# Patient Record
Sex: MALE | Race: WHITE | Employment: OTHER | URBAN - METROPOLITAN AREA
[De-identification: names, ages, dates, MRNs, and addresses within clinical notes are randomized per-mention and may not be internally consistent; named-entity substitution may affect disease eponyms.]

---

## 2017-04-04 PROBLEM — M15.9 PRIMARY OSTEOARTHRITIS INVOLVING MULTIPLE JOINTS: Status: ACTIVE | Noted: 2017-04-04

## 2019-11-26 ENCOUNTER — HOSPITAL ENCOUNTER (OUTPATIENT)
Dept: SURGERY | Age: 79
Discharge: HOME | End: 2019-11-26
Payer: MEDICARE

## 2019-11-26 ENCOUNTER — HOSPITAL ENCOUNTER (OUTPATIENT)
Dept: PHYSICAL THERAPY | Age: 79
Discharge: HOME | End: 2019-11-26
Payer: MEDICARE

## 2019-11-26 ENCOUNTER — HOME HEALTH ADMISSION (OUTPATIENT)
Dept: HOME HEALTH SERVICES | Facility: HOME HEALTH | Age: 79
End: 2019-11-26
Payer: MEDICARE

## 2019-11-26 LAB
ANION GAP SERPL CALC-SCNC: 4 MMOL/L (ref 7–16)
APTT PPP: 30.4 SEC (ref 24.7–39.8)
ATRIAL RATE: 63 BPM
BACTERIA SPEC CULT: NORMAL
BASOPHILS # BLD: 0.1 K/UL (ref 0–0.2)
BASOPHILS NFR BLD: 1 % (ref 0–2)
BUN SERPL-MCNC: 18 MG/DL (ref 8–23)
CALCIUM SERPL-MCNC: 10 MG/DL (ref 8.3–10.4)
CALCULATED P AXIS, ECG09: 45 DEGREES
CALCULATED T AXIS, ECG11: 36 DEGREES
CHLORIDE SERPL-SCNC: 107 MMOL/L (ref 98–107)
CO2 SERPL-SCNC: 29 MMOL/L (ref 21–32)
CREAT SERPL-MCNC: 0.96 MG/DL (ref 0.8–1.5)
DIAGNOSIS, 93000: NORMAL
DIFFERENTIAL METHOD BLD: NORMAL
EOSINOPHIL # BLD: 0.1 K/UL (ref 0–0.8)
EOSINOPHIL NFR BLD: 1 % (ref 0.5–7.8)
ERYTHROCYTE [DISTWIDTH] IN BLOOD BY AUTOMATED COUNT: 14.2 % (ref 11.9–14.6)
EST. AVERAGE GLUCOSE BLD GHB EST-MCNC: 100 MG/DL
GLUCOSE SERPL-MCNC: 85 MG/DL (ref 65–100)
HBA1C MFR BLD: 5.1 %
HCT VFR BLD AUTO: 45.3 % (ref 41.1–50.3)
HGB BLD-MCNC: 14.9 G/DL (ref 13.6–17.2)
IMM GRANULOCYTES # BLD AUTO: 0 K/UL (ref 0–0.5)
IMM GRANULOCYTES NFR BLD AUTO: 0 % (ref 0–5)
INR PPP: 1
LYMPHOCYTES # BLD: 1.4 K/UL (ref 0.5–4.6)
LYMPHOCYTES NFR BLD: 17 % (ref 13–44)
MCH RBC QN AUTO: 29.9 PG (ref 26.1–32.9)
MCHC RBC AUTO-ENTMCNC: 32.9 G/DL (ref 31.4–35)
MCV RBC AUTO: 90.8 FL (ref 79.6–97.8)
MONOCYTES # BLD: 0.8 K/UL (ref 0.1–1.3)
MONOCYTES NFR BLD: 9 % (ref 4–12)
NEUTS SEG # BLD: 6 K/UL (ref 1.7–8.2)
NEUTS SEG NFR BLD: 72 % (ref 43–78)
NRBC # BLD: 0 K/UL (ref 0–0.2)
P-R INTERVAL, ECG05: 192 MS
PLATELET # BLD AUTO: 216 K/UL (ref 150–450)
PMV BLD AUTO: 10.9 FL (ref 9.4–12.3)
POTASSIUM SERPL-SCNC: 4.4 MMOL/L (ref 3.5–5.1)
PROTHROMBIN TIME: 13.4 SEC (ref 11.7–14.5)
Q-T INTERVAL, ECG07: 418 MS
QRS DURATION, ECG06: 134 MS
QTC CALCULATION (BEZET), ECG08: 427 MS
RBC # BLD AUTO: 4.99 M/UL (ref 4.23–5.6)
SERVICE CMNT-IMP: NORMAL
SODIUM SERPL-SCNC: 140 MMOL/L (ref 136–145)
VENTRICULAR RATE, ECG03: 63 BPM
WBC # BLD AUTO: 8.3 K/UL (ref 4.3–11.1)

## 2019-11-26 PROCEDURE — 77030027138 HC INCENT SPIROMETER -A

## 2019-11-26 PROCEDURE — 85730 THROMBOPLASTIN TIME PARTIAL: CPT

## 2019-11-26 PROCEDURE — 80048 BASIC METABOLIC PNL TOTAL CA: CPT

## 2019-11-26 PROCEDURE — 93005 ELECTROCARDIOGRAM TRACING: CPT | Performed by: ANESTHESIOLOGY

## 2019-11-26 PROCEDURE — 87641 MR-STAPH DNA AMP PROBE: CPT

## 2019-11-26 PROCEDURE — 85610 PROTHROMBIN TIME: CPT

## 2019-11-26 PROCEDURE — 36415 COLL VENOUS BLD VENIPUNCTURE: CPT

## 2019-11-26 PROCEDURE — 83036 HEMOGLOBIN GLYCOSYLATED A1C: CPT

## 2019-11-26 PROCEDURE — 97161 PT EVAL LOW COMPLEX 20 MIN: CPT

## 2019-11-26 PROCEDURE — 85025 COMPLETE CBC W/AUTO DIFF WBC: CPT

## 2019-11-26 NOTE — PROGRESS NOTES
SW met with pt in Prehab to discuss Left TKA scheduled for 12/12/19. Pt plans to return home with spouse and HHPT. Pt resides in [facility] and is agreeable to [facility]. [facility] referral completed. Pt states he has a RW, cane and higher toilets. No additional needs or questions identified at this time.    Sander Snowden

## 2019-11-26 NOTE — PERIOP NOTES
Your patient recently had labs drawn during a hospital appointment due to an upcoming surgery. The results are attached. If you have any questions or concerns please reach out to your patient for a follow-up in your office. Please do not respond to this message as my mailbox is not monitored. You may call [phone] with questions or concerns. Recent Results (from the past 12 hour(s))   CBC WITH AUTOMATED DIFF    Collection Time: 11/26/19 11:20 AM   Result Value Ref Range    WBC 8.3 4.3 - 11.1 K/uL    RBC 4.99 4.23 - 5.6 M/uL    HGB 14.9 13.6 - 17.2 g/dL    HCT 45.3 41.1 - 50.3 %    MCV 90.8 79.6 - 97.8 FL    MCH 29.9 26.1 - 32.9 PG    MCHC 32.9 31.4 - 35.0 g/dL    RDW 14.2 11.9 - 14.6 %    PLATELET 866 084 - 885 K/uL    MPV 10.9 9.4 - 12.3 FL    ABSOLUTE NRBC 0.00 0.0 - 0.2 K/uL    DF AUTOMATED      NEUTROPHILS 72 43 - 78 %    LYMPHOCYTES 17 13 - 44 %    MONOCYTES 9 4.0 - 12.0 %    EOSINOPHILS 1 0.5 - 7.8 %    BASOPHILS 1 0.0 - 2.0 %    IMMATURE GRANULOCYTES 0 0.0 - 5.0 %    ABS. NEUTROPHILS 6.0 1.7 - 8.2 K/UL    ABS. LYMPHOCYTES 1.4 0.5 - 4.6 K/UL    ABS. MONOCYTES 0.8 0.1 - 1.3 K/UL    ABS. EOSINOPHILS 0.1 0.0 - 0.8 K/UL    ABS. BASOPHILS 0.1 0.0 - 0.2 K/UL    ABS. IMM.  GRANS. 0.0 0.0 - 0.5 K/UL   HEMOGLOBIN A1C WITH EAG    Collection Time: 11/26/19 11:20 AM   Result Value Ref Range    Hemoglobin A1c 5.1 %    Est. average glucose 498 mg/dL   METABOLIC PANEL, BASIC    Collection Time: 11/26/19 11:20 AM   Result Value Ref Range    Sodium 140 136 - 145 mmol/L    Potassium 4.4 3.5 - 5.1 mmol/L    Chloride 107 98 - 107 mmol/L    CO2 29 21 - 32 mmol/L    Anion gap 4 (L) 7 - 16 mmol/L    Glucose 85 65 - 100 mg/dL    BUN 18 8 - 23 MG/DL    Creatinine 0.96 0.8 - 1.5 MG/DL    GFR est AA >60 >60 ml/min/1.73m2    GFR est non-AA >60 >60 ml/min/1.73m2    Calcium 10.0 8.3 - 10.4 MG/DL   PROTHROMBIN TIME + INR    Collection Time: 11/26/19 11:20 AM   Result Value Ref Range    Prothrombin time 13.4 11.7 - 14.5 sec    INR 1. 0     PTT    Collection Time: 11/26/19 11:20 AM   Result Value Ref Range    aPTT 30.4 24.7 - 39.8 SEC   EKG, 12 LEAD, INITIAL    Collection Time: 11/26/19 12:25 PM   Result Value Ref Range    Ventricular Rate 63 BPM    Atrial Rate 63 BPM    P-R Interval 192 ms    QRS Duration 134 ms    Q-T Interval 418 ms    QTC Calculation (Bezet) 427 ms    Calculated P Axis 45 degrees    Calculated T Axis 36 degrees    Diagnosis       Normal sinus rhythm  Right bundle branch block  Abnormal ECG  No previous ECGs available

## 2019-11-26 NOTE — PERIOP NOTES
PLEASE CONTINUE TAKING ALL PRESCRIPTION MEDICATIONS UP TO THE DAY OF SURGERY. DISCONTINUE all vitamins and supplements 3 weeks prior to surgery. Home Medications to take  the day of surgery      Tylenol if needed           Home Medications   to Hold     Meloxicam - hold for 5 days prior to surgery           Comments                Please do not bring home medications with you on the day of surgery unless otherwise directed by your nurse. If you are instructed to bring home medications, please give them to your nurse as they will be administered by the nursing staff. If you have any questions, please call Bernardo Escobar [phone].

## 2019-11-26 NOTE — PERIOP NOTES
Patient verified name and . Order for consent is found in EHR and matches case posting; patient verified. Type 3 surgery, Joint camp assessment complete. Labs per surgeon: cbc,bmp,pt,ptt,mra swab, HgbA1c ; results (within [facility] protocols)  Labs per anesthesia protocol: included in surgeon's orders. EKG:done today - is within [facility] protocols. MRSA/MSSA swab collected; pharmacy to review and dose antibiotic as appropriate. Hospital approved surgical skin cleanser and instructions to return bottle on DOS given per hospital policy. Patient provided with handouts including Guide to Surgery, Pain Management, Hand Hygiene, Blood Transfusion Education, and [facility] Brochure. Patient answered medical/surgical history questions at their best of ability. All prior to admission medications documented in [facility]. Original medication prescription bottle not visualized during patient appointment. Patient instructed to hold all vitamins 7 days prior to surgery and NSAIDS 5 days prior to surgery. Prescription medications to hold: vitamins and meloxicam    Patient instructed to continue previous medications as prescribed prior to surgery and to take the following medications the day of surgery according to anesthesia guidelines with a small sip of water: Tylenol prn. Patient teach back successful and patient demonstrates knowledge of instruction.

## 2019-11-26 NOTE — PROGRESS NOTES
Owen Campoverde  : 3/0/1953(4 y.o.) [address] at [place] [address].  [phone]       Physical Therapy Prehab Plan of Treatment and Evaluation Summary:2019    ICD-10: Treatment Diagnosis:   · Pain in Left Knee (M25.562)  · Stiffness of Left Knee, Not elsewhere classified (N87.901)  Precautions/Allergies:   Patient has no known allergies. MEDICAL/REFERRING DIAGNOSIS:  Unilateral primary osteoarthritis, left knee [M17.12]  REFERRING PHYSICIAN: Anirudh De La Cruz MD  DATE OF SURGERY: 19    Assessment:   Comments:  H eis here with his spouse. He plans on going home after surgery with her support. He is motivated and  prepared for surgery. He needs to have the R knee replaced next     PROBLEM LIST (Impacting functional limitations):  Mr. Madie Duncan presents with the following left lower extremity(s) problems:  1. Strength  2. Range of Motion  3. Home Exercise Program  4. Pain   INTERVENTIONS PLANNED:  1. Home Exercise Program  2. Educational Discussion      TREATMENT PLAN: Effective Dates: 2019 TO 2019. Frequency/Duration: Patient to continue to perform home exercise program at least twice per day up until his surgery. GOALS: (Goals have been discussed and agreed upon with patient.)  Discharge Goals: Time Frame: 1 Day  1. Patient will demonstrate independence with a home exercise program designed to increase strength, range of motion and pain control to minimize functional deficits and optimize patient for total joint replacement. Rehabilitation Potential For Stated Goals: Good  Regarding Merclopez Cortezjoe Zapata's therapy, I certify that the treatment plan above will be carried out by a therapist or under their direction.   Thank you for this referral,  Donna Calderón, PT               HISTORY:   Present Symptoms:  Pain Intensity 1: 6(at its worst)  Pain Location 1: Knee  Pain Orientation 1: Anterior, Lateral, Left, Medial History of Present Injury/Illness (Reason for Referral):  Medical/Referring Diagnosis: Unilateral primary osteoarthritis, left knee [M17.12]   Past Medical History/Comorbidities:   Mr. Errol Meeks  has a past medical history of HLD (hyperlipidemia) (12/7/2012) and Macular degeneration. Mr. Errol Meeks  has a past surgical history that includes hx colonoscopy (2002) and hx colonoscopy (11-1-13).   Social History/Living Environment:   Home Environment: Private residence  # Steps to Enter: 1  Hand Rails : Left  One/Two Story Residence: One story(2 steps into den with rail)  Living Alone: No  Support Systems: Spouse/Significant Other/Partner  Patient Expects to be Discharged to[de-identified] Private residence  Current DME Used/Available at Home: dominick Collins, [address], [place], straight, Crutches, Commode, bedside  Tub or Shower Type: Tub/Shower combination  Work/Activity:  retired  Dominant Side:  RIGHT  Current Medications:  See Pre-assessment nursing note   Number of Personal Factors/Comorbidities that affect the Plan of Care: 1-2: MODERATE COMPLEXITY   EXAMINATION:   ADLs (Current Functional Status):   Ambulation:  [x] Independent  [] Walk Indoors Only  [] Walk Outdoors  [x] Use Assistive Device  [] Use Wheelchair Only Dressing:  [x] [address] from Someone for:  [] Sock/Shoes  [] Pants  [] Everything   Bathing/Showering:   [x] Independent  [] Requires Assistance from Someone  [] [address]:  [] Routine house and yard work  [] Light Housework Only  [x] None   Observation/Orthostatic Postural Assessment:    Genu varus left, Genu varus right  ROM/Flexibility:   AROM: Within functional limits(R LE)                LLE AROM  L Knee Flexion: 116  L Knee Extension: 10          Strength:   Strength: Generally decreased, functional(R LE 4/5)       LLE Strength  L Hip Flexion: 4  L Knee Extension: 4  L Ankle Dorsiflexion: 4          Functional Mobility:    Sensation: Intact(R LE)    Stand to Sit: Independent  Sit to Stand: Independent  Stand Pivot Transfers: Independent  Distance (ft): 1000 Feet (ft)  Ambulation - Level of Assistance: Modified independent  Assistive Device: Cane, straight  Speed/Marilyn: Pace decreased (<100 feet/min)  Step Length: Right shortened  Stance: Left decreased  Gait Abnormalities: Antalgic          Balance:    Sitting: Intact  Standing: Intact   Body Structures Involved:  1. Bones  2. Joints  3. Muscles Body Functions Affected:  1. Neuromusculoskeletal  2. Movement Related Activities and Participation Affected:  1. General Tasks and Demands  2. Mobility   Number of elements that affect the Plan of Care: 4+: HIGH COMPLEXITY   CLINICAL PRESENTATION:   Presentation: Stable and uncomplicated: LOW COMPLEXITY   CLINICAL DECISION MAKING:   Outcome Measure: Tool Used: Lower Extremity Functional Scale (LEFS)  Score:  Initial: 27/80 Most Recent: X/80 (Date: -- )   Interpretation of Score: 20 questions each scored on a 5 point scale with 0 representing \"extreme difficulty or unable to perform\" and 4 representing \"no difficulty\". The lower the score, the greater the functional disability. 80/80 represents no disability. Minimal detectable change is 9 points. Medical Necessity:   · Mr. Danny Veras is expected to optimize his lower extremity strength and ROM in preparation for joint replacement surgery. Reason for Services/Other Comments:  · Achieve baseline assesment of musculoskeletal system, functional mobility and home environment. , educate in PT HEP in preparation for surgery, educate in hospital plan of care. Use of outcome tool(s) and clinical judgement create a POC that gives a: Clear prediction of patient's progress: LOW COMPLEXITY   TREATMENT:   Treatment/Session Assessment:  Patient was instructed in PT- HEP to increase strength and ROM in LEs. Answered all questions. · Post session pain:  2  · Compliance with Program/Exercises: compliant most of the time.   Total Treatment Duration:  PT Patient Time In/Time Out  Time In: 1030  Time Out: [address], [place]

## 2019-11-27 VITALS
WEIGHT: 213 LBS | DIASTOLIC BLOOD PRESSURE: 82 MMHG | TEMPERATURE: 95.5 F | OXYGEN SATURATION: 98 % | HEIGHT: 70 IN | HEART RATE: 73 BPM | BODY MASS INDEX: 30.49 KG/M2 | SYSTOLIC BLOOD PRESSURE: 129 MMHG

## 2019-11-27 NOTE — PROGRESS NOTES
19 1030   Oxygen Therapy   O2 Sat (%) 97 %   Pulse via Oximetry 81 beats per minute   O2 Device Room air   Pre-Treatment   Breath Sounds Bilateral Clear   Pre FEV1 (liters) 2.5 liters   % Predicted 89   Incentive Spirometry Treatment   Actual Volume (ml) 2500 ml     Initial respiratory Assessment completed with pt. Pt was interviewed and evaluated in Joint camp prior to surgery. Patient ID:  Terri Celaya  [id number]  70 y.o.  1940  Surgeon: Dr. Alphonse Barajas  Date of Surgery: 2019  Procedure: Total Left Knee Arthroplasty  Primary Care Physician: Chata Elmore -[phone]  Specialists:                                  Pt instructed in the use of Incentive Spirometry. Pt instructed to bring Incentive Spirometer back on date of surgery & to start using Is upon return to pt room. Pt taught proper cough technique    History of smokin PPD FOR 15 YEARS                                                      Quit date:       1976    Secondhand smoke:PARENTS      Past procedures with Oxygen desaturation:DENIES    Past Medical History:   Diagnosis Date    HLD (hyperlipidemia) 2012    Macular degeneration     Osteoarthritis of left knee                                                                                                                                                      Respiratory history:DENIES SOB                                                                   Respiratory meds:  DENIES                                       FAMILY PRESENT:            SPOUSE,                                                                                           PAST SLEEP STUDY:                        DENIES  HX OF KENDRA:                                      DENIES                                     KENDRA assessment:                                               SLEEPS ON SIDE                                                        PHYSICAL EXAM   Body mass index is 30.56 kg/m². Visit Vitals  /82   Pulse 73   Temp 95.5 °F (35.3 °C)   Ht 5' 10\" (1.778 m)   Wt 96.6 kg (213 lb)   SpO2 98%   BMI 30.56 kg/m²     Neck circumference:  41    cm    Loud snoring:        YES                                 Witnessed apnea or wakening gasping or choking:,                                                                                                            APNEA    Awakens with headaches:                                                  DENIES    Morning or daytime tiredness/ sleepiness:                                                                                                          TIRED   Dry mouth or sore throat in morning:                                                                                       DENIES    Laws stage:  4    SACS score:14      Stop Bang   STOP-BANG  Does the patient snore loudly (louder than talking or loud enough to be heard through closed doors)?: Yes  Does the patient often feel tired, fatigued, or sleepy during the daytime, even after a \"good\" night's sleep?: Yes  Has anyone ever observed the patient stop breathing during their sleep? : Yes  Does the patient have or are they being treated for high blood pressure?: No  Is the patient's BMI greater than 35?: No  Is your neck circumference greater than 17 inches (Male) or 16 inches (Female)?: No  Is the patient older than 48?: Yes  Is the patient male?: Yes  KENDRA Score: 5  Has the patient been referred to Sleep Medicine?: No  Has the patient previously been diagnosed with Obstructive Sleep Apnea?: No                            CPAP:                       NONE                                                CONT SAT HS            Referrals:  [facility]  Pt.  Phone Number:

## 2019-12-09 NOTE — H&P
H&P    Patient ID:  Rebecca Flores  [id number]  38 y.o.  1940  Surgeon:  Donna Parada MD  Date of Surgery: * No surgery date entered *  Procedure: Left Knee Total Arthroplasty  Primary Care Physician: Elizabeth Cortes MD        Subjective:  Rebecca Flores is a [de-identified] y.o. WHITE OR  male who presents with Left Knee pain. He has history of Left Knee pain for several months. Symptoms worse with walking long distances and relieved with rest. Conservative treatment consisting of  activity modification and injections have not helped. The patient lives with their family. The patients goal after surgery is improved pain and function. Past Medical History:   Diagnosis Date    HLD (hyperlipidemia) 2012    Macular degeneration     Osteoarthritis of left knee       Past Surgical History:   Procedure Laterality Date    HX COLONOSCOPY      HX COLONOSCOPY  13    diverticulosis, internal hemorrhoids. no further screening recommended     No family history on file. Social History     Tobacco Use    Smoking status: Former Smoker     Types: Cigarettes     Last attempt to quit: 1976     Years since quittin.9    Smokeless tobacco: Never Used   Substance Use Topics    Alcohol use: Yes     Alcohol/week: 0.0 standard drinks     Comment: 1weekly       Prior to Admission medications    Medication Sig Start Date End Date Taking? Authorizing Provider   acetaminophen (TYLENOL PO) Take  by mouth as needed. Take / use AM day of surgery  per anesthesia protocols PRN    Provider, Historical   glucos sul 2KCl/msm/chond/C/Mn (GLUCOSAMINE CHONDROITIN PO) Take  by mouth. Provider, Historical   meloxicam (MOBIC) 7.5 mg tablet Take 1-2 Tabs by mouth daily. 19   Elizaebth Cortes MD   vit A/vit C/vit E/zinc/copper (PRESERVISION AREDS PO) Take  by mouth. Provider, Historical   multivitamin (ONE A DAY) tablet Take 1 Tab by mouth daily.     Provider, Historical   MELATONIN PO Take  by mouth. Provider, Historical   aspirin 81 mg chewable tablet Take 81 mg by mouth every evening. Provider, Historical   DOCOSAHEXANOIC ACID/EPA (FISH OIL PO) Take  by mouth. Provider, Historical     No Known Allergies     REVIEW OF SYSTEMS:  CONSTITUTIONAL: Denies fever, decreased appetite, weight loss/gain, night sweats or fatigue. HEENT: Denies vision or hearing changes. denies glasses. denies hearing aids. CARDIAC: Denies CP, palpitations, rheumatic fever, murmur, peripheral edema, carotid artery disease or syncopal episodes. RESPIRATORY: Denies dyspnea on exertion, asthma, COPD or orthopnea. GI: Denies GERD, history of GI bleed or melena, PUD, hepatitis or cirrhosis. : Denies dysuria, hematuria. denies BPH symptoms. HEMATOLOGIC: Denies anemia or blood disorders. ENDOCRINE: Denies thyroid disease. MUSCULOSKELETAL: See HPI. NEUROLOGIC: Denies seizure, peripheral neuropathy or memory loss. PSYCH: Denies depression, anxiety or insomnia. SKIN: Denies rash or open sores. Objective:    PHYSICAL EXAM  GENERAL: No data found. EYES: PERRL. EOM intact. MOUTH:Teeth and Gums normal. NECK: Full ROM. Trachea midline. No thyromegaly or JVD. CARDIOVASCULAR: Regular rate and rhythm. No murmur or gallops. No carotid bruits. Peripheral pulses: radial 2 +, PT 2+, DP 2+ bilaterally. LUNGS: CTA bilaterally. No wheezes, rhonchi or rales. GI: positive BS. Abdomen nontender. NEUROLOGIC: Alert and oriented x 3. Bilateral equal strong had grasp and bilateral equal strong plantar flexion and dorsiflexion. GAIT: abnormal MUSCULOSKELETAL: ROM: full with pain. Tenderness: over the medial and lateral joint lines. Crepitus: present. SKIN: No rash, bruising, swelling, redness or warmth. Labs:  No results found for this or any previous visit (from the past 24 hour(s)). Xray Left Knee: joint space narrowing    Assessment:  Advanced Left Knee Osteoarthritis. Total Left Knee Arthroplasty Indicated.   Patient Active Problem List   Diagnosis Code    HLD (hyperlipidemia) E78.5    Primary osteoarthritis involving multiple joints M15.0       Plan:  I have advised the patient of the risks and consequences, including possible complications of performing total joint replacement, as well as not doing this operation. The patient had the opportunity to ask questions and have them answered to their satisfaction.      Signed:  TRUDY Bullard 12/9/2019

## 2019-12-10 NOTE — ADVANCED PRACTICE NURSE
Total Joint Surgery Preoperative Chart Review      Patient ID:  Malathi Pabon  [id number]  38 y.o.  1940  Surgeon: Dr. Jenny Briones  Date of Surgery: 2019  Procedure: Total Left Knee Arthroplasty  Primary Care Physician: Cecilia Gomez -[phone]  Specialty Physician(s):      Subjective:   Malathi Pabon is a [de-identified] y.o. WHITE OR  male who presents for preoperative evaluation for Total Left Knee arthroplasty. This is a preoperative chart review note based on data collected by the nurse at the surgical Pre-Assessment visit. Past Medical History:   Diagnosis Date    HLD (hyperlipidemia) 2012    Macular degeneration     Osteoarthritis of left knee       Past Surgical History:   Procedure Laterality Date    HX COLONOSCOPY      HX COLONOSCOPY  13    diverticulosis, internal hemorrhoids. no further screening recommended     History reviewed. No pertinent family history. Social History     Tobacco Use    Smoking status: Former Smoker     Types: Cigarettes     Last attempt to quit: 1976     Years since quittin.9    Smokeless tobacco: Never Used   Substance Use Topics    Alcohol use: Yes     Alcohol/week: 0.0 standard drinks     Comment: 1weekly       Prior to Admission medications    Medication Sig Start Date End Date Taking? Authorizing Provider   acetaminophen (TYLENOL PO) Take  by mouth as needed. Take / use AM day of surgery  per anesthesia protocols PRN   Yes Provider, Historical   glucos sul 2KCl/msm/chond/C/Mn (GLUCOSAMINE CHONDROITIN PO) Take  by mouth. Yes Provider, Historical   meloxicam (MOBIC) 7.5 mg tablet Take 1-2 Tabs by mouth daily. 19  Yes Cecilia Gomez MD   vit A/vit C/vit E/zinc/copper (PRESERVISION AREDS PO) Take  by mouth. Yes Provider, Historical   multivitamin (ONE A DAY) tablet Take 1 Tab by mouth daily. Yes Provider, Historical   MELATONIN PO Take  by mouth.    Yes Provider, Historical   aspirin 81 mg chewable tablet Take 81 mg by mouth every evening. Yes Provider, Historical   DOCOSAHEXANOIC ACID/EPA (FISH OIL PO) Take  by mouth. Yes Provider, Historical     No Known Allergies       Objective:     Physical Exam:   No data found. ECG:    EKG Results     Procedure 720 Value Units Date/Time    EKG, 12 LEAD, INITIAL [[id number]] Collected:  11/26/19 1225    Order Status:  Completed Updated:  11/26/19 1342     Ventricular Rate 63 BPM      Atrial Rate 63 BPM      P-R Interval 192 ms      QRS Duration 134 ms      Q-T Interval 418 ms      QTC Calculation (Bezet) 427 ms      Calculated P Axis 45 degrees      Calculated T Axis 36 degrees      Diagnosis --     Normal sinus rhythm  Right bundle branch block  Abnormal ECG  No previous ECGs available  Confirmed by [facility]  MD (), Jelly San ([id number]) on 11/26/2019 1:42:17 PM            Data Review:   Labs:   Results for Lilly Harkins (MRN [id number]) as of 12/10/2019 14:23   Ref. Range 11/26/2019 11:20   Sodium Latest Ref Range: 136 - 145 mmol/L 140   Potassium Latest Ref Range: 3.5 - 5.1 mmol/L 4.4   Chloride Latest Ref Range: 98 - 107 mmol/L 107   CO2 Latest Ref Range: 21 - 32 mmol/L 29   Anion gap Latest Ref Range: 7 - 16 mmol/L 4 (L)   Glucose Latest Ref Range: 65 - 100 mg/dL 85   BUN Latest Ref Range: 8 - 23 MG/DL 18   Creatinine Latest Ref Range: 0.8 - 1.5 MG/DL 0.96   Calcium Latest Ref Range: 8.3 - 10.4 MG/DL 10.0   GFR est non-AA Latest Ref Range: >60 ml/min/1.73m2 >60   GFR est AA Latest Ref Range: >60 ml/min/1.73m2 >60   Hemoglobin A1c, (calculated) Latest Units: % 5.1   Est. average glucose Latest Units: mg/dL 100         Problem List:  )  Patient Active Problem List   Diagnosis Code    HLD (hyperlipidemia) E78.5    Primary osteoarthritis involving multiple joints M15.0       Total Joint Surgery Pre-Assessment Recommendations:           Recommend continuous saturation monitoring hours of sleep, during hospitalization.       Signed By: JAN Paez    December 10, 2019

## 2019-12-11 ENCOUNTER — ANESTHESIA EVENT (OUTPATIENT)
Dept: SURGERY | Age: 79
End: 2019-12-11
Payer: MEDICARE

## 2019-12-12 ENCOUNTER — HOSPITAL ENCOUNTER (OUTPATIENT)
Age: 79
Discharge: HOME HEALTH | End: 2019-12-13
Attending: ORTHOPAEDIC SURGERY | Admitting: ORTHOPAEDIC SURGERY
Payer: MEDICARE

## 2019-12-12 ENCOUNTER — ANESTHESIA (OUTPATIENT)
Dept: SURGERY | Age: 79
End: 2019-12-12
Payer: MEDICARE

## 2019-12-12 DIAGNOSIS — Z96.652 TOTAL KNEE REPLACEMENT STATUS, LEFT: Primary | ICD-10-CM

## 2019-12-12 PROBLEM — M17.12 ARTHRITIS OF KNEE, LEFT: Status: ACTIVE | Noted: 2019-12-12

## 2019-12-12 LAB
GLUCOSE BLD STRIP.AUTO-MCNC: 91 MG/DL (ref 65–100)
HGB BLD-MCNC: 13.1 G/DL (ref 13.6–17.2)

## 2019-12-12 PROCEDURE — 76010000171 HC OR TIME 2 TO 2.5 HR INTENSV-TIER 1: Performed by: ORTHOPAEDIC SURGERY

## 2019-12-12 PROCEDURE — 74011250636 HC RX REV CODE- 250/636: Performed by: ANESTHESIOLOGY

## 2019-12-12 PROCEDURE — 77030013708 HC HNDPC SUC IRR PULS STRY –B: Performed by: ORTHOPAEDIC SURGERY

## 2019-12-12 PROCEDURE — 36415 COLL VENOUS BLD VENIPUNCTURE: CPT

## 2019-12-12 PROCEDURE — 74011000250 HC RX REV CODE- 250: Performed by: NURSE ANESTHETIST, CERTIFIED REGISTERED

## 2019-12-12 PROCEDURE — 85018 HEMOGLOBIN: CPT

## 2019-12-12 PROCEDURE — 74011250637 HC RX REV CODE- 250/637: Performed by: ORTHOPAEDIC SURGERY

## 2019-12-12 PROCEDURE — 77030006835 HC BLD SAW SAG STRY -B: Performed by: ORTHOPAEDIC SURGERY

## 2019-12-12 PROCEDURE — 74011000258 HC RX REV CODE- 258: Performed by: ORTHOPAEDIC SURGERY

## 2019-12-12 PROCEDURE — 74011250636 HC RX REV CODE- 250/636: Performed by: NURSE ANESTHETIST, CERTIFIED REGISTERED

## 2019-12-12 PROCEDURE — 74011250636 HC RX REV CODE- 250/636: Performed by: ORTHOPAEDIC SURGERY

## 2019-12-12 PROCEDURE — 94762 N-INVAS EAR/PLS OXIMTRY CONT: CPT

## 2019-12-12 PROCEDURE — 77030012935 HC DRSG AQUACEL BMS -B: Performed by: ORTHOPAEDIC SURGERY

## 2019-12-12 PROCEDURE — 77030007880 HC KT SPN EPDRL BBMI -B: Performed by: ANESTHESIOLOGY

## 2019-12-12 PROCEDURE — 77030020256 HC SOL INJ NACL 0.9%  500ML: Performed by: ORTHOPAEDIC SURGERY

## 2019-12-12 PROCEDURE — 65270000029 HC RM PRIVATE

## 2019-12-12 PROCEDURE — 77030003665 HC NDL SPN BBMI -A: Performed by: ANESTHESIOLOGY

## 2019-12-12 PROCEDURE — 97535 SELF CARE MNGMENT TRAINING: CPT

## 2019-12-12 PROCEDURE — 77030040922 HC BLNKT HYPOTHRM STRY -A: Performed by: ANESTHESIOLOGY

## 2019-12-12 PROCEDURE — 74011000250 HC RX REV CODE- 250: Performed by: ORTHOPAEDIC SURGERY

## 2019-12-12 PROCEDURE — 97110 THERAPEUTIC EXERCISES: CPT

## 2019-12-12 PROCEDURE — 77030013819 HC MX SYS CEM ZIMM -B: Performed by: ORTHOPAEDIC SURGERY

## 2019-12-12 PROCEDURE — C1776 JOINT DEVICE (IMPLANTABLE): HCPCS | Performed by: ORTHOPAEDIC SURGERY

## 2019-12-12 PROCEDURE — 97165 OT EVAL LOW COMPLEX 30 MIN: CPT

## 2019-12-12 PROCEDURE — 77030006720 HC BLD PAT RMR ZIMM -B: Performed by: ORTHOPAEDIC SURGERY

## 2019-12-12 PROCEDURE — 94760 N-INVAS EAR/PLS OXIMETRY 1: CPT

## 2019-12-12 PROCEDURE — 77030037714 HC CLOSR DEV INCIS ZIP STRY -C: Performed by: ORTHOPAEDIC SURGERY

## 2019-12-12 PROCEDURE — 76060000035 HC ANESTHESIA 2 TO 2.5 HR: Performed by: ORTHOPAEDIC SURGERY

## 2019-12-12 PROCEDURE — 76942 ECHO GUIDE FOR BIOPSY: CPT | Performed by: ORTHOPAEDIC SURGERY

## 2019-12-12 PROCEDURE — 77030003602 HC NDL NRV BLK BBMI -B: Performed by: ANESTHESIOLOGY

## 2019-12-12 PROCEDURE — 82962 GLUCOSE BLOOD TEST: CPT

## 2019-12-12 PROCEDURE — 76210000006 HC OR PH I REC 0.5 TO 1 HR: Performed by: ORTHOPAEDIC SURGERY

## 2019-12-12 PROCEDURE — 77030033067 HC SUT PDO STRATFX SPIR J&J -B: Performed by: ORTHOPAEDIC SURGERY

## 2019-12-12 PROCEDURE — 76010010054 HC POST OP PAIN BLOCK: Performed by: ORTHOPAEDIC SURGERY

## 2019-12-12 PROCEDURE — 64450 NJX AA&/STRD OTHER PN/BRANCH: CPT

## 2019-12-12 PROCEDURE — 74011250637 HC RX REV CODE- 250/637: Performed by: ANESTHESIOLOGY

## 2019-12-12 PROCEDURE — 97161 PT EVAL LOW COMPLEX 20 MIN: CPT

## 2019-12-12 PROCEDURE — 77030037715 HC DRSG ZIP STRY -B: Performed by: ORTHOPAEDIC SURGERY

## 2019-12-12 PROCEDURE — 77030019557 HC ELECTRD VES SEAL MEDT -F: Performed by: ORTHOPAEDIC SURGERY

## 2019-12-12 PROCEDURE — 77030031139 HC SUT VCRL2 J&J -A: Performed by: ORTHOPAEDIC SURGERY

## 2019-12-12 PROCEDURE — 77030018836 HC SOL IRR NACL ICUM -A: Performed by: ORTHOPAEDIC SURGERY

## 2019-12-12 PROCEDURE — C1713 ANCHOR/SCREW BN/BN,TIS/BN: HCPCS | Performed by: ORTHOPAEDIC SURGERY

## 2019-12-12 DEVICE — ATTUNE PATELLA MEDIALIZED ANATOMIC 38MM CEMENTED AOX
Type: IMPLANTABLE DEVICE | Site: KNEE | Status: FUNCTIONAL
Brand: ATTUNE

## 2019-12-12 DEVICE — COMPONENT TIB SZ 6 CEM BASE ROT PLATFRM KNEE SYS ATTUNE: Type: IMPLANTABLE DEVICE | Site: KNEE | Status: FUNCTIONAL

## 2019-12-12 DEVICE — CEMENT BNE SIMPLEX W/O GENT -- PK/10 ONLY: Type: IMPLANTABLE DEVICE | Site: KNEE | Status: FUNCTIONAL

## 2019-12-12 DEVICE — ATTUNE KNEE SYSTEM TIBIAL INSERT ROTATING PLATFORM POSTERIOR STABILIZED 6 7MM AOX
Type: IMPLANTABLE DEVICE | Site: KNEE | Status: FUNCTIONAL
Brand: ATTUNE

## 2019-12-12 DEVICE — ATTUNE KNEE SYSTEM FEMORAL POSTERIOR STABILIZED SIZE 6 LEFT CEMENTED
Type: IMPLANTABLE DEVICE | Site: KNEE | Status: FUNCTIONAL
Brand: ATTUNE

## 2019-12-12 RX ORDER — PROPOFOL 10 MG/ML
INJECTION, EMULSION INTRAVENOUS
Status: DISCONTINUED | OUTPATIENT
Start: 2019-12-12 | End: 2019-12-12 | Stop reason: HOSPADM

## 2019-12-12 RX ORDER — PROMETHAZINE HYDROCHLORIDE 25 MG/1
25 TABLET ORAL
Status: DISCONTINUED | OUTPATIENT
Start: 2019-12-12 | End: 2019-12-13 | Stop reason: HOSPADM

## 2019-12-12 RX ORDER — ASPIRIN 81 MG/1
81 TABLET ORAL EVERY 12 HOURS
Status: DISCONTINUED | OUTPATIENT
Start: 2019-12-12 | End: 2019-12-13 | Stop reason: HOSPADM

## 2019-12-12 RX ORDER — NALOXONE HYDROCHLORIDE 0.4 MG/ML
.2-.4 INJECTION, SOLUTION INTRAMUSCULAR; INTRAVENOUS; SUBCUTANEOUS
Status: DISCONTINUED | OUTPATIENT
Start: 2019-12-12 | End: 2019-12-13 | Stop reason: HOSPADM

## 2019-12-12 RX ORDER — ACETAMINOPHEN 500 MG
1000 TABLET ORAL EVERY 6 HOURS
Status: DISCONTINUED | OUTPATIENT
Start: 2019-12-13 | End: 2019-12-13 | Stop reason: HOSPADM

## 2019-12-12 RX ORDER — ONDANSETRON 2 MG/ML
INJECTION INTRAMUSCULAR; INTRAVENOUS AS NEEDED
Status: DISCONTINUED | OUTPATIENT
Start: 2019-12-12 | End: 2019-12-12 | Stop reason: HOSPADM

## 2019-12-12 RX ORDER — ONDANSETRON 4 MG/1
8 TABLET, ORALLY DISINTEGRATING ORAL
Status: DISCONTINUED | OUTPATIENT
Start: 2019-12-12 | End: 2019-12-13 | Stop reason: HOSPADM

## 2019-12-12 RX ORDER — CELECOXIB 200 MG/1
200 CAPSULE ORAL ONCE
Status: COMPLETED | OUTPATIENT
Start: 2019-12-12 | End: 2019-12-12

## 2019-12-12 RX ORDER — LIDOCAINE HYDROCHLORIDE 10 MG/ML
0.1 INJECTION INFILTRATION; PERINEURAL AS NEEDED
Status: DISCONTINUED | OUTPATIENT
Start: 2019-12-12 | End: 2019-12-12 | Stop reason: HOSPADM

## 2019-12-12 RX ORDER — DIPHENHYDRAMINE HCL 25 MG
25 CAPSULE ORAL
Status: DISCONTINUED | OUTPATIENT
Start: 2019-12-12 | End: 2019-12-13 | Stop reason: HOSPADM

## 2019-12-12 RX ORDER — FENTANYL CITRATE 50 UG/ML
100 INJECTION, SOLUTION INTRAMUSCULAR; INTRAVENOUS ONCE
Status: DISCONTINUED | OUTPATIENT
Start: 2019-12-12 | End: 2019-12-12 | Stop reason: HOSPADM

## 2019-12-12 RX ORDER — DEXAMETHASONE SODIUM PHOSPHATE 4 MG/ML
INJECTION, SOLUTION INTRA-ARTICULAR; INTRALESIONAL; INTRAMUSCULAR; INTRAVENOUS; SOFT TISSUE
Status: COMPLETED | OUTPATIENT
Start: 2019-12-12 | End: 2019-12-12

## 2019-12-12 RX ORDER — OXYCODONE HYDROCHLORIDE 5 MG/1
5 TABLET ORAL
Status: DISCONTINUED | OUTPATIENT
Start: 2019-12-12 | End: 2019-12-12 | Stop reason: HOSPADM

## 2019-12-12 RX ORDER — SODIUM CHLORIDE 0.9 % (FLUSH) 0.9 %
5-40 SYRINGE (ML) INJECTION EVERY 8 HOURS
Status: DISCONTINUED | OUTPATIENT
Start: 2019-12-12 | End: 2019-12-13 | Stop reason: HOSPADM

## 2019-12-12 RX ORDER — SODIUM CHLORIDE, SODIUM LACTATE, POTASSIUM CHLORIDE, CALCIUM CHLORIDE 600; 310; 30; 20 MG/100ML; MG/100ML; MG/100ML; MG/100ML
100 INJECTION, SOLUTION INTRAVENOUS CONTINUOUS
Status: DISCONTINUED | OUTPATIENT
Start: 2019-12-12 | End: 2019-12-12 | Stop reason: HOSPADM

## 2019-12-12 RX ORDER — BACITRACIN 50000 [IU]/1
INJECTION, POWDER, FOR SOLUTION INTRAMUSCULAR AS NEEDED
Status: DISCONTINUED | OUTPATIENT
Start: 2019-12-12 | End: 2019-12-12 | Stop reason: HOSPADM

## 2019-12-12 RX ORDER — MIDAZOLAM HYDROCHLORIDE 1 MG/ML
2 INJECTION, SOLUTION INTRAMUSCULAR; INTRAVENOUS ONCE
Status: DISCONTINUED | OUTPATIENT
Start: 2019-12-12 | End: 2019-12-12 | Stop reason: HOSPADM

## 2019-12-12 RX ORDER — HYDROMORPHONE HYDROCHLORIDE 1 MG/ML
1 INJECTION, SOLUTION INTRAMUSCULAR; INTRAVENOUS; SUBCUTANEOUS
Status: DISCONTINUED | OUTPATIENT
Start: 2019-12-12 | End: 2019-12-13 | Stop reason: HOSPADM

## 2019-12-12 RX ORDER — DIPHENHYDRAMINE HYDROCHLORIDE 50 MG/ML
12.5 INJECTION, SOLUTION INTRAMUSCULAR; INTRAVENOUS
Status: DISCONTINUED | OUTPATIENT
Start: 2019-12-12 | End: 2019-12-12 | Stop reason: HOSPADM

## 2019-12-12 RX ORDER — TRANEXAMIC ACID 100 MG/ML
INJECTION, SOLUTION INTRAVENOUS AS NEEDED
Status: DISCONTINUED | OUTPATIENT
Start: 2019-12-12 | End: 2019-12-12 | Stop reason: HOSPADM

## 2019-12-12 RX ORDER — ALBUTEROL SULFATE 0.83 MG/ML
2.5 SOLUTION RESPIRATORY (INHALATION) AS NEEDED
Status: DISCONTINUED | OUTPATIENT
Start: 2019-12-12 | End: 2019-12-12 | Stop reason: HOSPADM

## 2019-12-12 RX ORDER — AMOXICILLIN 250 MG
2 CAPSULE ORAL DAILY
Status: DISCONTINUED | OUTPATIENT
Start: 2019-12-13 | End: 2019-12-13 | Stop reason: HOSPADM

## 2019-12-12 RX ORDER — SODIUM CHLORIDE 0.9 % (FLUSH) 0.9 %
5-40 SYRINGE (ML) INJECTION AS NEEDED
Status: DISCONTINUED | OUTPATIENT
Start: 2019-12-12 | End: 2019-12-13 | Stop reason: HOSPADM

## 2019-12-12 RX ORDER — FENTANYL CITRATE 50 UG/ML
INJECTION, SOLUTION INTRAMUSCULAR; INTRAVENOUS AS NEEDED
Status: DISCONTINUED | OUTPATIENT
Start: 2019-12-12 | End: 2019-12-12 | Stop reason: HOSPADM

## 2019-12-12 RX ORDER — DEXAMETHASONE SODIUM PHOSPHATE 100 MG/10ML
10 INJECTION INTRAMUSCULAR; INTRAVENOUS ONCE
Status: DISCONTINUED | OUTPATIENT
Start: 2019-12-13 | End: 2019-12-13 | Stop reason: HOSPADM

## 2019-12-12 RX ORDER — ONDANSETRON 2 MG/ML
4 INJECTION INTRAMUSCULAR; INTRAVENOUS ONCE
Status: DISCONTINUED | OUTPATIENT
Start: 2019-12-12 | End: 2019-12-12 | Stop reason: HOSPADM

## 2019-12-12 RX ORDER — HYDROMORPHONE HYDROCHLORIDE 2 MG/ML
0.5 INJECTION, SOLUTION INTRAMUSCULAR; INTRAVENOUS; SUBCUTANEOUS
Status: DISCONTINUED | OUTPATIENT
Start: 2019-12-12 | End: 2019-12-12 | Stop reason: HOSPADM

## 2019-12-12 RX ORDER — CEFAZOLIN SODIUM/WATER 2 G/20 ML
2 SYRINGE (ML) INTRAVENOUS ONCE
Status: COMPLETED | OUTPATIENT
Start: 2019-12-12 | End: 2019-12-12

## 2019-12-12 RX ORDER — DEXAMETHASONE SODIUM PHOSPHATE 4 MG/ML
INJECTION, SOLUTION INTRA-ARTICULAR; INTRALESIONAL; INTRAMUSCULAR; INTRAVENOUS; SOFT TISSUE AS NEEDED
Status: DISCONTINUED | OUTPATIENT
Start: 2019-12-12 | End: 2019-12-12 | Stop reason: HOSPADM

## 2019-12-12 RX ORDER — CELECOXIB 200 MG/1
200 CAPSULE ORAL EVERY 12 HOURS
Status: DISCONTINUED | OUTPATIENT
Start: 2019-12-12 | End: 2019-12-13 | Stop reason: HOSPADM

## 2019-12-12 RX ORDER — ROPIVACAINE HYDROCHLORIDE 2 MG/ML
INJECTION, SOLUTION EPIDURAL; INFILTRATION; PERINEURAL AS NEEDED
Status: DISCONTINUED | OUTPATIENT
Start: 2019-12-12 | End: 2019-12-12 | Stop reason: HOSPADM

## 2019-12-12 RX ORDER — SODIUM CHLORIDE 9 MG/ML
100 INJECTION, SOLUTION INTRAVENOUS CONTINUOUS
Status: DISCONTINUED | OUTPATIENT
Start: 2019-12-12 | End: 2019-12-13 | Stop reason: HOSPADM

## 2019-12-12 RX ORDER — MIDAZOLAM HYDROCHLORIDE 1 MG/ML
2 INJECTION, SOLUTION INTRAMUSCULAR; INTRAVENOUS
Status: DISCONTINUED | OUTPATIENT
Start: 2019-12-12 | End: 2019-12-12 | Stop reason: HOSPADM

## 2019-12-12 RX ORDER — OXYCODONE HYDROCHLORIDE 5 MG/1
5-10 TABLET ORAL
Status: DISCONTINUED | OUTPATIENT
Start: 2019-12-12 | End: 2019-12-13 | Stop reason: HOSPADM

## 2019-12-12 RX ORDER — ROPIVACAINE HYDROCHLORIDE 2 MG/ML
INJECTION, SOLUTION EPIDURAL; INFILTRATION; PERINEURAL
Status: COMPLETED | OUTPATIENT
Start: 2019-12-12 | End: 2019-12-12

## 2019-12-12 RX ORDER — ACETAMINOPHEN 500 MG
1000 TABLET ORAL ONCE
Status: COMPLETED | OUTPATIENT
Start: 2019-12-12 | End: 2019-12-12

## 2019-12-12 RX ORDER — NALOXONE HYDROCHLORIDE 0.4 MG/ML
0.1 INJECTION, SOLUTION INTRAMUSCULAR; INTRAVENOUS; SUBCUTANEOUS AS NEEDED
Status: DISCONTINUED | OUTPATIENT
Start: 2019-12-12 | End: 2019-12-12 | Stop reason: HOSPADM

## 2019-12-12 RX ORDER — KETOROLAC TROMETHAMINE 30 MG/ML
INJECTION, SOLUTION INTRAMUSCULAR; INTRAVENOUS AS NEEDED
Status: DISCONTINUED | OUTPATIENT
Start: 2019-12-12 | End: 2019-12-12 | Stop reason: HOSPADM

## 2019-12-12 RX ORDER — OXYCODONE HYDROCHLORIDE 5 MG/1
10 TABLET ORAL
Status: DISCONTINUED | OUTPATIENT
Start: 2019-12-12 | End: 2019-12-12 | Stop reason: HOSPADM

## 2019-12-12 RX ORDER — CEFAZOLIN SODIUM/WATER 2 G/20 ML
2 SYRINGE (ML) INTRAVENOUS EVERY 8 HOURS
Status: COMPLETED | OUTPATIENT
Start: 2019-12-12 | End: 2019-12-13

## 2019-12-12 RX ORDER — ACETAMINOPHEN 10 MG/ML
1000 INJECTION, SOLUTION INTRAVENOUS ONCE
Status: COMPLETED | OUTPATIENT
Start: 2019-12-12 | End: 2019-12-12

## 2019-12-12 RX ADMIN — Medication 5 ML: at 19:31

## 2019-12-12 RX ADMIN — CELECOXIB 200 MG: 200 CAPSULE ORAL at 09:24

## 2019-12-12 RX ADMIN — ACETAMINOPHEN 1000 MG: 10 INJECTION, SOLUTION INTRAVENOUS at 17:25

## 2019-12-12 RX ADMIN — FENTANYL CITRATE 50 MCG: 50 INJECTION INTRAMUSCULAR; INTRAVENOUS at 10:00

## 2019-12-12 RX ADMIN — ACETAMINOPHEN 1000 MG: 500 TABLET, FILM COATED ORAL at 23:35

## 2019-12-12 RX ADMIN — DEXAMETHASONE SODIUM PHOSPHATE 10 MG: 4 INJECTION, SOLUTION INTRAMUSCULAR; INTRAVENOUS at 11:43

## 2019-12-12 RX ADMIN — Medication 2 G: at 11:34

## 2019-12-12 RX ADMIN — FENTANYL CITRATE 25 MCG: 50 INJECTION INTRAMUSCULAR; INTRAVENOUS at 12:47

## 2019-12-12 RX ADMIN — FENTANYL CITRATE 25 MCG: 50 INJECTION INTRAMUSCULAR; INTRAVENOUS at 12:30

## 2019-12-12 RX ADMIN — OXYCODONE HYDROCHLORIDE 10 MG: 5 TABLET ORAL at 23:36

## 2019-12-12 RX ADMIN — MEPIVACAINE HYDROCHLORIDE 3 ML: 15 INJECTION, SOLUTION EPIDURAL; INFILTRATION at 11:41

## 2019-12-12 RX ADMIN — FENTANYL CITRATE 25 MCG: 50 INJECTION INTRAMUSCULAR; INTRAVENOUS at 12:07

## 2019-12-12 RX ADMIN — Medication 2 G: at 19:30

## 2019-12-12 RX ADMIN — DEXAMETHASONE SODIUM PHOSPHATE 5 MG: 4 INJECTION, SOLUTION INTRAMUSCULAR; INTRAVENOUS at 10:59

## 2019-12-12 RX ADMIN — FENTANYL CITRATE 25 MCG: 50 INJECTION INTRAMUSCULAR; INTRAVENOUS at 13:14

## 2019-12-12 RX ADMIN — CELECOXIB 200 MG: 200 CAPSULE ORAL at 20:38

## 2019-12-12 RX ADMIN — ASPIRIN 81 MG: 81 TABLET ORAL at 20:38

## 2019-12-12 RX ADMIN — PROPOFOL 25 MCG/KG/MIN: 10 INJECTION, EMULSION INTRAVENOUS at 11:43

## 2019-12-12 RX ADMIN — OXYCODONE HYDROCHLORIDE 10 MG: 5 TABLET ORAL at 15:00

## 2019-12-12 RX ADMIN — OXYCODONE HYDROCHLORIDE 10 MG: 5 TABLET ORAL at 19:30

## 2019-12-12 RX ADMIN — Medication 3 AMPULE: at 09:23

## 2019-12-12 RX ADMIN — MIDAZOLAM 1 MG: 1 INJECTION INTRAMUSCULAR; INTRAVENOUS at 10:56

## 2019-12-12 RX ADMIN — SODIUM CHLORIDE, SODIUM LACTATE, POTASSIUM CHLORIDE, AND CALCIUM CHLORIDE 100 ML/HR: 600; 310; 30; 20 INJECTION, SOLUTION INTRAVENOUS at 09:24

## 2019-12-12 RX ADMIN — ACETAMINOPHEN 1000 MG: 500 TABLET, FILM COATED ORAL at 09:23

## 2019-12-12 RX ADMIN — TRANEXAMIC ACID 1000 MG: 100 INJECTION, SOLUTION INTRAVENOUS at 11:43

## 2019-12-12 RX ADMIN — ONDANSETRON 4 MG: 2 INJECTION INTRAMUSCULAR; INTRAVENOUS at 11:43

## 2019-12-12 RX ADMIN — ROPIVACAINE HYDROCHLORIDE 20 MG: 2 INJECTION, SOLUTION EPIDURAL; INFILTRATION at 10:59

## 2019-12-12 RX ADMIN — Medication 1 AMPULE: at 20:38

## 2019-12-12 RX ADMIN — SODIUM CHLORIDE, SODIUM LACTATE, POTASSIUM CHLORIDE, AND CALCIUM CHLORIDE: 600; 310; 30; 20 INJECTION, SOLUTION INTRAVENOUS at 11:34

## 2019-12-12 NOTE — ANESTHESIA PROCEDURE NOTES
Peripheral Block    Start time: 12/12/2019 10:56 AM  End time: 12/12/2019 10:59 AM  Performed by: Josue Sanchez MD  Authorized by: Josue Sanchez MD       Pre-procedure: Indications: at surgeon's request and post-op pain management    Preanesthetic Checklist: patient identified, risks and benefits discussed, site marked, timeout performed, anesthesia consent given and patient being monitored    Timeout Time: 10:56          Block Type:   Block Type:   Adductor canal  Laterality:  Left  Monitoring:  Standard ASA monitoring, responsive to questions, continuous pulse ox, oxygen, frequent vital sign checks and heart rate  Injection Technique:  Single shot  Procedures: ultrasound guided    Patient Position: supine  Prep: chlorhexidine    Location:  Mid thigh  Needle Type:  Stimuplex  Needle Gauge:  22 G  Needle Localization:  Ultrasound guidance    Assessment:  Number of attempts:  1  Injection Assessment:  Incremental injection every 5 mL, negative aspiration for CSF, ultrasound image on chart, no paresthesia, local visualized surrounding nerve on ultrasound, negative aspiration for blood and no intravascular symptoms  Patient tolerance:  Patient tolerated the procedure well with no immediate complications

## 2019-12-12 NOTE — PROGRESS NOTES
Problem: Mobility Impaired (Adult and Pediatric)  Goal: *Acute Goals and Plan of Care (Insert Text)  Description  GOALS (1-4 days):  (1.)Mr. Reena Solis will move from supine to sit and sit to supine  in bed with STAND BY ASSIST.  (2.)Mr. Reena Solis will transfer from bed to chair and chair to bed with STAND BY ASSIST using the least restrictive device. (3.)Mr. Zapata will ambulate with STAND BY ASSIST for 150 feet with the least restrictive device. (4.)Mr. Zapata will ambulate up/down 2 steps with right railing with MINIMAL ASSIST with device as needed. (5.)Mr. Reena Solis will increase left knee ROM to 5°-80°.  ________________________________________________________________________________________________     Outcome: Progressing Towards Goal     PHYSICAL THERAPY JOINT CAMP TKA: Initial Assessment and PM 12/12/2019  INPATIENT: Hospital Day: 1  [place] / [place] / Product Type: Medicare /      NAME/AGE/GENDER: Khang Purcell is a [de-identified] y.o. male   PRIMARY DIAGNOSIS:  Unilateral primary osteoarthritis, left knee [M17.12]   Procedure(s) and Anesthesia Type:     * KNEE ARTHROPLASTY TOTAL/ LEFT - Spinal (Left)  ICD-10: Treatment Diagnosis:    · Pain in Left Knee (M25.562)  · Stiffness of Left Knee, Not elsewhere classified (M25.662)  · Difficulty in walking, Not elsewhere classified (R26.2)      ASSESSMENT:     Mr. Reena Solis presents with decreased rom and strength of left LE as well as decreased functional mobility and gait s/p left tka. He plans to go home with HHPT. This section established at most recent assessment   PROBLEM LIST (Impairments causing functional limitations):  1. Decreased Strength  2. Decreased ADL/Functional Activities  3. Decreased Transfer Abilities  4. Decreased Ambulation Ability/Technique  5. Decreased Balance  6. Increased Pain  7. Decreased Activity Tolerance  8. Decreased Flexibility/Joint Mobility  9.  Decreased [place] with Home Exercise Program INTERVENTIONS PLANNED: (Benefits and precautions of physical therapy have been discussed with the patient.)  1. bed mobility  2. gait training  3. home exercise program (HEP)  4. Range of Motion: active/assisted/passive  5. Therapeutic Activities  6. therapeutic exercise/strengthening  7. transfer training  8. Group Therapy     TREATMENT PLAN: Frequency/Duration: Follow patient BID for duration of hospital stay to address above goals. Rehabilitation Potential For Stated Goals: Good     RECOMMENDED REHABILITATION/EQUIPMENT: (at time of discharge pending progress): Continue Skilled Therapy and Home Health: Physical Therapy. HISTORY:   History of Present Injury/Illness (Reason for Referral):  S/p left tka  Past Medical History/Comorbidities:   Mr. Don Quiroga  has a past medical history of HLD (hyperlipidemia) (12/7/2012), Macular degeneration, and Osteoarthritis of left knee. Mr. Don Quiroga  has a past surgical history that includes hx colonoscopy (2002) and hx colonoscopy (11-1-13). Social History/Living Environment:   Home Environment: Private residence  One/Two Story Residence: One story  Living Alone: No  Support Systems: Family member(s), Friends \ neighbors  Patient Expects to be Discharged to[de-identified] Unknown  Current DME Used/Available at Home: None  Prior Level of Function/Work/Activity:  Independent, cane with gait   Number of Personal Factors/Comorbidities that affect the Plan of Care: 1-2: MODERATE COMPLEXITY   EXAMINATION:   Most Recent Physical Functioning:      Gross Assessment  AROM: Within functional limits(right LE)  Strength: Generally decreased, functional(right LE)          LLE AROM  L Knee Flexion: 60  L Knee Extension: 10          Bed Mobility  Supine to Sit: Minimum assistance    Transfers  Sit to Stand: Minimum assistance  Stand to Sit: Minimum assistance  Bed to Chair: Minimum assistance    Balance  Sitting: Intact  Standing: Pull to stand; With support              Weight Bearing Status  Left Side Weight Bearing: As tolerated  Distance (ft): 25 Feet (ft)  Ambulation - Level of Assistance: Contact guard assistance  Assistive Device: Walker, rolling  Speed/Marilyn: Delayed  Step Length: Left shortened;Right shortened  Stance: Left decreased  Gait Abnormalities: Antalgic  Interventions: Safety awareness training;Verbal cues     Braces/Orthotics:     Left Knee Cold  Type: Cryocuff      Body Structures Involved:  1. Bones  2. Joints  3. Muscles  4. Ligaments Body Functions Affected:  1. Movement Related Activities and Participation Affected:  1. Mobility   Number of elements that affect the Plan of Care: 3: MODERATE COMPLEXITY   CLINICAL PRESENTATION:   Presentation: Stable and uncomplicated: LOW COMPLEXITY   CLINICAL DECISION MAKIN[address] [place] [id number] AM-PAC 6 Clicks   Basic Mobility Inpatient Short Form  How much difficulty does the patient currently have. .. Unable A Lot A Little None   1. Turning over in bed (including adjusting bedclothes, sheets and blankets)? [] 1   [] 2   [x] 3   [] 4   2. Sitting down on and standing up from a chair with arms ( e.g., wheelchair, bedside commode, etc.)   [] 1   [] 2   [x] 3   [] 4   3. Moving from lying on back to sitting on the side of the bed? [] 1   [] 2   [x] 3   [] 4   How much help from another person does the patient currently need. .. Total A Lot A Little None   4. Moving to and from a bed to a chair (including a wheelchair)? [] 1   [] 2   [x] 3   [] 4   5. Need to walk in hospital room? [] 1   [] 2   [x] 3   [] 4   6. Climbing 3-5 steps with a railing? [] 1   [] 2   [x] 3   [] 4   © , Trustees of [address] [place] [address] [address] [address], under license to [employer]. All rights reserved     Score:  Initial: 18 Most Recent: X (Date: -- )    Interpretation of Tool:  Represents activities that are increasingly more difficult (i.e. Bed mobility, Transfers, Gait).     Medical Necessity:     · Patient is expected to demonstrate progress in   · strength, range of motion, and balance  ·  to   · decrease assistance required with exercises and functional mobility   · . Reason for Services/Other Comments:  · Patient   · continues to require present interventions due to patient's inability to perform exercises and functional mobility independently   · . Use of outcome tool(s) and clinical judgement create a POC that gives a: Clear prediction of patient's progress: LOW COMPLEXITY            TREATMENT:   (In addition to Assessment/Re-Assessment sessions the following treatments were rendered)     Pre-treatment Symptoms/Complaints:    Pain Initial: knee pain     Post Session:  no complaints     Therapeutic Exercise: (10 Minutes):  Exercises per grid below to improve mobility and strength. Required minimal visual, verbal, and manual cues to promote proper body alignment, promote proper body posture, and promote proper body mechanics. Progressed range and repetitions as indicated. Date:  12/12 Date:   Date:     ACTIVITY/EXERCISE AM PM AM PM AM PM   GROUP THERAPY  []  []  []  []  []  []   Ankle Pumps  10a       Quad Sets  10a       Gluteal Sets  10a       Hip ABd/ADduction  10a       Straight Leg Raises         Knee Slides  10a       Short Arc Quads         Long Arc Quads         Chair Slides                  B = bilateral; AA = active assistive; A = active; P = passive      Treatment/Session Assessment:     Response to Treatment:  Pt. Did well. Education:  [x] Home Exercises  [x] Fall Precautions  []  [] D/C Instruction Review  [x] Knee Prosthesis Review  [x] Walker Management/Safety [] Adaptive Equipment as Needed       Interdisciplinary Collaboration:   o Occupational Therapist  o Registered Nurse    After treatment position/precautions:   o Up in chair  o Bed/Chair-wheels locked  o Bed in low position  o Caregiver at bedside  o Call light within reach  o Family at bedside    Compliance with Program/Exercises: Will assess as treatment progresses.   No qeustions. Recommendations/Intent for next treatment session:  Treatment next visit will focus on increasing Mr. Soni Serra independence with bed mobility, transfers, gait training, strength/ROM exercises, modalities for pain, and patient education.       Total Treatment Duration:  PT Patient Time In/Time Out  Time In: [facility]  Time Out: [address], PT

## 2019-12-12 NOTE — ANESTHESIA POSTPROCEDURE EVALUATION
Procedure(s):  KNEE ARTHROPLASTY TOTAL/ LEFT. spinal    Anesthesia Post Evaluation      Multimodal analgesia: multimodal analgesia used between 6 hours prior to anesthesia start to PACU discharge  Patient location during evaluation: bedside  Patient participation: complete - patient participated  Level of consciousness: awake and alert  Pain score: 2  Pain management: adequate  Airway patency: patent  Anesthetic complications: no  Cardiovascular status: acceptable  Respiratory status: acceptable  Hydration status: acceptable  Comments: Patient doing well. Continue care on floor.    Post anesthesia nausea and vomiting:  none      Vitals Value Taken Time   /73 12/12/2019  1:55 PM   Temp     Pulse 69 12/12/2019  1:55 PM   Resp 16 12/12/2019  1:55 PM   SpO2 97 % 12/12/2019  1:55 PM

## 2019-12-12 NOTE — PROGRESS NOTES
12/12/19 1440   Oxygen Therapy   O2 Sat (%) 97 %   Pulse via Oximetry 63 beats per minute   O2 Device Room air   O2 Flow Rate (L/min) 0 l/min   Incentive Spirometry Treatment   Actual Volume (ml) 2500 ml   Number of Attempts 1   . No shortness of breath or distress noted. BS are clear b/l.    [facility] notes reviewed- continuous sat # 16 ordered HS

## 2019-12-12 NOTE — ANESTHESIA PROCEDURE NOTES
Spinal Block    Start time: 12/12/2019 11:38 AM  End time: 12/12/2019 11:41 AM  Performed by: Yamila Jean MD  Authorized by: Yamila Jean MD     Pre-procedure:   Indications: at surgeon's request and primary anesthetic  Preanesthetic Checklist: patient identified, risks and benefits discussed, anesthesia consent, site marked, patient being monitored and timeout performed    Timeout Time: 11:38          Spinal Block:   Patient Position:  Seated  Prep Region:  Lumbar  Prep: chlorhexidine and patient draped      Location:  L3-4  Technique:  Single shot    Local Dose (mL):  3    Needle:   Needle Type:  Pencan  Needle Gauge:  25 G  Attempts:  1      Events: CSF confirmed, no blood with aspiration and no paresthesia        Assessment:  Insertion:  Uncomplicated  Patient tolerance:  Patient tolerated the procedure well with no immediate complications

## 2019-12-12 NOTE — OP NOTES
[facility]  Cemented Total Knee Arthroplasty  Patient:Curly Zapata   : 1940  Medical Record CZYWEZ:[id number]  Pre-operative Diagnosis:  Unilateral primary osteoarthritis, left knee [M17.12]  Post-operative Diagnosis: Unilateral primary osteoarthritis, left knee [M17.12]    Surgeon: Mariah Rivera MD  Assistant: Nadir Lo PA-C    Anesthesia: Spinal    Procedure: Cemented Total Knee Arthroplasty   The complexity of the total joint surgery requires the use of a first assistant for positioning, retraction and assistance in closure. The patient's Body mass index is 30.56 kg/m²., BMI's greater then 40 make surgical exposure and retraction extremely difficult and increase operative time. Tourniquet Time: none  EBL: 150cc  Additional Findings: Severe DJD  Releases none    Toby Henriquez was brought to the operating room and positioned on the operating table. He was anethestized  IV antibiotics were administered per CMS protocol. Prior to the incision being made a timeout was called identifying the patient, procedure ,operative side and surgeon. The left leg was prepped and draped in the usual sterile manner  An anterior longitudinal incision was accomplished just medial to the tibial tubercle and extending approximal 6 centimeters proximal to the superior pole of the patella. A medial parapatellar capsular incision was performed. The medial capsular flap was elevated around to the insertion of the semimembranous tendon. The patella was everted and the knee flexed and externally rotated. The medial and external menisci were excised. The lateral half of the fat pad excised and the patella femoral ligament was released. The anterior cruciate ligament was resected and the posterior cruciate ligament was substituted. Using extramedullary instrumentation, the tibial cut was accomplished with appropriate posterior slope.   Approxiamately 2 mm of bone was removed from the low side of the tibia. The distal femur was next addressed. A drill hole was made above the intracondylar notch. Using appropriate intramedullary instrumentation,a 5 degree valgus distal cut was accomplished. A femur was sized. The anterior and posterior cuts were then made about the distal femur. The osteophytes were removed from the tibial and femoral surfaces. The flexion and extension gaps were assessed with the appropriate spacer blocks. Additional surgical procedures included none. The flexion and extension gaps were deemed appropriately balanced. The appropriate cutting blocks were then utilized to perform the anterior chamfer, posterior chamfer and notch cuts, with appropriate lateral tranlation accomplished for the patellofemoral groove. The tibia was sized. The tibial base plate was pinned into place with the appropriate external rotation and stem site prepared. A preliminary range of motion was accomplished with the above size trial components. A polyethylene insert allowed the patient to obtain full extension as well as appropriate flexion. The patient's ligaments were stable in flexion and extension to medial and lateral stressing and the alignment was through the appropriate mechanical axis. The patella was then everted. It was prepared for a pegged patellar implant per routine. All trial components were removed and the implants were cemented into position. Bone and cement debris were removed. The betadine lavage protocol was performed. Trish Alvarado knee was placed through range of motion and noted to be stable as mentioned above with the trial components. The wound was dry, therefore no drain was used. The operative knee was injected with 60cc of Naropin, 10 cc's of morphine and 1 cc of 30mg of Toradol. The capsular layer was closed using a #1 vicryl suture, while subcutaneous layers were closed using 2-0 Vicryl interrupted sutures and a #1 Stratofix.   Finally the skin was closed using 3-0 Vicryl and a Zipline closure. A sterile sterile bandage was applied. An Iceman cryo pad was applied on the operative leg. Sponge count and needle counts were correct. Owen Campoverde left the operating room     Implants:   Implant Name Type Inv.  Item Serial No.  Lot No. LRB No. Used   CEMENT BNE SIMPLEX W/O GENT -- PK/10 ONLY - [id number]  CEMENT BNE SIMPLEX W/O GENT -- PK/10 ONLY [id number] [employer] HOW [id number] Left 1   CEMENT BNE SIMPLEX W/O GENT -- PK/10 ONLY - [id number]  CEMENT BNE SIMPLEX W/O GENT -- PK/10 ONLY [id number] [employer] HOW [id number] Left 1   BASE TIB RP SZ6 PABLO -- ATTUNE - [id number]  BASE TIB RP SZ6 PABLO -- ATTUNE [id number] [facility] [employer] [id number] Left 1   FEM PS SZ 6 LT PABLO -- ATTUNE - [id number]  FEM PS SZ 6 LT PABLO -- ATTUNE [id number] [facility] [employer] [id number] Left 1   PAT PABLO MEDIAL SIOBHAN 38MM -- ATTUNE - [id number]  PAT PABLO MEDIAL SIOBHAN 38MM -- ATTUNE [id number] [facility] [employer] [id number] Left 1   INSERT TIB PS RP SZ 6 7MM -- ATTUNE - [id number]  INSERT TIB PS RP SZ 6 7MM -- ATTUNE [id number] [facility] [employer] [id number] Left 1     Signed By: Yung Aggarwal MD

## 2019-12-12 NOTE — PROGRESS NOTES
TRANSFER - IN REPORT:    Verbal report received from [facility]) on Megan Pradip  being received from [employer]) for routine post - op      Report consisted of patients Situation, Background, Assessment and   Recommendations(SBAR). Information from the following report(s) SBAR, Kardex, Procedure Summary, Intake/Output, MAR and Recent Results was reviewed with the receiving nurse. Opportunity for questions and clarification was provided. Assessment completed upon patients arrival to unit and care assumed.

## 2019-12-12 NOTE — PERIOP NOTES
Betadine lavage: 17.5cc of betadine lot # [id number], exp. Date 04/21,  in 500cc of . 9NS Lot # [id number], exp.  Date : [id number]

## 2019-12-12 NOTE — INTERVAL H&P NOTE
H&P Update: 
Max Espino was seen and examined. History and physical has been reviewed. The patient has been examined.  There have been no significant clinical changes since the completion of the originally dated History and Physical.

## 2019-12-12 NOTE — ANESTHESIA PREPROCEDURE EVALUATION
Relevant Problems   No relevant active problems       Anesthetic History   No history of anesthetic complications            Review of Systems / Medical History  Patient summary reviewed and pertinent labs reviewed    Pulmonary  Within defined limits                 Neuro/Psych   Within defined limits           Cardiovascular              Hyperlipidemia    Exercise tolerance: >4 METS  Comments: Denies CP, SOB or changes in functional status   GI/Hepatic/Renal  Within defined limits              Endo/Other        Obesity and arthritis     Other Findings              Physical Exam    Airway  Mallampati: II  TM Distance: 4 - 6 cm  Neck ROM: normal range of motion   Mouth opening: Normal     Cardiovascular    Rhythm: regular  Rate: normal         Dental    Dentition: Caps/crowns     Pulmonary  Breath sounds clear to auscultation               Abdominal  GI exam deferred       Other Findings            Anesthetic Plan    ASA: 2  Anesthesia type: spinal      Post-op pain plan if not by surgeon: peripheral nerve block single    Induction: Intravenous  Anesthetic plan and risks discussed with: Patient

## 2019-12-12 NOTE — PROGRESS NOTES
Problem: Self Care Deficits Care Plan (Adult)  Goal: *Acute Goals and Plan of Care (Insert Text)  Description  GOALS:   DISCHARGE GOALS (in preparation for going home/rehab):  3 days  1. Mr. Reena Solis will perform one lower body dressing activity with minimal assistance required to demonstrate improved functional mobility and safety. 2.  Mr. Reena Solis will perform one lower body bathing activity with minimal assistance required to demonstrate improved functional mobility and safety. 3.  Mr. Reena Solis will perform toileting/toilet transfer with contact guard assistance to demonstrate improved functional mobility and safety. 4.  Mr. Reena Solis will perform shower transfer with contact guard assistance to demonstrate improved functional mobility and safety. JOINT CAMP OCCUPATIONAL THERAPY TKA: Initial Assessment and Daily Note 12/12/2019  INPATIENT: Hospital Day: 1  [place] / Product Type: Medicare /      NAME/AGE/GENDER: Khang Purcell is a [de-identified] y.o. male   PRIMARY DIAGNOSIS:  Unilateral primary osteoarthritis, left knee [M17.12]   Procedure(s) and Anesthesia Type:     * KNEE ARTHROPLASTY TOTAL/ LEFT - Spinal (Left)  ICD-10: Treatment Diagnosis:    Pain in Left Knee (M25.562)  Stiffness of Left Knee, Not elsewhere classified (K34.125)      ASSESSMENT:     Mr. Reena Solis is s/p Left TKA and presents with decreased weight bearing on L LE and decreased independence with functional mobility and activities of daily living as compared to baseline level of function and safety. Patient would benefit from skilled Occupational Therapy to maximize independence and safety with self-care task and functional mobility. Pt would also benefit from education on adaptive equipment and safety precautions in preparation for going home with family. Patient able to don underwear and shorts at edge of bed. mobilized from bed to recliner using a rolling walker.  Should progress well with ADL's tomorrow. This section established at most recent assessment   PROBLEM LIST (Impairments causing functional limitations):  Decreased Strength  Decreased ADL/Functional Activities  Decreased Transfer Abilities  Increased Pain  Increased Fatigue  Decreased Flexibility/Joint Mobility  Decreased Knowledge of Precautions   INTERVENTIONS PLANNED: (Benefits and precautions of occupational therapy have been discussed with the patient.)  Activities of daily living training  Adaptive equipment training  Balance training  Clothing management  Donning&doffing training  Theraputic activity     TREATMENT PLAN: Frequency/Duration: Follow patient 1-2tx to address above goals. Rehabilitation Potential For Stated Goals: Excellent     RECOMMENDED REHABILITATION/EQUIPMENT: (at time of discharge pending progress): Continue Skilled Therapy. OCCUPATIONAL PROFILE AND HISTORY:   History of Present Injury/Illness (Reason for Referral): Pt presents this date s/p (right) TKA. Past Medical History/Comorbidities:   Mr. Kendal Hutson  has a past medical history of HLD (hyperlipidemia) (12/7/2012), Macular degeneration, and Osteoarthritis of left knee. Mr. Kendal Hutson  has a past surgical history that includes hx colonoscopy (2002) and hx colonoscopy (11-1-13). Social History/Living Environment:   Home Environment: Private residence  One/Two Story Residence: One story  Living Alone: No  Support Systems: Spouse/Significant Other/Partner  Patient Expects to be Discharged to[de-identified] Private residence  Current DME Used/Available at Home: None  Prior Level of Function/Work/Activity:  Independent prior. Number of Personal Factors/Comorbidities that affect the Plan of Care: Brief history (0):  LOW COMPLEXITY   ASSESSMENT OF OCCUPATIONAL PERFORMANCE[de-identified]   Most Recent Physical Functioning:   Balance  Sitting: Intact  Standing: Pull to stand; With support       Gross Assessment  AROM: Within functional limits(right LE)  Strength: Generally decreased, functional(right LE)          LLE AROM  L Knee Flexion: 60  L Knee Extension: 10 Coordination  Fine Motor Skills-Upper: Left Intact; Right Intact  Gross Motor Skills-Upper: Left Intact; Right Intact         Mental Status  Neurologic State: Alert  Orientation Level: Oriented X4  Cognition: Appropriate decision making  Perception: Appears intact                Basic ADLs (From Assessment) Complex ADLs (From Assessment)   Basic ADL  Feeding: Independent  Oral Facial Hygiene/Grooming: Setup  Bathing: Minimum assistance  Upper Body Dressing: Setup  Lower Body Dressing: Moderate assistance  Toileting: Moderate assistance     Grooming/Bathing/Dressing Activities of Daily Living                       Functional Transfers  Bathroom Mobility: Minimum assistance  Shower Transfer: Moderate assistance     Bed/Mat Mobility  Supine to Sit: Minimum assistance  Sit to Stand: Minimum assistance  Stand to Sit: Minimum assistance  Bed to Chair: Minimum assistance         Physical Skills Involved:  Range of Motion  Balance  Strength Cognitive Skills Affected (resulting in the inability to perform in a timely and safe manner):  [facility]  Psychosocial Skills Affected:  WFL    Number of elements that affect the Plan of Care: 1-3:  LOW COMPLEXITY   CLINICAL DECISION MAKING:   [employer] AM-PAC 6 Clicks   Daily Activity Inpatient Short Form  How much help from another person does the patient currently need. .. Total A Lot A Little None   1. Putting on and taking off regular lower body clothing? [] 1   [x] 2   [] 3   [] 4   2. Bathing (including washing, rinsing, drying)? [] 1   [x] 2   [] 3   [] 4   3. Toileting, which includes using toilet, bedpan or urinal?   [] 1   [x] 2   [] 3   [] 4   4. Putting on and taking off regular upper body clothing? [] 1   [] 2   [] 3   [x] 4   5. Taking care of personal grooming such as brushing teeth? [] 1   [] 2   [] 3   [x] 4   6. Eating meals?    [] 1   [] 2   [] 3   [x] 4   © 2007, Trustees of [address] [place] [address] [address] [address], under license to [employer]. All rights reserved     Score:  Initial: 18 Most Recent: X (Date: -- )    Interpretation of Tool:  Represents activities that are increasingly more difficult (i.e. Bed mobility, Transfers, Gait). Medical Necessity:     Skilled intervention continues to be required due to Deficits noted abvoe. Reason for Services/Other Comments:  Patient continues to require skilled intervention due to   New TKA   . Use of outcome tool(s) and clinical judgement create a POC that gives a: MODERATE COMPLEXITY            TREATMENT:   (In addition to Assessment/Re-Assessment sessions the following treatments were rendered)     Pre-treatment Symptoms/Complaints:    Pain: Initial:   Pain Intensity 1: 5  Post Session:  5     Self Care: (10): Procedure(s) (per grid) utilized to improve and/or restore self-care/home management as related to dressing and toileting. Required minimal verbal and tactile cueing to facilitate activities of daily living skills. Initial evaluation 5 minutes. Treatment/Session Assessment:     Response to Treatment:  Good, sitting up in recliner. Education:  [] Home Exercises  [x] Fall Precautions  [] Hip Precautions [] Going Home Video  [x] Knee/Hip Prosthesis Review  [x] Walker Management/Safety [x] Adaptive Equipment as Needed       Interdisciplinary Collaboration:   Physical Therapist  Occupational Therapist  Registered Nurse    After treatment position/precautions:   Up in chair  Bed/Chair-wheels locked  Caregiver at bedside  Call light within reach  RN notified     Compliance with Program/Exercises: Compliant all of the time, Will assess as treatment progresses. Recommendations/Intent for next treatment session:  Treatment next visit will focus on increasing Mr. Linda Frost independence with bed mobility, transfers, self care, functional mobility, modalities for pain, and patient education.       Total Treatment Duration:  OT Patient Time In/Time Out  Time In: 2615  Time Out: 1150 Aiden Jara Ne, OT

## 2019-12-13 VITALS
BODY MASS INDEX: 30.49 KG/M2 | TEMPERATURE: 98 F | SYSTOLIC BLOOD PRESSURE: 149 MMHG | HEIGHT: 70 IN | WEIGHT: 213 LBS | HEART RATE: 85 BPM | RESPIRATION RATE: 18 BRPM | OXYGEN SATURATION: 95 % | DIASTOLIC BLOOD PRESSURE: 80 MMHG

## 2019-12-13 PROBLEM — M17.12 UNILATERAL PRIMARY OSTEOARTHRITIS, LEFT KNEE: Status: ACTIVE | Noted: 2019-12-13

## 2019-12-13 PROBLEM — Z96.652 TOTAL KNEE REPLACEMENT STATUS, LEFT: Status: ACTIVE | Noted: 2019-12-13

## 2019-12-13 PROCEDURE — 74011250636 HC RX REV CODE- 250/636: Performed by: ORTHOPAEDIC SURGERY

## 2019-12-13 PROCEDURE — 97150 GROUP THERAPEUTIC PROCEDURES: CPT

## 2019-12-13 PROCEDURE — 97116 GAIT TRAINING THERAPY: CPT

## 2019-12-13 PROCEDURE — 97535 SELF CARE MNGMENT TRAINING: CPT

## 2019-12-13 PROCEDURE — 97110 THERAPEUTIC EXERCISES: CPT

## 2019-12-13 PROCEDURE — 74011250637 HC RX REV CODE- 250/637: Performed by: ORTHOPAEDIC SURGERY

## 2019-12-13 PROCEDURE — 94760 N-INVAS EAR/PLS OXIMETRY 1: CPT

## 2019-12-13 RX ORDER — ASPIRIN 81 MG/1
81 TABLET ORAL EVERY 12 HOURS
Qty: 60 TAB | Refills: 0 | Status: SHIPPED | OUTPATIENT
Start: 2019-12-13 | End: 2020-01-12

## 2019-12-13 RX ORDER — OXYCODONE HYDROCHLORIDE 5 MG/1
5-10 TABLET ORAL
Qty: 60 TAB | Refills: 0 | Status: SHIPPED | OUTPATIENT
Start: 2019-12-13 | End: 2019-12-20

## 2019-12-13 RX ADMIN — Medication 5 ML: at 03:29

## 2019-12-13 RX ADMIN — SENNOSIDES AND DOCUSATE SODIUM 2 TABLET: 8.6; 5 TABLET ORAL at 07:30

## 2019-12-13 RX ADMIN — Medication 1 AMPULE: at 07:30

## 2019-12-13 RX ADMIN — OXYCODONE HYDROCHLORIDE 10 MG: 5 TABLET ORAL at 03:29

## 2019-12-13 RX ADMIN — OXYCODONE HYDROCHLORIDE 10 MG: 5 TABLET ORAL at 14:49

## 2019-12-13 RX ADMIN — OXYCODONE HYDROCHLORIDE 10 MG: 5 TABLET ORAL at 11:20

## 2019-12-13 RX ADMIN — CELECOXIB 200 MG: 200 CAPSULE ORAL at 07:31

## 2019-12-13 RX ADMIN — ACETAMINOPHEN 1000 MG: 500 TABLET, FILM COATED ORAL at 05:52

## 2019-12-13 RX ADMIN — ASPIRIN 81 MG: 81 TABLET ORAL at 07:31

## 2019-12-13 RX ADMIN — Medication 2 G: at 03:29

## 2019-12-13 RX ADMIN — OXYCODONE HYDROCHLORIDE 10 MG: 5 TABLET ORAL at 07:31

## 2019-12-13 RX ADMIN — ACETAMINOPHEN 1000 MG: 500 TABLET, FILM COATED ORAL at 11:19

## 2019-12-13 NOTE — PROGRESS NOTES
[address]  Face to Face Encounter    Patients Name: Veronika Gonzalez    YOB: 1940    Ordering Physician: Kaylan Carroll    Primary Diagnosis: Unilateral primary osteoarthritis, left knee [M17.12]  Arthritis of knee, left [M17.12]  Unilateral primary osteoarthritis, left knee [M17.12]  S/p left TKA    Date of Face to Face:   12/13/19                                 Face to Face Encounter findings are related to primary reason for home care:   yes. 1. I certify that the patient needs intermittent care as follows: physical therapy: gait/stair training    2. I certify that this patient is homebound, that is: 1) patient requires the use of a walker device, special transportation, or assistance of another to leave the home; or 2) patient's condition makes leaving the home medically contraindicated; and 3) patient has a normal inability to leave the home and leaving the home requires considerable and taxing effort. Patient may leave the home for infrequent and short duration for medical reasons, and occasional absences for non-medical reasons. Homebound status is due to the following functional limitations: Patient's ambulation limited secondary to severe pain and requires the use of an assistive device and the assistance of a caregiver for safe completion. Patient with strength and ROM deficits limiting ambulation endurance requiring the use of an assistive device and the assistance of a caregiver. Patient deemed temporarily homebound secondary to increased risk for infection when leaving home and going out into the community. 3. I certify that this patient is under my care and that I, or a nurse practitioner or [address] [place] [address], or clinical nurse specialist, or certified nurse midwife, working with me, had a Face-to-Face Encounter that meets the physician Face-to-Face Encounter requirements.   The following are the clinical findings from the [address] [place] [address] encounter that support the need for skilled services and is a summary of the encounter:         Santiago Tyrone Daysi Clementine  12/13/2019      THE FOLLOWING TO BE COMPLETED BY THE COMMUNITY PHYSICIAN:    I concur with the findings described above from the F2F encounter that this patient is homebound and in need of a skilled service.     Certifying Physician: _____________________________________      Printed Certifying Physician Name: _____________________________________    Date: _________________

## 2019-12-13 NOTE — DISCHARGE SUMMARY
[address] [place] [address]  Total Joint Discharge Summary      Patient ID:  Leroy Chen  [id number]  5 y.o.  1940    Admit date: 12/12/2019  Discharge date and time: 12-13-19  Admitting Physician: Eliza Arias MD  Surgeon: Same  Admission Diagnoses: Unilateral primary osteoarthritis, left knee [M17.12]  Arthritis of knee, left [M17.12]  Discharge Diagnoses: Principal Problem: Total knee replacement status, left (12/13/2019)    Active Problems:    Arthritis of knee, left (12/12/2019)                                Perioperative Antibiotics: Ancef 1 to 2 mg was given depending on patient's weight. If allergic to Ancef or due to other indications, patient was given Vancomycin. Hospital Medications given:   [unfilled]  [unfilled]  [unfilled]    Discharge Medications given:  Current Discharge Medication List      START taking these medications    Details   aspirin delayed-release 81 mg tablet Take 1 Tab by mouth every twelve (12) hours every twelve (12) hours for 30 days. Qty: 60 Tab, Refills: 0      oxyCODONE IR (ROXICODONE) 5 mg immediate release tablet Take 1-2 Tabs by mouth every four (4) hours as needed for Pain for up to 7 days. Max Daily Amount: 60 mg.  Qty: 60 Tab, Refills: 0    Associated Diagnoses: Total knee replacement status, left         CONTINUE these medications which have NOT CHANGED    Details   acetaminophen (TYLENOL PO) Take  by mouth as needed. Take / use AM day of surgery  per anesthesia protocols PRN      glucos sul 2KCl/msm/chond/C/Mn (GLUCOSAMINE CHONDROITIN PO) Take  by mouth. MELATONIN PO Take  by mouth.          STOP taking these medications       meloxicam (MOBIC) 7.5 mg tablet Comments:   Reason for Stopping:         vit A/vit C/vit E/zinc/copper (PRESERVISION AREDS PO) Comments:   Reason for Stopping:         multivitamin (ONE A DAY) tablet Comments:   Reason for Stopping:         aspirin 81 mg chewable tablet Comments:   Reason for Stopping: DOCOSAHEXANOIC ACID/EPA (FISH OIL PO) Comments:   Reason for Stopping:                Additional DVT Prophylaxis:  KHUSHI Hose,Plexi-Pulse    Postoperative transfusions:   none  Post Op complications: none    Hemoglobin at discharge:   Lab Results   Component Value Date/Time    HGB 13.1 (L) 12/12/2019 07:49 PM       Wound appears to be healing without any evidence of infection. Physical Therapy started on the day following surgery and progressed to independent ambulation with the aid of a walker. At the time of discharge, able to go up and down stairs and had understanding of precautions needed following surgery.       PT/OT:            Assistive Device: Walker (comment)        LLE AROM  L Knee Flexion: 60  L Knee Extension: 10       Discharged to: home    Discharge instructions:  -Rx pain medication given  - Anticoagulate with: Ecotrin 81 mg PO BID x 4 weeks  -Resume pre hospital diet             -Resume home medications per medical continuation form     -Ambulate with walker, appropriate total joint protocol  -Follow up in office as scheduled       Signed:  Harvel Boxer, PA  12/13/2019  6:55 AM

## 2019-12-13 NOTE — PROGRESS NOTES
Pt alert and oriented. Sitting up in bed, family member at bedside. IV, dressing are clean, dry and intact. Iceman and SCDs on. Oxycodone was given for pain at 1930. At reassessment, pt reported that pain initially increased but has since calmed back down. He reports pain level about the same as prior to the pain med. Urinal at bedside. Call light, IS at bedside within reach. Will continue to monitor.

## 2019-12-13 NOTE — PROGRESS NOTES
2019         Post Op day: 1 Day Post-Op     Admit Date: 2019  Admit Diagnosis: Unilateral primary osteoarthritis, left knee [M17.12]  Arthritis of knee, left [M17.12]        Subjective: Doing well, No complaints, No SOB, No Chest Pain, No Nausea or Vomiting     Objective:   Vital Signs are Stable, No Acute Distress, Alert and Oriented, Dressing is Dry,  Neurovascular exam is normal.     Assessment / Plan :  Patient Active Problem List   Diagnosis Code    HLD (hyperlipidemia) E78.5    Primary osteoarthritis involving multiple joints M15.0    Arthritis of knee, left M17.12    Total knee replacement status, left D27.198      Patient Vitals for the past 8 hrs:   BP Temp Pulse Resp SpO2   19 0325 120/73 97.3 °F (36.3 °C) 77 16 97 %   19 2331 106/68 97.6 °F (36.4 °C) 84 16 95 %    Temp (24hrs), Av.6 °F (36.4 °C), Min:97 °F (36.1 °C), Max:98.5 °F (36.9 °C)    Body mass index is 30.56 kg/m².     Lab Results   Component Value Date/Time    HGB 13.1 (L) 2019 07:49 PM      Pt seen by and discussed with [employer] [facility] likely today       Signed By: TRUDY Wing

## 2019-12-13 NOTE — PROGRESS NOTES
Problem: Self Care Deficits Care Plan (Adult)  Goal: *Acute Goals and Plan of Care (Insert Text)  Description  GOALS:   DISCHARGE GOALS (in preparation for going home/rehab):  3 days  1. Mr. David Solares will perform one lower body dressing activity with minimal assistance required to demonstrate improved functional mobility and safety. -GOAL MET 12/13/2019   2. Mr. David Solares will perform one lower body bathing activity with minimal assistance required to demonstrate improved functional mobility and safety. -GOAL MET 12/13/2019   3. Mr. David Solares will perform toileting/toilet transfer with contact guard assistance to demonstrate improved functional mobility and safety. -GOAL MET 12/13/2019   4. Mr. David Solares will perform shower transfer with contact guard assistance to demonstrate improved functional mobility and safety. -GOAL MET 12/13/2019        JOINT CAMP OCCUPATIONAL THERAPY TKA: Daily Note and Discharge 12/13/2019  INPATIENT: Hospital Day: 2  [place] / Product Type: Medicare /      NAME/AGE/GENDER: Quynh Morton is a [de-identified] y.o. male   PRIMARY DIAGNOSIS:  Unilateral primary osteoarthritis, left knee [M17.12]   Procedure(s) and Anesthesia Type:     * KNEE ARTHROPLASTY TOTAL/ LEFT - Spinal (Left)  ICD-10: Treatment Diagnosis:    · Pain in Left Knee (M25.562)  · Stiffness of Left Knee, Not elsewhere classified (J21.014)      ASSESSMENT:      Mr. Daivd Solares is s/p Left TKA and presents with decreased weight bearing on L LE and decreased independence with functional mobility and activities of daily living. Patient completed shower and dressing as charter below in ADL grid and is ambulating with rolling walker and stand by assist.  Patient has met 4/4 goals and plans to return home with good family support. Family able to provide patient with appropriate level of assistance at this time.    Will do well at home for self cares and transfers during ADL's.  D/C OT for acute deficits. This section established at most recent assessment   PROBLEM LIST (Impairments causing functional limitations):  1. Decreased Strength  2. Decreased ADL/Functional Activities  3. Decreased Transfer Abilities  4. Increased Pain  5. Increased Fatigue  6. Decreased Flexibility/Joint Mobility  7. Decreased Knowledge of Precautions   INTERVENTIONS PLANNED: (Benefits and precautions of occupational therapy have been discussed with the patient.)  1. Activities of daily living training  2. Adaptive equipment training  3. Balance training  4. Clothing management  5. Donning&doffing training  6. Theraputic activity     TREATMENT PLAN: Frequency/Duration: Follow patient 1-2tx to address above goals. Rehabilitation Potential For Stated Goals: Excellent     RECOMMENDED REHABILITATION/EQUIPMENT: (at time of discharge pending progress): Continue Skilled Therapy. OCCUPATIONAL PROFILE AND HISTORY:   History of Present Injury/Illness (Reason for Referral): Pt presents this date s/p (right) TKA. Past Medical History/Comorbidities:   Mr. Joseph Stein  has a past medical history of HLD (hyperlipidemia) (12/7/2012), Macular degeneration, and Osteoarthritis of left knee. Mr. Joseph Stein  has a past surgical history that includes hx colonoscopy (2002) and hx colonoscopy (11-1-13). Social History/Living Environment:   Home Environment: Private residence  One/Two Story Residence: One story  Living Alone: No  Support Systems: Spouse/Significant Other/Partner  Patient Expects to be Discharged to[de-identified] Private residence  Current DME Used/Available at Home: None  Prior Level of Function/Work/Activity:  Independent prior.       Number of Personal Factors/Comorbidities that affect the Plan of Care: Brief history (0):  LOW COMPLEXITY   ASSESSMENT OF OCCUPATIONAL PERFORMANCE[de-identified]   Most Recent Physical Functioning:   Balance  Sitting: Intact  Standing: With support                    Coordination  Fine Motor Skills-Upper: Left Intact; Right Intact  Gross Motor Skills-Upper: Left Intact; Right Intact         Mental Status  Neurologic State: Alert  Orientation Level: Oriented X4  Cognition: Appropriate decision making  Perception: Appears intact                Basic ADLs (From Assessment) Complex ADLs (From Assessment)   Basic ADL  Feeding: Independent  Oral Facial Hygiene/Grooming: Setup  Bathing: Stand-by assistance  Upper Body Dressing: Setup  Lower Body Dressing: Stand-by assistance  Toileting: Stand by assistance     Grooming/Bathing/Dressing Activities of Daily Living   Grooming  Grooming Assistance: Independent     Upper Body Bathing  Bathing Assistance: Independent     Lower Body Bathing  Bathing Assistance: Stand-by assistance     Upper Body Dressing Assistance  Dressing Assistance: Set-up Functional Transfers  Shower Transfer: Stand-by assistance   Lower Body Dressing Assistance  Dressing Assistance: Stand-by assistance  Underpants: Stand-by assistance  Socks: Stand-by assistance Bed/Mat Mobility  Sit to Stand: Stand-by assistance  Stand to Sit: Stand-by assistance  Bed to Chair: Stand-by assistance         Physical Skills Involved:  1. Range of Motion  2. Balance  3. Strength Cognitive Skills Affected (resulting in the inability to perform in a timely and safe manner):  1. [facility]  Psychosocial Skills Affected:  1. WFL    Number of elements that affect the Plan of Care: 1-3:  LOW COMPLEXITY   CLINICAL DECISION MAKIN [facility] [address] [address] AM-PAC 6 Clicks   Daily Activity Inpatient Short Form  How much help from another person does the patient currently need. .. Total A Lot A Little None   1. Putting on and taking off regular lower body clothing? [] 1   [] 2   [x] 3   [] 4   2. Bathing (including washing, rinsing, drying)? [] 1   [] 2   [x] 3   [] 4   3. Toileting, which includes using toilet, bedpan or urinal?   [] 1   [] 2   [x] 3   [] 4   4. Putting on and taking off regular upper body clothing?    [] 1   [] 2   [] 3 [x] 4   5. Taking care of personal grooming such as brushing teeth? [] 1   [] 2   [] 3   [x] 4   6. Eating meals? [] 1   [] 2   [] 3   [x] 4   © 2007, Trustees of [address] [place] [address] [address] [address], under license to [employer]. All rights reserved     Score:  Initial: 18 Most Recent: 21 (Date: 12/13/2019 )    Interpretation of Tool:  Represents activities that are increasingly more difficult (i.e. Bed mobility, Transfers, Gait). Use of outcome tool(s) and clinical judgement create a POC that gives a: MODERATE COMPLEXITY            TREATMENT:   (In addition to Assessment/Re-Assessment sessions the following treatments were rendered)     Pre-treatment Symptoms/Complaints:    Pain: Initial:   Pain Intensity 1: 3  Post Session:  5     Self Care: (40): Procedure(s) (per grid) utilized to improve and/or restore self-care/home management as related to dressing, bathing, toileting and grooming. Required minimal verbal cueing to facilitate activities of daily living skills. Treatment/Session Assessment:     Response to Treatment:  Good, sitting up in recliner. Education:  [] Home Exercises  [x] Fall Precautions  [] Hip Precautions [] Going Home Video  [x] Knee/Hip Prosthesis Review  [x] Walker Management/Safety [x] Adaptive Equipment as Needed       Interdisciplinary Collaboration:   o Physical Therapist  o Occupational Therapist  o Registered Nurse    After treatment position/precautions:   o Up in chair  o Bed/Chair-wheels locked  o Caregiver at bedside  o Call light within reach  o RN notified     Compliance with Program/Exercises: Compliant all of the time, Will assess as treatment progresses.     Recommendations/Intent for next treatment session:  D/C OT for acute deficits     Total Treatment Duration:  OT Patient Time In/Time Out  Time In: 0920  Time Out: [address] [facility]

## 2019-12-13 NOTE — PROGRESS NOTES
Was called to pt's room as pt reported oximeter was alarming even though he was awake, alert and \"breathing hard\". Oxygen at 2L via nasal cannula initiated. Also advised that sometimes the alarm can sound due to movement of his hand as the sensor on his finger is sometimes sensitive. Pt voiced understanding. No further needs voiced at this time.

## 2019-12-13 NOTE — PROGRESS NOTES
Problem: Mobility Impaired (Adult and Pediatric)  Goal: *Acute Goals and Plan of Care (Insert Text)  Description  GOALS (1-4 days):  (1.)Mr. Fernanda Lowe will move from supine to sit and sit to supine  in bed with STAND BY ASSIST.  (2.)Mr. Fernanda Lowe will transfer from bed to chair and chair to bed with STAND BY ASSIST using the least restrictive device. (3.)Mr. Zapata will ambulate with STAND BY ASSIST for 150 feet with the least restrictive device. (4.)Mr. Zapata will ambulate up/down 2 steps with right railing with MINIMAL ASSIST with device as needed. (5.)Mr. Fernanda Lowe will increase left knee ROM to 5°-80°.  ________________________________________________________________________________________________     Outcome: Progressing Towards Goal     PHYSICAL THERAPY JOINT CAMP TKA: Daily Note and AM 12/13/2019  INPATIENT: Hospital Day: 2  [place] / Product Type: Medicare /      NAME/AGE/GENDER: Toby Henriquez is a [de-identified] y.o. male   PRIMARY DIAGNOSIS:  Unilateral primary osteoarthritis, left knee [M17.12]   Procedure(s) and Anesthesia Type:     * KNEE ARTHROPLASTY TOTAL/ LEFT - Spinal (Left)  ICD-10: Treatment Diagnosis:    · Pain in Left Knee (M25.562)  · Stiffness of Left Knee, Not elsewhere classified (M25.662)  · Difficulty in walking, Not elsewhere classified (R26.2)      ASSESSMENT:     Mr. Fernanda Lowe presents with decreased rom and strength of left LE as well as decreased functional mobility and gait s/p left tka. He plans to go home with HHPT. Pt. Doing well this am with no complaints. He ambulated with walker SBA into the faustin. He did tka exercises with cues. Progressing well. Plans to go home today. This section established at most recent assessment   PROBLEM LIST (Impairments causing functional limitations):  1. Decreased Strength  2. Decreased ADL/Functional Activities  3. Decreased Transfer Abilities  4. Decreased Ambulation Ability/Technique  5.  Decreased Balance  6. Increased Pain  7. Decreased Activity Tolerance  8. Decreased Flexibility/Joint Mobility  9. Decreased [place] with Home Exercise Program   INTERVENTIONS PLANNED: (Benefits and precautions of physical therapy have been discussed with the patient.)  1. bed mobility  2. gait training  3. home exercise program (HEP)  4. Range of Motion: active/assisted/passive  5. Therapeutic Activities  6. therapeutic exercise/strengthening  7. transfer training  8. Group Therapy     TREATMENT PLAN: Frequency/Duration: Follow patient BID for duration of hospital stay to address above goals. Rehabilitation Potential For Stated Goals: Good     RECOMMENDED REHABILITATION/EQUIPMENT: (at time of discharge pending progress): Continue Skilled Therapy and Home Health: Physical Therapy. HISTORY:   History of Present Injury/Illness (Reason for Referral):  S/p left tka  Past Medical History/Comorbidities:   Mr. Miles Fontana  has a past medical history of HLD (hyperlipidemia) (12/7/2012), Macular degeneration, and Osteoarthritis of left knee. Mr. Miles Fontana  has a past surgical history that includes hx colonoscopy (2002) and hx colonoscopy (11-1-13).   Social History/Living Environment:   Home Environment: Private residence  One/Two Story Residence: One story  Living Alone: No  Support Systems: Spouse/Significant Other/Partner  Patient Expects to be Discharged to[de-identified] Private residence  Current DME Used/Available at Home: None  Prior Level of Function/Work/Activity:  Independent, cane with gait   Number of Personal Factors/Comorbidities that affect the Plan of Care: 1-2: MODERATE COMPLEXITY   EXAMINATION:   Most Recent Physical Functioning:                            Bed Mobility  Supine to Sit: Contact guard assistance    Transfers  Sit to Stand: Stand-by assistance  Stand to Sit: Stand-by assistance  Bed to Chair: Stand-by assistance    Balance  Sitting: Intact  Standing: With support              Weight Bearing Status  Left Side Weight Bearing: As tolerated  Distance (ft): 100 Feet (ft)  Ambulation - Level of Assistance: Stand-by assistance  Assistive Device: Walker, rolling  Speed/Marilyn: Delayed  Step Length: Right shortened;Left shortened  Stance: Left decreased  Gait Abnormalities: Antalgic  Interventions: Safety awareness training;Verbal cues     Braces/Orthotics:     Left Knee Cold  Type: Cryocuff      Body Structures Involved:  1. Bones  2. Joints  3. Muscles  4. Ligaments Body Functions Affected:  1. Movement Related Activities and Participation Affected:  1. Mobility   Number of elements that affect the Plan of Care: 3: MODERATE COMPLEXITY   CLINICAL PRESENTATION:   Presentation: Stable and uncomplicated: LOW COMPLEXITY   CLINICAL DECISION MAKIN[address] [place] [address] AM-PAC 6 Clicks   Basic Mobility Inpatient Short Form  How much difficulty does the patient currently have. .. Unable A Lot A Little None   1. Turning over in bed (including adjusting bedclothes, sheets and blankets)? [] 1   [] 2   [x] 3   [] 4   2. Sitting down on and standing up from a chair with arms ( e.g., wheelchair, bedside commode, etc.)   [] 1   [] 2   [x] 3   [] 4   3. Moving from lying on back to sitting on the side of the bed? [] 1   [] 2   [x] 3   [] 4   How much help from another person does the patient currently need. .. Total A Lot A Little None   4. Moving to and from a bed to a chair (including a wheelchair)? [] 1   [] 2   [x] 3   [] 4   5. Need to walk in hospital room? [] 1   [] 2   [x] 3   [] 4   6. Climbing 3-5 steps with a railing? [] 1   [] 2   [x] 3   [] 4   © , Trustees of [address] [place] [address] [address] [address], under license to [employer]. All rights reserved     Score:  Initial: 18 Most Recent: X (Date: -- )    Interpretation of Tool:  Represents activities that are increasingly more difficult (i.e. Bed mobility, Transfers, Gait).     Medical Necessity:     · Patient is expected to demonstrate progress in   · strength, range of motion, and balance  ·  to   · decrease assistance required with exercises and functional mobility   · . Reason for Services/Other Comments:  · Patient   · continues to require present interventions due to patient's inability to perform exercises and functional mobility independently   · . Use of outcome tool(s) and clinical judgement create a POC that gives a: Clear prediction of patient's progress: LOW COMPLEXITY            TREATMENT:   (In addition to Assessment/Re-Assessment sessions the following treatments were rendered)     Pre-treatment Symptoms/Complaints:    Pain Initial: knee pain     Post Session:  2     Therapeutic Exercise: (15 Minutes):  Exercises per grid below to improve mobility and strength. Required minimal visual, verbal, and manual cues to promote proper body alignment, promote proper body posture, and promote proper body mechanics. Progressed range and repetitions as indicated. Gait Training (10 Minutes):  Gait training to improve and/or restore physical functioning as related to mobility, strength and balance. Ambulated 100 Feet (ft) with Stand-by assistance using a Walker, rolling and minimal Safety awareness training;Verbal cues related to their stance phase to promote proper body alignment, promote proper body posture and promote proper body mechanics.          Date:  12/12 Date:  12/13 Date:     ACTIVITY/EXERCISE AM PM AM PM AM PM   GROUP THERAPY  []  []  []  []  []  []   Ankle Pumps  10a 10a      Quad Sets  10a 10a      Gluteal Sets  10a 10a      Hip ABd/ADduction  10a 10a      Straight Leg Raises   10a      Knee Slides  10a 10a      Short Arc Quads         Long Arc Quads         Chair Slides                  B = bilateral; AA = active assistive; A = active; P = passive      Treatment/Session Assessment:     Response to Treatment:  Pt. Did fine    Education:  [x] Home Exercises  [x] Fall Precautions  []  [] D/C Instruction Review  [x] Knee Prosthesis Review  [x] Stefano Rivera Management/Safety [] Adaptive Equipment as Needed       Interdisciplinary Collaboration:   o Registered Nurse    After treatment position/precautions:   o Up in chair  o Bed/Chair-wheels locked  o Bed in low position  o Caregiver at bedside  o Call light within reach  o Family at bedside    Compliance with Program/Exercises: Will assess as treatment progresses. No qeustions. Recommendations/Intent for next treatment session:  Treatment next visit will focus on increasing Mr. Judah Mccray independence with bed mobility, transfers, gait training, strength/ROM exercises, modalities for pain, and patient education.       Total Treatment Duration:  PT Patient Time In/Time Out  Time In: 0820  Time Out: Yara 95, PT

## 2019-12-13 NOTE — PROGRESS NOTES
Problem: Mobility Impaired (Adult and Pediatric)  Goal: *Acute Goals and Plan of Care (Insert Text)  Description  GOALS (1-4 days):  (1.)Mr. Radha Pringle will move from supine to sit and sit to supine  in bed with STAND BY ASSIST.  (2.)Mr. Radha Pringle will transfer from bed to chair and chair to bed with STAND BY ASSIST using the least restrictive device. goalm et  (3.)Mr. Zapata will ambulate with STAND BY ASSIST for 150 feet with the least restrictive device. goal met  (4.)Mr. Zapata will ambulate up/down 2 steps with right railing with MINIMAL ASSIST with device as needed. goalm et  (5.)Mr. Radha Pringle will increase left knee ROM to 5°-80°.  ________________________________________________________________________________________________     Outcome: Progressing Towards Goal     PHYSICAL THERAPY JOINT CAMP TKA: Daily Note and PM 12/13/2019  OUTPATIENT: Hospital Day: 2  [place] / Product Type: Medicare /      NAME/AGE/GENDER: Max Espino is a [de-identified] y.o. male   PRIMARY DIAGNOSIS:  Unilateral primary osteoarthritis, left knee [M17.12]   Procedure(s) and Anesthesia Type:     * KNEE ARTHROPLASTY TOTAL/ LEFT - Spinal (Left)  ICD-10: Treatment Diagnosis:    · Pain in Left Knee (M25.562)  · Stiffness of Left Knee, Not elsewhere classified (M25.662)  · Difficulty in walking, Not elsewhere classified (R26.2)      ASSESSMENT:     Mr. Radha Pringle presents with decreased rom and strength of left LE as well as decreased functional mobility and gait s/p left tka. He plans to go home with HHPT. Pt. Continues to report that he is doing well with no complaints. We discussed safety, importance of rom and swelling control and that the block will wear off and he will have more pain and swelling at home. He ambulated an increased distance with walker and did stairs without a problem. He did tka exercises with cues only. Discussed importance of extension rom.   He had no questions or concerns about going home today. This section established at most recent assessment   PROBLEM LIST (Impairments causing functional limitations):  1. Decreased Strength  2. Decreased ADL/Functional Activities  3. Decreased Transfer Abilities  4. Decreased Ambulation Ability/Technique  5. Decreased Balance  6. Increased Pain  7. Decreased Activity Tolerance  8. Decreased Flexibility/Joint Mobility  9. Decreased [place] with Home Exercise Program   INTERVENTIONS PLANNED: (Benefits and precautions of physical therapy have been discussed with the patient.)  1. bed mobility  2. gait training  3. home exercise program (HEP)  4. Range of Motion: active/assisted/passive  5. Therapeutic Activities  6. therapeutic exercise/strengthening  7. transfer training  8. Group Therapy     TREATMENT PLAN: Frequency/Duration: Follow patient BID for duration of hospital stay to address above goals. Rehabilitation Potential For Stated Goals: Good     RECOMMENDED REHABILITATION/EQUIPMENT: (at time of discharge pending progress): Continue Skilled Therapy and Home Health: Physical Therapy. HISTORY:   History of Present Injury/Illness (Reason for Referral):  S/p left tka  Past Medical History/Comorbidities:   Mr. Radha Pringle  has a past medical history of HLD (hyperlipidemia) (12/7/2012), Macular degeneration, and Osteoarthritis of left knee. Mr. Radha Pringle  has a past surgical history that includes hx colonoscopy (2002) and hx colonoscopy (11-1-13).   Social History/Living Environment:   Home Environment: Private residence  One/Two Story Residence: One story  Living Alone: No  Support Systems: Spouse/Significant Other/Partner  Patient Expects to be Discharged to[de-identified] Private residence  Current DME Used/Available at Home: None  Prior Level of Function/Work/Activity:  Independent, cane with gait   Number of Personal Factors/Comorbidities that affect the Plan of Care: 1-2: MODERATE COMPLEXITY   EXAMINATION:   Most Recent Physical Functioning: LLE AROM  L Knee Flexion: 112  L Knee Extension: 9          Bed Mobility  Supine to Sit: Contact guard assistance    Transfers  Sit to Stand: Stand-by assistance  Stand to Sit: Stand-by assistance  Bed to Chair: Stand-by assistance    Balance  Sitting: Intact  Standing: With support              Weight Bearing Status  Left Side Weight Bearing: As tolerated  Distance (ft): 270 Feet (ft)(x 2)  Ambulation - Level of Assistance: Stand-by assistance  Assistive Device: Walker, rolling  Speed/Marilyn: Delayed  Step Length: Left shortened;Right shortened  Stance: Left decreased  Gait Abnormalities: Antalgic  Number of Stairs Trained: 3  Stairs - Level of Assistance: Contact guard assistance  Rail Use: Left   Interventions: Safety awareness training;Verbal cues     Braces/Orthotics:     Left Knee Cold  Type: Cryocuff      Body Structures Involved:  1. Bones  2. Joints  3. Muscles  4. Ligaments Body Functions Affected:  1. Movement Related Activities and Participation Affected:  1. Mobility   Number of elements that affect the Plan of Care: 3: MODERATE COMPLEXITY   CLINICAL PRESENTATION:   Presentation: Stable and uncomplicated: LOW COMPLEXITY   CLINICAL DECISION MAKIN[address] [place] [address] AM-PAC 6 Clicks   Basic Mobility Inpatient Short Form  How much difficulty does the patient currently have. .. Unable A Lot A Little None   1. Turning over in bed (including adjusting bedclothes, sheets and blankets)? [] 1   [] 2   [x] 3   [] 4   2. Sitting down on and standing up from a chair with arms ( e.g., wheelchair, bedside commode, etc.)   [] 1   [] 2   [x] 3   [] 4   3. Moving from lying on back to sitting on the side of the bed? [] 1   [] 2   [x] 3   [] 4   How much help from another person does the patient currently need. .. Total A Lot A Little None   4. Moving to and from a bed to a chair (including a wheelchair)? [] 1   [] 2   [x] 3   [] 4   5. Need to walk in hospital room?    [] 1   [] 2   [x] 3   [] 4   6.  Climbing 3-5 steps with a railing? [] 1   [] 2   [x] 3   [] 4   © 2007, Trustees of [address] [place] [address] [address] [address], under license to [employer]. All rights reserved     Score:  Initial: 18 Most Recent: X (Date: -- )    Interpretation of Tool:  Represents activities that are increasingly more difficult (i.e. Bed mobility, Transfers, Gait). Medical Necessity:     · Patient is expected to demonstrate progress in   · strength, range of motion, and balance  ·  to   · decrease assistance required with exercises and functional mobility   · . Reason for Services/Other Comments:  · Patient   · continues to require present interventions due to patient's inability to perform exercises and functional mobility independently   · . Use of outcome tool(s) and clinical judgement create a POC that gives a: Clear prediction of patient's progress: LOW COMPLEXITY            TREATMENT:   (In addition to Assessment/Re-Assessment sessions the following treatments were rendered)     Pre-treatment Symptoms/Complaints:    Pain Initial: knee pain     Post Session:  3     Therapeutic Exercise: (45 Minutes):  Exercises per grid below to improve mobility and strength. Required minimal visual, verbal, and manual cues to promote proper body alignment, promote proper body posture, and promote proper body mechanics. Progressed range and repetitions as indicated. Gait Training (15 Minutes):  Gait training to improve and/or restore physical functioning as related to mobility, strength and balance. Ambulated 270 Feet (ft)(x 2) with Stand-by assistance using a Walker, rolling and minimal Safety awareness training;Verbal cues related to their stance phase to promote proper body alignment, promote proper body posture and promote proper body mechanics.          Date:  12/12 Date:  12/13 Date:     ACTIVITY/EXERCISE AM PM AM PM AM PM   GROUP THERAPY  []  []  []  [x]  []  []   Ankle Pumps  10a 10a 15a     Quad Sets  10a 10a 15a     Gluteal Sets  10a 10a 15a     Hip ABd/ADduction  10a 10a 15a     Straight Leg Raises   10a 15a     Knee Slides  10a 10a 15a     Short Arc Quads    15a     Long Arc Quads         Chair Slides    15a              B = bilateral; AA = active assistive; A = active; P = passive      Treatment/Session Assessment:     Response to Treatment:  Pt. Has made great progress    Education:  [x] Home Exercises  [x] Fall Precautions  []  [x] D/C Instruction Review  [x] Knee Prosthesis Review  [x] Walker Management/Safety [] Adaptive Equipment as Needed       Interdisciplinary Collaboration:   o Physical Therapist  o Physical Therapy Assistant  o Rehabilitation Attendant    After treatment position/precautions:   o Up in chair  o Bed/Chair-wheels locked  o Bed in low position  o Caregiver at bedside  o Call light within reach  o Family at bedside    Compliance with Program/Exercises: Compliant most of the time. No qeustions. Recommendations/Intent for next treatment session:  Treatment next visit will focus on increasing Mr. Soni Serra independence with bed mobility, transfers, gait training, strength/ROM exercises, modalities for pain, and patient education.       Total Treatment Duration:  PT Patient Time In/Time Out  Time In: 1300  Time Out: 2202 Alisha Barroso, PT

## 2019-12-13 NOTE — PROGRESS NOTES
12/12/19 5611   Oxygen Therapy   O2 Sat (%) 93 %   O2 Device Room air   sat monitor number 10 placed on patient.   Trends cleared, patients alarms set per protocol

## 2019-12-13 NOTE — PROGRESS NOTES
I have reviewed discharge instructions with the patient and spouse. Instructed on last pain medication given and due at 7 pm when patient is allowed for next dose, ONLY TO BE TAKEN IF NEEDED. The patient and spouse verbalized understanding. Signed discharge paper in paper chart. Pain medication prescription given to patient. Will discharge home with family.

## 2019-12-13 NOTE — PROGRESS NOTES
Patient in bed. Dressing dry and intact. Pain med given-see MAR. Neurovascular status WDL. Palpable pulses. Positive dorsiflexion and plantar flexion. Instructed not to get up by themselves and call for assistance or any needs. Patient verbalized understanding. Call bell within reach. Side rails up x3. Bed low and locked. No distress noted. Family member at bedside.

## 2019-12-13 NOTE — PROGRESS NOTES
Care Management Interventions  PCP Verified by CM: Yes  Mode of Transport at Discharge: Self  Transition of Care Consult (CM Consult): [address]: Yes  Physical Therapy Consult: Yes  Occupational Therapy Consult: Yes  Current Support Network: Lives with Spouse  Confirm Follow Up Transport: Family  Plan discussed with Pt/Family/Caregiver: Yes  Freedom of Choice Offered: Yes  Discharge Location  Discharge Placement: Home with home health    Patient is a [de-identified]y.o. year old male admitted for Left TKA . Patient plans to return home on discharge. Order received to arrange home health. Patient without preference towards agency. Referral sent to [facility]. Patient denies any equipment needs as patient has a walker. The Plan for Transition of Care is related to the following treatment goals: return to independent care  The Patient  was provided with a choice of provider and agrees   with the discharge plan. [x] Yes [] No    Freedom of choice list was provided with basic dialogue that supports the patient's individualized plan of care/goals, treatment preferences and shares the quality data associated with the providers.  [x] Yes [] No

## 2019-12-14 ENCOUNTER — HOME CARE VISIT (OUTPATIENT)
Dept: SCHEDULING | Facility: HOME HEALTH | Age: 79
End: 2019-12-14
Payer: MEDICARE

## 2019-12-14 VITALS
OXYGEN SATURATION: 95 % | SYSTOLIC BLOOD PRESSURE: 112 MMHG | HEART RATE: 87 BPM | TEMPERATURE: 99.7 F | RESPIRATION RATE: 18 BRPM | DIASTOLIC BLOOD PRESSURE: 66 MMHG

## 2019-12-14 PROCEDURE — 3331090002 HH PPS REVENUE DEBIT

## 2019-12-14 PROCEDURE — 400013 HH SOC

## 2019-12-14 PROCEDURE — G0151 HHCP-SERV OF PT,EA 15 MIN: HCPCS

## 2019-12-14 PROCEDURE — 3331090001 HH PPS REVENUE CREDIT

## 2019-12-15 PROCEDURE — 3331090001 HH PPS REVENUE CREDIT

## 2019-12-15 PROCEDURE — 3331090002 HH PPS REVENUE DEBIT

## 2019-12-16 ENCOUNTER — HOME CARE VISIT (OUTPATIENT)
Dept: SCHEDULING | Facility: HOME HEALTH | Age: 79
End: 2019-12-16
Payer: MEDICARE

## 2019-12-16 VITALS
RESPIRATION RATE: 16 BRPM | HEART RATE: 78 BPM | DIASTOLIC BLOOD PRESSURE: 72 MMHG | TEMPERATURE: 98.6 F | SYSTOLIC BLOOD PRESSURE: 142 MMHG

## 2019-12-16 PROCEDURE — G0157 HHC PT ASSISTANT EA 15: HCPCS

## 2019-12-16 PROCEDURE — 3331090002 HH PPS REVENUE DEBIT

## 2019-12-16 PROCEDURE — 3331090001 HH PPS REVENUE CREDIT

## 2019-12-17 PROCEDURE — 3331090001 HH PPS REVENUE CREDIT

## 2019-12-17 PROCEDURE — 3331090002 HH PPS REVENUE DEBIT

## 2019-12-18 ENCOUNTER — HOME CARE VISIT (OUTPATIENT)
Dept: SCHEDULING | Facility: HOME HEALTH | Age: 79
End: 2019-12-18
Payer: MEDICARE

## 2019-12-18 VITALS
TEMPERATURE: 98 F | HEART RATE: 86 BPM | RESPIRATION RATE: 16 BRPM | DIASTOLIC BLOOD PRESSURE: 74 MMHG | SYSTOLIC BLOOD PRESSURE: 122 MMHG

## 2019-12-18 PROCEDURE — 3331090002 HH PPS REVENUE DEBIT

## 2019-12-18 PROCEDURE — 3331090001 HH PPS REVENUE CREDIT

## 2019-12-18 PROCEDURE — G0157 HHC PT ASSISTANT EA 15: HCPCS

## 2019-12-19 PROCEDURE — 3331090001 HH PPS REVENUE CREDIT

## 2019-12-19 PROCEDURE — 3331090002 HH PPS REVENUE DEBIT

## 2019-12-20 ENCOUNTER — HOME CARE VISIT (OUTPATIENT)
Dept: SCHEDULING | Facility: HOME HEALTH | Age: 79
End: 2019-12-20
Payer: MEDICARE

## 2019-12-20 VITALS
TEMPERATURE: 98.6 F | HEART RATE: 81 BPM | DIASTOLIC BLOOD PRESSURE: 72 MMHG | RESPIRATION RATE: 16 BRPM | SYSTOLIC BLOOD PRESSURE: 130 MMHG

## 2019-12-20 PROCEDURE — 3331090002 HH PPS REVENUE DEBIT

## 2019-12-20 PROCEDURE — 3331090001 HH PPS REVENUE CREDIT

## 2019-12-20 PROCEDURE — G0157 HHC PT ASSISTANT EA 15: HCPCS

## 2019-12-21 PROCEDURE — 3331090001 HH PPS REVENUE CREDIT

## 2019-12-21 PROCEDURE — 3331090002 HH PPS REVENUE DEBIT

## 2019-12-22 PROCEDURE — 3331090002 HH PPS REVENUE DEBIT

## 2019-12-22 PROCEDURE — 3331090001 HH PPS REVENUE CREDIT

## 2019-12-23 PROCEDURE — 3331090001 HH PPS REVENUE CREDIT

## 2019-12-23 PROCEDURE — 3331090002 HH PPS REVENUE DEBIT

## 2019-12-24 ENCOUNTER — HOME CARE VISIT (OUTPATIENT)
Dept: SCHEDULING | Facility: HOME HEALTH | Age: 79
End: 2019-12-24
Payer: MEDICARE

## 2019-12-24 ENCOUNTER — HOME CARE VISIT (OUTPATIENT)
Dept: HOME HEALTH SERVICES | Facility: HOME HEALTH | Age: 79
End: 2019-12-24
Payer: MEDICARE

## 2019-12-24 VITALS
HEART RATE: 85 BPM | RESPIRATION RATE: 16 BRPM | TEMPERATURE: 97.4 F | SYSTOLIC BLOOD PRESSURE: 138 MMHG | DIASTOLIC BLOOD PRESSURE: 84 MMHG

## 2019-12-24 PROCEDURE — G0157 HHC PT ASSISTANT EA 15: HCPCS

## 2019-12-24 PROCEDURE — 3331090002 HH PPS REVENUE DEBIT

## 2019-12-24 PROCEDURE — 3331090001 HH PPS REVENUE CREDIT

## 2019-12-25 PROCEDURE — 3331090001 HH PPS REVENUE CREDIT

## 2019-12-25 PROCEDURE — 3331090002 HH PPS REVENUE DEBIT

## 2019-12-26 ENCOUNTER — HOME CARE VISIT (OUTPATIENT)
Dept: SCHEDULING | Facility: HOME HEALTH | Age: 79
End: 2019-12-26
Payer: MEDICARE

## 2019-12-26 VITALS
HEART RATE: 72 BPM | RESPIRATION RATE: 16 BRPM | TEMPERATURE: 98 F | SYSTOLIC BLOOD PRESSURE: 138 MMHG | DIASTOLIC BLOOD PRESSURE: 72 MMHG

## 2019-12-26 PROCEDURE — A4649 SURGICAL SUPPLIES: HCPCS

## 2019-12-26 PROCEDURE — 3331090001 HH PPS REVENUE CREDIT

## 2019-12-26 PROCEDURE — 3331090002 HH PPS REVENUE DEBIT

## 2019-12-26 PROCEDURE — G0157 HHC PT ASSISTANT EA 15: HCPCS

## 2019-12-27 PROCEDURE — 3331090002 HH PPS REVENUE DEBIT

## 2019-12-27 PROCEDURE — 3331090001 HH PPS REVENUE CREDIT

## 2019-12-28 PROCEDURE — 3331090002 HH PPS REVENUE DEBIT

## 2019-12-28 PROCEDURE — 3331090001 HH PPS REVENUE CREDIT

## 2019-12-29 PROCEDURE — 3331090002 HH PPS REVENUE DEBIT

## 2019-12-29 PROCEDURE — 3331090001 HH PPS REVENUE CREDIT

## 2019-12-30 ENCOUNTER — HOME CARE VISIT (OUTPATIENT)
Dept: SCHEDULING | Facility: HOME HEALTH | Age: 79
End: 2019-12-30
Payer: MEDICARE

## 2019-12-30 VITALS
SYSTOLIC BLOOD PRESSURE: 130 MMHG | RESPIRATION RATE: 16 BRPM | HEART RATE: 98 BPM | DIASTOLIC BLOOD PRESSURE: 86 MMHG | TEMPERATURE: 98.2 F

## 2019-12-30 PROCEDURE — 3331090001 HH PPS REVENUE CREDIT

## 2019-12-30 PROCEDURE — 3331090002 HH PPS REVENUE DEBIT

## 2019-12-30 PROCEDURE — G0157 HHC PT ASSISTANT EA 15: HCPCS

## 2019-12-31 PROCEDURE — 3331090002 HH PPS REVENUE DEBIT

## 2019-12-31 PROCEDURE — 3331090001 HH PPS REVENUE CREDIT

## 2020-01-01 ENCOUNTER — HOME CARE VISIT (OUTPATIENT)
Dept: SCHEDULING | Facility: HOME HEALTH | Age: 80
End: 2020-01-01
Payer: MEDICARE

## 2020-01-01 VITALS
TEMPERATURE: 98 F | HEART RATE: 102 BPM | DIASTOLIC BLOOD PRESSURE: 80 MMHG | RESPIRATION RATE: 18 BRPM | SYSTOLIC BLOOD PRESSURE: 142 MMHG

## 2020-01-01 PROCEDURE — 3331090001 HH PPS REVENUE CREDIT

## 2020-01-01 PROCEDURE — 3331090003 HH PPS REVENUE ADJ

## 2020-01-01 PROCEDURE — G0151 HHCP-SERV OF PT,EA 15 MIN: HCPCS

## 2020-01-01 PROCEDURE — 3331090002 HH PPS REVENUE DEBIT

## 2020-01-03 ENCOUNTER — HOSPITAL ENCOUNTER (OUTPATIENT)
Dept: PHYSICAL THERAPY | Age: 80
Discharge: HOME | End: 2020-01-03
Payer: MEDICARE

## 2020-01-03 PROCEDURE — 97161 PT EVAL LOW COMPLEX 20 MIN: CPT

## 2020-01-03 PROCEDURE — 97110 THERAPEUTIC EXERCISES: CPT

## 2020-01-03 PROCEDURE — 97140 MANUAL THERAPY 1/> REGIONS: CPT

## 2020-01-03 NOTE — PROGRESS NOTES
Capri Rodríguez  : 1940  [place] / [place] / Product Type: Medicare /  [address]  at [address], [address]  [phone]   [phone]      OUTPATIENT PHYSICAL THERAPY: Daily Treatment Note 1/3/2020  Visit Count:  1    ICD-10: Treatment Diagnosis: difficulty walking R 26.2, left knee pain M25.562, Left knee stiffness M25.662    Pre-treatment Symptoms/Complaints:  Pt reporting increased knee pain and stiffness. Pain: Initial: Pain Intensity 1: 7  Post Session:  7/10   Medications Last Reviewed:  1/3/2020  Updated Objective Findings:  See evaluation note from today   TREATMENT:   THERAPEUTIC EXERCISE: (20 minutes):  Exercises per grid below to improve mobility and strength. Required minimal verbal cues to promote proper body mechanics. Progressed resistance, range and repetitions as indicated. Nu-step level 1 x 7 min seat 10/11  LAQ x 30  Hamstring stretching 3x hold 20 sec  Slant board 3x hold 20 sec  Marching x 30  HS curls 20# x 30  6 inch step ups x 20  Manual Therapy (  15 min    ): STM to the knee to work on surrounding muscle tissues and hamstrings to help improve his knee flexion and extension. Overpressure provided into flexion and extension    Therapeutic Modalities (  na   ):na    Evaluation (x)    HEP: As above; handouts given to patient for all exercises. Treatment/Session Summary:    · Response to Treatment:  Pt seen for eval today. Pt presenting with more extension loss than flexion loss. He is ambulating with cane and walker at times depending upon how he feels. He is progressing well for three weeks post op. Pt will work on knee extension at home through use of stretching and discontinue use of pillow directly under his knee while sleeping. .  · Communication/Consultation:  None today  · Equipment provided today:  None today  · Recommendations/Intent for next treatment session: Next visit will focus on ROM, strengthening and gait/balance training.   Treatment Plan of Care Effective Dates:  1/3/2020 to 4/2/2020  Total Treatment Billable Duration:  eval plus 35 min  PT Patient Time In/Time Out  Time In: 1035  Time Out: [address], PT    Future Appointments   Date Time Provider Liane Valencia   1/6/2020 12:00 PM Andrew Rosita, PT [facility]   1/9/2020 12:30 PM Andrew Rosita, PT [facility]   1/13/2020 12:00 PM Andrew Rosita, PT [facility]   1/16/2020 11:45 AM Andrew Rosita, PT [facility]   1/21/2020 11:45 AM Andrew Rosita, PT [facility]   1/24/2020 12:45 PM Andrew Rosita, PT [facility]   1/28/2020 11:45 AM Andrew Rosita, PT [facility]   1/30/2020 11:45 AM Andrew Rosita, PT [facility]

## 2020-01-03 NOTE — THERAPY EVALUATION
Gabriel Bautista  : 1940  [place] / Product Type: Medicare /  [address]  at [facility] Therapy  [address], [address]  [phone]   [phone]         OUTPATIENT PHYSICAL THERAPY:Initial Assessment 1/3/2020   ICD-10: Treatment Diagnosis: difficulty walking R 26.2, left knee pain M25.562, Left knee stiffness M25.662  Precautions/Allergies:   Patient has no known allergies. TREATMENT PLAN:  Effective Dates: 1/3/2020 TO 2020 (90 days). Frequency/Duration: 2 times a week for 90 Day(s) and upon reassessment, will adjust frequency and duration as progress indicates. MEDICAL/REFERRING DIAGNOSIS:  Presence of left artificial knee joint [Z96.652]   DATE OF ONSET: surgery 19  REFERRING PHYSICIAN: Jamil Chopra MD MD Orders: Luiz Shingles and treat  Return MD Appointment: 20     INITIAL ASSESSMENT:  Mr. Coby Leija presents three weeks status post LTKA. He reported spending one night in hospital and was discharged home with home health therapy. Pt reports increased pain, stiffness and difficulty with walking. He is using a cane and walker to ambulate. He reports having pain in the knee for at least one year prior to surgery if not longer and that he has ambulated with a cane over the past 6 months. He presented today with lack of full knee extension and knee flexion to 115 °. Pt will benefit from skilled therapy to work on improving his endurance, mobility and knee ROM. PROBLEM LIST (Impacting functional limitations):  1. Decreased Strength  2. Decreased ADL/Functional Activities  3. Decreased Ambulation Ability/Technique  4. Decreased Balance  5. Increased Pain  6. Decreased Activity Tolerance  7. Decreased Flexibility/Joint Mobility  8. Edema/Girth INTERVENTIONS PLANNED: (Treatment may consist of any combination of the following)  1. Balance Exercise  2. Gait Training  3.  Home Exercise Program (HEP)  4. Manual Therapy  5. Range of Motion (ROM)  6. Therapeutic Exercise/Strengthening     GOALS: (Goals have been discussed and agreed upon with patient.)  Short-Term Functional Goals: Time Frame: 6 weeks  1. Establish independent HEP with no cuing. 2. Pt will be able to gain 5 degrees of knee extension for improved gait mechanics. 3. Pt will be able to return to driving. 4. Pt will be independent with all dressing. Discharge Goals: Time Frame: 12 weeks (90 days)  1. Pt will be able to improve score on LEFS by at least 9 points for advanced ADL's.  2. Pt will be able to restore knee ROM to at least 3-0-120 degrees for improved gait mechanics and ease of sitting. 3. Pt will be able to increase strength to 4/5 to 4+/5 in order to ambulate with LRAD for 30 minutes. 4. Pt will be able to report ambulating on uneven surfaces with use of LRAD and without limitations or loss of balance. OUTCOME MEASURE:   Tool Used: Lower Extremity Functional Scale (LEFS)  Score:  Initial: 28/80 Most Recent: X/80 (Date: -- )   Interpretation of Score: 20 questions each scored on a 5 point scale with 0 representing \"extreme difficulty or unable to perform\" and 4 representing \"no difficulty\". The lower the score, the greater the functional disability. 80/80 represents no disability. Minimal detectable change is 9 points. MEDICAL NECESSITY:   · Patient is expected to demonstrate progress in strength, range of motion and balance to improve safety during ambulation and all transfers. REASON FOR SERVICES/OTHER COMMENTS:  · Patient continues to require skilled intervention due to lack of full knee motion, strrength  and normal balance for walking. Total Duration:  PT Patient Time In/Time Out  Time In: 1035  Time Out: 1130    Rehabilitation Potential For Stated Goals: Good  Regarding Cheryle Grazyna Zapata's therapy, I certify that the treatment plan above will be carried out by a therapist or under their direction.   Thank you for this referral,  Laura Washington PT     Referring Physician Signature: Samaria Kay MD _______________________________ Date _____________                        PAIN/SUBJECTIVE:   Initial: Pain Intensity 1: 7  Post Session:  7/10   HISTORY:   History of Injury/Illness (Reason for Referral):Mr. Zapata presents three weeks status post LTKA. He reported spending one night in hospital and was discharged home with home health therapy. Pt reports increased pain, stiffness and difficulty with walking. He is using a cane and walker to ambulate. He reports having pain in the knee for at least one year prior to surgery if not longer and that he has ambulated with a cane over the past 6 months. He presented today with lack of full knee extension and knee flexion to 115 °. Past Medical History/Comorbidities:   Mr. Justyna Mccauley  has a past medical history of HLD (hyperlipidemia) (12/7/2012), Macular degeneration, and Osteoarthritis of left knee. Mr. Justyna Mccauley  has a past surgical history that includes hx colonoscopy (2002) and hx colonoscopy (11-1-13). Social History/Living Environment:     pt lives with spouse on one level home-roughly 2 steps to enter home  Prior Level of Function/Work/Activity:  Pt is a retired industrial salesmen     Ambulatory/Rehab [address] [place] [address] Factors:       (1)  Gender [Male] Ability to [facility] from [address] [facility]:       (1)  Pushes up, successful in one attempt   [address]:       No modifications necessary   Total: (5 or greater = High Risk): 2   ©2010 [address] States Patent #[id number]. Federal Law prohibits the replication, distribution or use without written permission from [facility] [employer]   Current Medications:       Current Outpatient Medications:     docusate sodium (COLACE) 100 mg capsule, Take 100 mg by mouth three (3) times daily as needed for Constipation. , Disp: , Rfl:     aspirin delayed-release 81 mg tablet, Take 1 Tab by mouth every twelve (12) hours every twelve (12) hours for 30 days. , Disp: 60 Tab, Rfl: 0    acetaminophen (TYLENOL PO), Take 1,000 mg by mouth every six (6) hours as needed for Pain., Disp: , Rfl:    Date Last Reviewed:  1/3/2020     Number of Personal Factors/Comorbidities that affect the Plan of Care: 1-2: MODERATE COMPLEXITY        EXAMINATION:   Palpation:          Mild edema and bruising noted  ROM:        R knee 5-116 °  L knee 15 -115 °                             Strength:     R LE roughly 4/5         L LE grossly 4-/5            Special Tests: na  Neurological Screen: na  Balance:  fair-pt using straight cane for ambulation      Body Structures Involved:  1. Bones  2. Joints  3. Muscles  4. Ligaments Body Functions Affected:  1. Sensory/Pain  2. Neuromusculoskeletal  3. Movement Related Activities and Participation Affected:  1. General Tasks and Demands  2. Mobility  3. Self Care  4. Domestic Life  5. Interpersonal Interactions and Relationships  6.  Community, Social and [place]   Number of elements (examined above) that affect the Plan of Care: 4+: HIGH COMPLEXITY   CLINICAL PRESENTATION:   Presentation: Stable and uncomplicated: LOW COMPLEXITY   CLINICAL DECISION MAKING:   Use of outcome tool(s) and clinical judgement create a POC that gives a: Clear prediction of patient's progress: LOW COMPLEXITY

## 2020-01-09 ENCOUNTER — HOSPITAL ENCOUNTER (OUTPATIENT)
Dept: PHYSICAL THERAPY | Age: 80
Discharge: HOME | End: 2020-01-09
Payer: MEDICARE

## 2020-01-09 PROCEDURE — 97140 MANUAL THERAPY 1/> REGIONS: CPT

## 2020-01-09 PROCEDURE — 97110 THERAPEUTIC EXERCISES: CPT

## 2020-01-09 NOTE — PROGRESS NOTES
Jacklyn Littlejohn  : 1940  [place] / Product Type: Medicare /  [address]  at [facility] Therapy  [address] [place] [address], [address]  [phone]   [phone]      OUTPATIENT PHYSICAL THERAPY: Daily Treatment Note 2020  Visit Count:  2    ICD-10: Treatment Diagnosis: difficulty walking R 26.2, left knee pain M25.562, Left knee stiffness M25.662    Pre-treatment Symptoms/Complaints:  Pt reporting he thinks the knee is improving. He did see MD and x-rays were normal and pt is progressing as scheduled. Pain: Initial: Pain Intensity 1: 7  Post Session:  6/10   Medications Last Reviewed:  2020  Updated Objective Findings:  See evaluation note from today   TREATMENT:   THERAPEUTIC EXERCISE: (29 minutes):  Exercises per grid below to improve mobility and strength. Required minimal verbal cues to promote proper body mechanics. Progressed resistance, range and repetitions as indicated. Nu-step level 1 x 10 min seat 10/11  LAQ x 30  Hamstring stretching 3x hold 20 sec  Slant board 3x hold 20 sec  Marching x 30-held  HS curls 25# x 30  6 inch step ups and overs x 20  Standing bilateral hip abduction 25# x 20  Standing bilateral hip extension 37.5# x 20  Manual Therapy (  15 min    ): STM to the knee to work on surrounding muscle tissues and hamstrings to help improve his knee flexion and extension. Overpressure provided into flexion and extension    Therapeutic Modalities (  na   ):na    HEP: continue as directed    Treatment/Session Summary:    · Response to Treatment:  Pt reporting no increased pain. He is ambulating with straight cane due to right knee OA and instability. He will be undergoing R TKA iin April. He progressed well with exercises today and reached 130 degrees of knee flexion. Extension is still lacking on table and with ambulation.   He has stopped placing pillow under the knee and is stretching at home to work on this. .  · Communication/Consultation:  None today  · Equipment provided today:  None today  · Recommendations/Intent for next treatment session: Next visit will focus on ROM, strengthening and gait/balance training.   Treatment Plan of Care Effective Dates:  1/3/2020 to 4/2/2020  Total Treatment Billable Duration:  44 min  PT Patient Time In/Time Out  Time In: 1230  Time Out: [address], PT    Future Appointments   Date Time Provider Liane Valencia   1/13/2020 12:00 PM Lucinda Clipper, PT [facility]   1/16/2020 11:45 AM Lucinda Clipper, PT [facility]   1/21/2020 11:45 AM Lucinda Clipper, PT [facility]   1/24/2020 12:45 PM Lucinda Clipper, PT [facility]   1/28/2020 11:45 AM Lucinda Clipper, PT [facility]   1/30/2020 11:45 AM Lucinda Clipper, PT [facility]

## 2020-01-13 ENCOUNTER — HOSPITAL ENCOUNTER (OUTPATIENT)
Dept: PHYSICAL THERAPY | Age: 80
Discharge: HOME | End: 2020-01-13
Payer: MEDICARE

## 2020-01-13 PROCEDURE — 97110 THERAPEUTIC EXERCISES: CPT

## 2020-01-13 PROCEDURE — 97140 MANUAL THERAPY 1/> REGIONS: CPT

## 2020-01-13 NOTE — PROGRESS NOTES
Mohit Jesi  : 1940  [place] / Product Type: Medicare /  [address]  at [facility] Therapy  [address], [address]  [phone]   [phone]      OUTPATIENT PHYSICAL THERAPY: Daily Treatment Note 2020  Visit Count:  3    ICD-10: Treatment Diagnosis: difficulty walking R 26.2, left knee pain M25.562, Left knee stiffness M25.662    Pre-treatment Symptoms/Complaints:  Pt reporting the knee is improving. He has been able to walk in the home without the cane at times. Pain: Initial: Pain Intensity 1: 6  Post Session:  5/10   Medications Last Reviewed:  2020  Updated Objective Findings:  knee extension improved to 5- pt has now gained 10 degrees total   TREATMENT:   THERAPEUTIC EXERCISE: (40 minutes):  Exercises per grid below to improve mobility and strength. Required minimal verbal cues to promote proper body mechanics. Progressed resistance, range and repetitions as indicated. Nu-step level 1 x 10 min seat 10/11  LAQ x 30  Hamstring stretching 3x hold 20 sec  Slant board 3x hold 20 sec  Marching x 30-held  HS curls 30# x 30  6 inch step ups and overs x 20  Standing bilateral hip abduction 25# x 20  Standing bilateral hip extension 37.5# x 20  SAQ x 25  Manual Therapy (  15 min    ): STM to the knee to work on surrounding muscle tissues and hamstrings to help improve his knee flexion and extension. Overpressure provided into flexion and extension    Therapeutic Modalities (  na   ):na    HEP: continue as directed    Treatment/Session Summary:    · Response to Treatment:  Pt reporting no increased pain with treatment. He was able to ambulate without assistive device for 150 feet. He also has gained 10 degrees of knee extension and is ambulating wiht more ease and confidence. Slow radha still noted, but overall greatly improved. .  · Communication/Consultation:  None today  · Equipment provided today:  None today  · Recommendations/Intent for next treatment session: Next visit will focus on ROM, strengthening and gait/balance training.   Treatment Plan of Care Effective Dates:  1/3/2020 to 4/2/2020  Total Treatment Billable Duration:  55 min  PT Patient Time In/Time Out  Time In: 5144  Time Out: [address], PT    Future Appointments   Date Time Provider Liane Valencia   1/16/2020 11:45 AM [place] Golden, PT [facility]   1/21/2020 11:45 AM [place] Golden, PT [facility]   1/24/2020 12:45 PM [place] Golden, PT [facility]   1/28/2020 11:45 AM Glo Golden, PT [facility]   1/30/2020 11:45 AM Glo Golden, PT [facility]

## 2020-01-16 ENCOUNTER — HOSPITAL ENCOUNTER (OUTPATIENT)
Dept: PHYSICAL THERAPY | Age: 80
Discharge: HOME | End: 2020-01-16
Payer: MEDICARE

## 2020-01-16 PROCEDURE — 97110 THERAPEUTIC EXERCISES: CPT

## 2020-01-16 PROCEDURE — 97140 MANUAL THERAPY 1/> REGIONS: CPT

## 2020-01-16 NOTE — PROGRESS NOTES
Hannah Ramachandran  : 1940  [place] / Product Type: Medicare /  [address]  at [facility] Therapy  [address], [address]  [phone]   [phone]      OUTPATIENT PHYSICAL THERAPY: Daily Treatment Note 2020  Visit Count:  4    ICD-10: Treatment Diagnosis: difficulty walking R 26.2, left knee pain M25.562, Left knee stiffness M25.662    Pre-treatment Symptoms/Complaints:  Pt reporting the knee is gradually improving. Pain: Initial: Pain Intensity 1: 5  Post Session:  5/10   Medications Last Reviewed:  2020  Updated Objective Findings:  None Today   TREATMENT:   THERAPEUTIC EXERCISE: (30 minutes):  Exercises per grid below to improve mobility and strength. Required minimal verbal cues to promote proper body mechanics. Progressed resistance, range and repetitions as indicated. Nu-step level 1 x 10 min seat 10/11  LAQ x 30 5#  Hamstring stretching 3x hold 20 sec  Slant board 3x hold 20 sec  Marching x 30-held  HS curls 30# x 30  6 inch step ups and overs x 20  Standing bilateral hip abduction 25# x 20  Standing bilateral hip extension 37.5# x 20  SAQ x 25  BOSU marching x 30  Manual Therapy (  20 min    ): STM to the knee to work on surrounding muscle tissues and hamstrings to help improve his knee flexion and extension. Overpressure provided into flexion and extension    Therapeutic Modalities (  na   ):na    HEP: continue as directed    Treatment/Session Summary:    · Response to Treatment:  Pt reporting he feels like the knee is improving. He can ambulate without the cane, but the right knee is painful and he takes a shorter stride. He did lengthen with cuing and also with use of the cane. He is progressing well. Still continues to need work on achieving more knee extension.   · Communication/Consultation:  None today  · Equipment provided today:  None today  · Recommendations/Intent for next treatment session: Next visit will focus on ROM, strengthening and gait/balance training.   Treatment Plan of Care Effective Dates:  1/3/2020 to 4/2/2020  Total Treatment Billable Duration:  50 min  PT Patient Time In/Time Out  Time In: 4028  Time Out: [address], PT    Future Appointments   Date Time Provider Liane Valencia   1/21/2020 11:45 AM TANNER [facility]   1/24/2020 12:45 PM TANNER [facility]   1/28/2020 11:00 AM [facility]   1/30/2020 11:45 AM Faheem Weldon PT [facility] Send to hospital, if necessary, based on MOLST orders

## 2020-01-21 ENCOUNTER — HOSPITAL ENCOUNTER (OUTPATIENT)
Dept: PHYSICAL THERAPY | Age: 80
Discharge: HOME | End: 2020-01-21
Payer: MEDICARE

## 2020-01-21 PROCEDURE — 97110 THERAPEUTIC EXERCISES: CPT

## 2020-01-21 PROCEDURE — 97140 MANUAL THERAPY 1/> REGIONS: CPT

## 2020-01-21 NOTE — PROGRESS NOTES
Jay Preciado  : 1940  [place] / Product Type: Medicare /  [address] [employer]  at [address]  [address], [address]  [phone]   [phone]      OUTPATIENT PHYSICAL THERAPY: Daily Treatment Note 2020  Visit Count:  5    ICD-10: Treatment Diagnosis: difficulty walking R 26.2, left knee pain M25.562, Left knee stiffness M25.662    Pre-treatment Symptoms/Complaints:  Pt reporting the knee is feeling better on a daily basis. Pain: Initial: Pain Intensity 1: 4  Post Session:  3/10   Medications Last Reviewed:  2020  Updated Objective Findings:  None Today   TREATMENT:   THERAPEUTIC EXERCISE: (24 minutes):  Exercises per grid below to improve mobility and strength. Required minimal verbal cues to promote proper body mechanics. Progressed resistance, range and repetitions as indicated. Nu-step level 1 x 10 min seat 10/11  LAQ x 30 7.5#  Hamstring stretching 3x hold 20 sec  Slant board 3x hold 20 sec  Marching x 30-held  HS curls 30# x 30  6 inch step ups and overs x 20  Standing bilateral hip abduction 25# x 20  Standing bilateral hip extension 37.5# x 20  SAQ x 25-held  BOSU marching x 30-held  Manual Therapy (  20 min    ): STM to the knee to work on surrounding muscle tissues and hamstrings to help improve his knee flexion and extension. Overpressure provided into flexion and extension. Pt also in prone to work on hamstrings    Therapeutic Modalities (  na   ):na    HEP: continue as directed    Treatment/Session Summary:    · Response to Treatment:  Pt reporting the knee is improving. Pt placed in prone today to work on hamstrings and this seemed to help with his overall knee extension. He has been able to run short community errands and still requires the use of the cane for the community due to increased pain in right knee.   .  · Communication/Consultation:  None today  · Equipment provided today:  None today  · Recommendations/Intent for next treatment session: Next visit will focus on ROM, strengthening and gait/balance training.   Treatment Plan of Care Effective Dates:  1/3/2020 to 4/2/2020  Total Treatment Billable Duration:  44 min  PT Patient Time In/Time Out  Time In: 4070  Time Out: [address], PT    Future Appointments   Date Time Provider Liane Valencia   1/24/2020 12:45 PM Pepe Hendricks PT [facility]   1/28/2020 11:00 AM Kieran [facility]   1/30/2020 11:45 AM Pepe Hendricks, PT [facility]

## 2020-01-24 ENCOUNTER — HOSPITAL ENCOUNTER (OUTPATIENT)
Dept: PHYSICAL THERAPY | Age: 80
Discharge: HOME | End: 2020-01-24
Payer: MEDICARE

## 2020-01-24 PROCEDURE — 97110 THERAPEUTIC EXERCISES: CPT

## 2020-01-24 PROCEDURE — 97140 MANUAL THERAPY 1/> REGIONS: CPT

## 2020-01-24 NOTE — PROGRESS NOTES
Candice Ward  : 1940  [place] / Product Type: Medicare /  [address]  at [place] [address] Therapy  [address], [address]  [phone]   [phone]      OUTPATIENT PHYSICAL THERAPY: Daily Treatment Note 2020  Visit Count:  6    ICD-10: Treatment Diagnosis: difficulty walking R 26.2, left knee pain M25.562, Left knee stiffness M25.662    Pre-treatment Symptoms/Complaints:  Pt reporting the left knee continues to feel better. Right knee is more painful. Pain: Initial: Pain Intensity 1: 3  Post Session:  3/10   Medications Last Reviewed:  2020  Updated Objective Findings:  None Today   TREATMENT:   THERAPEUTIC EXERCISE: (28 minutes):  Exercises per grid below to improve mobility and strength. Required minimal verbal cues to promote proper body mechanics. Progressed resistance, range and repetitions as indicated. Nu-step level 1 x 10 min seat 10/11  LAQ x 30 7.5#  Hamstring stretching 3x hold 20 sec  Slant board 3x hold 20 sec  Marching x 30-held  HS curls 30# x 30  6 inch step ups and overs x 20  Standing bilateral hip abduction 25# x 20  Standing bilateral hip extension 37.5# x 20  SAQ x 25 3#  BOSU marching x 30-held  SLS on airex 3x hold 25 sec  Manual Therapy (  20 min    ): STM to the knee to work on surrounding muscle tissues and hamstrings to help improve his knee flexion and extension. Overpressure provided into flexion and extension. Pt also in prone to work on hamstrings    Therapeutic Modalities (  na   ):na    HEP: continue as directed    Treatment/Session Summary:    · Response to Treatment:  Pt is progressing well with knee ROM and strength. The knee feels  more stable to him with ambulation. Right knee is more of a hindrance now. ROM for knee flexion is 130 and knee extension is lacking only 2-4 degrees from being fully straingt.   He is intermittently using cane in the home and is using it out in community. Exepct 2 more weeks max of therapy until transition to HEP. · Communication/Consultation:  None today  · Equipment provided today:  None today  · Recommendations/Intent for next treatment session: Next visit will focus on ROM, strengthening and gait/balance training.   Treatment Plan of Care Effective Dates:  1/3/2020 to 4/2/2020  Total Treatment Billable Duration:  48 min  PT Patient Time In/Time Out  Time In: 1240  Time Out: [address] PT    Future Appointments   Date Time Provider Liane Valencia   1/28/2020 11:00 AM Misa [facility]   1/30/2020 11:45 AM Yanet Sr, PT [facility]

## 2020-01-27 NOTE — PROGRESS NOTES
Therapy Center at [facility] Therapy   [address], [facility] [address]  [phone]   [phone]    OUTPATIENT DAILY NOTE    NAME/AGE/GENDER: Yanira Dailey is a [de-identified] y.o. male. DATE: 1/27/2020    Patient called and stated he hurt his knee. He did call MD and asked if he could continue with therapy. He will return tomorrow for therapy.      Lior Javier, PT

## 2020-01-28 ENCOUNTER — HOSPITAL ENCOUNTER (OUTPATIENT)
Dept: PHYSICAL THERAPY | Age: 80
Discharge: HOME | End: 2020-01-28
Payer: MEDICARE

## 2020-01-28 PROCEDURE — 97140 MANUAL THERAPY 1/> REGIONS: CPT

## 2020-01-28 NOTE — PROGRESS NOTES
Ralph Alvarez  : 1940  [place] / Product Type: Medicare /  [address]  at [facility] Therapy  [address] [place] [address], [address]  [phone]   [phone]      OUTPATIENT PHYSICAL THERAPY: Daily Treatment Note 2020  Visit Count:  7    ICD-10: Treatment Diagnosis: difficulty walking R 26.2, left knee pain M25.562, Left knee stiffness M25.662    Pre-treatment Symptoms/Complaints:  Patient reports knee is very still very pain especially when he first stands up and while walking. Pain: Initial: Pain Intensity 1: 8  Post Session:  7/10   Medications Last Reviewed:  2020  Updated Objective Findings:  None Today   TREATMENT:   THERAPEUTIC EXERCISE: :min  Exercises per grid below to improve mobility and strength. Required minimal verbal cues to promote proper body mechanics. Progressed resistance, range and repetitions as indicated. Nu-step level 1 x 10 min seat 10/  LAQ x 30 7.5#  Hamstring stretching 3x hold 20 sec  Slant board 3x hold 20 sec  Marching x 30-held  HS curls 30# x 30  6 inch step ups and overs x 20  Standing bilateral hip abduction 25# x 20  Standing bilateral hip extension 37.5# x 20  SAQ x 25 3#  BOSU marching x 30-held  SLS on airex 3x hold 25 sec  Held all exercises expect ambulation with rolling walker  Manual Therapy (  55 min    ): STM to the knee to work on surrounding muscle tissues and hamstrings to help improve his knee flexion and extension. Overpressure provided into flexion and extension. Pt also in prone to work on hamstrings    Therapeutic Modalities (  na   ):na    HEP: continue as directed    Treatment/Session Summary:    · Response to Treatment:  Patient tolerated treatment with moderate discomfort today Patient continued to complain of discomfort, but after manual therapy and tapping he indicated slight improvement. Patient also stated that he was told to return to therapy. Instructed patient to continue home exercises especially the quad sets and leg lifts. · Communication/Consultation:  None today  · Equipment provided today:  None today  · Recommendations/Intent for next treatment session: Next visit will focus on ROM, strengthening and gait/balance training.   Treatment Plan of Care Effective Dates:  1/3/2020 to 4/2/2020  Total Treatment Billable Duration:  55 min  PT Patient Time In/Time Out  Time In: 1100  Time Out: [address], [place]    Future Appointments   Date Time Provider Liane Valencia   1/30/2020 11:45 AM Marielos Chandler, PT [facility]

## 2020-01-30 ENCOUNTER — HOSPITAL ENCOUNTER (OUTPATIENT)
Dept: PHYSICAL THERAPY | Age: 80
Discharge: HOME | End: 2020-01-30
Payer: MEDICARE

## 2020-01-30 NOTE — PROGRESS NOTES
[facility] at [facility] Therapy   [address], [facility] [address]  [phone]   [phone]    OUTPATIENT DAILY NOTE    NAME/AGE/GENDER: Kisha Akhtar is a [de-identified] y.o. male. DATE: 1/30/2020    Patient called to cancel appointment today due to MD orders. His knee is still hurting and he will be getting a MRI. Will plan to follow up on next scheduled visit.     Claudia Butcher, PT

## 2020-01-31 ENCOUNTER — HOSPITAL ENCOUNTER (OUTPATIENT)
Dept: MRI IMAGING | Age: 80
Discharge: HOME | End: 2020-01-31
Attending: ORTHOPAEDIC SURGERY
Payer: MEDICARE

## 2020-01-31 DIAGNOSIS — Z96.652 PRESENCE OF LEFT ARTIFICIAL KNEE JOINT: ICD-10-CM

## 2020-01-31 PROCEDURE — 73721 MRI JNT OF LWR EXTRE W/O DYE: CPT

## 2020-02-04 ENCOUNTER — HOSPITAL ENCOUNTER (OUTPATIENT)
Dept: PHYSICAL THERAPY | Age: 80
Discharge: HOME | End: 2020-02-04
Payer: MEDICARE

## 2020-02-04 PROCEDURE — 97110 THERAPEUTIC EXERCISES: CPT

## 2020-02-04 PROCEDURE — 97140 MANUAL THERAPY 1/> REGIONS: CPT

## 2020-02-04 NOTE — PROGRESS NOTES
Martin Culp  : 1940  [place] / Product Type: Medicare /  [address]  at [facility] Therapy  [address], [facility] [address]  [phone]   [phone]      OUTPATIENT PHYSICAL THERAPY: Daily Treatment Note 2020  Visit Count:  8    ICD-10: Treatment Diagnosis: difficulty walking R 26.2, left knee pain M25.562, Left knee stiffness M25.662    Pre-treatment Symptoms/Complaints:  Patient reports the knee is still very sore. He saw MD yesterday and he had MRI which showed ruptured patellar tendon. Pain: Initial: Pain Intensity 1: 8  Post Session:  7/10   Medications Last Reviewed:  2020  Updated Objective Findings:  None Today   TREATMENT:   THERAPEUTIC EXERCISE: :(35 min)  Exercises per grid below to improve mobility and strength. Required minimal verbal cues to promote proper body mechanics. Progressed resistance, range and repetitions as indicated. SLR 2 x 10  Hip abduction 2 x 10 on mat table  Hip adduction 2 x 10 on mat table  Quad sets x 20  Weight shifting with walker  Gait training x 35 feet  Manual Therapy (  15 min    ): STM to the knee to work on surrounding muscle tissues and hamstrings to help improve his knee flexion and extension. Slight Overpressure provided into  extension. Therapeutic Modalities (  na   ):na    HEP: continue as directed    Treatment/Session Summary:    · Response to Treatment:  Patient reporting feeling increased pain in the knee. He arrived in therapy in wheelchair today and use of walker to get inside the clinic. He has has limited ambulation since injuring the knee. He received manual therapy and light mat table exercises. Pt did report feeling better with standing after exercises. Will continue to progress pt per his tolerance.     · Communication/Consultation:  None today  · Equipment provided today:  None today  · Recommendations/Intent for next treatment session: Next visit will focus on ROM, strengthening and gait/balance training.   Treatment Plan of Care Effective Dates:  1/3/2020 to 4/2/2020  Total Treatment Billable Duration:  50 min  PT Patient Time In/Time Out  Time In: 0910  Time Out: 640 Jordan Barroso, PT    Future Appointments   Date Time Provider Liane Valencia   2/6/2020  9:00 AM Dayla Postal, PT [facility]   2/10/2020  9:00 AM Dayla Postal, PT [facility]   2/12/2020 11:30 AM Dayla Postal, PT [facility]   2/18/2020 11:30 AM Dayla Postal, PT [facility]   2/20/2020 10:30 AM Dayla Postal, PT [facility]   2/24/2020 11:15 AM Dayla Postal, PT [facility]   2/27/2020 10:30 AM Dayla Postal, PT [facility]   3/2/2020 10:30 AM Dayla Postal, PT [facility]   3/5/2020 10:30 AM Dayla Postal, PT [facility]   3/9/2020 10:30 AM Dayla Postal, PT [facility]   3/12/2020 10:30 AM Dayla Postal, PT [facility]

## 2020-02-06 ENCOUNTER — HOSPITAL ENCOUNTER (OUTPATIENT)
Dept: PHYSICAL THERAPY | Age: 80
Discharge: HOME | End: 2020-02-06
Payer: MEDICARE

## 2020-02-06 PROCEDURE — 97140 MANUAL THERAPY 1/> REGIONS: CPT

## 2020-02-06 PROCEDURE — 97110 THERAPEUTIC EXERCISES: CPT

## 2020-02-06 NOTE — PROGRESS NOTES
Tisha Height  : 1940  [place] / Product Type: Medicare /  [address]  at [facility] Therapy  [address], [address]  [phone]   [phone]      OUTPATIENT PHYSICAL THERAPY: Daily Treatment Note 2020  Visit Count:  9    ICD-10: Treatment Diagnosis: difficulty walking R 26.2, left knee pain M25.562, Left knee stiffness M25.662    Pre-treatment Symptoms/Complaints:  Patient reports his  Knee is feeling better. The right knee is giving him more pain currently due to effects of OA. Pain: Initial: Pain Intensity 1: 7  Post Session:  6/10   Medications Last Reviewed:  2020  Updated Objective Findings:  None Today   TREATMENT:   THERAPEUTIC EXERCISE: :(34 min)  Exercises per grid below to improve mobility and strength. Required minimal verbal cues to promote proper body mechanics. Progressed resistance, range and repetitions as indicated. SLR 2 x 10  Hip abduction 2 x 10 on mat table  Hip adduction 2 x 10 on mat table  Quad sets x 20  SAQ x 20  Weight shifting with walker onto 4 inch step x 15  4 inch step up with walker x 10  Gait training x 50 feet  Nu-step level 2 x 10 min  Manual Therapy (  25 min    ): STM to the knee to work on surrounding muscle tissues and hamstrings to help improve his knee flexion and extension. Slight Overpressure provided into  extension. Therapeutic Modalities (  na   ):na    HEP: continue as directed    Treatment/Session Summary:    · Response to Treatment:  Patient's right knee is hurting more now that he is relying on it for weight-bearing. He did progress well with exercises today to work on improving his overall gait and weight-bearing abilities. He continues to lack full knee extension and required verbal cues to work on this with gait.   He will continue to benefit from strengthening of bilateral LE's so he can ambulate with more ease and prepare for upcoming r TKA.  · Communication/Consultation:  None today  · Equipment provided today:  None today  · Recommendations/Intent for next treatment session: Next visit will focus on ROM, strengthening and gait/balance training.   Treatment Plan of Care Effective Dates:  1/3/2020 to 4/2/2020  Total Treatment Billable Duration:  59 min  PT Patient Time In/Time Out  Time In: 0900  Time Out: 640 Jordan Barroso, PT    Future Appointments   Date Time Provider Liane Valencia   2/10/2020  9:00 AM Anika Clonts, PT [facility]   2/12/2020 11:30 AM Anika Clonts, PT [facility]   2/18/2020 11:30 AM Anika Clonts, PT [facility]   2/20/2020 10:30 AM Anika Clonts, PT [facility]   2/24/2020 11:15 AM Anika Clonts, PT [facility]   2/27/2020 10:30 AM Anika Clonts, PT [facility]   3/2/2020 10:30 AM Anika Clonts, PT [facility]   3/5/2020 10:30 AM Anika Clonts, PT [facility]   3/9/2020 10:30 AM Anika Clonts, PT [facility]   3/12/2020 10:30 AM Anika Clonts, PT [facility]

## 2020-02-10 ENCOUNTER — HOSPITAL ENCOUNTER (OUTPATIENT)
Dept: PHYSICAL THERAPY | Age: 80
Discharge: HOME | End: 2020-02-10
Payer: MEDICARE

## 2020-02-10 PROCEDURE — 97140 MANUAL THERAPY 1/> REGIONS: CPT

## 2020-02-10 PROCEDURE — 97110 THERAPEUTIC EXERCISES: CPT

## 2020-02-10 NOTE — PROGRESS NOTES
Andres Moore  : 1940  [place] / Product Type: Medicare /  [address] [place]  at [place] [address] Therapy  [address], [address]  [phone]   [phone]         OUTPATIENT PHYSICAL THERAPY:Progress Report 2/10/2020   ICD-10: Treatment Diagnosis: difficulty walking R 26.2, left knee pain M25.562, Left knee stiffness M25.662  Precautions/Allergies:   Patient has no known allergies. TREATMENT PLAN:  Effective Dates: 1/3/2020 TO 2020 (90 days). Frequency/Duration: 2 times a week for 90 Day(s) and upon reassessment, will adjust frequency and duration as progress indicates. MEDICAL/REFERRING DIAGNOSIS:  Presence of left artificial knee joint [Z96.652]   DATE OF ONSET: surgery 19  REFERRING PHYSICIAN: Zenobia Becker MD MD Orders: Magdiel Emery and treat  Return MD Appointment: 20     INITIAL ASSESSMENT:  Mr. Gabriella Mcfadden presents three weeks status post LTKA. He reported spending one night in hospital and was discharged home with home health therapy. Pt reports increased pain, stiffness and difficulty with walking. He is using a cane and walker to ambulate. He reports having pain in the knee for at least one year prior to surgery if not longer and that he has ambulated with a cane over the past 6 months. He presented today with lack of full knee extension and knee flexion to 115 °. Pt will benefit from skilled therapy to work on improving his endurance, mobility and knee ROM. Assessment as of 2/10/2020:  Pt has attended ten therapy sessions thus far. He was progressing well until about 2-3 weeks ago, but at this time, pt ruptured left patellar tendon trying to get into the car. He reported his right knee buckled and all the weight was shifted to his left leg. The injury has caused a set back in his progress. He is now ambulating with walker and sits most of the day to avoid hurting the knee.   We are working on decreasing pain and increasing the strength and stability of the left knee after this injury. He still reports pain with weightbearing and knee motion is 3-120 °. He will benefit from continued therapy to address the left knee pain, weakness and loss of extension as well as to work on increasing right LE strength so he will be more supported with ambulation and in standing. PROBLEM LIST (Impacting functional limitations):  1. Decreased Strength  2. Decreased ADL/Functional Activities  3. Decreased Ambulation Ability/Technique  4. Decreased Balance  5. Increased Pain  6. Decreased Activity Tolerance  7. Decreased Flexibility/Joint Mobility  8. Edema/Girth INTERVENTIONS PLANNED: (Treatment may consist of any combination of the following)  1. Balance Exercise  2. Gait Training  3. Home Exercise Program (HEP)  4. Manual Therapy  5. Range of Motion (ROM)  6. Therapeutic Exercise/Strengthening     GOALS: (Goals have been discussed and agreed upon with patient.)  Short-Term Functional Goals: Time Frame: 6 weeks  1. Establish independent HEP with no cuing.-met  2. Pt will be able to gain 5 degrees of knee extension for improved gait mechanics. -met  3. Pt will be able to return to driving.-met  4. Pt will be independent with all dressing.-met  Discharge Goals: Time Frame: 12 weeks (90 days)  1. Pt will be able to improve score on LEFS by at least 9 points for advanced ADL's.-not met  2. Pt will be able to restore knee ROM to at least 3-0-120 degrees for improved gait mechanics and ease of sitting.-currently meeting  3. Pt will be able to increase strength to 4/5 to 4+/5 in order to ambulate with LRAD for 30 minutes.-in progress  4.  Pt will be able to report ambulating on uneven surfaces with use of LRAD and without limitations or loss of balance.-in progress    OUTCOME MEASURE:   Tool Used: Lower Extremity Functional Scale (LEFS)  Score:  Initial: 28/80 Most Recent: 10/80 (Date: 2/10/2020 )   Interpretation of Score: 20 questions each scored on a 5 point scale with 0 representing \"extreme difficulty or unable to perform\" and 4 representing \"no difficulty\". The lower the score, the greater the functional disability. 80/80 represents no disability. Minimal detectable change is 9 points. MEDICAL NECESSITY:   · Patient is expected to demonstrate progress in strength, range of motion and balance to improve safety during ambulation and all transfers. REASON FOR SERVICES/OTHER COMMENTS:  · Patient continues to require skilled intervention due to lack of full knee motion, strrength  and normal balance for walking. Total Duration:  PT Patient Time In/Time Out  Time In: 0900  Time Out: 1000    Rehabilitation Potential For Stated Goals: Good  Regarding Papito Zapata's therapy, I certify that the treatment plan above will be carried out by a therapist or under their direction.   Thank you for this referral,  Ernesto Garcia, PT

## 2020-02-10 NOTE — PROGRESS NOTES
Briana Brothers  : 1940  [place] / Product Type: Medicare /  [address] [place]  at [facility] Therapy  [address], [address]  [phone]   [phone]      OUTPATIENT PHYSICAL THERAPY: Daily Treatment Note 2/10/2020  Visit Count:  10    ICD-10: Treatment Diagnosis: difficulty walking R 26.2, left knee pain M25.562, Left knee stiffness M25.662    Pre-treatment Symptoms/Complaints:  Patient reports the knee hurts with weight-bearing. He has no pain with sitting. Pain: Initial: Pain Intensity 1: 7  Post Session:  6/10   Medications Last Reviewed:  2/10/2020  Updated Objective Findings:  3-120 degrees   TREATMENT:   THERAPEUTIC EXERCISE: :(39 min)  Exercises per grid below to improve mobility and strength. Required minimal verbal cues to promote proper body mechanics. Progressed resistance, range and repetitions as indicated. SLR 2 x 10  Right SLR 2# x 20  Hip abduction 2 x 10 on mat table-held  Hip adduction 2 x 10 on mat table-held  Quad sets x 20  SAQ x 25  4 inch step up with walker 2 x 10  Gait training 2 x 50 feet  Nu-step level 2 x 10 min -held  Seated HS curls right leg green TB x 20  Left TKE green TB x 25  Standing left hip abduction 17.5# 2 x 10  Manual Therapy (  20 min    ): STM to the knee to work on surrounding muscle tissues and hamstrings to help improve his knee flexion and extension. Slight Overpressure provided into  extension. Therapeutic Modalities (  na   ):na    HEP: continue as directed    Treatment/Session Summary:    · Response to Treatment:  Patient's left knee continues to hurt with weight bearing. He has not been up much at home and pt was asked to begin ambulating slightly more with use of walker to avoid atrophy and sedentary positions. We did add right LE exercises to work on improving strength and stability of this leg as well. He feels like both legs are weak and unstable. · Communication/Consultation:  None today  · Equipment provided today:  None today  · Recommendations/Intent for next treatment session: Next visit will focus on ROM, strengthening and gait/balance training.   Treatment Plan of Care Effective Dates:  1/3/2020 to 4/2/2020  Total Treatment Billable Duration:  59 min  PT Patient Time In/Time Out  Time In: 0900  Time Out: 640 Jordan Barroso, PT    Future Appointments   Date Time Provider Liane Valencia   2/12/2020 11:30 AM Dayla Postal, PT [facility]   2/18/2020 11:30 AM Dayla Postal, PT [facility]   2/20/2020 10:30 AM Dayla Postal, PT [facility]   2/24/2020 11:15 AM Dayla Postal, PT [facility]   2/27/2020 10:30 AM Dayla Postal, PT [facility]   3/2/2020 10:30 AM Dayla Postal, PT [facility]   3/5/2020 10:30 AM Dayla Postal, PT [facility]   3/9/2020 10:30 AM Dayla Postal, PT [facility]   3/12/2020 10:30 AM Dayla Postal, PT [facility]

## 2020-02-12 ENCOUNTER — HOSPITAL ENCOUNTER (OUTPATIENT)
Dept: PHYSICAL THERAPY | Age: 80
Discharge: HOME | End: 2020-02-12
Payer: MEDICARE

## 2020-02-12 PROCEDURE — 97140 MANUAL THERAPY 1/> REGIONS: CPT

## 2020-02-12 PROCEDURE — 97110 THERAPEUTIC EXERCISES: CPT

## 2020-02-12 NOTE — PROGRESS NOTES
Chiquita Webb  : 1940  [place] / Product Type: Medicare /  [address] [place]  at [facility] Therapy  [address], [address]  [phone]   [phone]      OUTPATIENT PHYSICAL THERAPY: Daily Treatment Note 2020  Visit Count:  11    ICD-10: Treatment Diagnosis: difficulty walking R 26.2, left knee pain M25.562, Left knee stiffness M25.662    Pre-treatment Symptoms/Complaints:  Patient reports the knee hurts more today. He was fatigued from entering clinic. Pain: Initial: Pain Intensity 1: 8  Post Session:  7/10   Medications Last Reviewed:  2020  Updated Objective Findings:  None Today   TREATMENT:   THERAPEUTIC EXERCISE: :( 28 min)  Exercises per grid below to improve mobility and strength. Required minimal verbal cues to promote proper body mechanics. Progressed resistance, range and repetitions as indicated. SLR 2 x 10-held  Right SLR 2# x 20-held  Hip abduction 2 x 10 on mat table-held  Hip adduction 2 x 10 on mat table-held  Quad sets x 20  SAQ x 25-held today  4 inch step up with walker 2 x 15  Gait training 2 x 50 feet  Nu-step level 2 x 10 min   Seated HS curls right and left leg green TB x 20  Left TKE green TB x 25  Standing left hip abduction 17.5# 2 x 10  LAQ 5 # bilaterally  Manual Therapy (  20 min    ): STM to the knee to work on surrounding muscle tissues and hamstrings to help improve his knee flexion and extension. Slight Overpressure provided into  extension. Therapeutic Modalities (  na   ):na    HEP: continue as directed    Treatment/Session Summary:    · Response to Treatment:  Patient's left and right knees hurt more today. He has severe OA in right knee which is making it very difficulty for pt to ambulate. The left knee injury is also making ambulation very difficult. He does not get up much at home due to the pain. Today, he had increased difficulty with SAQ.   Extension at rest is still lacking. Pt does requires slight assistance to get in and out of clinic properly and safely. · Communication/Consultation:  None today  · Equipment provided today:  None today  · Recommendations/Intent for next treatment session: Next visit will focus on ROM, strengthening and gait/balance training.   Treatment Plan of Care Effective Dates:  1/3/2020 to 4/2/2020  Total Treatment Billable Duration:  48 min  PT Patient Time In/Time Out  Time In: 1135  Time Out: 4901 Bradley Barroso, PT    Future Appointments   Date Time Provider Liane Valencia   2/18/2020 11:30 AM Mariposa Fuse, PT [facility]   2/20/2020 10:30 AM Mariposa Fuse, PT [facility]   2/24/2020 11:15 AM Mariposa Fuse, PT [facility]   2/27/2020 10:30 AM Otoniel Fuse, PT [facility]   3/2/2020 10:30 AM Mariposa Fuse, PT [facility]   3/5/2020 10:30 AM Otoniel Fuse, PT [facility]   3/9/2020 10:30 AM Otoniel Fuse, PT [facility]   3/12/2020 10:30 AM Mariposa Fuse, PT [facility]

## 2020-02-18 ENCOUNTER — HOSPITAL ENCOUNTER (OUTPATIENT)
Dept: PHYSICAL THERAPY | Age: 80
Discharge: HOME | End: 2020-02-18
Payer: MEDICARE

## 2020-02-18 NOTE — PROGRESS NOTES
[facility]rapy [facility] at [facility] Therapy   [address], [facility] [address]  [phone]   [phone]    OUTPATIENT DAILY NOTE    NAME/AGE/GENDER: Joanne June is a [de-identified] y.o. male. DATE: 2/18/2020    Patient called to cancel appointment today. Will plan to follow up on next scheduled visit.     Ernesto Garcia, PT

## 2020-02-20 ENCOUNTER — APPOINTMENT (OUTPATIENT)
Dept: PHYSICAL THERAPY | Age: 80
End: 2020-02-20
Payer: MEDICARE

## 2020-02-24 ENCOUNTER — APPOINTMENT (OUTPATIENT)
Dept: PHYSICAL THERAPY | Age: 80
End: 2020-02-24
Payer: MEDICARE

## 2020-02-27 ENCOUNTER — APPOINTMENT (OUTPATIENT)
Dept: PHYSICAL THERAPY | Age: 80
End: 2020-02-27
Payer: MEDICARE

## 2020-03-02 ENCOUNTER — APPOINTMENT (OUTPATIENT)
Dept: PHYSICAL THERAPY | Age: 80
End: 2020-03-02

## 2020-03-05 ENCOUNTER — APPOINTMENT (OUTPATIENT)
Dept: PHYSICAL THERAPY | Age: 80
End: 2020-03-05

## 2020-03-09 ENCOUNTER — APPOINTMENT (OUTPATIENT)
Dept: PHYSICAL THERAPY | Age: 80
End: 2020-03-09

## 2020-03-12 ENCOUNTER — APPOINTMENT (OUTPATIENT)
Dept: PHYSICAL THERAPY | Age: 80
End: 2020-03-12

## 2020-03-20 ENCOUNTER — APPOINTMENT (OUTPATIENT)
Dept: PHYSICAL THERAPY | Age: 80
End: 2020-03-20

## 2020-04-06 NOTE — PERIOP NOTES
Patient's [facility]INT [facility]HAB/PAT walk-in appointment changed to phone assessment. This RN spoke with patient and instructed patient not to come to scheduled PAT appointment; patient informed a PAT nurse will call on 4/7/20 to complete assessment over the phone. Patient verbalized understanding and requested nurse call [phone] to complete assessment.

## 2020-04-07 ENCOUNTER — HOSPITAL ENCOUNTER (OUTPATIENT)
Dept: SURGERY | Age: 80
Discharge: HOME | End: 2020-04-07

## 2020-05-14 NOTE — THERAPY DISCHARGE
Yenni Leggett  : 1940  [place] / Product Type: Medicare /  [address] [place]  at [address]  [address], [address]  [phone]   [phone]         OUTPATIENT PHYSICAL THERAPY:Discontinuation Summary 2020   ICD-10: Treatment Diagnosis: difficulty walking R 26.2, left knee pain M25.562, Left knee stiffness M25.662  Precautions/Allergies:   Patient has no known allergies. TREATMENT PLAN:  Effective Dates: 1/3/2020 TO 2020 (90 days). Frequency/Duration: 2 times a week for 90 Day(s) and upon reassessment, will adjust frequency and duration as progress indicates. MEDICAL/REFERRING DIAGNOSIS:  Presence of left artificial knee joint [Z96.652]   DATE OF ONSET: surgery 18  REFERRING PHYSICIAN: Timothy Murillo MD MD Orders: Juanita Linker and treat  Return MD Appointment: 20     INITIAL ASSESSMENT:  Mr. Gab Ortiz presents three weeks status post LTKA. He reported spending one night in hospital and was discharged home with home health therapy. Pt reports increased pain, stiffness and difficulty with walking. He is using a cane and walker to ambulate. He reports having pain in the knee for at least one year prior to surgery if not longer and that he has ambulated with a cane over the past 6 months. He presented today with lack of full knee extension and knee flexion to 115 °. Pt will benefit from skilled therapy to work on improving his endurance, mobility and knee ROM. Assessment as of 2/10/2020:  Pt has attended ten therapy sessions thus far. He was progressing well until about 2-3 weeks ago, but at this time, pt ruptured left patellar tendon trying to get into the car. He reported his right knee buckled and all the weight was shifted to his left leg. The injury has caused a set back in his progress. He is now ambulating with walker and sits most of the day to avoid hurting the knee.   We are working on decreasing pain and increasing the strength and stability of the left knee after this injury. He still reports pain with weightbearing and knee motion is 3-120 °. He will benefit from continued therapy to address the left knee pain, weakness and loss of extension as well as to work on increasing right LE strength so he will be more supported with ambulation and in standing. Discontinuation Assessment 5/14/2020:  Ralph Alvarez was seen in physical therapy from 1/3/2020 to 2/12/2020 for 11 visits. Treatment has been discontinued at this time due to discontinuation of therapy by patient and MD.  Pt having increased pain due to recent patellar tendon rupture. .  The below goals were met prior to discontinuation. Some goals were not met due to ongoing pain, weightbearing issues, and loss of motion and strength. Thank you for this referral.        PROBLEM LIST (Impacting functional limitations):  1. Decreased Strength  2. Decreased ADL/Functional Activities  3. Decreased Ambulation Ability/Technique  4. Decreased Balance  5. Increased Pain  6. Decreased Activity Tolerance  7. Decreased Flexibility/Joint Mobility  8. Edema/Girth INTERVENTIONS PLANNED: (Treatment may consist of any combination of the following)  1. Balance Exercise  2. Gait Training  3. Home Exercise Program (HEP)  4. Manual Therapy  5. Range of Motion (ROM)  6. Therapeutic Exercise/Strengthening     GOALS: (Goals have been discussed and agreed upon with patient.)  Short-Term Functional Goals: Time Frame: 6 weeks  1. Establish independent HEP with no cuing.-met  2. Pt will be able to gain 5 degrees of knee extension for improved gait mechanics. -met  3. Pt will be able to return to driving.-met  4. Pt will be independent with all dressing.-met  Discharge Goals: Time Frame: 12 weeks (90 days)  1. Pt will be able to improve score on LEFS by at least 9 points for advanced ADL's.-not met  2.  Pt will be able to restore knee ROM to at least 3-0-120 degrees for improved gait mechanics and ease of sitting.-not meeting since re-injury  3. Pt will be able to increase strength to 4/5 to 4+/5 in order to ambulate with LRAD for 30 minutes.-in progress  4. Pt will be able to report ambulating on uneven surfaces with use of LRAD and without limitations or loss of balance.-in progress    OUTCOME MEASURE:   Tool Used: Lower Extremity Functional Scale (LEFS)  Score:  Initial: 28/80 Most Recent: 10/80 (Date: 2/10/2020 )   Interpretation of Score: 20 questions each scored on a 5 point scale with 0 representing \"extreme difficulty or unable to perform\" and 4 representing \"no difficulty\". The lower the score, the greater the functional disability. 80/80 represents no disability. Minimal detectable change is 9 points. Rehabilitation Potential For Stated Goals: Good  Regarding Sarina Zapata's therapy, I certify that the treatment plan above will be carried out by a therapist or under their direction.   Thank you for this referral,  Claudia Butcher, PT

## 2020-05-19 NOTE — PERIOP NOTES
Pt notified that a negative Covid swab result is required to proceed with surgery; the swab will be collected 7 days prior to surgery at the [address] [facility] at [address] [place] [address]. Appointment date/time found in EHR and provided to patient.

## 2020-05-20 VITALS — WEIGHT: 205 LBS | BODY MASS INDEX: 29.35 KG/M2 | HEIGHT: 70 IN

## 2020-05-20 RX ORDER — ASPIRIN 81 MG/1
81 TABLET ORAL DAILY
COMMUNITY
End: 2020-06-27

## 2020-05-20 RX ORDER — MELOXICAM 7.5 MG/1
7.5 TABLET ORAL 2 TIMES DAILY
COMMUNITY
End: 2020-06-27

## 2020-05-20 NOTE — PERIOP NOTES
Patient verified name and . Order for consent was found in EHR and matches case posting; patient verified. Type 3 surgery, PAT phone assessment complete. Labs per surgeon: CBC,BMP, PT/PTT, HgbA1c and MRSA swab   Labs per anesthesia protocol: no additional  EKG:done 2019 and within anesthesia guidelines, found in CHart review for anesthesia reference    Patient instructed to come to [facility] entrance C to get temp checked then go to  [address], [address] 2020 after COVID testing at 11:30  to have blood drawn. Patient verbalized understanding    MRSA/MSSA swab will be collected; pharmacy to review and dose antibiotic as appropriate. Hospital approved surgical skin cleanser and instructions to return bottle on DOS given per hospital policy. Patient provided with handouts including Guide to Surgery, Pain Management, Hand Hygiene, Blood Transfusion Education, and [facility] Brochure. Patient answered medical/surgical history questions at their best of ability. All prior to admission medications documented in [facility]. Original medication prescription bottle not visualized during patient appointment. Patient instructed to hold all vitamins 3 weeks prior to surgery and NSAIDS 5 days prior to surgery. Patient teach back successful and patient demonstrates knowledge of instruction.

## 2020-05-20 NOTE — PERIOP NOTES
PLEASE CONTINUE TAKING ALL PRESCRIPTION MEDICATIONS UP TO THE DAY OF SURGERY UNLESS OTHERWISE DIRECTED BELOW. DISCONTINUE all vitamins and supplements 21 days prior to surgery. DISCONTINUE Non-Steriodal Anti-Inflammatory (NSAIDS) such as Advil and Aleve 5 days prior to surgery. Home Medications to take  the day of surgery    None           Home Medications   to Hold   Meloxicam and Aspirin x 5 days prior to surgery        Comments     Please bring bottle of soap (Dynahex)  to the hospital on the day of surgery. *Visitor policy of 0 visitor per patient discussed. Please do not bring home medications with you on the day of surgery unless otherwise directed by your nurse. If you are instructed to bring home medications, please give them to your nurse as they will be administered by the nursing staff. If you have any questions, [facility] [phone] or [address] [facility] [phone]. A copy of this note was provided to the patient for reference.

## 2020-05-21 ENCOUNTER — HOSPITAL ENCOUNTER (OUTPATIENT)
Dept: SURGERY | Age: 80
Discharge: HOME | End: 2020-05-21
Payer: MEDICARE

## 2020-05-21 LAB
ANION GAP SERPL CALC-SCNC: 6 MMOL/L (ref 7–16)
APTT PPP: 33.1 SEC (ref 24.3–35.4)
BACTERIA SPEC CULT: NORMAL
BASOPHILS # BLD: 0.1 K/UL (ref 0–0.2)
BASOPHILS NFR BLD: 1 % (ref 0–2)
BUN SERPL-MCNC: 16 MG/DL (ref 8–23)
CALCIUM SERPL-MCNC: 9.3 MG/DL (ref 8.3–10.4)
CHLORIDE SERPL-SCNC: 106 MMOL/L (ref 98–107)
CO2 SERPL-SCNC: 27 MMOL/L (ref 21–32)
CREAT SERPL-MCNC: 0.83 MG/DL (ref 0.8–1.5)
DIFFERENTIAL METHOD BLD: NORMAL
EOSINOPHIL # BLD: 0.1 K/UL (ref 0–0.8)
EOSINOPHIL NFR BLD: 1 % (ref 0.5–7.8)
ERYTHROCYTE [DISTWIDTH] IN BLOOD BY AUTOMATED COUNT: 13.7 % (ref 11.9–14.6)
EST. AVERAGE GLUCOSE BLD GHB EST-MCNC: 97 MG/DL
GLUCOSE SERPL-MCNC: 93 MG/DL (ref 65–100)
HBA1C MFR BLD: 5 %
HCT VFR BLD AUTO: 44.9 % (ref 41.1–50.3)
HGB BLD-MCNC: 14.8 G/DL (ref 13.6–17.2)
IMM GRANULOCYTES # BLD AUTO: 0 K/UL (ref 0–0.5)
IMM GRANULOCYTES NFR BLD AUTO: 0 % (ref 0–5)
INR PPP: 1.1
LYMPHOCYTES # BLD: 1 K/UL (ref 0.5–4.6)
LYMPHOCYTES NFR BLD: 13 % (ref 13–44)
MCH RBC QN AUTO: 29.7 PG (ref 26.1–32.9)
MCHC RBC AUTO-ENTMCNC: 33 G/DL (ref 31.4–35)
MCV RBC AUTO: 90.2 FL (ref 79.6–97.8)
MONOCYTES # BLD: 0.7 K/UL (ref 0.1–1.3)
MONOCYTES NFR BLD: 9 % (ref 4–12)
NEUTS SEG # BLD: 5.9 K/UL (ref 1.7–8.2)
NEUTS SEG NFR BLD: 76 % (ref 43–78)
NRBC # BLD: 0 K/UL (ref 0–0.2)
PLATELET # BLD AUTO: 227 K/UL (ref 150–450)
PMV BLD AUTO: 11.1 FL (ref 9.4–12.3)
POTASSIUM SERPL-SCNC: 3.9 MMOL/L (ref 3.5–5.1)
PROTHROMBIN TIME: 14 SEC (ref 12–14.7)
RBC # BLD AUTO: 4.98 M/UL (ref 4.23–5.6)
SARS-COV-2, NAA: NOT DETECTED
SERVICE CMNT-IMP: NORMAL
SODIUM SERPL-SCNC: 139 MMOL/L (ref 136–145)
WBC # BLD AUTO: 7.8 K/UL (ref 4.3–11.1)

## 2020-05-21 PROCEDURE — 83036 HEMOGLOBIN GLYCOSYLATED A1C: CPT

## 2020-05-21 PROCEDURE — 85610 PROTHROMBIN TIME: CPT

## 2020-05-21 PROCEDURE — 36415 COLL VENOUS BLD VENIPUNCTURE: CPT

## 2020-05-21 PROCEDURE — 80048 BASIC METABOLIC PNL TOTAL CA: CPT

## 2020-05-21 PROCEDURE — 85025 COMPLETE CBC W/AUTO DIFF WBC: CPT

## 2020-05-21 PROCEDURE — 87641 MR-STAPH DNA AMP PROBE: CPT

## 2020-05-21 PROCEDURE — 85730 THROMBOPLASTIN TIME PARTIAL: CPT

## 2020-05-21 NOTE — PERIOP NOTES
Your patient recently had labs drawn during a hospital appointment due to an upcoming surgery. The results are attached. If you have any questions or concerns please reach out to your patient for a follow-up in your office. Please do not respond to this message as my mailbox is not monitored. You may call [phone] with questions or concerns. Recent Results (from the past 12 hour(s))   CBC WITH AUTOMATED DIFF    Collection Time: 05/21/20 12:19 PM   Result Value Ref Range    WBC 7.8 4.3 - 11.1 K/uL    RBC 4.98 4.23 - 5.6 M/uL    HGB 14.8 13.6 - 17.2 g/dL    HCT 44.9 41.1 - 50.3 %    MCV 90.2 79.6 - 97.8 FL    MCH 29.7 26.1 - 32.9 PG    MCHC 33.0 31.4 - 35.0 g/dL    RDW 13.7 11.9 - 14.6 %    PLATELET 396 115 - 759 K/uL    MPV 11.1 9.4 - 12.3 FL    ABSOLUTE NRBC 0.00 0.0 - 0.2 K/uL    DF AUTOMATED      NEUTROPHILS 76 43 - 78 %    LYMPHOCYTES 13 13 - 44 %    MONOCYTES 9 4.0 - 12.0 %    EOSINOPHILS 1 0.5 - 7.8 %    BASOPHILS 1 0.0 - 2.0 %    IMMATURE GRANULOCYTES 0 0.0 - 5.0 %    ABS. NEUTROPHILS 5.9 1.7 - 8.2 K/UL    ABS. LYMPHOCYTES 1.0 0.5 - 4.6 K/UL    ABS. MONOCYTES 0.7 0.1 - 1.3 K/UL    ABS. EOSINOPHILS 0.1 0.0 - 0.8 K/UL    ABS. BASOPHILS 0.1 0.0 - 0.2 K/UL    ABS. IMM.  GRANS. 0.0 0.0 - 0.5 K/UL   HEMOGLOBIN A1C WITH EAG    Collection Time: 05/21/20 12:19 PM   Result Value Ref Range    Hemoglobin A1c 5.0 %    Est. average glucose 97 mg/dL   METABOLIC PANEL, BASIC    Collection Time: 05/21/20 12:19 PM   Result Value Ref Range    Sodium 139 136 - 145 mmol/L    Potassium 3.9 3.5 - 5.1 mmol/L    Chloride 106 98 - 107 mmol/L    CO2 27 21 - 32 mmol/L    Anion gap 6 (L) 7 - 16 mmol/L    Glucose 93 65 - 100 mg/dL    BUN 16 8 - 23 MG/DL    Creatinine 0.83 0.8 - 1.5 MG/DL    GFR est AA >60 >60 ml/min/1.73m2    GFR est non-AA >60 >60 ml/min/1.73m2    Calcium 9.3 8.3 - 10.4 MG/DL   PROTHROMBIN TIME + INR    Collection Time: 05/21/20 12:19 PM   Result Value Ref Range    Prothrombin time 14.0 12.0 - 14.7 sec    INR 1. 1     PTT    Collection Time: 05/21/20 12:19 PM   Result Value Ref Range    aPTT 33.1 24.3 - 35.4 SEC

## 2020-05-21 NOTE — PERIOP NOTES
Labs done today within [facility] protocols. Recent Results (from the past 12 hour(s))   CBC WITH AUTOMATED DIFF    Collection Time: 05/21/20 12:19 PM   Result Value Ref Range    WBC 7.8 4.3 - 11.1 K/uL    RBC 4.98 4.23 - 5.6 M/uL    HGB 14.8 13.6 - 17.2 g/dL    HCT 44.9 41.1 - 50.3 %    MCV 90.2 79.6 - 97.8 FL    MCH 29.7 26.1 - 32.9 PG    MCHC 33.0 31.4 - 35.0 g/dL    RDW 13.7 11.9 - 14.6 %    PLATELET 445 333 - 617 K/uL    MPV 11.1 9.4 - 12.3 FL    ABSOLUTE NRBC 0.00 0.0 - 0.2 K/uL    DF AUTOMATED      NEUTROPHILS 76 43 - 78 %    LYMPHOCYTES 13 13 - 44 %    MONOCYTES 9 4.0 - 12.0 %    EOSINOPHILS 1 0.5 - 7.8 %    BASOPHILS 1 0.0 - 2.0 %    IMMATURE GRANULOCYTES 0 0.0 - 5.0 %    ABS. NEUTROPHILS 5.9 1.7 - 8.2 K/UL    ABS. LYMPHOCYTES 1.0 0.5 - 4.6 K/UL    ABS. MONOCYTES 0.7 0.1 - 1.3 K/UL    ABS. EOSINOPHILS 0.1 0.0 - 0.8 K/UL    ABS. BASOPHILS 0.1 0.0 - 0.2 K/UL    ABS. IMM.  GRANS. 0.0 0.0 - 0.5 K/UL   HEMOGLOBIN A1C WITH EAG    Collection Time: 05/21/20 12:19 PM   Result Value Ref Range    Hemoglobin A1c 5.0 %    Est. average glucose 97 mg/dL   METABOLIC PANEL, BASIC    Collection Time: 05/21/20 12:19 PM   Result Value Ref Range    Sodium 139 136 - 145 mmol/L    Potassium 3.9 3.5 - 5.1 mmol/L    Chloride 106 98 - 107 mmol/L    CO2 27 21 - 32 mmol/L    Anion gap 6 (L) 7 - 16 mmol/L    Glucose 93 65 - 100 mg/dL    BUN 16 8 - 23 MG/DL    Creatinine 0.83 0.8 - 1.5 MG/DL    GFR est AA >60 >60 ml/min/1.73m2    GFR est non-AA >60 >60 ml/min/1.73m2    Calcium 9.3 8.3 - 10.4 MG/DL   PROTHROMBIN TIME + INR    Collection Time: 05/21/20 12:19 PM   Result Value Ref Range    Prothrombin time 14.0 12.0 - 14.7 sec    INR 1.1     PTT    Collection Time: 05/21/20 12:19 PM   Result Value Ref Range    aPTT 33.1 24.3 - 35.4 SEC

## 2020-05-26 NOTE — H&P
H&P    Patient ID:  Emy Salinas  [id number]  68 y.o.  1940  Surgeon:  Pino Gonzalez MD  Date of Surgery: * No surgery date entered *  Procedure: Right Knee Total Arthroplasty  Primary Care Physician: Abraham Chopra MD        Subjective:  Emy Salinas is a [de-identified] y.o. WHITE OR  male who presents with Right Knee pain. He has history of Right Knee pain for several months. Symptoms worse with walking long distances and relieved with rest. Conservative treatment consisting of  activity modification and injections have not helped. The patient lives with their family. The patients goal after surgery is improved pain and function. Past Medical History:   Diagnosis Date    Arthritis     HLD (hyperlipidemia) 2012    Macular degeneration     Osteoarthritis of left knee       Past Surgical History:   Procedure Laterality Date    HX COLONOSCOPY      HX COLONOSCOPY  13    diverticulosis, internal hemorrhoids. no further screening recommended     No family history on file. Social History     Tobacco Use    Smoking status: Former Smoker     Types: Cigarettes     Last attempt to quit: 1976     Years since quittin.4    Smokeless tobacco: Never Used   Substance Use Topics    Alcohol use: Yes     Alcohol/week: 0.0 standard drinks     Comment: 1weekly       Prior to Admission medications    Medication Sig Start Date End Date Taking? Authorizing Provider   meloxicam (MOBIC) 7.5 mg tablet Take 7.5 mg by mouth two (2) times a day. Provider, Historical   vit A/vit C/vit E/zinc/copper (PRESERVISION AREDS PO) Take 1 Cap by mouth two (2) times a day. Provider, Historical   docosahexaenoic acid/epa (FISH OIL PO) Take 1 Cap by mouth daily. Provider, Historical   aspirin delayed-release 81 mg tablet Take 81 mg by mouth daily. Provider, Historical   docusate sodium (COLACE) 100 mg capsule Take 100 mg by mouth three (3) times daily as needed for Constipation. Zina De La Rosa MD   acetaminophen (TYLENOL PO) Take 1,000 mg by mouth every six (6) hours as needed for Pain. Provider, Historical     No Known Allergies     REVIEW OF SYSTEMS:  CONSTITUTIONAL: Denies fever, decreased appetite, weight loss/gain, night sweats or fatigue. HEENT: Denies vision or hearing changes. denies glasses. denies hearing aids. CARDIAC: Denies CP, palpitations, rheumatic fever, murmur, peripheral edema, carotid artery disease or syncopal episodes. RESPIRATORY: Denies dyspnea on exertion, asthma, COPD or orthopnea. GI: Denies GERD, history of GI bleed or melena, PUD, hepatitis or cirrhosis. : Denies dysuria, hematuria. denies BPH symptoms. HEMATOLOGIC: Denies anemia or blood disorders. ENDOCRINE: Denies thyroid disease. MUSCULOSKELETAL: See HPI. NEUROLOGIC: Denies seizure, peripheral neuropathy or memory loss. PSYCH: Denies depression, anxiety or insomnia. SKIN: Denies rash or open sores. Objective:    PHYSICAL EXAM  GENERAL: No data found. EYES: PERRL. EOM intact. MOUTH:Teeth and Gums normal. NECK: Full ROM. Trachea midline. No thyromegaly or JVD. CARDIOVASCULAR: Regular rate and rhythm. No murmur or gallops. No carotid bruits. Peripheral pulses: radial 2 +, PT 2+, DP 2+ bilaterally. LUNGS: CTA bilaterally. No wheezes, rhonchi or rales. GI: positive BS. Abdomen nontender. NEUROLOGIC: Alert and oriented x 3. Bilateral equal strong had grasp and bilateral equal strong plantar flexion and dorsiflexion. GAIT: abnormal MUSCULOSKELETAL: ROM: full with pain. Tenderness: over the medial and lateral joint lines. Crepitus: present. SKIN: No rash, bruising, swelling, redness or warmth. Labs:  No results found for this or any previous visit (from the past 24 hour(s)). Xray Right Knee: joint space narrowing    Assessment:  Advanced Right Knee Osteoarthritis. Total Right Knee Arthroplasty Indicated.   Patient Active Problem List   Diagnosis Code    HLD (hyperlipidemia) E78.5    Primary osteoarthritis involving multiple joints M89.49    Arthritis of knee, left M17.12    Total knee replacement status, left Z96.652    Unilateral primary osteoarthritis, left knee M17.12       Plan:  I have advised the patient of the risks and consequences, including possible complications of performing total joint replacement, as well as not doing this operation. The patient had the opportunity to ask questions and have them answered to their satisfaction.      Signed:  TRUDY Heard 5/26/2020

## 2020-05-28 NOTE — ADVANCED PRACTICE NURSE
Total Joint Surgery Preoperative Chart Review      Patient ID:  Kelly Tyler  [id number]  5 y.o.  1940  Surgeon: Dr. Spenser Goss  Date of Surgery: 2020  Procedure: Total Right Knee Arthroplasty  Primary Care Physician: Traci Mckenna [phone]  Specialty Physician(s):      Subjective:   Kelly Tyler is a [de-identified] y.o. WHITE OR  male who presents for preoperative evaluation for Total Right Knee arthroplasty. This is a preoperative chart review note based on data collected by the nurse at the surgical Pre-Assessment visit. Past Medical History:   Diagnosis Date    Arthritis     HLD (hyperlipidemia) 2012    Macular degeneration     Osteoarthritis of left knee       Past Surgical History:   Procedure Laterality Date    HX COLONOSCOPY      HX COLONOSCOPY  13    diverticulosis, internal hemorrhoids. no further screening recommended     History reviewed. No pertinent family history. Social History     Tobacco Use    Smoking status: Former Smoker     Types: Cigarettes     Last attempt to quit: 1976     Years since quittin.4    Smokeless tobacco: Never Used   Substance Use Topics    Alcohol use: Yes     Alcohol/week: 0.0 standard drinks     Comment: 1weekly       Prior to Admission medications    Medication Sig Start Date End Date Taking? Authorizing Provider   meloxicam (MOBIC) 7.5 mg tablet Take 7.5 mg by mouth two (2) times a day. Yes Provider, Historical   vit A/vit C/vit E/zinc/copper (PRESERVISION AREDS PO) Take 1 Cap by mouth two (2) times a day. Yes Provider, Historical   docosahexaenoic acid/epa (FISH OIL PO) Take 1 Cap by mouth daily. Yes Provider, Historical   aspirin delayed-release 81 mg tablet Take 81 mg by mouth daily. Yes Provider, Historical   docusate sodium (COLACE) 100 mg capsule Take 100 mg by mouth three (3) times daily as needed for Constipation.    Yes Gary Miramontes MD   acetaminophen (TYLENOL PO) Take 1,000 mg by mouth every six (6) hours as needed for Pain. Yes Provider, Historical     No Known Allergies       Objective:     Physical Exam:   No data found. ECG:    EKG Results     None          Data Review:   Labs:   Labs reviewed    Problem List:  )  Patient Active Problem List   Diagnosis Code    HLD (hyperlipidemia) E78.5    Primary osteoarthritis involving multiple joints M89.49    Arthritis of knee, left M17.12    Total knee replacement status, left Z96.652    Unilateral primary osteoarthritis, left knee M17.12       Total Joint Surgery Pre-Assessment Recommendations:           Recommend continuous saturation monitoring hours of sleep, during hospitalization.       Signed By: JAN Amezquita    May 28, 2020

## 2020-06-10 NOTE — PERIOP NOTES
Pt had PAT phone assessment 5/20/2020 and labs collected 5/21/2020. Pt reports he has all instructions given previously and has no questions or concerns at this time. Pt aware he must go for repeat Covid-19 testing prior to surgery and pt states is scheduled to go 6/18/2020. Pt denies any changes to medical, surgical or medication hx since last assessment. Lab results collected 5/21/2020 were within anesthesia guidelines. Pt informed preop will call the day before surgery with arrival time. Pt reminded no food after midnight but clear liquids up until 2 hours prior to arrival time. Pt reminded to stop mobic x 5 days prior to surgery and vitamins/supplements starting now. Pt informed he may have 1 visitor come to hospital at this time- pt instructed to go to [facility], Entrance C for screening DOS. Pt verbalizes understanding of above.

## 2020-06-11 ENCOUNTER — HOSPITAL ENCOUNTER (OUTPATIENT)
Dept: SURGERY | Age: 80
Discharge: HOME | End: 2020-06-11

## 2020-06-19 NOTE — H&P
H&P    Patient ID:  Marva Tsai  [id number]  16 y.o.  1940  Surgeon:  Tierra Meredith MD  Date of Surgery: * No surgery date entered *  Procedure: Right Knee Total Arthroplasty  Primary Care Physician: Rona Andrews MD        Subjective:  Marva Tsai is a [de-identified] y.o. WHITE OR  male who presents with Right Knee pain. He has history of Right Knee pain for several months. Symptoms worse with walking long distances and relieved with rest. Conservative treatment consisting of  activity modification and injections have not helped. The patient lives with their family. The patients goal after surgery is improved pain and function. Past Medical History:   Diagnosis Date    Arthritis     HLD (hyperlipidemia) 2012    Macular degeneration     Osteoarthritis of left knee       Past Surgical History:   Procedure Laterality Date    HX COLONOSCOPY      HX COLONOSCOPY  13    diverticulosis, internal hemorrhoids. no further screening recommended     No family history on file. Social History     Tobacco Use    Smoking status: Former Smoker     Types: Cigarettes     Last attempt to quit: 1976     Years since quittin.4    Smokeless tobacco: Never Used   Substance Use Topics    Alcohol use: Yes     Alcohol/week: 0.0 standard drinks     Comment: 1weekly       Prior to Admission medications    Medication Sig Start Date End Date Taking? Authorizing Provider   meloxicam (MOBIC) 7.5 mg tablet Take 7.5 mg by mouth two (2) times a day. Provider, Historical   vit A/vit C/vit E/zinc/copper (PRESERVISION AREDS PO) Take 1 Cap by mouth two (2) times a day. Provider, Historical   docosahexaenoic acid/epa (FISH OIL PO) Take 1 Cap by mouth daily. Provider, Historical   aspirin delayed-release 81 mg tablet Take 81 mg by mouth daily. Provider, Historical   docusate sodium (COLACE) 100 mg capsule Take 100 mg by mouth three (3) times daily as needed for Constipation. Delma Arango MD   acetaminophen (TYLENOL PO) Take 1,000 mg by mouth every six (6) hours as needed for Pain. Provider, Historical     No Known Allergies     REVIEW OF SYSTEMS:  CONSTITUTIONAL: Denies fever, decreased appetite, weight loss/gain, night sweats or fatigue. HEENT: Denies vision or hearing changes. denies glasses. denies hearing aids. CARDIAC: Denies CP, palpitations, rheumatic fever, murmur, peripheral edema, carotid artery disease or syncopal episodes. RESPIRATORY: Denies dyspnea on exertion, asthma, COPD or orthopnea. GI: Denies GERD, history of GI bleed or melena, PUD, hepatitis or cirrhosis. : Denies dysuria, hematuria. denies BPH symptoms. HEMATOLOGIC: Denies anemia or blood disorders. ENDOCRINE: Denies thyroid disease. MUSCULOSKELETAL: See HPI. NEUROLOGIC: Denies seizure, peripheral neuropathy or memory loss. PSYCH: Denies depression, anxiety or insomnia. SKIN: Denies rash or open sores. Objective:    PHYSICAL EXAM  GENERAL: No data found. EYES: PERRL. EOM intact. MOUTH:Teeth and Gums normal. NECK: Full ROM. Trachea midline. No thyromegaly or JVD. CARDIOVASCULAR: Regular rate and rhythm. No murmur or gallops. No carotid bruits. Peripheral pulses: radial 2 +, PT 2+, DP 2+ bilaterally. LUNGS: CTA bilaterally. No wheezes, rhonchi or rales. GI: positive BS. Abdomen nontender. NEUROLOGIC: Alert and oriented x 3. Bilateral equal strong had grasp and bilateral equal strong plantar flexion and dorsiflexion. GAIT: abnormal MUSCULOSKELETAL: ROM: full with pain. Tenderness: over the medial and lateral joint lines. Crepitus: present. SKIN: No rash, bruising, swelling, redness or warmth. Labs:  No results found for this or any previous visit (from the past 24 hour(s)). Xray Right Knee: joint space narrowing    Assessment:  Advanced Right Knee Osteoarthritis. Total Right Knee Arthroplasty Indicated.   Patient Active Problem List   Diagnosis Code    HLD (hyperlipidemia) E78.5    Primary osteoarthritis involving multiple joints M89.49    Arthritis of knee, left M17.12    Total knee replacement status, left Z96.652    Unilateral primary osteoarthritis, left knee M17.12       Plan:  I have advised the patient of the risks and consequences, including possible complications of performing total joint replacement, as well as not doing this operation. The patient had the opportunity to ask questions and have them answered to their satisfaction.      Signed:  TRUDY Heard 6/19/2020

## 2020-06-24 ENCOUNTER — ANESTHESIA EVENT (OUTPATIENT)
Dept: SURGERY | Age: 80
DRG: 470 | End: 2020-06-24
Payer: MEDICARE

## 2020-06-25 ENCOUNTER — ANESTHESIA (OUTPATIENT)
Dept: SURGERY | Age: 80
DRG: 470 | End: 2020-06-25
Payer: MEDICARE

## 2020-06-25 ENCOUNTER — HOME HEALTH ADMISSION (OUTPATIENT)
Dept: HOME HEALTH SERVICES | Facility: HOME HEALTH | Age: 80
End: 2020-06-25
Payer: MEDICARE

## 2020-06-25 ENCOUNTER — HOSPITAL ENCOUNTER (INPATIENT)
Age: 80
LOS: 2 days | Discharge: HOME | DRG: 470 | End: 2020-06-27
Attending: ORTHOPAEDIC SURGERY | Admitting: ORTHOPAEDIC SURGERY
Payer: MEDICARE

## 2020-06-25 DIAGNOSIS — M17.11 ARTHRITIS OF KNEE, RIGHT: Primary | ICD-10-CM

## 2020-06-25 PROBLEM — Z96.651 TOTAL KNEE REPLACEMENT STATUS, RIGHT: Status: ACTIVE | Noted: 2020-06-25

## 2020-06-25 LAB
GLUCOSE BLD STRIP.AUTO-MCNC: 103 MG/DL (ref 65–100)
HGB BLD-MCNC: 13.2 G/DL (ref 13.6–17.2)

## 2020-06-25 PROCEDURE — 77030037714 HC CLOSR DEV INCIS ZIP STRY -C: Performed by: ORTHOPAEDIC SURGERY

## 2020-06-25 PROCEDURE — 74011000250 HC RX REV CODE- 250: Performed by: NURSE ANESTHETIST, CERTIFIED REGISTERED

## 2020-06-25 PROCEDURE — 77030037715 HC DRSG ZIP STRY -B: Performed by: ORTHOPAEDIC SURGERY

## 2020-06-25 PROCEDURE — 97161 PT EVAL LOW COMPLEX 20 MIN: CPT

## 2020-06-25 PROCEDURE — 74011250637 HC RX REV CODE- 250/637: Performed by: ORTHOPAEDIC SURGERY

## 2020-06-25 PROCEDURE — 74011250636 HC RX REV CODE- 250/636: Performed by: ANESTHESIOLOGY

## 2020-06-25 PROCEDURE — 76060000034 HC ANESTHESIA 1.5 TO 2 HR: Performed by: ORTHOPAEDIC SURGERY

## 2020-06-25 PROCEDURE — 77030006720 HC BLD PAT RMR ZIMM -B: Performed by: ORTHOPAEDIC SURGERY

## 2020-06-25 PROCEDURE — 77030011208: Performed by: ORTHOPAEDIC SURGERY

## 2020-06-25 PROCEDURE — 77030003602 HC NDL NRV BLK BBMI -B: Performed by: ANESTHESIOLOGY

## 2020-06-25 PROCEDURE — 77030013708 HC HNDPC SUC IRR PULS STRY –B: Performed by: ORTHOPAEDIC SURGERY

## 2020-06-25 PROCEDURE — 0SRC0J9 REPLACEMENT OF RIGHT KNEE JOINT WITH SYNTHETIC SUBSTITUTE, CEMENTED, OPEN APPROACH: ICD-10-PCS | Performed by: ORTHOPAEDIC SURGERY

## 2020-06-25 PROCEDURE — 97165 OT EVAL LOW COMPLEX 30 MIN: CPT

## 2020-06-25 PROCEDURE — 74011250636 HC RX REV CODE- 250/636: Performed by: ORTHOPAEDIC SURGERY

## 2020-06-25 PROCEDURE — 77030013819 HC MX SYS CEM ZIMM -B: Performed by: ORTHOPAEDIC SURGERY

## 2020-06-25 PROCEDURE — 76010010054 HC POST OP PAIN BLOCK: Performed by: ORTHOPAEDIC SURGERY

## 2020-06-25 PROCEDURE — 85018 HEMOGLOBIN: CPT

## 2020-06-25 PROCEDURE — 77030007880 HC KT SPN EPDRL BBMI -B: Performed by: ANESTHESIOLOGY

## 2020-06-25 PROCEDURE — 76942 ECHO GUIDE FOR BIOPSY: CPT | Performed by: ORTHOPAEDIC SURGERY

## 2020-06-25 PROCEDURE — 76010000162 HC OR TIME 1.5 TO 2 HR INTENSV-TIER 1: Performed by: ORTHOPAEDIC SURGERY

## 2020-06-25 PROCEDURE — 74011250636 HC RX REV CODE- 250/636: Performed by: NURSE ANESTHETIST, CERTIFIED REGISTERED

## 2020-06-25 PROCEDURE — C1776 JOINT DEVICE (IMPLANTABLE): HCPCS | Performed by: ORTHOPAEDIC SURGERY

## 2020-06-25 PROCEDURE — 94762 N-INVAS EAR/PLS OXIMTRY CONT: CPT

## 2020-06-25 PROCEDURE — 74011250637 HC RX REV CODE- 250/637: Performed by: ANESTHESIOLOGY

## 2020-06-25 PROCEDURE — 77030033067 HC SUT PDO STRATFX SPIR J&J -B: Performed by: ORTHOPAEDIC SURGERY

## 2020-06-25 PROCEDURE — 94760 N-INVAS EAR/PLS OXIMETRY 1: CPT

## 2020-06-25 PROCEDURE — 76210000006 HC OR PH I REC 0.5 TO 1 HR: Performed by: ORTHOPAEDIC SURGERY

## 2020-06-25 PROCEDURE — 77030040922 HC BLNKT HYPOTHRM STRY -A: Performed by: ANESTHESIOLOGY

## 2020-06-25 PROCEDURE — 97535 SELF CARE MNGMENT TRAINING: CPT

## 2020-06-25 PROCEDURE — 77030016544 HC BLD SAW RECIP1 STRY -B: Performed by: ORTHOPAEDIC SURGERY

## 2020-06-25 PROCEDURE — 77030003665 HC NDL SPN BBMI -A: Performed by: ANESTHESIOLOGY

## 2020-06-25 PROCEDURE — 74011000250 HC RX REV CODE- 250: Performed by: ORTHOPAEDIC SURGERY

## 2020-06-25 PROCEDURE — 77030012935 HC DRSG AQUACEL BMS -B: Performed by: ORTHOPAEDIC SURGERY

## 2020-06-25 PROCEDURE — 74011000250 HC RX REV CODE- 250: Performed by: ANESTHESIOLOGY

## 2020-06-25 PROCEDURE — 97110 THERAPEUTIC EXERCISES: CPT

## 2020-06-25 PROCEDURE — 77030031139 HC SUT VCRL2 J&J -A: Performed by: ORTHOPAEDIC SURGERY

## 2020-06-25 PROCEDURE — C1713 ANCHOR/SCREW BN/BN,TIS/BN: HCPCS | Performed by: ORTHOPAEDIC SURGERY

## 2020-06-25 PROCEDURE — 82962 GLUCOSE BLOOD TEST: CPT

## 2020-06-25 PROCEDURE — 65270000029 HC RM PRIVATE

## 2020-06-25 PROCEDURE — 77030019557 HC ELECTRD VES SEAL MEDT -F: Performed by: ORTHOPAEDIC SURGERY

## 2020-06-25 PROCEDURE — 77030006835 HC BLD SAW SAG STRY -B: Performed by: ORTHOPAEDIC SURGERY

## 2020-06-25 PROCEDURE — 77030018836 HC SOL IRR NACL ICUM -A: Performed by: ORTHOPAEDIC SURGERY

## 2020-06-25 PROCEDURE — 36415 COLL VENOUS BLD VENIPUNCTURE: CPT

## 2020-06-25 PROCEDURE — 74011000258 HC RX REV CODE- 258: Performed by: ORTHOPAEDIC SURGERY

## 2020-06-25 DEVICE — BASEPLATE TIB SZ 5 FIX BEAR CEM S+ TECHNOLOGY ATTUNE: Type: IMPLANTABLE DEVICE | Site: KNEE | Status: FUNCTIONAL

## 2020-06-25 DEVICE — CEMENT BNE SIMPLEX W/O GENT -- PK/10 ONLY: Type: IMPLANTABLE DEVICE | Site: KNEE | Status: FUNCTIONAL

## 2020-06-25 DEVICE — IMPLANTABLE DEVICE: Type: IMPLANTABLE DEVICE | Site: KNEE | Status: FUNCTIONAL

## 2020-06-25 DEVICE — COMPONENT FEM SZ 5 R KNEE POST STBL CEM ATTUNE: Type: IMPLANTABLE DEVICE | Site: KNEE | Status: FUNCTIONAL

## 2020-06-25 RX ORDER — EPHEDRINE SULFATE/0.9% NACL/PF 50 MG/5 ML
SYRINGE (ML) INTRAVENOUS AS NEEDED
Status: DISCONTINUED | OUTPATIENT
Start: 2020-06-25 | End: 2020-06-25 | Stop reason: HOSPADM

## 2020-06-25 RX ORDER — TRANEXAMIC ACID 100 MG/ML
INJECTION, SOLUTION INTRAVENOUS AS NEEDED
Status: DISCONTINUED | OUTPATIENT
Start: 2020-06-25 | End: 2020-06-25 | Stop reason: HOSPADM

## 2020-06-25 RX ORDER — ASPIRIN 81 MG/1
81 TABLET ORAL EVERY 12 HOURS
Status: DISCONTINUED | OUTPATIENT
Start: 2020-06-25 | End: 2020-06-27 | Stop reason: HOSPADM

## 2020-06-25 RX ORDER — DEXAMETHASONE SODIUM PHOSPHATE 100 MG/10ML
10 INJECTION INTRAMUSCULAR; INTRAVENOUS ONCE
Status: ACTIVE | OUTPATIENT
Start: 2020-06-26 | End: 2020-06-27

## 2020-06-25 RX ORDER — ONDANSETRON 8 MG/1
8 TABLET, ORALLY DISINTEGRATING ORAL
Status: DISCONTINUED | OUTPATIENT
Start: 2020-06-25 | End: 2020-06-27 | Stop reason: HOSPADM

## 2020-06-25 RX ORDER — LIDOCAINE HYDROCHLORIDE 10 MG/ML
0.1 INJECTION INFILTRATION; PERINEURAL AS NEEDED
Status: DISCONTINUED | OUTPATIENT
Start: 2020-06-25 | End: 2020-06-25 | Stop reason: HOSPADM

## 2020-06-25 RX ORDER — HYDROMORPHONE HYDROCHLORIDE 2 MG/ML
0.5 INJECTION, SOLUTION INTRAMUSCULAR; INTRAVENOUS; SUBCUTANEOUS
Status: DISCONTINUED | OUTPATIENT
Start: 2020-06-25 | End: 2020-06-25 | Stop reason: HOSPADM

## 2020-06-25 RX ORDER — CELECOXIB 200 MG/1
200 CAPSULE ORAL EVERY 12 HOURS
Status: DISCONTINUED | OUTPATIENT
Start: 2020-06-25 | End: 2020-06-27 | Stop reason: HOSPADM

## 2020-06-25 RX ORDER — MIDAZOLAM HYDROCHLORIDE 1 MG/ML
INJECTION, SOLUTION INTRAMUSCULAR; INTRAVENOUS AS NEEDED
Status: DISCONTINUED | OUTPATIENT
Start: 2020-06-25 | End: 2020-06-25 | Stop reason: HOSPADM

## 2020-06-25 RX ORDER — NALOXONE HYDROCHLORIDE 0.4 MG/ML
0.04 INJECTION, SOLUTION INTRAMUSCULAR; INTRAVENOUS; SUBCUTANEOUS
Status: DISCONTINUED | OUTPATIENT
Start: 2020-06-25 | End: 2020-06-25 | Stop reason: HOSPADM

## 2020-06-25 RX ORDER — ACETAMINOPHEN 500 MG
1000 TABLET ORAL ONCE
Status: DISCONTINUED | OUTPATIENT
Start: 2020-06-25 | End: 2020-06-25 | Stop reason: HOSPADM

## 2020-06-25 RX ORDER — MIDAZOLAM HYDROCHLORIDE 1 MG/ML
2 INJECTION, SOLUTION INTRAMUSCULAR; INTRAVENOUS
Status: DISCONTINUED | OUTPATIENT
Start: 2020-06-25 | End: 2020-06-25 | Stop reason: HOSPADM

## 2020-06-25 RX ORDER — SODIUM CHLORIDE 0.9 % (FLUSH) 0.9 %
5-40 SYRINGE (ML) INJECTION EVERY 8 HOURS
Status: DISCONTINUED | OUTPATIENT
Start: 2020-06-25 | End: 2020-06-27 | Stop reason: HOSPADM

## 2020-06-25 RX ORDER — CEFAZOLIN SODIUM/WATER 2 G/20 ML
2 SYRINGE (ML) INTRAVENOUS EVERY 8 HOURS
Status: COMPLETED | OUTPATIENT
Start: 2020-06-25 | End: 2020-06-26

## 2020-06-25 RX ORDER — ONDANSETRON 2 MG/ML
INJECTION INTRAMUSCULAR; INTRAVENOUS AS NEEDED
Status: DISCONTINUED | OUTPATIENT
Start: 2020-06-25 | End: 2020-06-25 | Stop reason: HOSPADM

## 2020-06-25 RX ORDER — OXYCODONE HYDROCHLORIDE 5 MG/1
5-10 TABLET ORAL
Status: DISCONTINUED | OUTPATIENT
Start: 2020-06-25 | End: 2020-06-27 | Stop reason: HOSPADM

## 2020-06-25 RX ORDER — SODIUM CHLORIDE, SODIUM LACTATE, POTASSIUM CHLORIDE, CALCIUM CHLORIDE 600; 310; 30; 20 MG/100ML; MG/100ML; MG/100ML; MG/100ML
100 INJECTION, SOLUTION INTRAVENOUS CONTINUOUS
Status: DISCONTINUED | OUTPATIENT
Start: 2020-06-25 | End: 2020-06-25 | Stop reason: HOSPADM

## 2020-06-25 RX ORDER — SODIUM CHLORIDE 0.9 % (FLUSH) 0.9 %
5-40 SYRINGE (ML) INJECTION AS NEEDED
Status: DISCONTINUED | OUTPATIENT
Start: 2020-06-25 | End: 2020-06-27 | Stop reason: HOSPADM

## 2020-06-25 RX ORDER — PROPOFOL 10 MG/ML
INJECTION, EMULSION INTRAVENOUS
Status: DISCONTINUED | OUTPATIENT
Start: 2020-06-25 | End: 2020-06-25 | Stop reason: HOSPADM

## 2020-06-25 RX ORDER — OXYCODONE HYDROCHLORIDE 5 MG/1
5-10 TABLET ORAL
Qty: 60 TAB | Refills: 0 | Status: SHIPPED | OUTPATIENT
Start: 2020-06-25 | End: 2020-07-02

## 2020-06-25 RX ORDER — FENTANYL CITRATE 50 UG/ML
INJECTION, SOLUTION INTRAMUSCULAR; INTRAVENOUS AS NEEDED
Status: DISCONTINUED | OUTPATIENT
Start: 2020-06-25 | End: 2020-06-25 | Stop reason: HOSPADM

## 2020-06-25 RX ORDER — DEXAMETHASONE SODIUM PHOSPHATE 4 MG/ML
INJECTION, SOLUTION INTRA-ARTICULAR; INTRALESIONAL; INTRAMUSCULAR; INTRAVENOUS; SOFT TISSUE AS NEEDED
Status: DISCONTINUED | OUTPATIENT
Start: 2020-06-25 | End: 2020-06-25 | Stop reason: HOSPADM

## 2020-06-25 RX ORDER — PROMETHAZINE HYDROCHLORIDE 25 MG/1
25 TABLET ORAL
Status: DISCONTINUED | OUTPATIENT
Start: 2020-06-25 | End: 2020-06-27 | Stop reason: HOSPADM

## 2020-06-25 RX ORDER — GLYCOPYRROLATE 0.2 MG/ML
INJECTION INTRAMUSCULAR; INTRAVENOUS AS NEEDED
Status: DISCONTINUED | OUTPATIENT
Start: 2020-06-25 | End: 2020-06-25 | Stop reason: HOSPADM

## 2020-06-25 RX ORDER — ROPIVACAINE HYDROCHLORIDE 2 MG/ML
INJECTION, SOLUTION EPIDURAL; INFILTRATION; PERINEURAL AS NEEDED
Status: DISCONTINUED | OUTPATIENT
Start: 2020-06-25 | End: 2020-06-25 | Stop reason: HOSPADM

## 2020-06-25 RX ORDER — DIPHENHYDRAMINE HCL 25 MG
25 CAPSULE ORAL
Status: DISCONTINUED | OUTPATIENT
Start: 2020-06-25 | End: 2020-06-27 | Stop reason: HOSPADM

## 2020-06-25 RX ORDER — KETOROLAC TROMETHAMINE 30 MG/ML
INJECTION, SOLUTION INTRAMUSCULAR; INTRAVENOUS AS NEEDED
Status: DISCONTINUED | OUTPATIENT
Start: 2020-06-25 | End: 2020-06-25 | Stop reason: HOSPADM

## 2020-06-25 RX ORDER — HYDROMORPHONE HYDROCHLORIDE 1 MG/ML
1 INJECTION, SOLUTION INTRAMUSCULAR; INTRAVENOUS; SUBCUTANEOUS
Status: DISCONTINUED | OUTPATIENT
Start: 2020-06-25 | End: 2020-06-27 | Stop reason: HOSPADM

## 2020-06-25 RX ORDER — AMOXICILLIN 250 MG
2 CAPSULE ORAL DAILY
Status: DISCONTINUED | OUTPATIENT
Start: 2020-06-26 | End: 2020-06-27 | Stop reason: HOSPADM

## 2020-06-25 RX ORDER — ACETAMINOPHEN 500 MG
1000 TABLET ORAL EVERY 6 HOURS
Status: DISCONTINUED | OUTPATIENT
Start: 2020-06-25 | End: 2020-06-27 | Stop reason: HOSPADM

## 2020-06-25 RX ORDER — ASPIRIN 81 MG/1
81 TABLET ORAL EVERY 12 HOURS
Qty: 60 TAB | Refills: 0 | Status: SHIPPED | OUTPATIENT
Start: 2020-06-25 | End: 2020-07-25

## 2020-06-25 RX ORDER — FENTANYL CITRATE 50 UG/ML
100 INJECTION, SOLUTION INTRAMUSCULAR; INTRAVENOUS ONCE
Status: COMPLETED | OUTPATIENT
Start: 2020-06-25 | End: 2020-06-25

## 2020-06-25 RX ORDER — MIDAZOLAM HYDROCHLORIDE 1 MG/ML
2 INJECTION, SOLUTION INTRAMUSCULAR; INTRAVENOUS ONCE
Status: COMPLETED | OUTPATIENT
Start: 2020-06-25 | End: 2020-06-25

## 2020-06-25 RX ORDER — OXYCODONE HYDROCHLORIDE 5 MG/1
5 TABLET ORAL
Status: DISCONTINUED | OUTPATIENT
Start: 2020-06-25 | End: 2020-06-25 | Stop reason: HOSPADM

## 2020-06-25 RX ORDER — CEFAZOLIN SODIUM/WATER 2 G/20 ML
2 SYRINGE (ML) INTRAVENOUS ONCE
Status: COMPLETED | OUTPATIENT
Start: 2020-06-25 | End: 2020-06-25

## 2020-06-25 RX ORDER — CELECOXIB 200 MG/1
200 CAPSULE ORAL ONCE
Status: COMPLETED | OUTPATIENT
Start: 2020-06-25 | End: 2020-06-25

## 2020-06-25 RX ORDER — NALOXONE HYDROCHLORIDE 0.4 MG/ML
.2-.4 INJECTION, SOLUTION INTRAMUSCULAR; INTRAVENOUS; SUBCUTANEOUS
Status: DISCONTINUED | OUTPATIENT
Start: 2020-06-25 | End: 2020-06-27 | Stop reason: HOSPADM

## 2020-06-25 RX ORDER — SODIUM CHLORIDE 9 MG/ML
100 INJECTION, SOLUTION INTRAVENOUS CONTINUOUS
Status: DISPENSED | OUTPATIENT
Start: 2020-06-25 | End: 2020-06-27

## 2020-06-25 RX ADMIN — Medication 2 G: at 09:30

## 2020-06-25 RX ADMIN — MIDAZOLAM 1 MG: 1 INJECTION INTRAMUSCULAR; INTRAVENOUS at 09:38

## 2020-06-25 RX ADMIN — OXYCODONE 10 MG: 5 TABLET ORAL at 13:00

## 2020-06-25 RX ADMIN — PHENYLEPHRINE HYDROCHLORIDE 50 MCG: 10 INJECTION INTRAVENOUS at 10:37

## 2020-06-25 RX ADMIN — CELECOXIB 200 MG: 200 CAPSULE ORAL at 21:00

## 2020-06-25 RX ADMIN — Medication 10 MG: at 10:24

## 2020-06-25 RX ADMIN — Medication 3 AMPULE: at 07:18

## 2020-06-25 RX ADMIN — Medication 1 AMPULE: at 22:21

## 2020-06-25 RX ADMIN — Medication 10 ML: at 23:33

## 2020-06-25 RX ADMIN — SODIUM CHLORIDE, SODIUM LACTATE, POTASSIUM CHLORIDE, AND CALCIUM CHLORIDE 100 ML/HR: 600; 310; 30; 20 INJECTION, SOLUTION INTRAVENOUS at 07:18

## 2020-06-25 RX ADMIN — FENTANYL CITRATE 25 MCG: 50 INJECTION INTRAMUSCULAR; INTRAVENOUS at 10:30

## 2020-06-25 RX ADMIN — MIDAZOLAM 1 MG: 1 INJECTION INTRAMUSCULAR; INTRAVENOUS at 09:56

## 2020-06-25 RX ADMIN — PROPOFOL 50 MCG/KG/MIN: 10 INJECTION, EMULSION INTRAVENOUS at 09:47

## 2020-06-25 RX ADMIN — GLYCOPYRROLATE 0.2 MG: 0.2 INJECTION, SOLUTION INTRAMUSCULAR; INTRAVENOUS at 10:39

## 2020-06-25 RX ADMIN — FENTANYL CITRATE 50 MCG: 50 INJECTION INTRAMUSCULAR; INTRAVENOUS at 09:56

## 2020-06-25 RX ADMIN — Medication 10 MG: at 10:08

## 2020-06-25 RX ADMIN — TRANEXAMIC ACID 1000 MG: 100 INJECTION, SOLUTION INTRAVENOUS at 09:47

## 2020-06-25 RX ADMIN — ASPIRIN 81 MG: 81 TABLET, COATED ORAL at 22:21

## 2020-06-25 RX ADMIN — Medication 10 ML: at 17:14

## 2020-06-25 RX ADMIN — FENTANYL CITRATE 25 MCG: 50 INJECTION INTRAMUSCULAR; INTRAVENOUS at 10:12

## 2020-06-25 RX ADMIN — ONDANSETRON 4 MG: 2 INJECTION INTRAMUSCULAR; INTRAVENOUS at 09:47

## 2020-06-25 RX ADMIN — MEPIVACAINE HYDROCHLORIDE 60 MG: 20 INJECTION, SOLUTION EPIDURAL; INFILTRATION at 09:41

## 2020-06-25 RX ADMIN — ACETAMINOPHEN 1000 MG: 500 TABLET, FILM COATED ORAL at 17:14

## 2020-06-25 RX ADMIN — Medication 10 MG: at 10:36

## 2020-06-25 RX ADMIN — MIDAZOLAM 1 MG: 1 INJECTION INTRAMUSCULAR; INTRAVENOUS at 08:04

## 2020-06-25 RX ADMIN — ROPIVACAINE HYDROCHLORIDE 20 ML: 2 INJECTION, SOLUTION EPIDURAL; INFILTRATION at 08:06

## 2020-06-25 RX ADMIN — CELECOXIB 200 MG: 200 CAPSULE ORAL at 07:18

## 2020-06-25 RX ADMIN — ACETAMINOPHEN 1000 MG: 500 TABLET, FILM COATED ORAL at 23:32

## 2020-06-25 RX ADMIN — DEXAMETHASONE SODIUM PHOSPHATE 10 MG: 4 INJECTION, SOLUTION INTRAMUSCULAR; INTRAVENOUS at 09:47

## 2020-06-25 RX ADMIN — Medication 2 G: at 17:14

## 2020-06-25 RX ADMIN — LIDOCAINE HYDROCHLORIDE 0.1 ML: 10 INJECTION, SOLUTION INFILTRATION; PERINEURAL at 07:18

## 2020-06-25 RX ADMIN — FENTANYL CITRATE 50 MCG: 50 INJECTION, SOLUTION INTRAMUSCULAR; INTRAVENOUS at 08:04

## 2020-06-25 NOTE — PERIOP NOTES
TRANSFER - OUT REPORT:    Verbal report given to papi LUI on [employer]  being transferred to [facility]) for routine progression of care       Report consisted of patients Situation, Background, Assessment and   Recommendations(SBAR). Information from the following report(s) SBAR, [facility], [facility] and Recent Results was reviewed with the receiving nurse. Lines:   Peripheral IV 06/25/20 Left Hand (Active)   Site Assessment Clean, dry, & intact 6/25/2020  7:09 AM   Phlebitis Assessment 0 6/25/2020  7:09 AM   Infiltration Assessment 0 6/25/2020  7:09 AM   Dressing Status Clean, dry, & intact 6/25/2020  7:09 AM   Dressing Type Tape;Transparent 6/25/2020  7:09 AM   Hub Color/Line Status Infusing 6/25/2020  7:09 AM   Action Taken Blood drawn 6/25/2020  7:09 AM        Opportunity for questions and clarification was provided.       Patient transported with:   [employer]

## 2020-06-25 NOTE — PROGRESS NOTES
Problem: Self Care Deficits Care Plan (Adult)  Goal: *Acute Goals and Plan of Care (Insert Text)  Description: GOALS:   DISCHARGE GOALS (in preparation for going home/rehab):  3 days  1. Mr. Shar Olvera will perform one lower body dressing activity with minimal assistance required to demonstrate improved functional mobility and safety. 2.  Mr. Shar Olvera will perform one lower body bathing activity with minimal assistance required to demonstrate improved functional mobility and safety. 3.  Mr. Shar Olvera will perform toileting/toilet transfer with contact guard assistance to demonstrate improved functional mobility and safety. 4.  Mr. Shar Olvera will perform shower transfer with contact guard assistance to demonstrate improved functional mobility and safety. JOINT CAMP OCCUPATIONAL THERAPY TKA: Initial Assessment and Daily Note 6/25/2020  INPATIENT: Hospital Day: 1  [place] / [place] / Product Type: Medicare /      NAME/AGE/GENDER: Kelly Tyler is a [de-identified] y.o. male   PRIMARY DIAGNOSIS:  Unilateral primary osteoarthritis, right knee [M17.11]   Procedure(s) and Anesthesia Type:     * RIGHT KNEE ARTHROPLASTY TOTAL - Spinal (Right)  ICD-10: Treatment Diagnosis:    Pain in Right Knee (M25.561)  Stiffness of Right Knee, Not elsewhere classified (C72.788)      ASSESSMENT:     Mr. Shar Olvera is s/p Right TKA and presents with decreased weight bearing on R LE and decreased independence with functional mobility and activities of daily living as compared to baseline level of function and safety. Patient would benefit from skilled Occupational Therapy to maximize independence and safety with self-care task and functional mobility. Pt would also benefit from education on adaptive equipment and safety precautions in preparation for going home. Patient able to don gown at edge of bed. SPT from bed to recliner using a rolling walker. Should progress well with ADL's tomorrow.            This section established at most recent assessment   PROBLEM LIST (Impairments causing functional limitations):  Decreased Strength  Decreased ADL/Functional Activities  Decreased Transfer Abilities  Increased Pain  Increased Fatigue  Decreased Flexibility/Joint Mobility  Decreased Knowledge of Precautions   INTERVENTIONS PLANNED: (Benefits and precautions of occupational therapy have been discussed with the patient.)  Activities of daily living training  Adaptive equipment training  Balance training  Clothing management  Donning&doffing training  Theraputic activity     TREATMENT PLAN: Frequency/Duration: Follow patient 1-2tx to address above goals. Rehabilitation Potential For Stated Goals: Excellent     RECOMMENDED REHABILITATION/EQUIPMENT: (at time of discharge pending progress): Continue Skilled Therapy. OCCUPATIONAL PROFILE AND HISTORY:   History of Present Injury/Illness (Reason for Referral): Pt presents this date s/p (Right) TKA. Past Medical History/Comorbidities:   Mr. May Burnett  has a past medical history of Arthritis, HLD (hyperlipidemia) (12/7/2012), Macular degeneration, and Osteoarthritis of left knee. He also has no past medical history of Malignant hyperthermia due to anesthesia or Pseudocholinesterase deficiency. Mr. May Burnett  has a past surgical history that includes hx colonoscopy (2002) and hx colonoscopy (11-1-13). Social History/Living Environment:   Home Environment: Private residence  Support Systems: Child(saeid)  Patient Expects to be Discharged to[de-identified] Private residence  Prior Level of Function/Work/Activity: Indpeendent prior. Number of Personal Factors/Comorbidities that affect the Plan of Care: Brief history (0):  LOW COMPLEXITY   ASSESSMENT OF OCCUPATIONAL PERFORMANCE[de-identified]   Most Recent Physical Functioning:   Balance  Sitting: Intact  Standing: With support                    Coordination  Fine Motor Skills-Upper: Left Intact; Right Intact  Gross Motor Skills-Upper: Left Intact; Right Intact         Mental Status  Neurologic State: Alert  Orientation Level: Oriented X4  Cognition: Appropriate decision making  Perception: Appears intact                Basic ADLs (From Assessment) Complex ADLs (From Assessment)   Basic ADL  Feeding: Independent  Oral Facial Hygiene/Grooming: Setup  Bathing: Contact guard assistance  Upper Body Dressing: Contact guard assistance  Lower Body Dressing: Minimum assistance  Toileting: Moderate assistance     Grooming/Bathing/Dressing Activities of Daily Living                       Functional Transfers  Bathroom Mobility: Minimum assistance  Shower Transfer: Moderate assistance     Bed/Mat Mobility  Supine to Sit: Contact guard assistance  Sit to Stand: Minimum assistance  Stand to Sit: Minimum assistance  Bed to Chair: Minimum assistance  Scooting: Contact guard assistance         Physical Skills Involved:  Range of Motion  Balance  Strength Cognitive Skills Affected (resulting in the inability to perform in a timely and safe manner):  WFL  Psychosocial Skills Affected:  WFL    Number of elements that affect the Plan of Care: 1-3:  LOW COMPLEXITY   CLINICAL DECISION MAKIN[address] [place] [id number] AM-PAC 6 Clicks   Daily Activity Inpatient Short Form  How much help from another person does the patient currently need. .. Total A Lot A Little None   1. Putting on and taking off regular lower body clothing? [] 1   [x] 2   [] 3   [] 4   2. Bathing (including washing, rinsing, drying)? [] 1   [x] 2   [] 3   [] 4   3. Toileting, which includes using toilet, bedpan or urinal?   [] 1   [x] 2   [] 3   [] 4   4. Putting on and taking off regular upper body clothing? [] 1   [] 2   [] 3   [x] 4   5. Taking care of personal grooming such as brushing teeth? [] 1   [] 2   [] 3   [x] 4   6. Eating meals? [] 1   [] 2   [] 3   [x] 4   © , Trustees of [address] [place] [id number], under license to [employer].  All rights reserved     Score:  Initial: 18 Most Recent: X (Date: -- )    Interpretation of Tool:  Represents activities that are increasingly more difficult (i.e. Bed mobility, Transfers, Gait). Medical Necessity:     Skilled intervention continues to be required due to Deficits above. Reason for Services/Other Comments:  Patient continues to require skilled intervention due to   New dx   . Use of outcome tool(s) and clinical judgement create a POC that gives a: LOW COMPLEXITY            TREATMENT:   (In addition to Assessment/Re-Assessment sessions the following treatments were rendered)     Pre-treatment Symptoms/Complaints:    Pain: Initial:   Pain Intensity 1: 5  Pain Location 1: Knee  Pain Orientation 1: Right  Post Session:  5     Self Care: (10): Procedure(s) (per grid) utilized to improve and/or restore self-care/home management as related to dressing, bathing, and toileting. Required minimal verbal and tactile cueing to facilitate activities of daily living skills. Initial evaluation 5 minutes    Treatment/Session Assessment:     Response to Treatment:  Good, sitting up in recliner. Education:  [] Home Exercises  [x] Fall Precautions  [] Hip Precautions [] Going Home Video  [x] Knee/Hip Prosthesis Review  [x] Walker Management/Safety [x] Adaptive Equipment as Needed       Interdisciplinary Collaboration:   Physical Therapist  Occupational Therapist  Registered Nurse    After treatment position/precautions:   Up in chair  Bed/Chair-wheels locked  Caregiver at bedside  Call light within reach  RN notified     Compliance with Program/Exercises: Compliant all of the time, Will assess as treatment progresses. Recommendations/Intent for next treatment session:  Treatment next visit will focus on increasing Mr. Marily Gipson independence with bed mobility, transfers, self care, functional mobility, modalities for pain, and patient education.       Total Treatment Duration:  OT Patient Time In/Time Out  Time In: 1525  Time Out: 1201 [place], [place]

## 2020-06-25 NOTE — PERIOP NOTES
TRANSFER - IN REPORT:    Verbal report received from [facility] on Marva Tsai  being received from 3rd floor ortho for routine progression of care      Report consisted of patients Situation, Background, Assessment and   Recommendations(SBAR). Information from the following report(s) Kardex and MAR was reviewed with the receiving nurse. Opportunity for questions and clarification was provided. Assessment completed upon patients arrival to unit and care assumed.

## 2020-06-25 NOTE — PROGRESS NOTES
Problem: Mobility Impaired (Adult and Pediatric)  Goal: *Acute Goals and Plan of Care (Insert Text)  Description: GOALS (1-4 days):  (1.)Mr. Rodrigo Dickerson will move from supine to sit and sit to supine  in bed with STAND BY ASSIST.  (2.)Mr. Rodrigo Dickerson will transfer from bed to chair and chair to bed with STAND BY ASSIST using the least restrictive device. (3.)Mr. Zapata will ambulate with STAND BY ASSIST for 150 feet with the least restrictive device. (4.)Mr. Zapata will ambulate up/down 3 steps with left railing with CONTACT GUARD ASSIST with no device. (5.)Mr. Rodrigo Dickerson will increase right knee ROM to 5°-80°.  ________________________________________________________________________________________________   Outcome: Progressing Towards Goal     PHYSICAL THERAPY JOINT CAMP TKA: Initial Assessment 6/25/2020  INPATIENT: Hospital Day: 1  [place] / Product Type: Medicare /      NAME/AGE/GENDER: Michael Kaur is a [de-identified] y.o. male   PRIMARY DIAGNOSIS:  Unilateral primary osteoarthritis, right knee [M17.11]   Procedure(s) and Anesthesia Type:     * RIGHT KNEE ARTHROPLASTY TOTAL - Spinal (Right)  ICD-10: Treatment Diagnosis:    Pain in Right Knee (M25.561)  Stiffness of Right Knee, Not elsewhere classified (M25.661)  Difficulty in walking, Not elsewhere classified (R26.2)  Other abnormalities of gait and mobility (R26.89)      ASSESSMENT:     Mr. Rodrigo Dickerson presents with decreased ROM and strength R LE and limited functional mobility S/P R TKA. He had L TKA Dec 2019 and states he injured a ligament or tendon -unsure? in the L knee and he has been wearing a hinge brace on L knee due to instability. Did not don L knee brace today as he was only ambulating a short distance.   He will benefit from skilled therapy services to address the below problem list.     This section established at most recent assessment   PROBLEM LIST (Impairments causing functional limitations):  Decreased Strength  Decreased ADL/Functional Activities  Decreased Transfer Abilities  Decreased Ambulation Ability/Technique  Decreased Flexibility/Joint Mobility  Decreased [place] with Home Exercise Program   INTERVENTIONS PLANNED: (Benefits and precautions of physical therapy have been discussed with the patient.)  bed mobility  gait training  home exercise program (HEP)  Range of Motion: active/assisted/passive  Therapeutic Activities  therapeutic exercise/strengthening  transfer training  Group Therapy     TREATMENT PLAN: Frequency/Duration: Follow patient BID for duration of hospital stay to address above goals. Rehabilitation Potential For Stated Goals: Good     RECOMMENDED REHABILITATION/EQUIPMENT: (at time of discharge pending progress): Continue Skilled Therapy and Home Health: Physical Therapy. HISTORY:   History of Present Injury/Illness (Reason for Referral):  S/P R TKA  Past Medical History/Comorbidities:   Mr. Lana Knox  has a past medical history of Arthritis, HLD (hyperlipidemia) (12/7/2012), Macular degeneration, and Osteoarthritis of left knee. He also has no past medical history of Malignant hyperthermia due to anesthesia or Pseudocholinesterase deficiency. Mr. Lana Knox  has a past surgical history that includes hx colonoscopy (2002) and hx colonoscopy (11-1-13).   Social History/Living Environment:   Home Environment: Private residence  # Steps to Enter: 1  Rails to Enter: Yes  Hand Rails : Left  One/Two Story Residence: One story(with sunken den- 2 steps with rail)  Living Alone: No  Support Systems: Family member(s)  Patient Expects to be Discharged to[de-identified] Private residence  Current DME Used/Available at Home: [employer], straight, Commode, bedside, Crutches, Walker, rolling  Tub or Shower Type: Tub/Shower combination  Prior Level of Function/Work/Activity:  Ambulating with a cane   Number of Personal Factors/Comorbidities that affect the Plan of Care: 1-2: MODERATE COMPLEXITY   EXAMINATION: Most Recent Physical Functioning:      Gross Assessment  AROM: Generally decreased, functional  Strength: Generally decreased, functional        RLE AROM  R Knee Flexion: 55(approximate)  R Knee Extension: -20(approximate)            Bed Mobility  Supine to Sit: Contact guard assistance  Scooting: Contact guard assistance    Transfers  Sit to Stand: Minimum assistance  Stand to Sit: Minimum assistance  Bed to Chair: Minimum assistance    Balance  Sitting: Intact  Standing: Pull to stand; With support              Weight Bearing Status  Right Side Weight Bearing: As tolerated  Distance (ft): 15 Feet (ft)  Ambulation - Level of Assistance: Minimal assistance  Assistive Device: Walker, rolling  Speed/Marilyn: Pace decreased (<100 feet/min)  Step Length: Left shortened;Right shortened  Stance: Right decreased  Gait Abnormalities: Antalgic;Decreased step clearance;Shuffling gait  Interventions: Manual cues; Safety awareness training;Verbal cues     Braces/Orthotics: has L hinge brace; did not don today since only ambulating short distance    Right Knee Cold  Type: Cryocuff      Body Structures Involved:  Bones  Joints  Muscles Body Functions Affected:  Neuromusculoskeletal  Movement Related Activities and Participation Affected:  General Tasks and Demands  Mobility   Number of elements that affect the Plan of Care: 3: MODERATE COMPLEXITY   CLINICAL PRESENTATION:   Presentation: Stable and uncomplicated: LOW COMPLEXITY   CLINICAL DECISION MAKIN [facility] Mobility Inpatient Short Form  How much difficulty does the patient currently have. .. Unable A Lot A Little None   1. Turning over in bed (including adjusting bedclothes, sheets and blankets)? [] 1   [] 2   [] 3   [x] 4   2. Sitting down on and standing up from a chair with arms ( e.g., wheelchair, bedside commode, etc.)   [] 1   [] 2   [x] 3   [] 4   3. Moving from lying on back to sitting on the side of the bed?    [] 1   [] 2 [x] 3   [] 4   How much help from another person does the patient currently need. .. Total A Lot A Little None   4. Moving to and from a bed to a chair (including a wheelchair)? [] 1   [] 2   [x] 3   [] 4   5. Need to walk in hospital room? [] 1   [] 2   [x] 3   [] 4   6. Climbing 3-5 steps with a railing? [] 1   [] 2   [x] 3   [] 4   © 2007, Trustees of [address] [place] [address] [address] [address], under license to [employer]. All rights reserved     Score:  Initial: 19 Most Recent: X (Date: -- )    Interpretation of Tool:  Represents activities that are increasingly more difficult (i.e. Bed mobility, Transfers, Gait). Medical Necessity:     Patient demonstrates   good   rehab potential due to higher previous functional level. Reason for Services/Other Comments:  Patient   continues to require present interventions due to patient's inability to perform exercises and functional mobility independently   . Use of outcome tool(s) and clinical judgement create a POC that gives a: Clear prediction of patient's progress: LOW COMPLEXITY            TREATMENT:   (In addition to Assessment/Re-Assessment sessions the following treatments were rendered)     Pre-treatment Symptoms/Complaints:  R knee pain- \" not much\"  Pain Initial:   Pain Intensity 1: 3  Pain Location 1: Knee  Pain Orientation 1: Right  Pain Intervention(s) 1: Cold pack, Repositioned  Post Session:  3     Therapeutic Exercise: (10 Minutes):  Exercises per grid below to improve mobility and strength. Required minimal verbal and manual cues to perform exercises correctly .        Date:  6/25/20 Date:   Date:     ACTIVITY/EXERCISE AM PM AM PM AM PM   GROUP THERAPY  []  []  []  []  []  []   Ankle Pumps  10       Quad Sets  10       Gluteal Sets  10       Hip ABd/ADduction  10       Straight Leg Raises  10       Knee Slides  10       Short Arc Quads         Long Arc Quads         Chair Slides                  B = bilateral; AA = active assistive; A = active; P = passive Treatment/Session Assessment:     Response to Treatment:  tolerated well. Education:  [x] Home Exercises  [x] Fall Precautions  []  [] D/C Instruction Review  [] Knee Prosthesis Review  [x] Walker Management/Safety [] Adaptive Equipment as Needed       Interdisciplinary Collaboration:   Physical Therapist  Occupational Therapist  Registered Nurse    After treatment position/precautions:   Up in chair  Bed/Chair-wheels locked  Call light within reach  RN notified    Compliance with Program/Exercises: Compliant most of the time, Will assess as treatment progresses. Recommendations/Intent for next treatment session:  Treatment next visit will focus on increasing Mr. Sunny Leung independence with bed mobility, transfers, gait training, strength/ROM exercises, modalities for pain, and patient education.       Total Treatment Duration:  PT Patient Time In/Time Out  Time In: 1980  Time Out: 9245 [facility],

## 2020-06-25 NOTE — PERIOP NOTES
Betadine lavage:  17.5cc of betadine lot # [id number], exp. Date 06/2021,  in 500cc of . 9NS Lot # [id number], exp.  Date : 01/01/2023

## 2020-06-25 NOTE — PERIOP NOTES
TRANSFER - OUT REPORT:    Verbal report given to [facility] RN  on [employer]  being transferred to [facility] for routine post - op       Report consisted of patients Situation, Background, Assessment and   Recommendations(SBAR). Information from the following report(s) SBAR was reviewed with the receiving nurse. Opportunity for questions and clarification was provided.       Patient transported with:   O2 @ 2 liters  Tech

## 2020-06-25 NOTE — ANESTHESIA PROCEDURE NOTES
Peripheral Block    Start time: 6/25/2020 8:04 AM  End time: 6/25/2020 8:06 AM  Performed by: Monica Ingram MD  Authorized by: Monica Ingram MD       Pre-procedure: Indications: at surgeon's request and post-op pain management    Preanesthetic Checklist: patient identified, risks and benefits discussed, site marked, timeout performed, anesthesia consent given and patient being monitored    Timeout Time: 08:04  Preanesthetic Checklist comment:  Risk of nerve damage discussed. Block Type:   Block Type: Adductor canal  Laterality:  Right  Monitoring:  Standard ASA monitoring, continuous pulse ox, frequent vital sign checks, heart rate, oxygen and responsive to questions  Injection Technique:  Single shot  Procedures: ultrasound guided    Prep: chlorhexidine    Location:  Mid thigh  Needle Type:  Stimuplex (4 inch)  Needle Gauge:  20 G  Needle Localization:  Ultrasound guidance    Assessment:  Number of attempts:  1  Injection Assessment:  Incremental injection every 5 mL, local visualized surrounding nerve on ultrasound, negative aspiration for blood, no intravascular symptoms, no paresthesia and ultrasound image on chart  Patient tolerance:  Patient tolerated the procedure well with no immediate complications  3 cc 1% Lidocaine injected at needle insertion site. Needle inserted and placed in close proximity to the nerve under real time ultrasound guidance. Ultrasound was used to visualize the spread of local anesthetic in close proximity to the nerve being blocked. The nerve appeared anatomically normal and there were no abnormal findings.

## 2020-06-25 NOTE — PHYSICIAN ADVISORY
Letter of admission status determination     Abrahan Monson   Age: [de-identified] y.o. MRN: [id number]  Admitting physician: Clois Cabot  [place] / Plan: [facility] MEDICARE PART A AND B / Product Type: Medicare /     Date of admission:  6/25/2020    I have reviewed this case as it involves a Medicare patient not meeting criteria for Inpatient services. The patient underwent elective total knee arthroplasty today and tolerated the procedure well, without any documented complications. The procedure is not on the current Medicare Inpatient Only List.    A discharge order was placed today. There is no indication that patient's hospital care will extend to the POD #2 or that SNF rehab is anticipated. Patient's modest burden of comorbidities does not increase the operative risk enough to justify Inpatient status. A discharge order was placed today with anticipated discharge tomorrow. Therefore, Outpatient status is appropriate. The final decision regarding the patient's hospitalization status depends on the attending physician's judgment.        Edgar Kevin MD, URIEL, [facility], [address] [place] [address]  [phone]

## 2020-06-25 NOTE — OP NOTES
[facility]  Cemented Total Knee Arthroplasty  Patient:Curly Zapata   : 1940  Medical Record [id number]  Pre-operative Diagnosis:  Unilateral primary osteoarthritis, right knee [M17.11]  Post-operative Diagnosis: Unilateral primary osteoarthritis, right knee [M17.11]    Surgeon: Marilynn Matta MD  Assistant: John Diggs PA-C    Anesthesia: Spinal    Procedure: Cemented Total Knee Arthroplasty   The complexity of the total joint surgery requires the use of a first assistant for positioning, retraction and assistance in closure. The patient's Body mass index is 29.41 kg/m²., BMI's greater then 40 make surgical exposure and retraction extremely difficult and increase operative time. Tourniquet Time: none  EBL: 150cc  Additional Findings: Severe MFC and PF DJD with eburnated patellar articular surface and trochlear groove  Releases none    Shagufta Jensen was brought to the operating room and positioned on the operating table. He was anethestized  IV antibiotics were administered per CMS protocol. Prior to the incision being made a timeout was called identifying the patient, procedure ,operative side and surgeon. The right leg was prepped and draped in the usual sterile manner  An anterior longitudinal incision was accomplished just medial to the tibial tubercle and extending approximal 6 centimeters proximal to the superior pole of the patella. A medial parapatellar capsular incision was performed. The medial capsular flap was elevated around to the insertion of the semimembranous tendon. The patella was everted and the knee flexed and externally rotated. The medial and external menisci were excised. The lateral half of the fat pad excised and the patella femoral ligament was released. The anterior cruciate ligament was resected and the posterior cruciate ligament was substituted.   Using extramedullary instrumentation, the tibial cut was accomplished with appropriate posterior slope. Approxiamately 2 mm of bone was removed from the low side of the tibia. The distal femur was next addressed. A drill hole was made above the intracondylar notch. Using appropriate intramedullary instrumentation,a 5 degree valgus distal cut was accomplished. A femur was sized. The anterior and posterior cuts were then made about the distal femur. The osteophytes were removed from the tibial and femoral surfaces. The flexion and extension gaps were assessed with the appropriate spacer blocks. Additional surgical procedures included none. The flexion and extension gaps were deemed appropriately balanced. The appropriate cutting blocks were then utilized to perform the anterior chamfer, posterior chamfer and notch cuts, with appropriate lateral tranlation accomplished for the patellofemoral groove. The tibia was sized. The tibial base plate was pinned into place with the appropriate external rotation and stem site prepared. A preliminary range of motion was accomplished with the above size trial components. A polyethylene insert allowed the patient to obtain full extension as well as appropriate flexion. The patient's ligaments were stable in flexion and extension to medial and lateral stressing and the alignment was through the appropriate mechanical axis. The patella was then everted. It was prepared for a pegged patellar implant per routine. All trial components were removed and the implants were cemented into position. Bone and cement debris were removed. The betadine lavage protocol was performed. [facility] knee was placed through range of motion and noted to be stable as mentioned above with the trial components. The wound was dry, therefore no drain was used. The operative knee was injected with 60cc of Naropin, 10 cc's of morphine and 1 cc of 30mg of Toradol.   The capsular layer was closed using a #1 vicryl suture, while subcutaneous layers were closed using 2-0 Vicryl interrupted sutures and a #1 Stratofix. Finally the skin was closed using 3-0 Vicryl and a Zipline closure. A sterile sterile bandage was applied. An Iceman cryo pad was applied on the operative leg. Sponge count and needle counts were correct. Aashish Jaramillo left the operating room     Implants:   Implant Name Type Inv.  Item Serial No.  Lot No. LRB No. Used   CEMENT BNE SIMPLEX W/O GENT -- PK/10 ONLY - [id number]  CEMENT BNE SIMPLEX W/O GENT -- PK/10 ONLY [id number] [employer] HOWM [id number] Right 1   CEMENT BNE SIMPLEX W/O GENT -- PK/10 ONLY - [id number]  CEMENT BNE SIMPLEX W/O GENT -- PK/10 ONLY [id number] [employer] HOWM [id number] Right 1   ATTUNE PATELLA MEDIALIZED    [id number] [employer] OR[employer]O [id number] Right 1   FEM PS SZ 5 RT APBLO -- ATTUNE - [id number]  FEM PS SZ 5 RT PABLO -- ATTUNE [id number] [facility] [id number] Right 1   BEARING TIB BASE FIX SZ5 PABLO -- ATTUNE - [id number]  BEARING TIB BASE FIX SZ5 PABLO -- ATTUNE [id number] [facility] [id number] Right 1   INSERT TIB FB PS SZ 5 10MM -- ATTUNE - [id number]  INSERT TIB FB PS SZ 5 10MM -- ATTUNE [id number] [facility] [id number] Right 1     Signed By: Amna Cleaning MD

## 2020-06-25 NOTE — PROGRESS NOTES
06/25/20 1350   Oxygen Therapy   O2 Sat (%) 97 %   Pulse via Oximetry 85 beats per minute   O2 Device Room air   O2 Flow Rate (L/min) 0 l/min   Incentive Spirometry Treatment   Actual Volume (ml) 2500 ml   Patient encouraged to do 10 breaths every hour while awake-patient agreed and demonstrated. No shortness of breath or distress noted. BS are clear b/l. Joint Camp notes reviewed- Sat monitor ordered HS.

## 2020-06-25 NOTE — INTERVAL H&P NOTE
Update History & Physical    The Patient's History and Physical of June 19, 2020 was reviewed with the patient and I examined the patient. There was no change. The surgical site was confirmed by the patient and me. Plan:  The risk, benefits, expected outcome, and alternative to the recommended procedure have been discussed with the patient. Patient understands and wants to proceed with the procedure.     Electronically signed by TRUDY Payne on 6/25/2020 at 6:55 AM

## 2020-06-25 NOTE — PROGRESS NOTES
Care Management Interventions  PCP Verified by CM: Yes  Mode of Transport at Discharge: Self  Transition of Care Consult (CM Consult): [address]: Yes  Discharge Durable Medical Equipment: No  Physical Therapy Consult: Yes  Occupational Therapy Consult: No  Current Support Network: Lives with Spouse  Confirm Follow Up Transport: Family  The Plan for Transition of Care is Related to the Following Treatment Goals : return to independent function  The Patient and/or Patient Representative was Provided with a Choice of Provider and Agrees with the Discharge Plan?: Yes  Freedom of Choice List was Provided with Basic Dialogue that Supports the Patient's Individualized Plan of Care/Goals, Treatment Preferences and Shares the Quality Data Associated with the Providers?: Yes  Discharge Location  Discharge Placement: Home with home health    Patient is a [de-identified]y.o. year old male admitted for Right TKA . Patient plans to return home on discharge. Order received to arrange home health. Patient without preference towards agency. Referral sent to [facility]. Patient denies any equipment needs as patient has a walker. Will follow until discharge.

## 2020-06-25 NOTE — ANESTHESIA PROCEDURE NOTES
Spinal Block    Performed by: Louis Turpin MD  Authorized by: Louis Turpin MD     Pre-procedure: Indications: primary anesthetic  Preanesthetic Checklist: patient identified, risks and benefits discussed, anesthesia consent, patient being monitored and timeout performed    Timeout Time: 09:36  Preanesthetic Checklist comment:  Risk of nerve damage discussed.     Spinal Block:   Patient Position:  Seated  Prep Region:  Lumbar  Prep: chlorhexidine and patient draped      Location:  L3-4  Technique:  Single shot    Local Dose (mL):  3    Needle:   Needle Type:  Pencan  Needle Gauge:  25 G  Attempts:  1      Events: CSF confirmed, no blood with aspiration and no paresthesia        Assessment:  Insertion:  Uncomplicated  Patient tolerance:  Patient tolerated the procedure well with no immediate complications

## 2020-06-25 NOTE — DISCHARGE SUMMARY
[facility]  Total Joint Discharge Summary      Patient ID:  Chas Steen  [id number]  64 y.o.  1940    Admit date: 6/25/2020  Discharge date and time: 6-26-20  Admitting Physician: Sallie Foster MD  Surgeon: Same  Admission Diagnoses: Unilateral primary osteoarthritis, right knee [M17.11]  Arthritis of knee, right [M17.11]  Discharge Diagnoses: Active Problems:    Arthritis of knee, right (6/25/2020)                                Perioperative Antibiotics: Ancef 1 to 2 mg was given depending on patient's weight. If allergic to Ancef or due to other indications, patient was given Vancomycin. Hospital Medications given:   [unfilled]  [unfilled]  [unfilled]    Discharge Medications given:  Current Discharge Medication List      START taking these medications    Details   oxyCODONE IR (ROXICODONE) 5 mg immediate release tablet Take 1-2 Tabs by mouth every four (4) hours as needed for Pain for up to 7 days. Max Daily Amount: 60 mg.  Qty: 60 Tab, Refills: 0    Associated Diagnoses: Arthritis of knee, right         CONTINUE these medications which have CHANGED    Details   aspirin delayed-release 81 mg tablet Take 1 Tab by mouth every twelve (12) hours every twelve (12) hours for 30 days. Qty: 60 Tab, Refills: 0         CONTINUE these medications which have NOT CHANGED    Details   docusate sodium (COLACE) 100 mg capsule Take 100 mg by mouth three (3) times daily as needed for Constipation. acetaminophen (TYLENOL PO) Take 1,000 mg by mouth every six (6) hours as needed for Pain.          STOP taking these medications       meloxicam (MOBIC) 7.5 mg tablet Comments:   Reason for Stopping:         vit A/vit C/vit E/zinc/copper (PRESERVISION AREDS PO) Comments:   Reason for Stopping:         docosahexaenoic acid/epa (FISH OIL PO) Comments:   Reason for Stopping:                Additional DVT Prophylaxis:  KHUSHI Hose,Plexi-Pulse    Postoperative transfusions:   none  Post Op complications: none    Hemoglobin at discharge:   Lab Results   Component Value Date/Time    HGB 14.8 05/21/2020 12:19 PM       Wound appears to be healing without any evidence of infection. Physical Therapy started on the day following surgery and progressed to independent ambulation with the aid of a walker. At the time of discharge, able to go up and down stairs and had understanding of precautions needed following surgery.       PT/OT:            Assistive Device: Walker (comment)                Discharged to: home    Discharge instructions:  -Rx pain medication given  - Anticoagulate with: Ecotrin 81 mg PO BID x 4 weeks  -Resume pre hospital diet             -Resume home medications per medical continuation form     -Ambulate with walker, appropriate total joint protocol  -Follow up in office as scheduled       Signed:  TRUDY Rider  6/25/2020  1:59 PM

## 2020-06-25 NOTE — ANESTHESIA POSTPROCEDURE EVALUATION
Procedure(s):  RIGHT KNEE ARTHROPLASTY TOTAL.     spinal    Anesthesia Post Evaluation        Patient location during evaluation: PACU  Patient participation: complete - patient participated  Level of consciousness: awake  Pain management: satisfactory to patient  Airway patency: patent  Anesthetic complications: no  Cardiovascular status: hemodynamically stable  Respiratory status: spontaneous ventilation  Hydration status: euvolemic  Post anesthesia nausea and vomiting:  none      INITIAL Post-op Vital signs:   Vitals Value Taken Time   /66 6/25/2020 11:42 AM   Temp     Pulse 79 6/25/2020 11:42 AM   Resp 16 6/25/2020 11:42 AM   SpO2 98 % 6/25/2020 11:42 AM

## 2020-06-26 PROCEDURE — 97110 THERAPEUTIC EXERCISES: CPT

## 2020-06-26 PROCEDURE — 65270000029 HC RM PRIVATE

## 2020-06-26 PROCEDURE — 74011250636 HC RX REV CODE- 250/636: Performed by: ORTHOPAEDIC SURGERY

## 2020-06-26 PROCEDURE — 97116 GAIT TRAINING THERAPY: CPT

## 2020-06-26 PROCEDURE — 74011250637 HC RX REV CODE- 250/637: Performed by: ORTHOPAEDIC SURGERY

## 2020-06-26 PROCEDURE — 97535 SELF CARE MNGMENT TRAINING: CPT

## 2020-06-26 RX ADMIN — ACETAMINOPHEN 1000 MG: 500 TABLET, FILM COATED ORAL at 12:43

## 2020-06-26 RX ADMIN — ACETAMINOPHEN 1000 MG: 500 TABLET, FILM COATED ORAL at 05:35

## 2020-06-26 RX ADMIN — DOCUSATE SODIUM 50MG AND SENNOSIDES 8.6MG 2 TABLET: 8.6; 5 TABLET, FILM COATED ORAL at 10:11

## 2020-06-26 RX ADMIN — Medication 5 ML: at 21:00

## 2020-06-26 RX ADMIN — OXYCODONE 10 MG: 5 TABLET ORAL at 21:59

## 2020-06-26 RX ADMIN — Medication 1 AMPULE: at 20:26

## 2020-06-26 RX ADMIN — Medication 10 ML: at 05:36

## 2020-06-26 RX ADMIN — Medication 1 AMPULE: at 10:11

## 2020-06-26 RX ADMIN — ASPIRIN 81 MG: 81 TABLET, COATED ORAL at 20:26

## 2020-06-26 RX ADMIN — Medication 2 G: at 02:00

## 2020-06-26 RX ADMIN — OXYCODONE 10 MG: 5 TABLET ORAL at 14:13

## 2020-06-26 RX ADMIN — CELECOXIB 200 MG: 200 CAPSULE ORAL at 10:11

## 2020-06-26 RX ADMIN — ACETAMINOPHEN 1000 MG: 500 TABLET, FILM COATED ORAL at 18:17

## 2020-06-26 RX ADMIN — CELECOXIB 200 MG: 200 CAPSULE ORAL at 20:26

## 2020-06-26 RX ADMIN — ASPIRIN 81 MG: 81 TABLET, COATED ORAL at 10:11

## 2020-06-26 NOTE — PROGRESS NOTES
Remains in recliner. No c/o pain while in recliner, says he has pain when up. Iceman to knee. NV checks WDL. No further changes.

## 2020-06-26 NOTE — PROGRESS NOTES
Problem: Self Care Deficits Care Plan (Adult)  Goal: *Acute Goals and Plan of Care (Insert Text)  Description: GOALS:   DISCHARGE GOALS (in preparation for going home/rehab):  3 days  1. Mr. Stephany Dennis will perform one lower body dressing activity with minimal assistance required to demonstrate improved functional mobility and safety. 2.  Mr. Stephany Dennis will perform one lower body bathing activity with minimal assistance required to demonstrate improved functional mobility and safety. 3.  Mr. Stephany Dennis will perform toileting/toilet transfer with contact guard assistance to demonstrate improved functional mobility and safety. 4.  Mr. Stephany Dennis will perform shower transfer with contact guard assistance to demonstrate improved functional mobility and safety. JOINT CAMP OCCUPATIONAL THERAPY TKA: Daily Note and Discharge 6/26/2020  INPATIENT: Hospital Day: 2  [place] / [place] / Product Type: Medicare /      NAME/AGE/GENDER: Leelee Nunez is a [de-identified] y.o. male   PRIMARY DIAGNOSIS:  Unilateral primary osteoarthritis, right knee [M17.11]   Procedure(s) and Anesthesia Type:     * RIGHT KNEE ARTHROPLASTY TOTAL - Spinal (Right)  ICD-10: Treatment Diagnosis:    · Pain in Right Knee (M25.561)  · Stiffness of Right Knee, Not elsewhere classified (U49.960)      ASSESSMENT:      Mr. Stephany Dennis is s/p Right TKA and presents with decreased weight bearing on R LE and decreased independence with functional mobility and activities of daily living. Patient completed shower and dressing as charter below in ADL grid and is ambulating with rolling walker and stand by assist.  Patient has met 4/4 goals and plans to return home with good family support. Will do well at home for self cares and transfers during ADL's.  D/C OT for acute deficits. This section established at most recent assessment   PROBLEM LIST (Impairments causing functional limitations):  1. Decreased Strength  2.  Decreased ADL/Functional Activities  3. Decreased Transfer Abilities  4. Increased Pain  5. Increased Fatigue  6. Decreased Flexibility/Joint Mobility  7. Decreased Knowledge of Precautions   INTERVENTIONS PLANNED: (Benefits and precautions of occupational therapy have been discussed with the patient.)  1. Activities of daily living training  2. Adaptive equipment training  3. Balance training  4. Clothing management  5. Donning&doffing training  6. Theraputic activity     TREATMENT PLAN: Frequency/Duration: Follow patient 1-2tx to address above goals. Rehabilitation Potential For Stated Goals: Excellent     RECOMMENDED REHABILITATION/EQUIPMENT: (at time of discharge pending progress): Continue Skilled Therapy. OCCUPATIONAL PROFILE AND HISTORY:   History of Present Injury/Illness (Reason for Referral): Pt presents this date s/p (Right) TKA. Past Medical History/Comorbidities:   Mr. Roel Harper  has a past medical history of Arthritis, HLD (hyperlipidemia) (12/7/2012), Macular degeneration, and Osteoarthritis of left knee. He also has no past medical history of Malignant hyperthermia due to anesthesia or Pseudocholinesterase deficiency. Mr. Roel Harper  has a past surgical history that includes hx colonoscopy (2002) and hx colonoscopy (11-1-13). Social History/Living Environment:   Home Environment: Private residence  # Steps to Enter: 1  Rails to Enter: Yes  Hand Rails : Left  One/Two Story Residence: One story(with sunken den- 2 steps with rail)  Living Alone: No  Support Systems: Family member(s)  Patient Expects to be Discharged to[de-identified] Private residence  Current DME Used/Available at Home: Cane, straight, Commode, bedside, Crutches, Walker, rolling  Tub or Shower Type: Tub/Shower combination  Prior Level of Function/Work/Activity: Indpeendent prior.       Number of Personal Factors/Comorbidities that affect the Plan of Care: Brief history (0):  LOW COMPLEXITY   ASSESSMENT OF OCCUPATIONAL PERFORMANCE[de-identified]   Most Recent Physical Functioning:   Balance  Sitting: Intact  Standing: Pull to stand; With support                    Coordination  Fine Motor Skills-Upper: Left Intact; Right Intact  Gross Motor Skills-Upper: Left Intact; Right Intact         Mental Status  Neurologic State: Alert  Orientation Level: Oriented X4  Cognition: Appropriate decision making  Perception: Appears intact                Basic ADLs (From Assessment) Complex ADLs (From Assessment)   Basic ADL  Feeding: Independent  Oral Facial Hygiene/Grooming: Supervision  Bathing: Stand-by assistance  Upper Body Dressing: Stand-by assistance  Lower Body Dressing: Stand-by assistance  Toileting: Stand by assistance     Grooming/Bathing/Dressing Activities of Daily Living                       Functional Transfers  Bathroom Mobility: Stand-by assistance  Toilet Transfer : Stand-by assistance  Shower Transfer: Contact guard assistance     Bed/Mat Mobility  Supine to Sit: Stand-by assistance  Sit to Stand: Stand-by assistance; Additional time  Stand to Sit: Stand-by assistance  Bed to Chair: Stand-by assistance  Scooting: Stand-by assistance         Physical Skills Involved:  1. Range of Motion  2. Balance  3. Strength Cognitive Skills Affected (resulting in the inability to perform in a timely and safe manner):  1. [facility]  Psychosocial Skills Affected:  1. WFL    Number of elements that affect the Plan of Care: 1-3:  LOW COMPLEXITY   CLINICAL DECISION MAKING:   [facility] MIR[facility] AM-PAC 6 Clicks   Daily Activity Inpatient Short Form  How much help from another person does the patient currently need. .. Total A Lot A Little None   1. Putting on and taking off regular lower body clothing? [] 1   [] 2   [x] 3   [] 4   2. Bathing (including washing, rinsing, drying)? [] 1   [] 2   [x] 3   [] 4   3. Toileting, which includes using toilet, bedpan or urinal?   [] 1   [] 2   [x] 3   [] 4   4. Putting on and taking off regular upper body clothing?    [] 1   [] 2   [] 3 [x] 4   5. Taking care of personal grooming such as brushing teeth? [] 1   [] 2   [] 3   [x] 4   6. Eating meals? [] 1   [] 2   [] 3   [x] 4   © 2007, Trustees of [address] [place] [address] [address] [id number], under license to [employer]. All rights reserved     Score:  Initial: 18 Most Recent: 21 (Date: 6/26/2020 )    Interpretation of Tool:  Represents activities that are increasingly more difficult (i.e. Bed mobility, Transfers, Gait). Medical Necessity:     · Skilled intervention continues to be required due to Deficits above. Reason for Services/Other Comments:  · Patient continues to require skilled intervention due to   · New dx   · . Use of outcome tool(s) and clinical judgement create a POC that gives a: LOW COMPLEXITY            TREATMENT:   (In addition to Assessment/Re-Assessment sessions the following treatments were rendered)     Pre-treatment Symptoms/Complaints:    Pain: Initial:   Pain Intensity 1: 3  Pain Location 1: Knee  Pain Orientation 1: Right  Post Session:  5     Self Care: (45): Procedure(s) (per grid) utilized to improve and/or restore self-care/home management as related to dressing, bathing, and toileting. Required minimal verbal and tactile cueing to facilitate activities of daily living skills. Treatment/Session Assessment:     Response to Treatment:  Good, sitting up in recliner. Education:  [] Home Exercises  [x] Fall Precautions  [] Hip Precautions [] Going Home Video  [x] Knee/Hip Prosthesis Review  [x] Walker Management/Safety [x] Adaptive Equipment as Needed       Interdisciplinary Collaboration:   o Physical Therapist  o Occupational Therapist  o Registered Nurse    After treatment position/precautions:   o Up in chair  o Bed/Chair-wheels locked  o Caregiver at bedside  o Call light within reach  o RN notified     Compliance with Program/Exercises: Compliant all of the time, Will assess as treatment progresses.     Recommendations/Intent for next treatment session:  D/C OT for acute deficits.       Total Treatment Duration:  OT Patient Time In/Time Out  Time In: 0745  Time Out: Ace 224, OT

## 2020-06-26 NOTE — PROGRESS NOTES
Problem: Mobility Impaired (Adult and Pediatric)  Goal: *Acute Goals and Plan of Care (Insert Text)  Description: GOALS (1-4 days):  (1.)Mr. Sarmad Noe will move from supine to sit and sit to supine  in bed with STAND BY ASSIST.  (2.)Mr. Sarmad Noe will transfer from bed to chair and chair to bed with STAND BY ASSIST using the least restrictive device. (3.)Mr. Zapata will ambulate with STAND BY ASSIST for 150 feet with the least restrictive device. (4.)Mr. Zapata will ambulate up/down 3 steps with left railing with CONTACT GUARD ASSIST with no device. (5.)Mr. Sarmad Noe will increase right knee ROM to 5°-80°.  ________________________________________________________________________________________________   Outcome: Progressing Towards Goal     PHYSICAL THERAPY JOINT CAMP TKA: Daily Note, Treatment Day: 1st and PM 6/26/2020  INPATIENT: Hospital Day: 2  [place] / [place] / Product Type: Medicare /      NAME/AGE/GENDER: Paola Watkins is a [de-identified] y.o. male   PRIMARY DIAGNOSIS:  Unilateral primary osteoarthritis, right knee [M17.11]   Procedure(s) and Anesthesia Type:     * RIGHT KNEE ARTHROPLASTY TOTAL - Spinal (Right)  ICD-10: Treatment Diagnosis:    · Pain in Right Knee (M25.561)  · Stiffness of Right Knee, Not elsewhere classified (M25.661)  · Difficulty in walking, Not elsewhere classified (R26.2)  · Other abnormalities of gait and mobility (R26.89)      ASSESSMENT:     Mr. Sarmad Noe presents with decreased ROM and strength R LE and limited functional mobility S/P R TKA. He had L TKA Dec 2019 and states he injured a ligament or tendon -unsure? in the L knee and he has been wearing a hinge brace on L knee due to instability. Did not don L knee brace today as he was only ambulating a short distance. He will benefit from skilled therapy services to address the below problem list.  6/26-pt up in chair on contact and agreeable to therapy.  Started session with gait training and pt stated after walking into bathroom he felt he did not need to wear his left knee brace. Back in room in chair worked on TKA exercises with verbal cues progressing with repetitions from yesterday. Pt stayed up in chair with ice on right knee and needs in reach. Hope to progress at next therapy session. 6/26 in pm worked on gait training in the fa[place]in with verbal cues progressing with distance. Slow steady gait with walker, talked about pt may need his brace on his left knee tomorrow if he is having more pain. Back in room in chair worked on TKA exercises with verbal cues. Pt stayed up in chair with needs in reach and ice on place. Hope to progress in the morning. This section established at most recent assessment   PROBLEM LIST (Impairments causing functional limitations):  1. Decreased Strength  2. Decreased ADL/Functional Activities  3. Decreased Transfer Abilities  4. Decreased Ambulation Ability/Technique  5. Decreased Flexibility/Joint Mobility  6. Decreased Dixie with Home Exercise Program   INTERVENTIONS PLANNED: (Benefits and precautions of physical therapy have been discussed with the patient.)  1. bed mobility  2. gait training  3. home exercise program (HEP)  4. Range of Motion: active/assisted/passive  5. Therapeutic Activities  6. therapeutic exercise/strengthening  7. transfer training  8. Group Therapy     TREATMENT PLAN: Frequency/Duration: Follow patient BID for duration of hospital stay to address above goals. Rehabilitation Potential For Stated Goals: Good     RECOMMENDED REHABILITATION/EQUIPMENT: (at time of discharge pending progress): Continue Skilled Therapy and Home Health: Physical Therapy. HISTORY:   History of Present Injury/Illness (Reason for Referral):  S/P R TKA  Past Medical History/Comorbidities:   Mr. Torey Stinson  has a past medical history of Arthritis, HLD (hyperlipidemia) (12/7/2012), Macular degeneration, and Osteoarthritis of left knee.  He also has no past medical history of Malignant hyperthermia due to anesthesia or Pseudocholinesterase deficiency. Mr. Neyda Robertson  has a past surgical history that includes hx colonoscopy (2002) and hx colonoscopy (11-1-13). Social History/Living Environment:   Home Environment: Private residence  # Steps to Enter: 1  Rails to Enter: Yes  Hand Rails : Left  One/Two Story Residence: One story(with sunken den- 2 steps with rail)  Living Alone: No  Support Systems: Family member(s)  Patient Expects to be Discharged to[de-identified] Private residence  Current DME Used/Available at Home: [address], [place], straight, [employer], bedside, Crutches, Walker, rolling  Tub or Shower Type: Tub/Shower combination  Prior Level of Function/Work/Activity:  Ambulating with a cane   Number of Personal Factors/Comorbidities that affect the Plan of Care: 1-2: MODERATE COMPLEXITY   EXAMINATION:   Most Recent Physical Functioning:                            Bed Mobility  Supine to Sit: Stand-by assistance  Scooting: Stand-by assistance    Transfers  Sit to Stand: Stand-by assistance; Additional time  Stand to Sit: Stand-by assistance  Bed to Chair: Stand-by assistance    Balance  Sitting: Intact  Standing: Pull to stand; With support         Gait Training: Yes    Weight Bearing Status  Right Side Weight Bearing: As tolerated  Distance (ft): 80 Feet (ft)  Ambulation - Level of Assistance: Contact guard assistance  Assistive Device: Walker, rolling  Speed/Marilyn: Pace decreased (<100 feet/min)  Step Length: Left shortened;Right shortened  Stance: Right decreased  Gait Abnormalities: Antalgic;Decreased step clearance  Interventions: Safety awareness training;Verbal cues     Braces/Orthotics: has L hinge brace; did not don today since only ambulating short distance    Right Knee Cold  Type: Cryocuff      Body Structures Involved:  1. Bones  2. Joints  3. Muscles Body Functions Affected:  1. Neuromusculoskeletal  2. Movement Related Activities and Participation Affected:  1.  General Tasks and Demands  2. Mobility   Number of elements that affect the Plan of Care: 3: MODERATE COMPLEXITY   CLINICAL PRESENTATION:   Presentation: Stable and uncomplicated: LOW COMPLEXITY   CLINICAL DECISION MAKIN [facility] [address] [address] AM-PAC 6 Clicks   Basic Mobility Inpatient Short Form  How much difficulty does the patient currently have. .. Unable A Lot A Little None   1. Turning over in bed (including adjusting bedclothes, sheets and blankets)? [] 1   [] 2   [] 3   [x] 4   2. Sitting down on and standing up from a chair with arms ( e.g., wheelchair, bedside commode, etc.)   [] 1   [] 2   [x] 3   [] 4   3. Moving from lying on back to sitting on the side of the bed? [] 1   [] 2   [x] 3   [] 4   How much help from another person does the patient currently need. .. Total A Lot A Little None   4. Moving to and from a bed to a chair (including a wheelchair)? [] 1   [] 2   [x] 3   [] 4   5. Need to walk in hospital room? [] 1   [] 2   [x] 3   [] 4   6. Climbing 3-5 steps with a railing? [] 1   [] 2   [x] 3   [] 4   © , Trustees of [address] [facility] [address] [address], under license to [employer]. All rights reserved     Score:  Initial: 19 Most Recent: X (Date: -- )    Interpretation of Tool:  Represents activities that are increasingly more difficult (i.e. Bed mobility, Transfers, Gait). Medical Necessity:     · Patient demonstrates   · good  ·  rehab potential due to higher previous functional level. Reason for Services/Other Comments:  · Patient   · continues to require present interventions due to patient's inability to perform exercises and functional mobility independently   · .    Use of outcome tool(s) and clinical judgement create a POC that gives a: Clear prediction of patient's progress: LOW COMPLEXITY            TREATMENT:   (In addition to Assessment/Re-Assessment sessions the following treatments were rendered)     Pre-treatment Symptoms/Complaints:  none  Pain Initial:   Pain Intensity 1: 5  Post Session:  Sore with exercises     Therapeutic Exercise: (15 Minutes):  Exercises per grid below to improve mobility and strength. Required minimal verbal and manual cues to perform exercises correctly . Gait Training (10 Minutes):  Gait training to improve and/or restore physical functioning as related to mobility and strength. Ambulated 80 Feet (ft) with Contact guard assistance using a Walker, rolling and minimal Safety awareness training;Verbal cues related to their stance phase and stride length to promote proper body alignment and promote proper body posture. Instruction in performance of walker use and gait sequencing to correct stance phase and stride length. Date:  6/25/20 Date:  6/26 Date:     ACTIVITY/EXERCISE AM PM AM PM AM PM   GROUP THERAPY  []  []  []  []  []  []   Ankle Pumps  10 15 15     Quad Sets  10 15 15     Gluteal Sets  10 15 15     Hip ABd/ADduction  10 15 15     Straight Leg Raises  10 15 15     Knee Slides  10 15 15     Short Arc Quads   15 15     Long Arc Quads         Chair Slides                  B = bilateral; AA = active assistive; A = active; P = passive      Treatment/Session Assessment:     Response to Treatment:  Increasing pain this afternoon. Education:  [x] Home Exercises  [x] Fall Precautions  [x] no pillow under knee [] D/C Instruction Review  [] Knee Prosthesis Review  [x] Walker Management/Safety [] Adaptive Equipment as Needed       Interdisciplinary Collaboration:   o Registered Nurse    After treatment position/precautions:   o Up in chair  o Bed/Chair-wheels locked  o Call light within reach    Compliance with Program/Exercises: Compliant all of the time, Will assess as treatment progresses. Recommendations/Intent for next treatment session:  Treatment next visit will focus on increasing Mr. Yary Huffman independence with bed mobility, transfers, gait training, strength/ROM exercises, modalities for pain, and patient education.       Total Treatment Duration:  PT Patient Time In/Time Out  Time In: 1340  Time Out: [address] Minerva Jara, PT

## 2020-06-26 NOTE — DISCHARGE INSTRUCTIONS
[address] [facility]   Patient Discharge Instructions    Unique Nicolas / [id number] : 1940    Admitted 2020 Discharged: 2020     IF YOU HAVE ANY PROBLEMS ONCE YOU ARE AT HOME CALL THE FOLLOWING NUMBERS:   Main office number: [phone]    Take Home Medications     · It is important that you take the medication exactly as they are prescribed. · Keep your medication in the bottles provided by the pharmacist and keep a list of the medication names, dosages, and times to be taken in your wallet. · Do not take other medications without consulting your doctor. What to do at [address] your prehospital diet. If you have excessive nausea or vomitting call your doctor's office     Home Physical Therapy is arranged. Use rolling walker when walking. Patients who have had a joint replacement should not drive until you are seen for your follow up appointment by Dr. Annelise Leal. When to Call    - Call if you have a temperature greater then 101  - Unable to keep food down  - Loose control of your bladder or bowel function  - Are unable to bear any weight   - Need a pain medication refill       DISCHARGE SUMMARY from Nurse    The following personal items collected during your admission are returned to you:   Dental Appliance: Dental Appliances: None  Vision: Visual Aid: Glasses  Hearing Aid:    Jewelry:    Clothing: Clothing: Pants, Shirt  Other Valuables: Other Valuables: Cell Phone, [employer] sent to safe:      PATIENT INSTRUCTIONS:    After general anesthesia or intravenous sedation, for 24 hours or while taking prescription Narcotics:  · Limit your activities  · Do not drive and operate hazardous machinery  · Do not make important personal or business decisions  · Do  not drink alcoholic beverages  · If you have not urinated within 8 hours after discharge, please contact your surgeon on call.     Report the following to your surgeon:  · Excessive pain, swelling, redness or odor of or around the surgical area  · Temperature over 101  · Nausea and vomiting lasting longer than 4 hours or if unable to take medications  · Any signs of decreased circulation or nerve impairment to extremity: change in color, persistent  numbness, tingling, coldness or increase pain  · Any questions, call office @ [phone]      Keep scheduled follow up appointment. If need to change, call office @ [phone]. *  Please give a list of your current medications to your Primary Care Provider. *  Please update this list whenever your medications are discontinued, doses are      changed, or new medications (including over-the-counter products) are added. *  Please carry medication information at all times in case of emergency situations. Patient Education        Total Knee Replacement: What to Expect at [address] had a total knee replacement. The doctor replaced the worn ends of the bones that connect to your knee (thighbone and lower leg bone) with plastic and metal parts. When you leave the hospital, you should be able to move around with a walker or crutches. But you will need someone to help you at home for the next few weeks or until you have more energy and can move around better. If you need more extensive rehab, you may go to a specialized rehab center for more treatment. You will go home with a bandage and stitches, staples, tissue glue, or tape strips. Change the bandage as your doctor tells you to. If you have stitches or staples, your doctor will remove them 10 to 21 days after your surgery. Glue or tape strips will fall off on their own over time. You may still have some mild pain, and the area may be swollen for 3 to 6 months after surgery. Your knee will continue to improve for 6 to 12 months. You will probably use a walker for 1 to 3 weeks and then use crutches. When you are ready, you can use a cane.  You will probably be able to walk on your own in 4 to 8 weeks.  You will need to do months of physical rehabilitation (rehab) after a knee replacement. Rehab will help you strengthen the muscles of the knee and help you regain movement. After you recover, your artificial knee will allow you to do normal daily activities with less pain or no pain at all. You may be able to hike, dance, ride a bike, and play golf. Talk to your doctor about whether you can do more strenuous activities. Always tell your caregivers that you have an artificial knee. How long it will take to walk on your own, return to normal activities, and go back to work depends on your health and how well your rehabilitation (rehab) program goes. The better you do with your rehab exercises, the quicker you will get your strength and movement back. This care sheet gives you a general idea about how long it will take for you to recover. But each person recovers at a different pace. Follow the steps below to get better as quickly as possible. How can you care for yourself at home? Activity  · Rest when you feel tired. You may take a nap, but don't stay in bed all day. When you sit, use a chair with arms. You can use the arms to help you stand up. · Work with your physical therapist to find the best way to exercise. What you can do as your knee heals will depend on whether your new knee is cemented or uncemented. You may not be able to do certain things for a while if your new knee is uncemented. · After your knee has healed enough, you can do more strenuous activities with caution. ? You can golf, but use a golf cart. And don't wear shoes with spikes. ? You can bike on a flat road or on a stationary bike. Avoid biking up hills. ? Your doctor may suggest that you stay away from activities that put stress on your knee. These include tennis, badminton, squash, racquetball, contact sports like football, jumping (such as in basketball), jogging, and running. ? Avoid activities where you might fall.  These include horseback riding, skiing, and mountain biking. · Do not sit for more than 1 hour at a time. Get up and walk around for a while before you sit again. If you must sit for a long time, prop up your leg with a chair or footstool. This will help you avoid swelling. · Ask your doctor when you can drive again. It may take up to 8 weeks after knee replacement surgery before it's safe for you to drive. · When you get into a car, sit on the edge of the seat. Then pull in your legs, and turn to face the front. · You should be able to do many everyday activities 3 to 6 weeks after your surgery. You will probably need to take 4 to 16 weeks off from work. When you can go back to work depends on the type of work you do and how you feel. · Ask your doctor when it is okay for you to have sex. · For 12 weeks, do not lift anything heavier than 10 pounds and do not lift weights. Diet  · By the time you leave the hospital, you should be eating your normal diet. If your stomach is upset, try bland, low-fat foods like plain rice, broiled chicken, toast, and yogurt. Your doctor may suggest that you take iron and vitamin supplements. · Drink plenty of fluids (unless your doctor tells you not to). · Eat healthy foods, and watch your portion sizes. Try to stay at your ideal weight. Too much weight puts more stress on your new knee. · You may notice that your bowel movements are not regular right after your surgery. This is common. Try to avoid constipation and straining with bowel movements. You may want to take a fiber supplement every day. If you have not had a bowel movement after a couple of days, ask your doctor about taking a mild laxative. Medicines  · Your doctor will tell you if and when you can restart your medicines. He or she will also give you instructions about taking any new medicines. · If you take aspirin or some other blood thinner, ask your doctor if and when to start taking it again.  Make sure that you understand exactly what your doctor wants you to do. · Your doctor may give you a blood-thinning medicine to prevent blood clots. If you take a blood thinner, be sure you get instructions about how to take your medicine safely. Blood thinners can cause serious bleeding problems. This medicine could be in pill form or as a shot (injection). If a shot is needed, your doctor will tell you how to do this. · Be safe with medicines. Take pain medicines exactly as directed. ? If the doctor gave you a prescription medicine for pain, take it as prescribed. ? If you are not taking a prescription pain medicine, ask your doctor if you can take an over-the-counter medicine. ? Plan to take your pain medicine 30 minutes before exercises. It is easier to prevent pain before it starts than to stop it after it has started. · If you think your pain medicine is making you sick to your stomach:  ? Take your medicine after meals (unless your doctor has told you not to). ? Ask your doctor for a different pain medicine. · If your doctor prescribed antibiotics, take them as directed. Do not stop taking them just because you feel better. You need to take the full course of antibiotics. Incision care  · If your doctor told you how to care for your cut (incision), follow your doctor's instructions. You will have a dressing over the cut. A dressing helps the incision heal and protects it. Your doctor will tell you how to take care of this. · If you did not get instructions, follow this general advice:  ? If you have strips of tape on the cut the doctor made, leave the tape on for a week or until it falls off.  ? If you have stitches or staples, your doctor will tell you when to come back to have them removed. ? If you have skin adhesive on the cut, leave it on until it falls off. Skin adhesive is also called glue or liquid stitches. ? Change the bandage every day. ? Wash the area daily with warm water, and pat it dry.  Don't use hydrogen peroxide or alcohol. They can slow healing. ? You may cover the area with a gauze bandage if it oozes fluid or rubs against clothing. ? You may shower 24 to 48 hours after surgery. Pat the incision dry. Don't swim or take a bath for the first 2 weeks, or until your doctor tells you it is okay. Exercise  · Your rehab program will give you a number of exercises to do to help you get back your knee's range of motion and strength. Always do them as your therapist tells you. Ice  · For pain and swelling, put ice or a cold pack on the area for 10 to 20 minutes at a time. Put a thin cloth between the ice and your skin. Other instructions  · Keep wearing your support stockings as your doctor says. These help to prevent blood clots. How long you'll have to wear them depends on your activity level and the amount of swelling. · Carry a medical alert card that says you have an artificial joint. You have metal pieces in your knee. These may set off some airport metal detectors. Follow-up care is a key part of your treatment and safety. Be sure to make and go to all appointments, and call your doctor if you are having problems. It's also a good idea to know your test results and keep a list of the medicines you take. When should you call for help? [id number] anytime you think you may need emergency care. For example, call if:  · You passed out (lost consciousness). · You have severe trouble breathing. · You have sudden chest pain and shortness of breath, or you cough up blood. Call your doctor now or seek immediate medical care if:  · You have signs of infection, such as:  ? Increased pain, swelling, warmth, or redness. ? Red streaks leading from the incision. ? Pus draining from the incision. ? A fever. · You have signs of a blood clot, such as:  ? Pain in your calf, back of the knee, thigh, or groin. ? Redness and swelling in your leg or groin.   · Your incision comes open and begins to bleed, or the bleeding increases. · You have pain that does not get better after you take pain medicine. Watch closely for changes in your health, and be sure to contact your doctor if:  · You do not have a bowel movement after taking a laxative. Where can you learn more? Go to http://aminah-tonie.info/  Enter T054 in the search box to learn more about \"Total Knee Replacement: What to Expect at Home. \"  Current as of: March 2, 2020               Content Version: 12.5  © 2006-2020 [employer]. Care instructions adapted under license by [employer] (which disclaims liability or warranty for this information). If you have questions about a medical condition or this instruction, always ask your healthcare professional. [address] any warranty or liability for your use of this information. These are general instructions for a healthy lifestyle:    No smoking/ No tobacco products/ Avoid exposure to second hand smoke    Surgeon General's Warning:  Quitting smoking now greatly reduces serious risk to your health. Obesity, smoking, and sedentary lifestyle greatly increases your risk for illness    A healthy diet, regular physical exercise & weight monitoring are important for maintaining a healthy lifestyle    You may be retaining fluid if you have a history of heart failure or if you experience any of the following symptoms:  Weight gain of 3 pounds or more overnight or 5 pounds in a week, increased swelling in our hands or feet or shortness of breath while lying flat in bed. Please call your doctor as soon as you notice any of these symptoms; do not wait until your next office visit.     Recognize signs and symptoms of STROKE:    F-face looks uneven    A-arms unable to move or move even    S-speech slurred or non-existent    T-time-call 911 as soon as signs and symptoms begin-DO NOT go       Back to bed or wait to see if you get better-TIME IS BRAIN.        The discharge information has been reviewed with the patient. The patient verbalized understanding. Information obtained by :  I understand that if any problems occur once I am at home I am to contact my physician. I understand and acknowledge receipt of the instructions indicated above.                                                                                                                                            Physician's or R.N.'s Signature                                                                  Date/Time                                                                                                                                              Patient or Representative Signature                                                          Date/Time

## 2020-06-26 NOTE — PROGRESS NOTES
Had therapy. Back in recliner. Medicated for pain with oxycodone 10 mg. Iceman on. NV checks remain WDL.

## 2020-06-26 NOTE — PROGRESS NOTES
Had therapy and shower with OT. Dressed and resting in recliner with iceman to knee. No complaints. Had thigh pain when up but comfortable now. Aquacel dry and intact to R knee. NV checks WDL.

## 2020-06-26 NOTE — PROGRESS NOTES
06/25/20 2108   Oxygen Therapy   O2 Sat (%) 97 %   Pulse via Oximetry 74 beats per minute   O2 Device Room air   Incentive Spirometry Treatment   Actual Volume (ml) 2500 ml   Number of Attempts 2   Pt on continuous Sat for HS. Alarm limits set.  Pt working on IS

## 2020-06-26 NOTE — PROGRESS NOTES
Orthopedic Joint Progress Note    2020  Admit Date: 2020  Admit Diagnosis: Unilateral primary osteoarthritis, right knee [M17.11]  Arthritis of knee, right [M17.11]    1 Day Post-Op    Subjective:     Max Hughes awake and alert    Review of Systems: Pertinent items are noted in HPI. Objective:     PT/OT:     PATIENT MOBILITY    Bed Mobility  Supine to Sit: Contact guard assistance  Scooting: Contact guard assistance  Transfers  Sit to Stand: Minimum assistance  Stand to Sit: Minimum assistance  Bed to Chair: Minimum assistance      Gait  Speed/Marilyn: Pace decreased (<100 feet/min)  Step Length: Left shortened, Right shortened  Stance: Right decreased  Gait Abnormalities: Antalgic, Decreased step clearance, Shuffling gait  Ambulation - Level of Assistance: Minimal assistance  Distance (ft): 15 Feet (ft)  Assistive Device: Walker, rolling  Interventions: Manual cues, Safety awareness training, Verbal cues   Weight Bearing Status  Right Side Weight Bearing: As tolerated        Vital Signs:    Blood pressure 137/67, pulse 60, temperature 97.5 °F (36.4 °C), resp. rate 16, height 5' 10\" (1.778 m), weight 93 kg (205 lb), SpO2 95 %.   Temp (24hrs), Av.6 °F (36.4 °C), Min:97.3 °F (36.3 °C), Max:98.4 °F (36.9 °C)      Pain Control:   Pain Assessment  Pain Scale 1: Numeric (0 - 10)  Pain Intensity 1: 0  Pain Location 1: Knee  Pain Orientation 1: Right  Pain Intervention(s) 1: Cold pack, Repositioned    Meds:  Current Facility-Administered Medications   Medication Dose Route Frequency    alcohol 62% (NOZIN) nasal  1 Ampule  1 Ampule Topical Q12H    0.9% sodium chloride infusion  100 mL/hr IntraVENous CONTINUOUS    sodium chloride (NS) flush 5-40 mL  5-40 mL IntraVENous Q8H    sodium chloride (NS) flush 5-40 mL  5-40 mL IntraVENous PRN    acetaminophen (TYLENOL) tablet 1,000 mg  1,000 mg Oral Q6H    celecoxib (CELEBREX) capsule 200 mg  200 mg Oral Q12H    oxyCODONE IR (ROXICODONE) tablet 5-10 mg  5-10 mg Oral Q4H PRN    HYDROmorphone (PF) (DILAUDID) injection 1 mg  1 mg IntraVENous Q3H PRN    naloxone (NARCAN) injection 0.2-0.4 mg  0.2-0.4 mg IntraVENous Q10MIN PRN    dexamethasone (DECADRON) 10 mg/mL injection 10 mg  10 mg IntraVENous ONCE    promethazine (PHENERGAN) tablet 25 mg  25 mg Oral Q6H PRN    diphenhydrAMINE (BENADRYL) capsule 25 mg  25 mg Oral Q4H PRN    senna-docusate (PERICOLACE) 8.6-50 mg per tablet 2 Tab  2 Tab Oral DAILY    aspirin delayed-release tablet 81 mg  81 mg Oral Q12H    ondansetron (ZOFRAN ODT) tablet 8 mg  8 mg Oral Q8H PRN        LAB:    Lab Results   Component Value Date/Time    INR 1.1 05/21/2020 12:19 PM    INR 1.0 11/26/2019 11:20 AM     Lab Results   Component Value Date/Time    HGB 13.2 (L) 06/25/2020 06:45 PM    HGB 14.8 05/21/2020 12:19 PM    HGB 13.1 (L) 12/12/2019 07:49 PM       Wound Knee Left (Active)   Number of days: 197       Wound Knee Right (Active)   Dressing Status Clean, dry, and intact 6/25/2020  7:20 PM   Dressing Type Aquacel 6/25/2020  7:20 PM   Number of days: 1         Physical Exam:  Calves soft/ neuro intact      Assessment:      Principal Problem:     Total knee replacement status, right (6/25/2020)    Active Problems:    Arthritis of knee, right (6/25/2020)         Plan:     Continue PT/OT/Rehab  Consult: Rehab team including PT, OT, recreational therapy, and    Home tomorrow - expected to be slow with PT given LLE and ext mech disruption    Patient Expects to be Discharged to[de-identified] Private residence

## 2020-06-26 NOTE — PROGRESS NOTES
Problem: Mobility Impaired (Adult and Pediatric)  Goal: *Acute Goals and Plan of Care (Insert Text)  Description: GOALS (1-4 days):  (1.)Mr. Bridger Prince will move from supine to sit and sit to supine  in bed with STAND BY ASSIST.  (2.)Mr. Bridger Prince will transfer from bed to chair and chair to bed with STAND BY ASSIST using the least restrictive device. (3.)Mr. Zapata will ambulate with STAND BY ASSIST for 150 feet with the least restrictive device. (4.)Mr. Zapata will ambulate up/down 3 steps with left railing with CONTACT GUARD ASSIST with no device. (5.)Mr. Bridger Prince will increase right knee ROM to 5°-80°.  ________________________________________________________________________________________________   Outcome: Progressing Towards Goal     PHYSICAL THERAPY JOINT CAMP TKA: Daily Note, Treatment Day: 1st and AM 6/26/2020  INPATIENT: Hospital Day: 2  [place] / [place] / Product Type: Medicare /      NAME/AGE/GENDER: Shagufta Jensen is a [de-identified] y.o. male   PRIMARY DIAGNOSIS:  Unilateral primary osteoarthritis, right knee [M17.11]   Procedure(s) and Anesthesia Type:     * RIGHT KNEE ARTHROPLASTY TOTAL - Spinal (Right)  ICD-10: Treatment Diagnosis:    · Pain in Right Knee (M25.561)  · Stiffness of Right Knee, Not elsewhere classified (M25.661)  · Difficulty in walking, Not elsewhere classified (R26.2)  · Other abnormalities of gait and mobility (R26.89)      ASSESSMENT:     Mr. Bridger Pirnce presents with decreased ROM and strength R LE and limited functional mobility S/P R TKA. He had L TKA Dec 2019 and states he injured a ligament or tendon -unsure? in the L knee and he has been wearing a hinge brace on L knee due to instability. Did not don L knee brace today as he was only ambulating a short distance. He will benefit from skilled therapy services to address the below problem list.  6/26-pt up in chair on contact and agreeable to therapy.  Started session with gait training and pt stated after walking into bathroom he felt he did not need to wear his left knee brace. Back in room in chair worked on TKA exercises with verbal cues progressing with repetitions from yesterday. Pt stayed up in chair with ice on right knee and needs in reach. Hope to progress at next therapy session. This section established at most recent assessment   PROBLEM LIST (Impairments causing functional limitations):  1. Decreased Strength  2. Decreased ADL/Functional Activities  3. Decreased Transfer Abilities  4. Decreased Ambulation Ability/Technique  5. Decreased Flexibility/Joint Mobility  6. Decreased [place] with Home Exercise Program   INTERVENTIONS PLANNED: (Benefits and precautions of physical therapy have been discussed with the patient.)  1. bed mobility  2. gait training  3. home exercise program (HEP)  4. Range of Motion: active/assisted/passive  5. Therapeutic Activities  6. therapeutic exercise/strengthening  7. transfer training  8. Group Therapy     TREATMENT PLAN: Frequency/Duration: Follow patient BID for duration of hospital stay to address above goals. Rehabilitation Potential For Stated Goals: Good     RECOMMENDED REHABILITATION/EQUIPMENT: (at time of discharge pending progress): Continue Skilled Therapy and Home Health: Physical Therapy. HISTORY:   History of Present Injury/Illness (Reason for Referral):  S/P R TKA  Past Medical History/Comorbidities:   Mr. Joyce Arriaza  has a past medical history of Arthritis, HLD (hyperlipidemia) (12/7/2012), Macular degeneration, and Osteoarthritis of left knee. He also has no past medical history of Malignant hyperthermia due to anesthesia or Pseudocholinesterase deficiency. Mr. Joyce Arriaza  has a past surgical history that includes hx colonoscopy (2002) and hx colonoscopy (11-1-13).   Social History/Living Environment:   Home Environment: Private residence  # Steps to Enter: 1  Rails to Enter: Yes  Hand Rails : Left  One/Two Story Residence: One story(with sunken den- 2 steps with rail)  Living Alone: No  Support Systems: Family member(s)  Patient Expects to be Discharged to[de-identified] Private residence  Current DME Used/Available at Home: [place], straight, Commode, bedside, Crutches, Walker, rolling  Tub or Shower Type: Tub/Shower combination  Prior Level of Function/Work/Activity:  Ambulating with a cane   Number of Personal Factors/Comorbidities that affect the Plan of Care: 1-2: MODERATE COMPLEXITY   EXAMINATION:   Most Recent Physical Functioning:                                 Transfers  Sit to Stand: Stand-by assistance; Additional time  Stand to Sit: Stand-by assistance    Balance  Sitting: Intact  Standing: Pull to stand; With support         Gait Training: Yes    Weight Bearing Status  Right Side Weight Bearing: As tolerated  Distance (ft): 45 Feet (ft)  Ambulation - Level of Assistance: Contact guard assistance  Assistive Device: Walker, rolling  Speed/Marilyn: Pace decreased (<100 feet/min)  Step Length: Left shortened;Right shortened  Stance: Right decreased  Gait Abnormalities: Antalgic;Decreased step clearance;Shuffling gait; Step to gait  Interventions: Safety awareness training;Verbal cues     Braces/Orthotics: has L hinge brace; did not don today since only ambulating short distance    Right Knee Cold  Type: Cryocuff      Body Structures Involved:  1. Bones  2. Joints  3. Muscles Body Functions Affected:  1. Neuromusculoskeletal  2. Movement Related Activities and Participation Affected:  1. General Tasks and Demands  2. Mobility   Number of elements that affect the Plan of Care: 3: MODERATE COMPLEXITY   CLINICAL PRESENTATION:   Presentation: Stable and uncomplicated: LOW COMPLEXITY   CLINICAL DECISION MAKIN [facility] [place] [address] [address] AM-PAC 6 Clicks   Basic Mobility Inpatient Short Form  How much difficulty does the patient currently have. .. Unable A Lot A Little None   1. Turning over in bed (including adjusting bedclothes, sheets and blankets)?    [] 1   [] 2   [] 3   [x] 4   2. Sitting down on and standing up from a chair with arms ( e.g., wheelchair, bedside commode, etc.)   [] 1   [] 2   [x] 3   [] 4   3. Moving from lying on back to sitting on the side of the bed? [] 1   [] 2   [x] 3   [] 4   How much help from another person does the patient currently need. .. Total A Lot A Little None   4. Moving to and from a bed to a chair (including a wheelchair)? [] 1   [] 2   [x] 3   [] 4   5. Need to walk in hospital room? [] 1   [] 2   [x] 3   [] 4   6. Climbing 3-5 steps with a railing? [] 1   [] 2   [x] 3   [] 4   © 2007, Trustees of [address] [place] [address], under license to [employer]. All rights reserved     Score:  Initial: 19 Most Recent: X (Date: -- )    Interpretation of Tool:  Represents activities that are increasingly more difficult (i.e. Bed mobility, Transfers, Gait). Medical Necessity:     · Patient demonstrates   · good  ·  rehab potential due to higher previous functional level. Reason for Services/Other Comments:  · Patient   · continues to require present interventions due to patient's inability to perform exercises and functional mobility independently   · . Use of outcome tool(s) and clinical judgement create a POC that gives a: Clear prediction of patient's progress: LOW COMPLEXITY            TREATMENT:   (In addition to Assessment/Re-Assessment sessions the following treatments were rendered)     Pre-treatment Symptoms/Complaints:  none  Pain Initial:   Pain Intensity 1: (some pain with exercises)  Post Session:  Sore with exercises     Therapeutic Exercise: (13 Minutes):  Exercises per grid below to improve mobility and strength. Required minimal verbal and manual cues to perform exercises correctly . Gait Training (10 Minutes):  Gait training to improve and/or restore physical functioning as related to mobility and strength.   Ambulated 45 Feet (ft) with Contact guard assistance using a Walker, rolling and minimal Safety awareness training;Verbal cues related to their stance phase and stride length to promote proper body alignment and promote proper body posture. Instruction in performance of walker use and gait sequencing to correct stance phase and stride length. Date:  6/25/20 Date:  6/26 Date:     ACTIVITY/EXERCISE AM PM AM PM AM PM   GROUP THERAPY  []  []  []  []  []  []   Ankle Pumps  10 15      Quad Sets  10 15      Gluteal Sets  10 15      Hip ABd/ADduction  10 15      Straight Leg Raises  10 15      Knee Slides  10 15      Short Arc Quads   15      Long Arc Quads         Chair Slides                  B = bilateral; AA = active assistive; A = active; P = passive      Treatment/Session Assessment:     Response to Treatment:  tolerated well, progressing nicely. Education:  [x] Home Exercises  [x] Fall Precautions  [x] no pillow under knee [] D/C Instruction Review  [] Knee Prosthesis Review  [x] Walker Management/Safety [] Adaptive Equipment as Needed       Interdisciplinary Collaboration:   o Registered Nurse    After treatment position/precautions:   o Up in chair  o Bed/Chair-wheels locked  o Call light within reach    Compliance with Program/Exercises: Compliant all of the time, Will assess as treatment progresses. Recommendations/Intent for next treatment session:  Treatment next visit will focus on increasing Mr. Lis Riley independence with bed mobility, transfers, gait training, strength/ROM exercises, modalities for pain, and patient education.       Total Treatment Duration:  PT Patient Time In/Time Out  Time In: 0930  [facility]

## 2020-06-26 NOTE — PROGRESS NOTES
Shift assessment completed. Pt is alert & oriented x4. Able to verbalize needs. Resting quietly with no distress noted. Dressing to right knee is clean, dry and intact. Manjula Repine in place. Neurovascular and peripheral vascular checks WNL. Incentive Spirometry at bedside. Patient encouraged to use hourly x 10 repetitions. Patient voiding clear yellow urine without difficulty. Pain is being managed with Oxycodone with patient tolerating well. Bed low and locked. Call light within reach. Patient instructed to call for assistance. Pt verbalizes understanding. Will monitor.

## 2020-06-27 VITALS
BODY MASS INDEX: 29.35 KG/M2 | HEART RATE: 60 BPM | RESPIRATION RATE: 12 BRPM | WEIGHT: 205 LBS | HEIGHT: 70 IN | OXYGEN SATURATION: 97 % | TEMPERATURE: 97.2 F | SYSTOLIC BLOOD PRESSURE: 131 MMHG | DIASTOLIC BLOOD PRESSURE: 62 MMHG

## 2020-06-27 PROCEDURE — 74011250637 HC RX REV CODE- 250/637: Performed by: ORTHOPAEDIC SURGERY

## 2020-06-27 PROCEDURE — 97110 THERAPEUTIC EXERCISES: CPT

## 2020-06-27 PROCEDURE — 97116 GAIT TRAINING THERAPY: CPT

## 2020-06-27 RX ADMIN — ACETAMINOPHEN 1000 MG: 500 TABLET, FILM COATED ORAL at 12:14

## 2020-06-27 RX ADMIN — ACETAMINOPHEN 1000 MG: 500 TABLET, FILM COATED ORAL at 05:36

## 2020-06-27 RX ADMIN — OXYCODONE 10 MG: 5 TABLET ORAL at 08:20

## 2020-06-27 RX ADMIN — OXYCODONE 10 MG: 5 TABLET ORAL at 12:14

## 2020-06-27 RX ADMIN — ASPIRIN 81 MG: 81 TABLET, COATED ORAL at 08:20

## 2020-06-27 RX ADMIN — DOCUSATE SODIUM 50MG AND SENNOSIDES 8.6MG 2 TABLET: 8.6; 5 TABLET, FILM COATED ORAL at 08:20

## 2020-06-27 RX ADMIN — CELECOXIB 200 MG: 200 CAPSULE ORAL at 08:20

## 2020-06-27 RX ADMIN — Medication 10 ML: at 05:37

## 2020-06-27 RX ADMIN — ACETAMINOPHEN 1000 MG: 500 TABLET, FILM COATED ORAL at 00:15

## 2020-06-27 RX ADMIN — Medication 1 AMPULE: at 09:00

## 2020-06-27 NOTE — PROGRESS NOTES
Shift assessment completed. Pt is alert & oriented x4. Able to verbalize needs. Resting quietly with no distress noted. Dressing to right knee is clean, dry and intact. Pain is being managed with Oxycodone with patient tolerating well. Bed low and locked. Call light within reach. Patient instructed to call for assistance. Pt verbalizes understanding. Will monitor.

## 2020-06-27 NOTE — PROGRESS NOTES
Orthopedic Joint Progress Note    2020  Admit Date: 2020  Admit Diagnosis: Unilateral primary osteoarthritis, right knee [M17.11]  Arthritis of knee, right [M17.11]    2 Days Post-Op    Subjective:     Beula December awake and alert    Review of Systems: Pertinent items are noted in HPI. Objective:     PT/OT:     PATIENT MOBILITY    Bed Mobility  Supine to Sit: Stand-by assistance  Scooting: Stand-by assistance  Transfers  Sit to Stand: Stand-by assistance, Additional time  Stand to Sit: Stand-by assistance  Bed to Chair: Stand-by assistance      Gait  Speed/Marilyn: Pace decreased (<100 feet/min)  Step Length: Left shortened, Right shortened  Stance: Right decreased  Gait Abnormalities: Antalgic, Decreased step clearance  Ambulation - Level of Assistance: Contact guard assistance  Distance (ft): 80 Feet (ft)  Assistive Device: Walker, rolling  Interventions: Safety awareness training, Verbal cues  Duration: 10 Minutes   Weight Bearing Status  Right Side Weight Bearing: As tolerated        Vital Signs:    Blood pressure 131/62, pulse 60, temperature 97.2 °F (36.2 °C), resp. rate 12, height 5' 10\" (1.778 m), weight 93 kg (205 lb), SpO2 97 %.   Temp (24hrs), Av.6 °F (36.4 °C), Min:97.2 °F (36.2 °C), Max:98 °F (36.7 °C)      Pain Control:   Pain Assessment  Pain Scale 1: FLACC  Pain Intensity 1: 3  Pain Location 1: Knee  Pain Orientation 1: Right  Pain Description 1: Intermittent  Pain Intervention(s) 1: Medication (see MAR)    Meds:  Current Facility-Administered Medications   Medication Dose Route Frequency    alcohol 62% (NOZIN) nasal  1 Ampule  1 Ampule Topical Q12H    sodium chloride (NS) flush 5-40 mL  5-40 mL IntraVENous Q8H    sodium chloride (NS) flush 5-40 mL  5-40 mL IntraVENous PRN    acetaminophen (TYLENOL) tablet 1,000 mg  1,000 mg Oral Q6H    celecoxib (CELEBREX) capsule 200 mg  200 mg Oral Q12H    oxyCODONE IR (ROXICODONE) tablet 5-10 mg  5-10 mg Oral Q4H PRN    HYDROmorphone (PF) (DILAUDID) injection 1 mg  1 mg IntraVENous Q3H PRN    naloxone (NARCAN) injection 0.2-0.4 mg  0.2-0.4 mg IntraVENous Q10MIN PRN    promethazine (PHENERGAN) tablet 25 mg  25 mg Oral Q6H PRN    diphenhydrAMINE (BENADRYL) capsule 25 mg  25 mg Oral Q4H PRN    senna-docusate (PERICOLACE) 8.6-50 mg per tablet 2 Tab  2 Tab Oral DAILY    aspirin delayed-release tablet 81 mg  81 mg Oral Q12H    ondansetron (ZOFRAN ODT) tablet 8 mg  8 mg Oral Q8H PRN        LAB:    Lab Results   Component Value Date/Time    INR 1.1 05/21/2020 12:19 PM    INR 1.0 11/26/2019 11:20 AM     Lab Results   Component Value Date/Time    HGB 13.2 (L) 06/25/2020 06:45 PM    HGB 14.8 05/21/2020 12:19 PM    HGB 13.1 (L) 12/12/2019 07:49 PM       Wound Knee Left (Active)   Number of days: 198       Wound Knee Right (Active)   Dressing Status Clean, dry, and intact 6/26/2020  8:10 PM   Dressing Type Aquacel 6/26/2020  8:10 PM   Number of days: 2         Physical Exam:  No significant changes    Assessment:      Principal Problem:     Total knee replacement status, right (6/25/2020)    Active Problems:    Arthritis of knee, right (6/25/2020)         Plan:     Continue PT/OT/Rehab  Consult: Rehab team including PT, OT, recreational therapy, and    Doing well  Home soon    Patient Expects to be Discharged to[facility]de[facility]identified[facility]

## 2020-06-27 NOTE — PROGRESS NOTES
Problem: Mobility Impaired (Adult and Pediatric)  Goal: *Acute Goals and Plan of Care (Insert Text)  Description: GOALS (1-4 days):  (1.)Mr. Peter French will move from supine to sit and sit to supine  in bed with STAND BY ASSIST.  (2.)Mr. Peter French will transfer from bed to chair and chair to bed with STAND BY ASSIST using the least restrictive device. (3.)Mr. Zapata will ambulate with STAND BY ASSIST for 150 feet with the least restrictive device. (4.)Mr. Zapata will ambulate up/down 3 steps with left railing with CONTACT GUARD ASSIST with no device. (5.)Mr. Peter French will increase right knee ROM to 5°-80°.  ________________________________________________________________________________________________   Outcome: Progressing Towards Goal     PHYSICAL THERAPY JOINT CAMP TKA: Daily Note and AM 6/27/2020  INPATIENT: Hospital Day: 3  [place] / Product Type: Medicare /      NAME/AGE/GENDER: Julia Chow is a [de-identified] y.o. male   PRIMARY DIAGNOSIS:  Unilateral primary osteoarthritis, right knee [M17.11]   Procedure(s) and Anesthesia Type:     * RIGHT KNEE ARTHROPLASTY TOTAL - Spinal (Right)  ICD-10: Treatment Diagnosis:    · Pain in Right Knee (M25.561)  · Stiffness of Right Knee, Not elsewhere classified (M25.661)  · Difficulty in walking, Not elsewhere classified (R26.2)  · Other abnormalities of gait and mobility (R26.89)      ASSESSMENT:     Mr. Peter French presents with decreased ROM and strength R LE and limited functional mobility S/P R TKA. He had L TKA Dec 2019 and states he injured a ligament or tendon -unsure? in the L knee and he has been wearing a hinge brace on L knee due to instability. Did not don L knee brace today as he was only ambulating a short distance. He will benefit from skilled therapy services to address the below problem list.  6/26-pt up in chair on contact and agreeable to therapy.  Started session with gait training and pt stated after walking into bathroom he felt he did not need to wear his left knee brace. Back in room in chair worked on TKA exercises with verbal cues progressing with repetitions from yesterday. Pt stayed up in chair with ice on right knee and needs in reach. Hope to progress at next therapy session. 6/26 in pm worked on gait training in the [place] with verbal cues progressing with distance. Slow steady gait with walker, talked about pt may need his brace on his left knee tomorrow if he is having more pain. Back in room in chair worked on TKA exercises with verbal cues. Pt stayed up in chair with needs in reach and ice on place. Hope to progress in the morning. 6/27 supine upon arrival.  Performs TKA exercises without any problems. Increase distance using RW with SBA and no LOB. Left sitting in recliner with needs in reach. This section established at most recent assessment   PROBLEM LIST (Impairments causing functional limitations):  1. Decreased Strength  2. Decreased ADL/Functional Activities  3. Decreased Transfer Abilities  4. Decreased Ambulation Ability/Technique  5. Decreased Flexibility/Joint Mobility  6. Decreased [place] with Home Exercise Program   INTERVENTIONS PLANNED: (Benefits and precautions of physical therapy have been discussed with the patient.)  1. bed mobility  2. gait training  3. home exercise program (HEP)  4. Range of Motion: active/assisted/passive  5. Therapeutic Activities  6. therapeutic exercise/strengthening  7. transfer training  8. Group Therapy     TREATMENT PLAN: Frequency/Duration: Follow patient BID for duration of hospital stay to address above goals. Rehabilitation Potential For Stated Goals: Good     RECOMMENDED REHABILITATION/EQUIPMENT: (at time of discharge pending progress): Continue Skilled Therapy and Home Health: Physical Therapy.               HISTORY:   History of Present Injury/Illness (Reason for Referral):  S/P R TKA  Past Medical History/Comorbidities:   Mr. Elizabeth Pastor  has a past medical history of Arthritis, HLD (hyperlipidemia) (12/7/2012), Macular degeneration, and Osteoarthritis of left knee. He also has no past medical history of Malignant hyperthermia due to anesthesia or Pseudocholinesterase deficiency. Mr. Junior Walton  has a past surgical history that includes hx colonoscopy (2002) and hx colonoscopy (11-1-13). Social History/Living Environment:   Home Environment: Private residence  # Steps to Enter: 1  Rails to Enter: Yes  Hand Rails : Left  One/Two Story Residence: One story(with sunken den- 2 steps with rail)  Living Alone: No  Support Systems: Family member(s)  Patient Expects to be Discharged to[de-identified] Private residence  Current DME Used/Available at Home: [place]ane, straight, Commode, bedside, Crutches, Walker, rolling  Tub or Shower Type: Tub/Shower combination  Prior Level of Function/Work/Activity:  Ambulating with a cane   Number of Personal Factors/Comorbidities that affect the Plan of Care: 1-2: MODERATE COMPLEXITY   EXAMINATION:   Most Recent Physical Functioning:                            Bed Mobility  Supine to Sit: Stand-by assistance    Transfers  Sit to Stand: Stand-by assistance  Stand to Sit: Stand-by assistance                   Weight Bearing Status  Right Side Weight Bearing: As tolerated  Distance (ft): 86 Feet (ft)  Ambulation - Level of Assistance: Stand-by assistance  Assistive Device: Walker, rolling  Speed/Marilyn: [place] decreased (<100 feet/min)  Step Length: Left shortened;Right shortened  Stance: Right decreased  Gait Abnormalities: Antalgic;Decreased step clearance  Interventions: Safety awareness training     Braces/Orthotics: has L hinge brace; did not don today since only ambulating short distance    Right Knee Cold  Type: Cryocuff      Body Structures Involved:  1. Bones  2. Joints  3. Muscles Body Functions Affected:  1. Neuromusculoskeletal  2. Movement Related Activities and Participation Affected:  1. General Tasks and Demands  2.  Mobility   Number of elements that affect the Plan of Care: 3: MODERATE COMPLEXITY   CLINICAL PRESENTATION:   Presentation: Stable and uncomplicated: LOW COMPLEXITY   CLINICAL DECISION MAKIN [facility] [address] [address] AM-PAC 6 Clicks   Basic Mobility Inpatient Short Form  How much difficulty does the patient currently have. .. Unable A Lot A Little None   1. Turning over in bed (including adjusting bedclothes, sheets and blankets)? [] 1   [] 2   [] 3   [x] 4   2. Sitting down on and standing up from a chair with arms ( e.g., wheelchair, bedside commode, etc.)   [] 1   [] 2   [x] 3   [] 4   3. Moving from lying on back to sitting on the side of the bed? [] 1   [] 2   [x] 3   [] 4   How much help from another person does the patient currently need. .. Total A Lot A Little None   4. Moving to and from a bed to a chair (including a wheelchair)? [] 1   [] 2   [x] 3   [] 4   5. Need to walk in hospital room? [] 1   [] 2   [x] 3   [] 4   6. Climbing 3-5 steps with a railing? [] 1   [] 2   [x] 3   [] 4   © , Trustees of [address] [place] [address] [address] [address], under license to [employer]. All rights reserved     Score:  Initial: 19 Most Recent: X (Date: -- )    Interpretation of Tool:  Represents activities that are increasingly more difficult (i.e. Bed mobility, Transfers, Gait). Medical Necessity:     · Patient demonstrates   · good  ·  rehab potential due to higher previous functional level. Reason for Services/Other Comments:  · Patient   · continues to require present interventions due to patient's inability to perform exercises and functional mobility independently   · .    Use of outcome tool(s) and clinical judgement create a POC that gives a: Clear prediction of patient's progress: LOW COMPLEXITY            TREATMENT:   (In addition to Assessment/Re-Assessment sessions the following treatments were rendered)     Pre-treatment Symptoms/Complaints:  none  Pain Initial:   Pain Intensity 1: 2(about the same)  Post Session: Therapeutic Exercise: (15 Minutes):  Exercises per grid below to improve mobility and strength. Required minimal verbal and manual cues to perform exercises correctly . Gait Training (15 Minutes):  Gait training to improve and/or restore physical functioning as related to mobility and strength. Ambulated 86 Feet (ft) with Stand-by assistance using a Walker, rolling and minimal Safety awareness training related to their stance phase and stride length to promote proper body alignment and promote proper body posture. Instruction in performance of walker use and gait sequencing to correct stance phase and stride length. Date:  6/25/20 Date:  6/26 Date:  6/27     ACTIVITY/EXERCISE AM PM AM PM AM PM   GROUP THERAPY  []  []  []  []  []  []   Ankle Pumps  10 15 15 15    Quad Sets  10 15 15 15    Gluteal Sets  10 15 15 15    Hip ABd/ADduction  10 15 15 15    Straight Leg Raises  10 15 15 15    Knee Slides  10 15 15 15    Short Arc Quads   15 15 15    Long Arc Quads         Chair Slides     15             B = bilateral; AA = active assistive; A = active; P = passive      Treatment/Session Assessment:     Response to Treatment: continue to make progress    Education:  [x] Home Exercises  [x] Fall Precautions  [x] no pillow under knee [] D/C Instruction Review  [] Knee Prosthesis Review  [x] Walker Management/Safety [] Adaptive Equipment as Needed       Interdisciplinary Collaboration:   o Registered Nurse    After treatment position/precautions:   o Up in chair  o Bed/Chair-wheels locked  o Call light within reach    Compliance with Program/Exercises: Compliant all of the time, Will assess as treatment progresses. Recommendations/Intent for next treatment session:  Treatment next visit will focus on increasing Mr. Solitario Organ independence with bed mobility, transfers, gait training, strength/ROM exercises, modalities for pain, and patient education.       Total Treatment Duration:  PT Patient Time In/Time Out  Time In: 1100  Time Out: Willy 50 Ricardo, PTA

## 2020-06-27 NOTE — PROGRESS NOTES
Care Management Interventions  PCP Verified by CM:  Yes  Mode of Transport at Discharge: Self  Transition of Care Consult (CM Consult): [address]: Yes  Discharge Durable Medical Equipment: No  Physical Therapy Consult: Yes  Occupational Therapy Consult: No  Current Support Network: Lives with Spouse  Confirm Follow Up Transport: Family  The Plan for Transition of Care is Related to the Following Treatment Goals : return to independent function  The Patient and/or Patient Representative was Provided with a Choice of Provider and Agrees with the Discharge Plan?: Yes  Freedom of Choice List was Provided with Basic Dialogue that Supports the Patient's Individualized Plan of Care/Goals, Treatment Preferences and Shares the Quality Data Associated with the Providers?: Yes  Discharge Location  Discharge Placement: Home with home health

## 2020-06-28 ENCOUNTER — HOME CARE VISIT (OUTPATIENT)
Dept: SCHEDULING | Facility: HOME HEALTH | Age: 80
End: 2020-06-28
Payer: MEDICARE

## 2020-06-28 VITALS
DIASTOLIC BLOOD PRESSURE: 68 MMHG | HEART RATE: 90 BPM | SYSTOLIC BLOOD PRESSURE: 123 MMHG | TEMPERATURE: 98.7 F | RESPIRATION RATE: 16 BRPM

## 2020-06-28 PROCEDURE — 400013 HH SOC

## 2020-06-28 PROCEDURE — 3331090002 HH PPS REVENUE DEBIT

## 2020-06-28 PROCEDURE — G0151 HHCP-SERV OF PT,EA 15 MIN: HCPCS

## 2020-06-28 PROCEDURE — 3331090001 HH PPS REVENUE CREDIT

## 2020-06-29 ENCOUNTER — HOME CARE VISIT (OUTPATIENT)
Dept: SCHEDULING | Facility: HOME HEALTH | Age: 80
End: 2020-06-29
Payer: MEDICARE

## 2020-06-29 VITALS
OXYGEN SATURATION: 95 % | HEART RATE: 91 BPM | DIASTOLIC BLOOD PRESSURE: 78 MMHG | RESPIRATION RATE: 18 BRPM | TEMPERATURE: 98.1 F | SYSTOLIC BLOOD PRESSURE: 124 MMHG

## 2020-06-29 PROCEDURE — 3331090001 HH PPS REVENUE CREDIT

## 2020-06-29 PROCEDURE — G0157 HHC PT ASSISTANT EA 15: HCPCS

## 2020-06-29 PROCEDURE — 3331090002 HH PPS REVENUE DEBIT

## 2020-06-30 ENCOUNTER — PATIENT OUTREACH (OUTPATIENT)
Dept: CASE MANAGEMENT | Age: 80
End: 2020-06-30

## 2020-06-30 PROCEDURE — 3331090001 HH PPS REVENUE CREDIT

## 2020-06-30 PROCEDURE — 3331090002 HH PPS REVENUE DEBIT

## 2020-06-30 NOTE — PROGRESS NOTES
This note will not be viewable in [address] Transition of Care Hospital Discharge Follow-Up      Date/Time:  2020 4:49 PM    Patient was admitted to [facility] on 2020 and discharged on 2020 for right knee replacement. . The physician discharge summary was available at the time of outreach. Patient was contacted within 2 business days of discharge. Inpatient RUR score: 2%   Was this a readmission? No  Patient stated reason for the readmission: N/A  Patients top risk factors for readmission: functional physical ability      LPN Care Coordinator contacted the patient by telephone to perform post hospital discharge assessment. Verified name and  with patient as identifiers. Provided introduction to self, and explanation of the Care Coordinator role. Patient received hospital discharge instructions. LPN reviewed discharge instructions and red flags with patient who verbalized understanding. Patient given an opportunity to ask questions and does not have any further questions or concerns at this time. The patient agrees to contact the PCP office for questions related to their healthcare. LPN provided contact information for future reference. Home Health orders at discharge: [address] [facility]: [place] [address]  Date of initial or scheduled visit:  2020  Durable Medical Equipment ordered at discharge: none  [facility] received: N/A  Medication(s):   Medication review was performed with patient, who verbalizes understanding of administration of home medications. There were no barriers to obtaining medications identified at this time. Current Outpatient Medications   Medication Sig    polyethylene glycol (MIRALAX) 17 gram packet Take 17 g by mouth daily. Mix with 8oz water    aspirin delayed-release 81 mg tablet Take 1 Tab by mouth every twelve (12) hours every twelve (12) hours for 30 days.     oxyCODONE IR (ROXICODONE) 5 mg immediate release tablet Take 1-2 Tabs by mouth every four (4) hours as needed for Pain for up to 7 days. Max Daily Amount: 60 mg.    docusate sodium (COLACE) 100 mg capsule Take 100 mg by mouth two (2) times daily as needed for Constipation.  acetaminophen (TYLENOL PO) Take 1,000 mg by mouth every six (6) hours as needed for Pain. No current facility-administered medications for this visit. There are no discontinued medications. ADL assessment:  Patient independent with most ADLs, patient needs some assistance with bathing. Spouse there to assist if needed. [facility] follow up appointment(s):   Future Appointments   Date Time Provider Liane Valencia   7/2/2020 To Be Determined Devorah Peterson [address]   7/6/2020 To Be Determined David Solis [facility]   7/8/2020 To Be Determined Melissa Raveling [address] [place] [address]   7/10/2020 To Be Determined Melissa Raveling [address] [place] [address]   7/13/2020 To Be Determined Melissa Raveling [address] [place] [address]   7/16/2020 To Be Determined Melissa Raveling [address] [place] [address]   7/20/2020 To Be Determined David Solis [facility]      Non-[facility] follow up appointment(s): None  7 Day follow up with PCP or Specialist:  PCP Dr. Samnata Lowe 7/22/2020   Transportation arranged:   Spouse to transport     Covid Risk Education    Patient has following risk factors of: no known risk factors. Education provided regarding infection prevention, and signs and symptoms of COVID-19 and when to seek medical attention with patient who verbalized understanding. Discussed exposure protocols and quarantine From CDC: Are you at higher risk for severe illness?  and given an opportunity for questions and concerns. The patient agrees to contact the COVID-19 hotline [phone] or PCP office for questions related to COVID-19.      For more information on steps you can take to protect yourself, see CDC's How to Protect Yourself Patient/family/caregiver given information for [employer] and agrees to enroll no  Patient's preferred e-mail: declines  Patient's preferred phone number: declines      Any other questions or concerns expressed by patient? Patient states that he is doing well. He is able to move around his house using a walker. Patient states that pain is minimal that is relieved by over the counter pain relievers. Patient does not voice any questions or concerns at this time. Scheduled next follow up call or referral to CTN/ ACM:  Not at this time  Next follow up call:  7-14 days    Goals Addressed                 This Visit's Progress     Returns to baseline activity level.

## 2020-07-01 PROCEDURE — 3331090001 HH PPS REVENUE CREDIT

## 2020-07-01 PROCEDURE — 3331090002 HH PPS REVENUE DEBIT

## 2020-07-02 ENCOUNTER — HOME CARE VISIT (OUTPATIENT)
Dept: SCHEDULING | Facility: HOME HEALTH | Age: 80
End: 2020-07-02
Payer: MEDICARE

## 2020-07-02 VITALS
OXYGEN SATURATION: 98 % | SYSTOLIC BLOOD PRESSURE: 140 MMHG | RESPIRATION RATE: 29 BRPM | DIASTOLIC BLOOD PRESSURE: 78 MMHG | TEMPERATURE: 98.1 F | HEART RATE: 94 BPM

## 2020-07-02 PROCEDURE — G0157 HHC PT ASSISTANT EA 15: HCPCS

## 2020-07-02 PROCEDURE — 3331090002 HH PPS REVENUE DEBIT

## 2020-07-02 PROCEDURE — 3331090001 HH PPS REVENUE CREDIT

## 2020-07-03 PROCEDURE — 3331090002 HH PPS REVENUE DEBIT

## 2020-07-03 PROCEDURE — 3331090001 HH PPS REVENUE CREDIT

## 2020-07-04 PROCEDURE — 3331090001 HH PPS REVENUE CREDIT

## 2020-07-04 PROCEDURE — 3331090002 HH PPS REVENUE DEBIT

## 2020-07-05 PROCEDURE — 3331090002 HH PPS REVENUE DEBIT

## 2020-07-05 PROCEDURE — 3331090001 HH PPS REVENUE CREDIT

## 2020-07-06 ENCOUNTER — HOME CARE VISIT (OUTPATIENT)
Dept: SCHEDULING | Facility: HOME HEALTH | Age: 80
End: 2020-07-06
Payer: MEDICARE

## 2020-07-06 VITALS
RESPIRATION RATE: 16 BRPM | DIASTOLIC BLOOD PRESSURE: 65 MMHG | HEART RATE: 88 BPM | TEMPERATURE: 98.3 F | SYSTOLIC BLOOD PRESSURE: 118 MMHG

## 2020-07-06 PROCEDURE — 3331090002 HH PPS REVENUE DEBIT

## 2020-07-06 PROCEDURE — 3331090001 HH PPS REVENUE CREDIT

## 2020-07-06 PROCEDURE — G0151 HHCP-SERV OF PT,EA 15 MIN: HCPCS

## 2020-07-07 PROCEDURE — 3331090001 HH PPS REVENUE CREDIT

## 2020-07-07 PROCEDURE — 3331090002 HH PPS REVENUE DEBIT

## 2020-07-08 ENCOUNTER — HOME CARE VISIT (OUTPATIENT)
Dept: SCHEDULING | Facility: HOME HEALTH | Age: 80
End: 2020-07-08
Payer: MEDICARE

## 2020-07-08 VITALS
HEART RATE: 76 BPM | DIASTOLIC BLOOD PRESSURE: 80 MMHG | RESPIRATION RATE: 17 BRPM | SYSTOLIC BLOOD PRESSURE: 124 MMHG | TEMPERATURE: 98.1 F

## 2020-07-08 PROCEDURE — 3331090002 HH PPS REVENUE DEBIT

## 2020-07-08 PROCEDURE — G0157 HHC PT ASSISTANT EA 15: HCPCS

## 2020-07-08 PROCEDURE — 3331090001 HH PPS REVENUE CREDIT

## 2020-07-09 PROCEDURE — 3331090001 HH PPS REVENUE CREDIT

## 2020-07-09 PROCEDURE — 3331090002 HH PPS REVENUE DEBIT

## 2020-07-10 ENCOUNTER — PATIENT OUTREACH (OUTPATIENT)
Dept: CASE MANAGEMENT | Age: 80
End: 2020-07-10

## 2020-07-10 ENCOUNTER — HOME CARE VISIT (OUTPATIENT)
Dept: SCHEDULING | Facility: HOME HEALTH | Age: 80
End: 2020-07-10
Payer: MEDICARE

## 2020-07-10 VITALS
HEART RATE: 72 BPM | SYSTOLIC BLOOD PRESSURE: 124 MMHG | RESPIRATION RATE: 18 BRPM | DIASTOLIC BLOOD PRESSURE: 70 MMHG | TEMPERATURE: 97.5 F

## 2020-07-10 PROCEDURE — 3331090001 HH PPS REVENUE CREDIT

## 2020-07-10 PROCEDURE — G0157 HHC PT ASSISTANT EA 15: HCPCS

## 2020-07-10 PROCEDURE — 3331090002 HH PPS REVENUE DEBIT

## 2020-07-10 NOTE — PROGRESS NOTES
This note will not be viewable in [address] Attempted to reach patient for a 30 day [facility] Follow up. No answer, left VM for returned call.  Episode will be closed and [facility] Coordinator will be removed from Care Team.

## 2020-07-11 PROCEDURE — 3331090002 HH PPS REVENUE DEBIT

## 2020-07-11 PROCEDURE — 3331090001 HH PPS REVENUE CREDIT

## 2020-07-12 PROCEDURE — 3331090001 HH PPS REVENUE CREDIT

## 2020-07-12 PROCEDURE — 3331090002 HH PPS REVENUE DEBIT

## 2020-07-13 ENCOUNTER — HOME CARE VISIT (OUTPATIENT)
Dept: HOME HEALTH SERVICES | Facility: HOME HEALTH | Age: 80
End: 2020-07-13
Payer: MEDICARE

## 2020-07-13 ENCOUNTER — HOME CARE VISIT (OUTPATIENT)
Dept: SCHEDULING | Facility: HOME HEALTH | Age: 80
End: 2020-07-13
Payer: MEDICARE

## 2020-07-13 PROCEDURE — 3331090001 HH PPS REVENUE CREDIT

## 2020-07-13 PROCEDURE — 3331090002 HH PPS REVENUE DEBIT

## 2020-07-13 PROCEDURE — G0157 HHC PT ASSISTANT EA 15: HCPCS

## 2020-07-14 VITALS
HEART RATE: 80 BPM | DIASTOLIC BLOOD PRESSURE: 70 MMHG | TEMPERATURE: 97.5 F | SYSTOLIC BLOOD PRESSURE: 128 MMHG | RESPIRATION RATE: 18 BRPM

## 2020-07-14 PROCEDURE — 3331090002 HH PPS REVENUE DEBIT

## 2020-07-14 PROCEDURE — 3331090001 HH PPS REVENUE CREDIT

## 2020-07-15 PROCEDURE — 3331090002 HH PPS REVENUE DEBIT

## 2020-07-15 PROCEDURE — 3331090001 HH PPS REVENUE CREDIT

## 2020-07-16 ENCOUNTER — HOME CARE VISIT (OUTPATIENT)
Dept: SCHEDULING | Facility: HOME HEALTH | Age: 80
End: 2020-07-16
Payer: MEDICARE

## 2020-07-16 VITALS
TEMPERATURE: 98.8 F | HEART RATE: 84 BPM | RESPIRATION RATE: 18 BRPM | DIASTOLIC BLOOD PRESSURE: 70 MMHG | SYSTOLIC BLOOD PRESSURE: 128 MMHG

## 2020-07-16 PROCEDURE — G0157 HHC PT ASSISTANT EA 15: HCPCS

## 2020-07-16 PROCEDURE — 3331090002 HH PPS REVENUE DEBIT

## 2020-07-16 PROCEDURE — 3331090001 HH PPS REVENUE CREDIT

## 2020-07-17 PROCEDURE — 3331090002 HH PPS REVENUE DEBIT

## 2020-07-17 PROCEDURE — 3331090001 HH PPS REVENUE CREDIT

## 2020-07-18 PROCEDURE — 3331090001 HH PPS REVENUE CREDIT

## 2020-07-18 PROCEDURE — 3331090002 HH PPS REVENUE DEBIT

## 2020-07-19 PROCEDURE — 3331090001 HH PPS REVENUE CREDIT

## 2020-07-19 PROCEDURE — 3331090002 HH PPS REVENUE DEBIT

## 2020-07-20 ENCOUNTER — HOME CARE VISIT (OUTPATIENT)
Dept: SCHEDULING | Facility: HOME HEALTH | Age: 80
End: 2020-07-20
Payer: MEDICARE

## 2020-07-20 VITALS
TEMPERATURE: 98 F | RESPIRATION RATE: 17 BRPM | DIASTOLIC BLOOD PRESSURE: 76 MMHG | SYSTOLIC BLOOD PRESSURE: 132 MMHG | HEART RATE: 99 BPM

## 2020-07-20 PROCEDURE — 3331090001 HH PPS REVENUE CREDIT

## 2020-07-20 PROCEDURE — G0151 HHCP-SERV OF PT,EA 15 MIN: HCPCS

## 2020-07-20 PROCEDURE — 3331090002 HH PPS REVENUE DEBIT

## 2020-07-30 ENCOUNTER — HOSPITAL ENCOUNTER (OUTPATIENT)
Dept: PHYSICAL THERAPY | Age: 80
Discharge: HOME | End: 2020-07-30
Payer: MEDICARE

## 2020-07-30 PROCEDURE — 97110 THERAPEUTIC EXERCISES: CPT

## 2020-07-30 PROCEDURE — 97162 PT EVAL MOD COMPLEX 30 MIN: CPT

## 2020-07-30 NOTE — THERAPY EVALUATION
Carmen Smith : 1940 [place] Secondary: [address] at [address] 
[address], [address] [place] [address] [address], [address] [phone]   [phone] OUTPATIENT PHYSICAL THERAPY:Initial Assessment 2020 ICD-10: Treatment Diagnosis: M25.561, Z96.651, R26.2 Precautions/Allergies:  
Patient has no known allergies. TREATMENT PLAN: 
Effective Dates: 2020 TO 10/28/2020 (90 days). Frequency/Duration: 2 times a week for 90 Day(s) MEDICAL/REFERRING DIAGNOSIS: 
Presence of right artificial knee joint [Z96.651] DATE OF ONSET: L TKA on  REFERRING PHYSICIAN: Brynn Benítez MD MD Orders: Ivonne Sun and treat s/p R TKA Return MD Appointment: N/A  
 
INITIAL ASSESSMENT:  Mr. Rodolfo Barragan presents to physical therapy with MD diagnosis of a R TKA and knee pain. Pt demonstrated signs and symptoms consistent with this diagnosis. On objective examination, the patient demonstrated deficits in ROM, strength, ambulatory ability, transfer ability, functional mobility. The patient also had increased pain, swelling. The patient is limited in the following activities: walking, standing, transferring, yard work. The patient has a good  prognosis for recovery based on the examination findings and may be limited by: L knee pain. Patient requires skilled physical therapy services in order to return to prior level of function. PROBLEM LIST (Impacting functional limitations): 1. Decreased Strength 2. Decreased ADL/Functional Activities 3. Decreased Transfer Abilities 4. Decreased Ambulation Ability/Technique 5. Decreased Balance 6. Increased Pain 7. Decreased Activity Tolerance 8. Decreased Flexibility/Joint Mobility 9. Decreased [place] with Home Exercise Program INTERVENTIONS PLANNED: (Treatment may consist of any combination of the following) 1. Cryotherapy 2. Electrical Stimulation 3. Home Exercise Program (HEP) 4. Manual Therapy 5. Neuromuscular Re-education/Strengthening 6. Range of Motion (ROM) 7. Therapeutic Activites 8. Therapeutic Exercise/Strengthening GOALS: (Goals have been discussed and agreed upon with patient.) Short-Term Functional Goals: Time Frame: 4 weeks 1. Pt will be compliant with progressive HEP 2. Pt will have L knee ext on < -10deg 3. Pt will be able to perform 3 sit to stands without using arms Discharge Goals: Time Frame: 12 weeks 1. Pt will be able to walk at least 10min w/ cane 2. Pt will improve TUG time to < 15sec in order to reduce fall risk 3. Pt will have knee extension bilaterally of < -5deg 4. Pt will perform at least 5 sit to stands in 30sec without using arms OUTCOME MEASURE:  
Tool Used: Lower Extremity Functional Scale (LEFS) Score:  Initial: 18/80 Most Recent: X/80 (Date: -- ) Interpretation of Score: 20 questions each scored on a 5 point scale with 0 representing \"extreme difficulty or unable to perform\" and 4 representing \"no difficulty\". The lower the score, the greater the functional disability. 80/80 represents no disability. Minimal detectable change is 9 points. MEDICAL NECESSITY:  
· Patient is expected to demonstrate progress in strength, range of motion, balance, coordination and functional technique to increase independence with ADLs, ambulation, functional mobility. REASON FOR SERVICES/OTHER COMMENTS: 
· Pt requires skilled physical therapy intervention in order to return to prior level of function. Total Duration: 
  
 
Rehabilitation Potential For Stated Goals: Good Regarding Yvonne Zapata's therapy, I certify that the treatment plan above will be carried out by a therapist or under their direction. Thank you for this referral, 
Marianne Barrios PT, DPT, OCS Referring Physician Signature: Barbara Haley MD _______________________________ Date _____________ PAIN/SUBJECTIVE:  
 Initial:   5 Post Session:  5/10 HISTORY:  
History of Injury/Illness (Reason for Referral): 
Pt is s/p R TKA on 6/25/2020. Pt states he has been doing well since then. Pt had the L knee replaced in 12/2019, but tore a ligament in it while getting into a car. Pt states that due to this the L knee has been holding him back some from his R knee rehab because it hurts with standing and will get stuck. This has improved over the last week or so. Pt is using a walker around the house, but will get fatigued easily. Past Medical History/Comorbidities:  
Mr. Shelia Alexis  has a past medical history of Arthritis, HLD (hyperlipidemia) (12/7/2012), Macular degeneration, and Osteoarthritis of left knee. He also has no past medical history of Malignant hyperthermia due to anesthesia or Pseudocholinesterase deficiency. Mr. Shelia Alexis  has a past surgical history that includes hx colonoscopy (2002) and hx colonoscopy (11-1-13). Social History/Living Environment:  
  Lives at home with wife, 2 steps getting into the house, no stairs in house Prior Level of Function/Work/Activity: 
Yardwork (mowing), walking without assistive device Personal Factors:   
      Sex:  male Age:  [de-identified] y.o. Past/Current Experience:  L TKA in 12/2019 Ambulatory/Rehab Services H2 Model Falls Risk Assessment Risk Factors: 
     (1)  Gender [Male] 
     (1)  Visual Impairment [specify:  corrective lenses] Ability to Rise from Chair: 
     (1)  Pushes up, successful in one attempt Falls Prevention Plan: Mobility Assistance Device (specify):  wheelchair, rolling walker Total: (5 or greater = High Risk): 3  
©2010 [facility] [address] States Patent #[id number]. Federal Law prohibits the replication, distribution or use without written permission from [facility] of [address] [place] [address] Current Medications:   
  
Current Outpatient Medications:   polyethylene glycol (MIRALAX) 17 gram packet, Take 17 g by mouth daily. Mix with 8oz water, Disp: , Rfl:  
  docusate sodium (COLACE) 100 mg capsule, Take 100 mg by mouth two (2) times daily as needed for Constipation. , Disp: , Rfl:  
  acetaminophen (TYLENOL PO), Take 1,000 mg by mouth every six (6) hours as needed for Pain., Disp: , Rfl:   
Date Last Reviewed:  2020 Number of Personal Factors/Comorbidities that affect the Plan of Care: 3+: HIGH COMPLEXITY EXAMINATION:  
Observation Incision: well-healing, no redness Assistive Device: Pt arrives to PT in wheelchair Pt in T-Scope brace on L 
 
 
ROM Right Left Flexion 115 120 Extension -10 -18 Strength (all MMT scores are graded on a scale of 0-5) Right Left Hip Flexion 5 4 Abduction 4 4 Extension 4 4 Glute Max 4 4 Knee Extension 4 4- Flexion 5 5 Ankle Dorsiflexion 5 5 Plantarflexion 5 5 Joint/Soft Tissue Mobility Description Joint Mobility ? Knee: hypomobility Soft Tissue Mobility No TTP, good scar mobility Functional Mobility TU.4sec (w/ rolling walker) 30sec Sit to Stand: 9x (w/ arms), unable without arms Balance Body Structures Involved: 1. Bones 2. Joints 3. Muscles 4. Ligaments Body Functions Affected: 1. Neuromusculoskeletal 
2. Movement Related Activities and Participation Affected: 1. General Tasks and Demands 2. Mobility 3. Self Care 4. Domestic Life 5. Interpersonal Interactions and Relationships 6. Community, Social and [place] Number of elements (examined above) that affect the Plan of Care: 4+: HIGH COMPLEXITY CLINICAL PRESENTATION:  
Presentation: Evolving clinical presentation with changing clinical characteristics: MODERATE COMPLEXITY CLINICAL DECISION MAKING:  
Use of outcome tool(s) and clinical judgement create a POC that gives a: Questionable prediction of patient's progress: MODERATE COMPLEXITY Venecia Boros PT, DPT, OCS

## 2020-07-30 NOTE — PROGRESS NOTES
Dav Rutherford  : 1940  [place]  Secondary: [address] at [facility] Therapy  [address], [facility], [facility] [address]  [phone]   [phone]       OUTPATIENT PHYSICAL THERAPY:Daily Note 2020      TREATMENT:   PT Patient Time In/Time Out  Time In: 1110  Time Out: 1200      Total Time: 50min  Visit Count:  1   ICD-10: Treatment Diagnosis: M25.561, Y03.631, R26.2      Subjective: See Evaluation Note dated 2020 for details   Pain:     Objective: See Evaluation Note dated 2020 for details      Therapeutic Exercise: (25min) Done in order to improve strength, ROM and understanding of current condition. Date:   Date:   Date:   Date:     Activity/Exercise Parameters      Education Discussed examination findings, HEP, plan of care      Quad Set x6min      SLR 3x5, B      Knee Extension Hangs x6min      NuStep x6min               Date: 2020   Prepared by:  Leelee Gutierrez PT, DPT, OCS    Exercises  Supine Quad Set - 5x daily - 7x weekly  Long Sitting Quad Set - 5x daily - 7x weekly  Active Straight Leg Raise with Quad Set - 5 reps - 3 sets - 2x daily - 4x weekly      Manual Therapy: (0min) Done in order to improve joint and soft tissue mobility,reduce muscle guarding, and decrease muscle tone   Date:   Date:   Date:   Date:     Type Parameters      Joint Mobilization       Soft Tissue Mobilization           Modalities: (-) Done in order to reduce swelling and pain    Assessment: See Evaluation Note dated 2020 for details    Plan: See Evaluation Note dated 2020 for details    Future Appointments   Date Time Provider Liane Valencia   2020  1:00 PM [facility]   2020  2:00 PM [place], [facility]       Trini Carlos Time:  25min evaluation      Leelee Gutierrez PT, DPT, OCS

## 2020-08-04 ENCOUNTER — HOSPITAL ENCOUNTER (OUTPATIENT)
Dept: PHYSICAL THERAPY | Age: 80
Discharge: HOME | End: 2020-08-04
Payer: MEDICARE

## 2020-08-04 PROCEDURE — 97110 THERAPEUTIC EXERCISES: CPT

## 2020-08-04 NOTE — PROGRESS NOTES
Quynh Morton  : 1940  [place]  Secondary: [address] at [facility] Therapy  [address], [address]  [phone]   [phone]       OUTPATIENT PHYSICAL THERAPY:Daily Note 2020      TREATMENT:   PT Patient Time In/Time Out  Time In: 0100  Time Out: 0200      Total Time: 50min  Visit Count:  2   ICD-10: Treatment Diagnosis: M25.561, Z96.651, R26.2      Subjective: Patient reports knee is still tight, but not as bad as it has been.  Pain: 1/10    Objective: See Evaluation Note dated 2020 for details      Therapeutic Exercise: (60 min) Done in order to improve strength, ROM and understanding of current condition. Date:   Date:   Date:   Date:     Activity/Exercise Parameters      Education Discussed examination findings, HEP, plan of care      Quad Set x6min      SLR 3x5, B      Knee Extension Hangs x6min      NuStep x6min               Date: 2020   Prepared by: Irma Rainey PT, DPT, OCS    Exercises  Supine Quad Set - 5x daily - 7x weekly  Long Sitting Quad Set - 5x daily - 7x weekly  Active Straight Leg Raise with Quad Set - 5 reps - 3 sets - 2x daily - 4x weekly  Sit to stand 2 x 5   Hip abd in supine 2 x 5   SLR in supine 2 x 5  Seated heel raises on 4 inch step 2 x 10  Step ups on 2 inch step with CGA and tactile ques for quad control 2 x 5  Nu step 7 min level 2  Standing weight shifting          Manual Therapy: (0min) Done in order to improve joint and soft tissue mobility,reduce muscle guarding, and decrease muscle tone   Date:   Date:   Date:   Date:     Type Parameters      Joint Mobilization       Soft Tissue Mobilization           Modalities: (-) Done in order to reduce swelling and pain    Assessment: Patient tolerated treatment with mild discomfort. He needs to continue working on quad strength to improve ambulation.  Good motivation     Plan: See Evaluation Note dated 7/30/2020 for details    Future Appointments   Date Time Provider Liane Valencia   8/6/2020  2:00 PM [place] [facility]       Bree Hung Time:       Tevin Darnell, PTA

## 2020-08-06 ENCOUNTER — HOSPITAL ENCOUNTER (OUTPATIENT)
Dept: PHYSICAL THERAPY | Age: 80
Discharge: HOME | End: 2020-08-06
Payer: MEDICARE

## 2020-08-06 PROCEDURE — 97110 THERAPEUTIC EXERCISES: CPT

## 2020-08-06 NOTE — PROGRESS NOTES
Agata Joseph  : 1940  [place]  Secondary: [address] at [facility] Therapy  [address], [address]  [phone]   [phone]       OUTPATIENT PHYSICAL THERAPY:Daily Note 2020      TREATMENT:   PT Patient Time In/Time Out  Time In: 0200  Time Out: 0300      Total Time: 60min  Visit Count:  3   ICD-10: Treatment Diagnosis: M25.561, Z96.651, R26.2      Subjective: Patient reports knee is starting to feel a little better.  Pain: 1/10    Objective: (none new today)      Therapeutic Exercise: (60 min) Done in order to improve strength, ROM and understanding of current condition. Date:   Date:   Date:   Date:     Activity/Exercise Parameters      Education Discussed examination findings, HEP, plan of care      Quad Set x6min      SLR 3x5, B      Knee Extension Hangs x6min      NuStep x6min               Date: 2020   Prepared by: Rossy Corrales PT, DPT, OCS    Exercises  Supine Quad Set - 5x daily - 7x weekly  Long Sitting Quad Set - 5x daily - 7x weekly  Active Straight Leg Raise with Quad Set - 5 reps - 3 sets - 2x daily - 4x weekly  Sit to stand 2 x 5   Hip abd in supine 2 x 5   SLR in supine 3 x 10  Seated heel raises on 4 inch step 2 x 10  Step ups on 2 inch step with CGA and tactile ques for quad control 2 x 5  Nu step 7 min level 2  Standing weight shifting   Standing heel toe raies x 8  Standing quad sets 2 x 10  Standing side stepping x 5 with assistance for quad stabilization         Manual Therapy: (0min) Done in order to improve joint and soft tissue mobility,reduce muscle guarding, and decrease muscle tone   Date:   Date:   Date:   Date:     Type Parameters      Joint Mobilization       Soft Tissue Mobilization           Modalities: (-) Done in order to reduce swelling and pain    Assessment: Patient tolerated treatment with mild discomfort.  Patient seems encouraged with his progress. Good motivation     Plan: See Evaluation Note dated 7/30/2020 for details    No future appointments.     Benita Anderson Time:       Helio Santana, PTA

## 2020-08-12 ENCOUNTER — HOSPITAL ENCOUNTER (OUTPATIENT)
Dept: PHYSICAL THERAPY | Age: 80
Discharge: HOME | End: 2020-08-12
Payer: MEDICARE

## 2020-08-12 PROCEDURE — 97110 THERAPEUTIC EXERCISES: CPT

## 2020-08-12 NOTE — PROGRESS NOTES
Shelia Marshall  : 1940  [place]  Secondary: [address] at [place] [address] Therapy  [address], [address], [address]  [phone]   [phone]       OUTPATIENT PHYSICAL THERAPY:Daily Note 2020      TREATMENT:   PT Patient Time In/Time Out  Time In: 1601  Time Out: 1646      Total Time: 45min  Visit Count:  4   ICD-10: Treatment Diagnosis: M25.561, Z96.651, R26.2      Subjective: Patient reports knee is feeling better, thinks he has improved so far.  Pain: 1/10    Objective: (none new today)      Therapeutic Exercise: (45 min) Done in order to improve strength, ROM and understanding of current condition. Date:   Date:   Date:   Date:     Activity/Exercise Parameters      Education Discussed examination findings, HEP, plan of care      Quad Set x6min [id number] B in long sitting     SLR 3x5, B      Knee Extension Hangs x6min      NuStep x6min x10min lvl 1-2     Sit to Stand  3x5     Step Taps  3x5 B 4\"     Standing Marches  2x10 B     Step Ups  3x3 B 2\"     Heel Raises  2x8 standing     LAQ  3x5                            Date: 2020   Prepared by: Dang Del Valle PT, DPT, OCS    Exercises  Supine Quad Set - 5x daily - 7x weekly  Long Sitting Quad Set - 5x daily - 7x weekly  Active Straight Leg Raise with Quad Set - 5 reps - 3 sets - 2x daily - 4x weekly        Manual Therapy: (0min) Done in order to improve joint and soft tissue mobility,reduce muscle guarding, and decrease muscle tone   Date:   Date:   Date:   Date:     Type Parameters      Joint Mobilization       Soft Tissue Mobilization           Modalities: (-) Done in order to reduce swelling and pain    Assessment: Patient still with significant weakness in B quads, L>R, which affects his ability to perform functional tasks without significant UE support or cuing from PT.  Pt is improving strength and functional mobility however    Plan: continue per POC    Future Appointments   Date Time Provider Liane Valencia   8/14/2020 11:00 AM [facility]       Theresa Aggarwal Time:       Florentino Bustamante PT, DPT, OCS

## 2020-08-14 ENCOUNTER — HOSPITAL ENCOUNTER (OUTPATIENT)
Dept: PHYSICAL THERAPY | Age: 80
Discharge: HOME | End: 2020-08-14
Payer: MEDICARE

## 2020-08-14 PROCEDURE — 97110 THERAPEUTIC EXERCISES: CPT

## 2020-08-14 NOTE — PROGRESS NOTES
Terri Celaya  : 1940  [place]  Secondary: [address] at [facility] Therapy  [address], [address]  [phone]   [phone]       OUTPATIENT PHYSICAL THERAPY:Daily Note 2020      TREATMENT:   PT Patient Time In/Time Out  Time In: 1100  Time Out: 1148      Total Time: 48min  Visit Count:  5   ICD-10: Treatment Diagnosis: M25.561, Z96.651, R26.2      Subjective: Patient states he was a little sore after the last session   Pain: 1/10    Objective: (none new today)      Therapeutic Exercise: (48 min) Done in order to improve strength, ROM and understanding of current condition. Date:   Date:   Date:   Date:     Activity/Exercise Parameters      Education Discussed examination findings, HEP, plan of care      Quad Set x6min [id number] B in long sitting [id number] B    SLR 3x5, B      Knee Extension Hangs x6min      NuStep x6min x10min lvl 1-2 x10min lvl 1-2    Sit to Stand  3x5 3x5    Step Taps  3x5 B 4\"     Standing Marches  2x10 B     Step Ups  3x3 B 2\" 3x5 B, 2\"    Heel Raises  2x8 standing     LAQ  3x5     Knee Extension   2x fatigue red TB in standing    Step Overs   3x5 over half foam w/ R    Walking   3x30ft                                                              Date: 2020   Prepared by:  Boby Viveros PT, DPT, OCS    Exercises  Supine Quad Set - 5x daily - 7x weekly  Long Sitting Quad Set - 5x daily - 7x weekly  Active Straight Leg Raise with Quad Set - 5 reps - 3 sets - 2x daily - 4x weekly        Manual Therapy: (0min) Done in order to improve joint and soft tissue mobility,reduce muscle guarding, and decrease muscle tone   Date:   Date:   Date:   Date:     Type Parameters      Joint Mobilization       Soft Tissue Mobilization           Modalities: (-) Done in order to reduce swelling and pain    Assessment: Patient continues to build strength in the quads, however is still quick to fatigue and is unable to bear much weight on the L leg without the knee buckling    Plan: continue per POC    No future appointments.     Benita Anderson Time:       Yuliya Pederson PT, DPT, OCS

## 2020-08-18 ENCOUNTER — HOSPITAL ENCOUNTER (OUTPATIENT)
Dept: PHYSICAL THERAPY | Age: 80
Discharge: HOME | End: 2020-08-18
Payer: MEDICARE

## 2020-08-18 PROCEDURE — 97110 THERAPEUTIC EXERCISES: CPT

## 2020-08-18 NOTE — PROGRESS NOTES
[place]  : 1940  [place]  Secondary: [address] at [facility] Therapy  [address], [facility], [address]  [phone]   [phone]       OUTPATIENT PHYSICAL THERAPY:Daily Note 2020      TREATMENT:   PT Patient Time In/Time Out  Time In: 0100  Time Out: 0200      Total Time: 60min  Visit Count:  6   ICD-10: Treatment Diagnosis: M25.561, Z96.651, R26.2      Subjective: Patient reports feeling pretty good today not as sore as it has been.  Pain: 1/10    Objective: (none new today)      Therapeutic Exercise: (60 min) Done in order to improve strength, ROM and understanding of current condition. Date:   Date:   Date:   Date:  20   Activity/Exercise Parameters      Education Discussed examination findings, HEP, plan of care      Quad Set x6min [id number] B in long sitting [id number] B 3 x 10 with 5 sec holds   SLR 3x5, B   2 x 10 with ques for keeping knee straight   Knee Extension Hangs x6min      NuStep x6min x10min lvl 1-2 x10min lvl 1-2 12 min   Sit to Stand  3x5 3x5 3 x 5    Step Taps  3x5 B 4\"     Standing Marches  2x10 B  3 x 10   Step Ups  3x3 B 2\" 3x5 B, 2\"    Heel Raises  2x8 standing     LAQ  3x5  3 x 10   Knee Extension   2x fatigue red TB in standing 2 x 10    Step Overs   3x5 over half foam w/ R    Walking   3x30ft 3 x 50 ft   Standing knee ext with ball    2 x 10   Stepping forward and bakward    3 x 8                                               Date: 2020   Prepared by:  Mita Serna PT, DPT, OCS    Exercises  Supine Quad Set - 5x daily - 7x weekly  Long Sitting Quad Set - 5x daily - 7x weekly  Active Straight Leg Raise with Quad Set - 5 reps - 3 sets - 2x daily - 4x weekly        Manual Therapy: (0min) Done in order to improve joint and soft tissue mobility,reduce muscle guarding, and decrease muscle tone   Date:   Date:   Date:   Date:     Type Parameters Joint Mobilization       Soft Tissue Mobilization           Modalities: (-) Done in order to reduce swelling and pain    Assessment: Patient tolerated treatment well today he continues to have some discomfort in his knees, but seems to be getting better with each treatment. Patient needs to continue to work on quad sets to help with ambulation.      Plan: continue per POC    Future Appointments   Date Time Provider Liane Valencia   8/20/2020  4:00 PM [facility]       [facility]      Nicolette Barron, PTA

## 2020-08-20 ENCOUNTER — APPOINTMENT (OUTPATIENT)
Dept: PHYSICAL THERAPY | Age: 80
End: 2020-08-20
Payer: MEDICARE

## 2020-08-25 ENCOUNTER — HOSPITAL ENCOUNTER (OUTPATIENT)
Dept: PHYSICAL THERAPY | Age: 80
Discharge: HOME | End: 2020-08-25
Payer: MEDICARE

## 2020-08-25 PROCEDURE — 97110 THERAPEUTIC EXERCISES: CPT

## 2020-08-25 NOTE — PROGRESS NOTES
Pending [facility] to [facility]  : 1940  [place]  Secondary: [address] at [place] [address] Therapy  [address], [address]  [phone]   [phone]       OUTPATIENT PHYSICAL THERAPY:Daily Note 2020      TREATMENT:   PT Patient Time In/Time Out  Time In: 1500  Time Out: 1604      Total Time: 64min  Visit Count:  7   ICD-10: Treatment Diagnosis: M25.561, Z96.651, R26.2      Subjective: Patient reports feeling pretty good today not as sore as it has been.  Pain: 1/10    Objective: (none new today)      Therapeutic Exercise: (60 min) Done in order to improve strength, ROM and understanding of current condition. Date:   Date:   Date:   Date:        Activity/Exercise          Education          Quad Set [id number] B in long sitting [id number] B 3 x 10 with 5 sec holds       SLR   2 x 10 with ques for keeping knee straight       Knee Extension Hangs          NuStep x10min lvl 1-2 x10min lvl 1-2 12 min x15min lvl 4-5      Sit to Stand 3x5 3x5 3 x 5  3x5      Step Taps 3x5 B 4\"   x7min (Irish e-stim @ 100hz on L leg)      Standing Marches 2x10 B  3 x 10       Step Ups 3x3 B 2\" 3x5 B, 2\"        Heel Raises 2x8 standing         LAQ 3x5  3 x 10 · 3x8 eccentric lowering on L  · 3x12 on R      Knee Extension  2x fatigue red TB in standing 2 x 10        Step Overs  3x5 over half foam w/ R        Walking  3x30ft 3 x 50 ft 3x30ft      Standing knee ext with ball   2 x 10 2x15      Stepping forward and bakward   3 x 8                                                                     Date: 2020   Prepared by:  Leelee Gutierrez PT, DPT, OCS    Exercises  Supine Quad Set - 5x daily - 7x weekly  Long Sitting Quad Set - 5x daily - 7x weekly  Active Straight Leg Raise with Quad Set - 5 reps - 3 sets - 2x daily - 4x weekly        Manual Therapy: (0min) Done in order to improve joint and soft tissue mobility,reduce muscle guarding, and decrease muscle tone   Date:  7/30 Date:   Date:   Date:     Type Parameters      Joint Mobilization       Soft Tissue Mobilization           Modalities: (-) Done in order to reduce swelling and pain    Assessment: Patient able to increase gait speed and step length on R with cuing while walking, but cannot maintain due to fatigue and is unable to do consistently without cues. Improved quad function in step tap exercise with e-stim on L leg.      Plan: continue per POC    Future Appointments   Date Time Provider Liane Valencia   8/27/2020 11:00 AM John Montes De Oca, PT [facility]       Kameron Holliday, PTA

## 2020-08-27 ENCOUNTER — HOSPITAL ENCOUNTER (OUTPATIENT)
Dept: PHYSICAL THERAPY | Age: 80
Discharge: HOME | End: 2020-08-27
Payer: MEDICARE

## 2020-08-27 PROCEDURE — 97110 THERAPEUTIC EXERCISES: CPT

## 2020-08-27 NOTE — PROGRESS NOTES
Blanche Joyner  : 1940  [place]  Secondary: [address] at [place] [address] Therapy  [address] [place] [address], [address]  [phone]   [phone]       OUTPATIENT PHYSICAL THERAPY:Daily Note 2020      TREATMENT:   PT Patient Time In/Time Out  Time In: 1059  Time Out: 1157      Total Time: 58min  Visit Count:  8   ICD-10: Treatment Diagnosis: M25.561, Z96.651, R26.2      Subjective: Patient states he is doing better, feels like he is stronger   Pain: 1/10    Objective: (none new today)      Therapeutic Exercise: (60 min) Done in order to improve strength, ROM and understanding of current condition. Date:   Date:   Date:   Date:   Date:       Activity/Exercise          Education          Quad Set [id number] B in long sitting [id number] B 3 x 10 with 5 sec holds       SLR   2 x 10 with ques for keeping knee straight  x6min w/ e-stim @ 85mA     Knee Extension Hangs          NuStep x10min lvl 1-2 x10min lvl 1-2 12 min x15min lvl 4-5 x15min lvl 4-5     Sit to Stand 3x5 3x5 3 x 5  3x5 3x7     Step Taps 3x5 B 4\"   x7min (russian e-stim @ 100mA on L leg) x6min (russian e-stim @ 85mA on L leg) 4\"     Standing Marches 2x10 B  3 x 10       Step Ups 3x3 B 2\" 3x5 B, 2\"        Heel Raises 2x8 standing         LAQ 3x5  3 x 10 · 3x8 eccentric lowering on L  · 3x12 on R x6min w/ e-stim @ 80mA on L     Knee Extension  2x fatigue red TB in standing 2 x 10        Step Overs  3x5 over half foam w/ R        Walking  3x30ft 3 x 50 ft 3x30ft 3x30ft, 100ft     Standing knee ext with ball   2 x 10 2x15      Stepping forward and bakward   3 x 8                                                                     Date: 2020   Prepared by:  Nahid Valdez PT, DPT, OCS    Exercises  Supine Quad Set - 5x daily - 7x weekly  Long Sitting Quad Set - 5x daily - 7x weekly  Active Straight Leg Raise with Quad Set - 5 reps - 3 sets - 2x daily - 4x weekly        Manual Therapy: (0min) Done in order to improve joint and soft tissue mobility,reduce muscle guarding, and decrease muscle tone   Date:  7/30 Date:   Date:   Date:     Type Parameters      Joint Mobilization       Soft Tissue Mobilization           Modalities: (-) Done in order to reduce swelling and pain    Assessment: Patient tolerated all exercises well and was able to perform more functional activities with e-stim on L quad. All e-stim setting were 10sec on 50sec off with strength set to 80-85mA, Pt able to walk with improved step length on the R and L without cuing. Plan: continue per POC    No future appointments.     Unbilled Time      Samuel Lin PT, DPT, OCS

## 2020-09-01 ENCOUNTER — HOSPITAL ENCOUNTER (OUTPATIENT)
Dept: PHYSICAL THERAPY | Age: 80
Discharge: HOME | End: 2020-09-01
Payer: MEDICARE

## 2020-09-01 PROCEDURE — 97110 THERAPEUTIC EXERCISES: CPT

## 2020-09-01 NOTE — PROGRESS NOTES
Malathi Pabon  : 1940  [place]  Secondary: [address] at [facility] Therapy  [address], [address]  [phone]   [phone]       OUTPATIENT PHYSICAL THERAPY:Daily Note 2020      TREATMENT:   PT Patient Time In/Time Out  Time In: 1456  Time Out: 1555      Total Time: 59min  Visit Count:  9   ICD-10: Treatment Diagnosis: M25.561, Z96.651, R26.2      Subjective: Patient states he feels he is continuing to improve   Pain: 1/10    Objective: (none new today)      Therapeutic Exercise: (59 min) Done in order to improve strength, ROM and understanding of current condition. Date:   Date:   Date:   Date:   Date:   Date:      Activity/Exercise          Education          Quad Set [id number] B in long sitting [id number] B 3 x 10 with 5 sec holds       SLR   2 x 10 with ques for keeping knee straight  x6min w/ e-stim @ 85mA x5min w/ e-stim @ 80mA on L    Knee Extension Hangs          NuStep x10min lvl 1-2 x10min lvl 1-2 12 min x15min lvl 4-5 x15min lvl 4-5 x15min lvl 4-5    Sit to Stand 3x5 3x5 3 x 5  3x5 3x7 3x7    Step Taps 3x5 B 4\"   x7min (russian e-stim @ 100mA on L leg) x6min (russian e-stim @ 85mA on L leg) 4\" x5min (russian e-stim @ 85mA on L leg) 4\"    Standing Marches 2x10 B  3 x 10       Step Ups 3x3 B 2\" 3x5 B, 2\"        Heel Raises 2x8 standing         LAQ 3x5  3 x 10 · 3x8 eccentric lowering on L  · 3x12 on R x6min w/ e-stim @ 80mA on L x5min w/ e-stim @ 80mA on L    Knee Extension  2x fatigue red TB in standing 2 x 10        Step Overs  3x5 over half foam w/ R        Walking  3x30ft 3 x 50 ft 3x30ft 3x30ft, 100ft [id number]    Standing knee ext with ball   2 x 10 2x15      Stepping forward and bakward   3 x 8                                                                     Date: 2020   Prepared by:  Tin French PT, DPT, OCS    Exercises  Supine Quad Set - 5x daily - 7x weekly  Long Sitting Quad Set - 5x daily - 7x weekly  Active Straight Leg Raise with Quad Set - 5 reps - 3 sets - 2x daily - 4x weekly        Manual Therapy: (0min) Done in order to improve joint and soft tissue mobility,reduce muscle guarding, and decrease muscle tone   Date:  7/30 Date:   Date:   Date:     Type Parameters      Joint Mobilization       Soft Tissue Mobilization           Modalities: (-) Done in order to reduce swelling and pain    Assessment: Patient continues to improve overall walking ability and needs fewer cues to perform step thru gait and clear his foot    Plan: continue per POC    Future Appointments   Date Time Provider Liane Valencia   9/3/2020  1:00 PM Desirae Armando, PT [facility]       Unbilled Time      Giselle Spaulding PT, DPT, OCS

## 2020-09-03 ENCOUNTER — HOSPITAL ENCOUNTER (OUTPATIENT)
Dept: PHYSICAL THERAPY | Age: 80
Discharge: HOME | End: 2020-09-03
Payer: MEDICARE

## 2020-09-03 PROCEDURE — 97110 THERAPEUTIC EXERCISES: CPT

## 2020-09-03 NOTE — PROGRESS NOTES
Toby Henriquez  : 1940  [place]  Secondary: [address] at [facility] Therapy  [address], [address]  [phone]   [phone]       OUTPATIENT PHYSICAL THERAPY:Daily Note 9/3/2020      TREATMENT:   PT Patient Time In/Time Out  Time In: 1300  Time Out: 1358      Total Time: 58min  Visit Count:  10   ICD-10: Treatment Diagnosis: M25.561, L06.049, R26.2      Subjective: See Progress Note dated 9/3/2020 for details   Pain: 1/10    Objective: See Progress Note dated 9/3/2020 for details      Therapeutic Exercise: (58 min) Done in order to improve strength, ROM and understanding of current condition.     Date:   Date:   Date:   Date:   Date:   Date:   Date:  9/3   Activity/Exercise          Education          Reassessment       x7min   Quad Set [id number] B in long sitting [id number] B 3 x 10 with 5 sec holds    x6min w/ e-stim @ 85mA   SLR   2 x 10 with ques for keeping knee straight  x6min w/ e-stim @ 85mA x5min w/ e-stim @ 80mA on L    Knee Extension Hangs       Weight shifts x6min w/ e-stim @ 85mA   NuStep x10min lvl 1-2 x10min lvl 1-2 12 min x15min lvl 4-5 x15min lvl 4-5 x15min lvl 4-5 x10min lvl 4-5   Sit to Stand 3x5 3x5 3 x 5  3x5 3x7 3x7 3x5 (lower chair)   Step Taps 3x5 B 4\"   x7min (russian e-stim @ 100mA on L leg) x6min (russian e-stim @ 85mA on L leg) 4\" x5min (russian e-stim @ 85mA on L leg) 4\"    Standing Marches 2x10 B  3 x 10       Step Ups 3x3 B 2\" 3x5 B, 2\"        Heel Raises 2x8 standing         LAQ 3x5  3 x 10 · 3x8 eccentric lowering on L  · 3x12 on R x6min w/ e-stim @ 80mA on L x5min w/ e-stim @ 80mA on L · L: x6min w/ e-stim @ 85mA  · R: 6x10 3lbs   Knee Extension  2x fatigue red TB in standing 2 x 10        Step Overs  3x5 over half foam w/ R        Walking  3x30ft 3 x 50 ft 3x30ft 3x30ft, 100ft [id number] · [id number]  · 2x25ft w/ quad cane   Standing knee ext with ball 2 x 10 2x15      Stepping forward and bakward   3 x 8                                                                     Date: 07/30/2020   Prepared by: Dang Del Valle PT, DPT, OCS    Exercises  Supine Quad Set - 5x daily - 7x weekly  Long Sitting Quad Set - 5x daily - 7x weekly  Active Straight Leg Raise with Quad Set - 5 reps - 3 sets - 2x daily - 4x weekly        Manual Therapy: (0min) Done in order to improve joint and soft tissue mobility,reduce muscle guarding, and decrease muscle tone   Date:  7/30 Date:   Date:   Date:     Type Parameters      Joint Mobilization       Soft Tissue Mobilization           Modalities: (-) Done in order to reduce swelling and pain    Assessment: See Progress Note dated 9/3/2020 for details    Plan: See Progress Note dated 9/3/2020 for details    No future appointments.     Unbilled Time      Dang Del Valle PT, DPT, OCS

## 2020-09-03 NOTE — THERAPY RECERTIFICATION
Rebecca Flores  : 1940  [place]  Secondary: [address] at [address]  [address], [address]  [phone]   [phone]        OUTPATIENT PHYSICAL THERAPY:Recertification [id number]   ICD-10: Treatment Diagnosis: M25.561, Z96.651, R26.2  Precautions/Allergies:   Patient has no known allergies. TREATMENT PLAN:  Effective Dates: 9/3/2020 TO 12/3/2020 (90 days). Frequency/Duration: 2 times a week for 90 Day(s) MEDICAL/REFERRING DIAGNOSIS:  Presence of right artificial knee joint [Z96.651]   DATE OF ONSET: L TKA on   REFERRING PHYSICIAN: Romulo Benavides MD MD Orders: Kimberly Paige and treat s/p R TKA  Return MD Appointment: N/A     INITIAL ASSESSMENT:  Mr. Joseph Stein presents to physical therapy with MD diagnosis of a R TKA and knee pain. Pt demonstrated signs and symptoms consistent with this diagnosis. On objective examination, the patient demonstrated deficits in ROM, strength, ambulatory ability, transfer ability, functional mobility. The patient also had increased pain, swelling. The patient is limited in the following activities: walking, standing, transferring, yard work. The patient has a good  prognosis for recovery based on the examination findings and may be limited by: L knee pain. Patient requires skilled physical therapy services in order to return to prior level of function. REASSESSMENT: Mr. Joseph Stein presented to physical therapy with MD diagnosis of a R TKA. Pt demonstrated signs and symptoms consistent with this diagnosis. On objective examination, the patient demonstrated improvements in ROM, strength ambulatory ability, functional mobility, weight bearing. The patient also has decreased pain These improvements have increased the patient's ability to: walk, stand, transfer. The patient is still limited in the following activities: walking, standing, transferring.  The patient has a good prognosis for recovery based on the examination findings and may be limited by: L quad tear. Patient continues to require skilled physical therapy services in order to return to prior level of function. PROBLEM LIST (Impacting functional limitations):  1. Decreased Strength  2. Decreased ADL/Functional Activities  3. Decreased Transfer Abilities  4. Decreased Ambulation Ability/Technique  5. Decreased Balance  6. Increased Pain  7. Decreased Activity Tolerance  8. Decreased Flexibility/Joint Mobility  9. Decreased [place] with Home Exercise Program INTERVENTIONS PLANNED: (Treatment may consist of any combination of the following)  1. Cryotherapy  2. Electrical Stimulation  3. Home Exercise Program (HEP)  4. Manual Therapy  5. Neuromuscular Re-education/Strengthening  6. Range of Motion (ROM)  7. Therapeutic Activites  8. Therapeutic Exercise/Strengthening     GOALS: (Goals have been discussed and agreed upon with patient.)  Short-Term Functional Goals: Time Frame: 4 weeks  1. Pt will be compliant with progressive HEP-- goal met  2. Pt will have L knee ext on < -10deg-- goal met  3. Pt will be able to perform 3 sit to stands without using arms-- goal met  Discharge Goals: Time Frame: 12 weeks  1. Pt will be able to walk at least 10min w/ cane  2. Pt will improve TUG time to < 15sec in order to reduce fall risk  3. Pt will have knee extension bilaterally of < -5deg  4. Pt will perform at least 5 sit to stands in [address] [place] [address] without using arms    OUTCOME MEASURE:   Tool Used: Lower Extremity Functional Scale (LEFS)  Score:  Initial: 18/80 Most Recent: 27/80 (Date: 9/3/2020 )   Interpretation of Score: 20 questions each scored on a 5 point scale with 0 representing \"extreme difficulty or unable to perform\" and 4 representing \"no difficulty\". The lower the score, the greater the functional disability. 80/80 represents no disability. Minimal detectable change is 9 points.       MEDICAL NECESSITY:   · Patient is expected to demonstrate progress in strength, range of motion, balance, coordination and functional technique to increase independence with ADLs, ambulation, functional mobility. REASON FOR SERVICES/OTHER COMMENTS:  · Pt requires skilled physical therapy intervention in order to return to prior level of function. Total Duration:  PT Patient Time In/Time Out  Time In: 1300  Time Out: 1358    Rehabilitation Potential For Stated Goals: Good  Regarding Anastacia Zapata's therapy, I certify that the treatment plan above will be carried out by a therapist or under their direction. Thank you for this referral,  Giselle Spaulding PT, DPT, OCS  Referring Physician Signature: Brynn Benítez MD _______________________________ Date _____________     PAIN/SUBJECTIVE:   Initial:   5 Post Session:  5/10   HISTORY:   History of Injury/Illness (Reason for Referral):  Pt is s/p R TKA on 6/25/2020. Pt states he has been doing well since then. Pt had the L knee replaced in 12/2019, but tore a ligament in it while getting into a car. Pt states that due to this the L knee has been holding him back some from his R knee rehab because it hurts with standing and will get stuck. This has improved over the last week or so. Pt is using a walker around the house, but will get fatigued easily. Past Medical History/Comorbidities:   Mr. Rodolfo Barragan  has a past medical history of Arthritis, HLD (hyperlipidemia) (12/7/2012), Macular degeneration, and Osteoarthritis of left knee. He also has no past medical history of Malignant hyperthermia due to anesthesia or Pseudocholinesterase deficiency. Mr. Rodolfo Barragan  has a past surgical history that includes hx colonoscopy (2002) and hx colonoscopy (11-1-13).   Social History/Living Environment:     Lives at home with wife, 2 steps getting into the house, no stairs in house  Prior Level of Function/Work/Activity:  Yardwork (mowing), walking without assistive device  Personal Factors:          Sex: male        Age:  [de-identified] y.o. Past/Current Experience:  L TKA in 2019   Ambulatory/Rehab Services H2 Model Falls Risk Assessment   Risk Factors:       (1)  Gender [Male]       (1)  Visual Impairment [specify:  corrective lenses] Ability to Rise from Chair:       (1)  Pushes up, successful in one attempt   Falls Prevention Plan: Mobility Assistance Device (specify):  wheelchair, rolling walker   Total: (5 or greater = High Risk): 3    [facility] [address] [place] [address] Patent #[id number]. Federal Law prohibits the replication, distribution or use without written permission from [facility] [employer]   Current Medications:       Current Outpatient Medications:     polyethylene glycol (MIRALAX) 17 gram packet, Take 17 g by mouth daily. Mix with 8oz water, Disp: , Rfl:     docusate sodium (COLACE) 100 mg capsule, Take 100 mg by mouth two (2) times daily as needed for Constipation. , Disp: , Rfl:     acetaminophen (TYLENOL PO), Take 1,000 mg by mouth every six (6) hours as needed for Pain., Disp: , Rfl:    Date Last Reviewed:  2020   Number of Personal Factors/Comorbidities that affect the Plan of Care: 3+: HIGH COMPLEXITY   EXAMINATION:   Observation  Incision: well-healing, no redness  Assistive Device: Pt arrives to PT in rolling walker        ROM   Right Left   Flexion 118 120   Extension -2 -8     Strength (all MMT scores are graded on a scale of 0-5)   Right Left   Hip      Flexion 5 5   Abduction 4 4   Extension 4 4   Glute Max 4 4   Knee     Extension 4 4   Flexion 5 5   Ankle     Dorsiflexion 5 5   Plantarflexion 5 5       Joint/Soft Tissue Mobility   Description   Joint Mobility  Knee: hypomobility   Soft Tissue Mobility No TTP, good scar mobility       Functional Mobility  TU.9sec (w/ rolling walker)  30sec Sit to Stand: 9x (w/ arms), 4x w/o arms    Balance       Body Structures Involved:  1. Bones  2. Joints  3. Muscles  4.  Ligaments Body Functions Affected:  1. Neuromusculoskeletal  2. Movement Related Activities and Participation Affected:  1. General Tasks and Demands  2. Mobility  3. Self Care  4. Domestic Life  5. Interpersonal Interactions and Relationships  6.  Community, Social and [place]   Number of elements (examined above) that affect the Plan of Care: 4+: HIGH COMPLEXITY   CLINICAL PRESENTATION:   Presentation: Evolving clinical presentation with changing clinical characteristics: MODERATE COMPLEXITY   CLINICAL DECISION MAKING:   Use of outcome tool(s) and clinical judgement create a POC that gives a: Questionable prediction of patient's progress: MODERATE COMPLEXITY     Courtney Gomez PT, DPT, OCS

## 2020-09-08 ENCOUNTER — HOSPITAL ENCOUNTER (OUTPATIENT)
Dept: PHYSICAL THERAPY | Age: 80
Discharge: HOME | End: 2020-09-08
Payer: MEDICARE

## 2020-09-08 PROCEDURE — 97110 THERAPEUTIC EXERCISES: CPT

## 2020-09-08 NOTE — PROGRESS NOTES
Leroy Chen  : 1940  [place]  Secondary: [address] at [place] [address] Therapy  [address], [address]  [phone]   [phone]       OUTPATIENT PHYSICAL THERAPY:Daily Note 2020      TREATMENT:   PT Patient Time In/Time Out  Time In: 0200  Time Out: 0300      Total Time: 60 min  Visit Count:  11   ICD-10: Treatment Diagnosis: M25.561, Z96.651, R26.2      Subjective: Patient reports left leg is feeling much stronger and he can get around a little better.  Pain: 1/10    Objective: Therapeutic Exercise: (60miin) Done in order to improve strength, ROM and understanding of current condition. Date:   Date:   Date:   Date:  9/3 Date  20   Activity/Exercise        Education        Reassessment    x7min    Quad Set    x6min w/ e-stim @ 85mA x6min w/ e-stim @ 85mA   SLR  x6min w/ e-stim @ 85mA x5min w/ e-stim @ 80mA on L     Knee Extension Hangs    Weight shifts x6min w/ e-stim @ 85mA Weight shifts x6min w/ e-stim @ 85mA   NuStep x15min lvl 4-5 x15min lvl 4-5 x15min lvl 4-5 x10min lvl 4-5 X 15 min level 5   Sit to Stand 3x5 3x7 3x7 3x5 (lower chair) 3 x 7 from plinth   Step Taps x7min (russian e-stim @ 100mA on L leg) x6min (russian e-stim @ 85mA on L leg) 4\" x5min (russian e-stim @ 85mA on L leg) 4\"     Standing Marches        Step Ups        Heel Raises        LAQ · 3x8 eccentric lowering on L  · 3x12 on R x6min w/ e-stim @ 80mA on L x5min w/ e-stim @ 80mA on L · L: x6min w/ e-stim @ 85mA  · R: 6x10 3lbs · Left and right x6min w/ e-stim @ 85mA  ·    Knee Extension        Step Overs        Walking 3x30ft 3x30ft, 100ft [id number] · [id number]  · 2x25ft w/ quad cane · 2 x 50 ft 2 x 25 ft with quad cane   Standing knee ext with ball 2x15       Stepping forward and bakward                                                          Date: 2020   Prepared by:  Nahun Zacarias PT, DPT, OCS    Exercises  Supine Quad Set - 5x daily - 7x weekly  Long Sitting Quad Set - 5x daily - 7x weekly  Active Straight Leg Raise with Quad Set - 5 reps - 3 sets - 2x daily - 4x weekly        Manual Therapy: (0min) Done in order to improve joint and soft tissue mobility,reduce muscle guarding, and decrease muscle tone   Date:  7/30 Date:   Date:   Date:     Type Parameters      Joint Mobilization       Soft Tissue Mobilization           Modalities: (-) Done in order to reduce swelling and pain    Assessment: Patient tolerated treatment well. He continues to show good improvement in quad strength especially in his left quad. Patient indicates that he doesn't have a fear of falling like he did before starting therapy.      Plan: See Progress Note dated 9/3/2020 for details    Future Appointments   Date Time Provider Liane Valencia   9/11/2020  3:00 PM Greer Freedman, PT [facility]   9/14/2020 11:00 AM Greer Freedman PT [facility]   9/17/2020  1:00 PM Greer Freedman PT [facility]       [place] Time      Yifan Ayala, PTA

## 2020-09-11 ENCOUNTER — HOSPITAL ENCOUNTER (OUTPATIENT)
Dept: PHYSICAL THERAPY | Age: 80
Discharge: HOME | End: 2020-09-11
Payer: MEDICARE

## 2020-09-11 PROCEDURE — 97110 THERAPEUTIC EXERCISES: CPT

## 2020-09-11 NOTE — PROGRESS NOTES
[facility]  : 1940  [place]  Secondary: [address] at [place] [address] Therapy  [address], [address]  [phone]   [phone]       OUTPATIENT PHYSICAL THERAPY:Daily Note 2020      TREATMENT:   PT Patient Time In/Time Out  Time In: 1303  Time Out: 1400      Total Time: 57 min  Visit Count:  12   ICD-10: Treatment Diagnosis: M25.561, Z96.651, R26.2      Subjective: Patient reports feeling fine.  Pain: 1/10    Objective: Therapeutic Exercise: (55miin) Done in order to improve strength, ROM and understanding of current condition.     Date:   Date:   Date:   Date:  9/3 Date  20 Date:      Activity/Exercise          Education          Reassessment    x7min      Quad Set    x6min w/ e-stim @ 85mA x6min w/ e-stim @ 85mA     SLR  x6min w/ e-stim @ 85mA x5min w/ e-stim @ 80mA on L       Knee Extension     Weight shifts x6min w/ e-stim @ 85mA Weight shifts x6min w/ e-stim @ 85mA Weight shifts x6min w/ e-stim @ 85mA    NuStep x15min lvl 4-5 x15min lvl 4-5 x15min lvl 4-5 x10min lvl 4-5 X 15 min level 5 x15min lvl 5    Sit to Stand 3x5 3x7 3x7 3x5 (lower chair) 3 x 7 from plinth 3x7    Step Taps x7min (russian e-stim @ 100mA on L leg) x6min (russian e-stim @ 85mA on L leg) 4\" x5min (russian e-stim @ 85mA on L leg) 4\"       Standing Marches          Step Ups          Heel Raises          LAQ · 3x8 eccentric lowering on L  · 3x12 on R x6min w/ e-stim @ 80mA on L x5min w/ e-stim @ 80mA on L · L: x6min w/ e-stim @ 85mA  · R: 6x10 3lbs · Left and right x6min w/ e-stim @ 85mA  ·  · Left and right x6min w/ e-stim @ 85mA ·    Knee Extension          Step Overs          Walking 3x30ft 3x30ft, 100ft [id number] · [id number]  · 2x25ft w/ quad cane · 2 x 50 ft 2 x 25 ft with quad cane · 3x65ft w/ quad cane ·    Standing knee ext with ball 2x15         Stepping forward and bakward          Standing Balance [id number] DL stance on airex w/o AD                                                        Date: 07/30/2020   Prepared by:  Jeramy Rivas PT, DPT, OCS    Exercises  Supine Quad Set - 5x daily - 7x weekly  Long Sitting Quad Set - 5x daily - 7x weekly  Active Straight Leg Raise with Quad Set - 5 reps - 3 sets - 2x daily - 4x weekly        Manual Therapy: (0min) Done in order to improve joint and soft tissue mobility,reduce muscle guarding, and decrease muscle tone   Date:  7/30 Date:   Date:   Date:     Type Parameters      Joint Mobilization       Soft Tissue Mobilization           Modalities: (-) Done in order to reduce swelling and pain    Assessment: Patient continues to improve quad activation/strength, as well as ability to walk with decreased assistance and cuing    Plan: See Progress Note dated 9/3/2020 for details    Future Appointments   Date Time Provider Liane Valencia   9/14/2020  3:00 PM David Huggins PT [facility]   9/17/2020  1:00 PM David Huggins PT [facility]       Giselle [place] Time      Jeramy Rivas PT, DPT, OCS

## 2020-09-14 ENCOUNTER — HOSPITAL ENCOUNTER (OUTPATIENT)
Dept: PHYSICAL THERAPY | Age: 80
Discharge: HOME | End: 2020-09-14
Payer: MEDICARE

## 2020-09-14 PROCEDURE — 97110 THERAPEUTIC EXERCISES: CPT

## 2020-09-14 NOTE — PROGRESS NOTES
Clare Bui  : 1940  [place]  Secondary: [address] at [facility] Therapy  [address], [facility], [address]  [phone]   [phone]       OUTPATIENT PHYSICAL THERAPY:Daily Note 2020      TREATMENT:   PT Patient Time In/Time Out  Time In: 1502  Time Out: 1558      Total Time: 56 min  Visit Count:  13   ICD-10: Treatment Diagnosis: M25.561, X40.466, R26.2      Subjective: Patient reports feeling fine.  Pain: 1/10    Objective: Therapeutic Exercise: (56miin) Done in order to improve strength, ROM and understanding of current condition.     Date:   Date:   Date:   Date:  9/3 Date  20 Date:   Date:     Activity/Exercise          Education          Reassessment    x7min      Quad Set    x6min w/ e-stim @ 85mA x6min w/ e-stim @ 85mA     SLR  x6min w/ e-stim @ 85mA x5min w/ e-stim @ 80mA on L       Knee Extension     Weight shifts x6min w/ e-stim @ 85mA Weight shifts x6min w/ e-stim @ 85mA Weight shifts x6min w/ e-stim @ 85mA Weight shifts x5min w/ e-stim @ 85mA   NuStep x15min lvl 4-5 x15min lvl 4-5 x15min lvl 4-5 x10min lvl 4-5 X 15 min level 5 x15min lvl 5 x15min lvl 5-6   Sit to Stand 3x5 3x7 3x7 3x5 (lower chair) 3 x 7 from plinth 3x7 3x8   Step Taps x7min (russian e-stim @ 100mA on L leg) x6min (russian e-stim @ 85mA on L leg) 4\" x5min (russian e-stim @ 85mA on L leg) 4\"       Standing Marches          Step Ups          Heel Raises          LAQ · 3x8 eccentric lowering on L  · 3x12 on R x6min w/ e-stim @ 80mA on L x5min w/ e-stim @ 80mA on L · L: x6min w/ e-stim @ 85mA  · R: 6x10 3lbs · Left and right x6min w/ e-stim @ 85mA  ·  · Left and right x6min w/ e-stim @ 85mA · Left and right x5min w/ e-stim @ 85mA   Knee Extension          Step Overs          Walking 3x30ft 3x30ft, 100ft [id number] · [id number]  · 2x25ft w/ quad cane · 2 x 50 ft 2 x 25 ft with quad cane · 3x65ft w/ quad cane · 4x65ft w/ straight cane   Standing knee ext with ball 2x15         Stepping forward and bakward          Standing Balance      [id number] DL stance on airex w/o AD 3x1min DL on airex w/o AD                                                       Date: 07/30/2020   Prepared by: Shagufta Delgado PT, DPT, OCS    Exercises  Supine Quad Set - 5x daily - 7x weekly  Long Sitting Quad Set - 5x daily - 7x weekly  Active Straight Leg Raise with Quad Set - 5 reps - 3 sets - 2x daily - 4x weekly        Manual Therapy: (0min) Done in order to improve joint and soft tissue mobility,reduce muscle guarding, and decrease muscle tone   Date:  7/30 Date:   Date:   Date:     Type Parameters      Joint Mobilization       Soft Tissue Mobilization           Modalities: (-) Done in order to reduce swelling and pain    Assessment: Patient able to decrease off time on estim from 10sec on/20sec off to 10sec on/10sec off, performing more reps.  Pt also able to progress to single point cane for ambulation in clinic    Plan: See Progress Note dated 9/3/2020 for details    Future Appointments   Date Time Provider Liane Valencia   9/17/2020  1:00 PM Leah Parker, PT [facility]       Neptali [place] Time      Shagufta Delgado PT, DPT, OCS

## 2020-09-17 ENCOUNTER — HOSPITAL ENCOUNTER (OUTPATIENT)
Dept: PHYSICAL THERAPY | Age: 80
Discharge: HOME | End: 2020-09-17
Payer: MEDICARE

## 2020-09-17 PROCEDURE — 97110 THERAPEUTIC EXERCISES: CPT

## 2020-09-17 NOTE — PROGRESS NOTES
Derek Huerta  : 1940  [place]  Secondary: [address] at [facility] Therapy  [address], [address]  [phone]   [phone]       OUTPATIENT PHYSICAL THERAPY:Daily Note 2020      TREATMENT:   PT Patient Time In/Time Out  Time In: 1300  Time Out: 1359      Total Time: 59 min  Visit Count:  14   ICD-10: Treatment Diagnosis: M25.561, Z96.651, R26.2      Subjective: Patient states he is having a good day today. Has been using his cane more at home.  Pain: 1/10    Objective: Tool Used: Lower Extremity Functional Scale (LEFS)  Score:  Initial:  Most Recent:  (Date: 9/3/2020 ) Today:  (Date: 2020)   Interpretation of Score: 20 questions each scored on a 5 point scale with 0 representing \"extreme difficulty or unable to perform\" and 4 representing \"no difficulty\". The lower the score, the greater the functional disability. 80/80 represents no disability. Minimal detectable change is 9 points    Tool Used KOOS Jr  Score: Today:  Most Recent:    Interpretation of Score: 20 questions each scored on a 5 point scale with 0 representing \"extreme difficulty or unable to perform\" and 4 represe    TU.9sec (w/ walker); 18.3sec (w/ straight cane)    Therapeutic Exercise: (59miin) Done in order to improve strength, ROM and understanding of current condition.     Date  20 Date:   Date:   Date:        Activity/Exercise          Education          Reassessment    Mini assessment      Quad Set x6min w/ e-stim @ 85mA         SLR          Knee Extension  Weight shifts x6min w/ e-stim @ 85mA Weight shifts x6min w/ e-stim @ 85mA Weight shifts x5min w/ e-stim @ 85mA Weight shifts x5min w/ e-stim @ 87mA      NuStep X 15 min level 5 x15min lvl 5 x15min lvl 5-6 x10min lvl 6      Sit to Stand 3 x 7 from plinth 3x7 3x8 2x10      Step Taps    · Standing on R: 3x10 taps (2x 2\" 1x 4\")  · Standing on L: 3x10 2\"      Standing Marches          Step Ups          Heel Raises          LAQ · Left and right x6min w/ e-stim @ 85mA  ·  · Left and right x6min w/ e-stim @ 85mA · Left and right x5min w/ e-stim @ 85mA · Left and right x5min w/ e-stim @ 87mA ·  ·  ·    Knee Extension          Step Overs          Walking · 2 x 50 ft 2 x 25 ft with quad cane · 3x65ft w/ quad cane · 4x65ft w/ straight cane · x65ft w/ cane ·  ·  ·    Standing knee ext with ball          Stepping forward and bakward          Standing Balance  [id number] DL stance on airex w/o AD 3x1min DL on airex w/o AD                                                           Date: 07/30/2020   Prepared by: Leelee Gutierrez PT, DPT, OCS    Exercises  Supine Quad Set - 5x daily - 7x weekly  Long Sitting Quad Set - 5x daily - 7x weekly  Active Straight Leg Raise with Quad Set - 5 reps - 3 sets - 2x daily - 4x weekly        Manual Therapy: (0min) Done in order to improve joint and soft tissue mobility,reduce muscle guarding, and decrease muscle tone   Date:  7/30 Date:   Date:   Date:     Type Parameters      Joint Mobilization       Soft Tissue Mobilization           Modalities: (-) Done in order to reduce swelling and pain    Assessment: Patient continues to do well with estim and shortened rest periods. Pt able to perform step taps without estim for the first time and minimal cuing from PT. Pt continues to make significant improvements in gait ability and had improved TUG times with the walker and was able to complete the TUG with a cane for the first time.  Overall patient is doing very well and continues to require skilled physical therapy in order to continue making improvements with functional mobility/ability    Plan: continue per POC    Future Appointments   Date Time Provider Liane Valencia   9/21/2020  3:00 PM TANNER Le   9/24/2020  2:00 PM TANNER Le Time      Hanane Wooten PT, DPT, OCS

## 2020-09-21 ENCOUNTER — HOSPITAL ENCOUNTER (OUTPATIENT)
Dept: PHYSICAL THERAPY | Age: 80
Discharge: HOME | End: 2020-09-21
Payer: MEDICARE

## 2020-09-21 PROCEDURE — 97110 THERAPEUTIC EXERCISES: CPT

## 2020-09-21 NOTE — PROGRESS NOTES
Jasmyne Gibbons  : 1940  [place]  Secondary: [address] at [facility] Therapy  [address], [address]  [phone]   [phone]       OUTPATIENT PHYSICAL THERAPY:Daily Note 2020      TREATMENT:   PT Patient Time In/Time Out  Time In: 1502  Time Out: 1558      Total Time: 56 min  Visit Count:  15   ICD-10: Treatment Diagnosis: M25.561, Z96.651, R26.2      Subjective: Patient states he is doing well   Pain: 1/10    Objective: Tool Used: Lower Extremity Functional Scale (LEFS)  Score:  Initial:  Most Recent:  (Date: 9/3/2020 ) Today:  (Date: 2020)   Interpretation of Score: 20 questions each scored on a 5 point scale with 0 representing \"extreme difficulty or unable to perform\" and 4 representing \"no difficulty\". The lower the score, the greater the functional disability. 80/80 represents no disability. Minimal detectable change is 9 points    Tool Used KOOS Jr  Score: Today:  Most Recent:    Interpretation of Score: 20 questions each scored on a 5 point scale with 0 representing \"extreme difficulty or unable to perform\" and 4 represe    TU.9sec (w/ walker); 18.3sec (w/ straight cane)    Therapeutic Exercise: (56min) Done in order to improve strength, ROM and understanding of current condition.     Date  20 Date:   Date:   Date:        Activity/Exercise          Education          Reassessment    Mini assessment      Quad Set x6min w/ e-stim @ 85mA         SLR          Knee Extension  Weight shifts x6min w/ e-stim @ 85mA Weight shifts x6min w/ e-stim @ 85mA Weight shifts x5min w/ e-stim @ 85mA Weight shifts x5min w/ e-stim @ 87mA      NuStep X 15 min level 5 x15min lvl 5 x15min lvl 5-6 x10min lvl 6 x15min lvl 6-7     Sit to Stand 3 x 7 from plinth 3x7 3x8 2x10 3x10     Step Taps    · Standing on R: 3x10 taps (2x 2\" 1x 4\")  · Standing on L: 3x10 2\" · [id number] R/L estim on L quad  · Standing on R: 2x10 taps 4\"  · Standing on L: 2x5 2\"     Standing Marches          Step Ups          Heel Raises          LAQ · Left and right x6min w/ e-stim @ 85mA  ·  · Left and right x6min w/ e-stim @ 85mA · Left and right x5min w/ e-stim @ 85mA · Left and right x5min w/ e-stim @ 87mA · Left and right x6min w/ e-stim @ 87mA ·  ·    Knee Extension          Step Overs          Walking · 2 x 50 ft 2 x 25 ft with quad cane · 3x65ft w/ quad cane · 4x65ft w/ straight cane · x65ft w/ cane · x100ft outside w/ cane ·  ·    Standing knee ext with ball          Stepping forward and bakward          Standing Balance  [id number] DL stance on airex w/o AD 3x1min DL on airex w/o AD  3x1min DL on airex w/o AD                                                         Date: 07/30/2020   Prepared by: Magi Hernández PT, DPT, OCS    Exercises  Supine Quad Set - 5x daily - 7x weekly  Long Sitting Quad Set - 5x daily - 7x weekly  Active Straight Leg Raise with Quad Set - 5 reps - 3 sets - 2x daily - 4x weekly        Manual Therapy: (0min) Done in order to improve joint and soft tissue mobility,reduce muscle guarding, and decrease muscle tone   Date:  7/30 Date:   Date:   Date:     Type Parameters      Joint Mobilization       Soft Tissue Mobilization           Modalities: (-) Done in order to reduce swelling and pain    Assessment: Patient continues to progress well with decrease dependence on rolling walker with exercises in clinic and for balance. Pt able to progress off e-stim for some exercises and increase amount of exercise done.  Pt continues to fatigue in the L quad > R quad    Plan: continue per POC    Future Appointments   Date Time Provider Liane Valencia   9/25/2020 11:00 AM Lillard Jeans, PT [facility] Time      Magi Hernández PT, DPT, OCS

## 2020-09-28 ENCOUNTER — HOSPITAL ENCOUNTER (OUTPATIENT)
Dept: PHYSICAL THERAPY | Age: 80
Discharge: HOME | End: 2020-09-28
Payer: MEDICARE

## 2020-09-28 PROCEDURE — 97110 THERAPEUTIC EXERCISES: CPT

## 2020-09-28 NOTE — PROGRESS NOTES
Julia Nicholson  : 1940  [place]  Secondary: [address] at [facility] Therapy  [address], [address]  [phone]   [phone]       OUTPATIENT PHYSICAL THERAPY:Daily Note 2020      TREATMENT:   PT Patient Time In/Time Out  Time In: 1303  Time Out: 1359      Total Time: 56 min  Visit Count:  16   ICD-10: Treatment Diagnosis: M25.561, Z96.651, R26.2      Subjective: Patient states he is having a good day   Pain: 1/10    Objective: Therapeutic Exercise: (56min) Done in order to improve strength, ROM and understanding of current condition.     Date  20 Date:   Date:   Date:   Date:   Date:      Activity/Exercise          Education          Reassessment    Mini assessment      Quad Set x6min w/ e-stim @ 85mA         SLR          Knee Extension  Weight shifts x6min w/ e-stim @ 85mA Weight shifts x6min w/ e-stim @ 85mA Weight shifts x5min w/ e-stim @ 85mA Weight shifts x5min w/ e-stim @ 87mA      NuStep X 15 min level 5 x15min lvl 5 x15min lvl 5-6 x10min lvl 6 x15min lvl 6-7 x12min (lvl 6-7)    Sit to Stand 3 x 7 from plinth 3x7 3x8 2x10 3x10     Step Taps    · Standing on R: 3x10 taps (2x 2\" 1x 4\")  · Standing on L: 3x10 2\" · [id number] R/L estim on L quad  · Standing on R: 2x10 taps 4\"  · Standing on L: 2x5 2\" · 3x10 6\" on R (no cane) 4\" on L  · 3x6 L foot on 4\" box step up     Standing Marches          Step Ups          Heel Raises      2x10    LAQ · Left and right x6min w/ e-stim @ 85mA  ·  · Left and right x6min w/ e-stim @ 85mA · Left and right x5min w/ e-stim @ 85mA · Left and right x5min w/ e-stim @ 87mA · Left and right x6min w/ e-stim @ 87mA · 4x fatigue (2x w/ 3lbs) ·    Knee Extension          Step Overs          Walking · 2 x 50 ft 2 x 25 ft with quad cane · 3x65ft w/ quad cane · 4x65ft w/ straight cane · x65ft w/ cane · x100ft outside w/ cane · x130ft outside w/ cane and walker ·    Standing knee ext with ball          Stepping forward and bakward          Standing Balance  [id number] DL stance on airex w/o AD 3x1min DL on airex w/o AD  3x1min DL on airex w/o AD     Hip ABD      1x10 B                                              Date: 07/30/2020   Prepared by: Julieta Zapien PT, [facility], OCS    Exercises  Supine Quad Set - 5x daily - 7x weekly  Long Sitting Quad Set - 5x daily - 7x weekly  Active Straight Leg Raise with Quad Set - 5 reps - 3 sets - 2x daily - 4x weekly        Manual Therapy: (0min) Done in order to improve joint and soft tissue mobility,reduce muscle guarding, and decrease muscle tone   Date:  7/30 Date:   Date:   Date:     Type Parameters      Joint Mobilization       Soft Tissue Mobilization           Modalities: (-) Done in order to reduce swelling and pain    Assessment: Patient continues to progress strength and endurance, and was able to perform harder exercises today. Able to stand and do step ups without cane when standing on L leg    Plan: continue per POC    No future appointments.     Unbilled Time      Julieta Zapien PT, [facility], OCS

## 2020-10-07 ENCOUNTER — HOSPITAL ENCOUNTER (OUTPATIENT)
Dept: PHYSICAL THERAPY | Age: 80
Discharge: HOME | End: 2020-10-07
Payer: MEDICARE

## 2020-10-07 PROCEDURE — 97110 THERAPEUTIC EXERCISES: CPT

## 2020-10-07 NOTE — PROGRESS NOTES
Amarjit Valdovinos  : 1940  [place]  Secondary: [address] at [facility] Therapy  [address], [address]  [phone]   [phone]       OUTPATIENT PHYSICAL THERAPY:Daily Note 10/7/2020      TREATMENT:   PT Patient Time In/Time Out  Time In: 1358  Time Out: 1447      Total Time: 49 min  Visit Count:  17   ICD-10: Treatment Diagnosis: M25.561, Z96.651, R26.2      Subjective: Patient states he is having a good day   Pain: 1/10    Objective: Therapeutic Exercise: (45min) Done in order to improve strength, ROM and understanding of current condition.     Date  20 Date:   Date:   Date:   Date:   Date:   Date:  10/7   Activity/Exercise          Education          Reassessment    Mini assessment      Quad Set x6min w/ e-stim @ 85mA         SLR          Knee Extension  Weight shifts x6min w/ e-stim @ 85mA Weight shifts x6min w/ e-stim @ 85mA Weight shifts x5min w/ e-stim @ 85mA Weight shifts x5min w/ e-stim @ 87mA      NuStep X 15 min level 5 x15min lvl 5 x15min lvl 5-6 x10min lvl 6 x15min lvl 6-7 x12min (lvl 6-7) x10min (lvl 6)   Sit to Stand 3 x 7 from plinth 3x7 3x8 2x10 3x10  3x10   Step Taps    · Standing on R: 3x10 taps (2x 2\" 1x 4\")  · Standing on L: 3x10 2\" · [id number] R/L estim on L quad  · Standing on R: 2x10 taps 4\"  · Standing on L: 2x5 2\" · 3x10 6\" on R (no cane) 4\" on L  · 3x6 L foot on 4\" box step up     Standing Marches          Step Ups       3x6 Up and down curb   Heel Raises      2x10 2x10   LAQ · Left and right x6min w/ e-stim @ 85mA  ·  · Left and right x6min w/ e-stim @ 85mA · Left and right x5min w/ e-stim @ 85mA · Left and right x5min w/ e-stim @ 87mA · Left and right x6min w/ e-stim @ 87mA · 4x fatigue (2x w/ 3lbs) · 3x fatigue 3lbs   Knee Extension          Step Overs          Walking · 2 x 50 ft 2 x 25 ft with quad cane · 3x65ft w/ quad cane · 4x65ft w/ straight cane · x65ft w/ cane · x100ft outside w/ cane · x130ft outside w/ cane and walker · [id number] outside w/ cane   Standing knee ext with ball          Stepping forward and bakward          Standing Balance  [id number] DL stance on airex w/o AD 3x1min DL on airex w/o AD  3x1min DL on airex w/o AD     Hip ABD      1x10 B                                              Date: 07/30/2020   Prepared by:  Israel Ferrer PT, DPT, OCS    Exercises  Supine Quad Set - 5x daily - 7x weekly  Long Sitting Quad Set - 5x daily - 7x weekly  Active Straight Leg Raise with Quad Set - 5 reps - 3 sets - 2x daily - 4x weekly        Manual Therapy: (0min) Done in order to improve joint and soft tissue mobility,reduce muscle guarding, and decrease muscle tone   Date:  7/30 Date:   Date:   Date:     Type Parameters      Joint Mobilization       Soft Tissue Mobilization           Modalities: (-) Done in order to reduce swelling and pain    Assessment: Patient continues to make gains in R and L quad endurance and strength, as well as functional ability/mobility    Plan: continue per POC    Future Appointments   Date Time Provider Liane Valencia   10/16/2020 11:00 [facility]       Units: 3TE      Israel Ferrer PT, DPT, OCS

## 2020-10-16 ENCOUNTER — APPOINTMENT (OUTPATIENT)
Dept: PHYSICAL THERAPY | Age: 80
End: 2020-10-16
Payer: MEDICARE

## 2020-10-24 ENCOUNTER — APPOINTMENT (OUTPATIENT)
Dept: CT IMAGING | Age: 80
DRG: 470 | End: 2020-10-24
Attending: STUDENT IN AN ORGANIZED HEALTH CARE EDUCATION/TRAINING PROGRAM
Payer: MEDICARE

## 2020-10-24 ENCOUNTER — APPOINTMENT (OUTPATIENT)
Dept: GENERAL RADIOLOGY | Age: 80
DRG: 470 | End: 2020-10-24
Attending: EMERGENCY MEDICINE
Payer: MEDICARE

## 2020-10-24 ENCOUNTER — HOSPITAL ENCOUNTER (EMERGENCY)
Age: 80
Discharge: HOME | DRG: 470 | End: 2020-10-24
Attending: STUDENT IN AN ORGANIZED HEALTH CARE EDUCATION/TRAINING PROGRAM
Payer: MEDICARE

## 2020-10-24 ENCOUNTER — APPOINTMENT (OUTPATIENT)
Dept: ULTRASOUND IMAGING | Age: 80
DRG: 470 | End: 2020-10-24
Attending: STUDENT IN AN ORGANIZED HEALTH CARE EDUCATION/TRAINING PROGRAM
Payer: MEDICARE

## 2020-10-24 VITALS
BODY MASS INDEX: 30.06 KG/M2 | TEMPERATURE: 99.7 F | SYSTOLIC BLOOD PRESSURE: 143 MMHG | RESPIRATION RATE: 16 BRPM | HEIGHT: 70 IN | WEIGHT: 210 LBS | DIASTOLIC BLOOD PRESSURE: 69 MMHG | OXYGEN SATURATION: 97 % | HEART RATE: 75 BPM

## 2020-10-24 DIAGNOSIS — S82.031A CLOSED DISPLACED TRANSVERSE FRACTURE OF RIGHT PATELLA, INITIAL ENCOUNTER: Primary | ICD-10-CM

## 2020-10-24 LAB
ALBUMIN SERPL-MCNC: 3.5 G/DL (ref 3.2–4.6)
ALBUMIN/GLOB SERPL: 1.1 (ref 1.2–3.5)
ALP SERPL-CCNC: 85 U/L (ref 50–136)
ALT SERPL-CCNC: 19 U/L (ref 12–65)
ANION GAP SERPL CALC-SCNC: 5 MMOL/L (ref 7–16)
AST SERPL-CCNC: 18 U/L (ref 15–37)
BASOPHILS # BLD: 0.1 K/UL (ref 0–0.2)
BASOPHILS NFR BLD: 1 % (ref 0–2)
BILIRUB SERPL-MCNC: 0.7 MG/DL (ref 0.2–1.1)
BUN SERPL-MCNC: 17 MG/DL (ref 8–23)
CALCIUM SERPL-MCNC: 9.2 MG/DL (ref 8.3–10.4)
CHLORIDE SERPL-SCNC: 108 MMOL/L (ref 98–107)
CO2 SERPL-SCNC: 29 MMOL/L (ref 21–32)
CREAT SERPL-MCNC: 0.8 MG/DL (ref 0.8–1.5)
DIFFERENTIAL METHOD BLD: ABNORMAL
EOSINOPHIL # BLD: 0.1 K/UL (ref 0–0.8)
EOSINOPHIL NFR BLD: 1 % (ref 0.5–7.8)
ERYTHROCYTE [DISTWIDTH] IN BLOOD BY AUTOMATED COUNT: 14.1 % (ref 11.9–14.6)
GLOBULIN SER CALC-MCNC: 3.3 G/DL (ref 2.3–3.5)
GLUCOSE SERPL-MCNC: 97 MG/DL (ref 65–100)
HCT VFR BLD AUTO: 40.3 % (ref 41.1–50.3)
HGB BLD-MCNC: 13.4 G/DL (ref 13.6–17.2)
IMM GRANULOCYTES # BLD AUTO: 0 K/UL (ref 0–0.5)
IMM GRANULOCYTES NFR BLD AUTO: 0 % (ref 0–5)
LYMPHOCYTES # BLD: 1 K/UL (ref 0.5–4.6)
LYMPHOCYTES NFR BLD: 10 % (ref 13–44)
MCH RBC QN AUTO: 29.4 PG (ref 26.1–32.9)
MCHC RBC AUTO-ENTMCNC: 33.3 G/DL (ref 31.4–35)
MCV RBC AUTO: 88.4 FL (ref 79.6–97.8)
MONOCYTES # BLD: 0.6 K/UL (ref 0.1–1.3)
MONOCYTES NFR BLD: 7 % (ref 4–12)
NEUTS SEG # BLD: 8 K/UL (ref 1.7–8.2)
NEUTS SEG NFR BLD: 83 % (ref 43–78)
NRBC # BLD: 0 K/UL (ref 0–0.2)
PLATELET # BLD AUTO: 193 K/UL (ref 150–450)
PMV BLD AUTO: 10.6 FL (ref 9.4–12.3)
POTASSIUM SERPL-SCNC: 4.1 MMOL/L (ref 3.5–5.1)
PROT SERPL-MCNC: 6.8 G/DL (ref 6.3–8.2)
RBC # BLD AUTO: 4.56 M/UL (ref 4.23–5.6)
SODIUM SERPL-SCNC: 142 MMOL/L (ref 136–145)
WBC # BLD AUTO: 9.7 K/UL (ref 4.3–11.1)

## 2020-10-24 PROCEDURE — 80053 COMPREHEN METABOLIC PANEL: CPT

## 2020-10-24 PROCEDURE — 99284 EMERGENCY DEPT VISIT MOD MDM: CPT

## 2020-10-24 PROCEDURE — 85025 COMPLETE CBC W/AUTO DIFF WBC: CPT

## 2020-10-24 PROCEDURE — 73706 CT ANGIO LWR EXTR W/O&W/DYE: CPT

## 2020-10-24 PROCEDURE — 93926 LOWER EXTREMITY STUDY: CPT

## 2020-10-24 PROCEDURE — 74011000636 HC RX REV CODE- 636: Performed by: STUDENT IN AN ORGANIZED HEALTH CARE EDUCATION/TRAINING PROGRAM

## 2020-10-24 PROCEDURE — 73562 X-RAY EXAM OF KNEE 3: CPT

## 2020-10-24 PROCEDURE — 74011000258 HC RX REV CODE- 258: Performed by: STUDENT IN AN ORGANIZED HEALTH CARE EDUCATION/TRAINING PROGRAM

## 2020-10-24 PROCEDURE — 93971 EXTREMITY STUDY: CPT

## 2020-10-24 RX ORDER — SODIUM CHLORIDE 0.9 % (FLUSH) 0.9 %
5-10 SYRINGE (ML) INJECTION
Status: COMPLETED | OUTPATIENT
Start: 2020-10-24 | End: 2020-10-24

## 2020-10-24 RX ORDER — HYDROCODONE BITARTRATE AND ACETAMINOPHEN 5; 325 MG/1; MG/1
1 TABLET ORAL
Qty: 10 TAB | Refills: 0 | Status: SHIPPED | OUTPATIENT
Start: 2020-10-24 | End: 2020-10-29

## 2020-10-24 RX ORDER — MELOXICAM 7.5 MG/1
7.5 TABLET ORAL 2 TIMES DAILY
COMMUNITY
End: 2020-10-29

## 2020-10-24 RX ADMIN — Medication 10 ML: at 17:20

## 2020-10-24 RX ADMIN — IOPAMIDOL 100 ML: 755 INJECTION, SOLUTION INTRAVENOUS at 17:19

## 2020-10-24 RX ADMIN — SODIUM CHLORIDE 100 ML: 900 INJECTION, SOLUTION INTRAVENOUS at 17:20

## 2020-10-24 NOTE — ED TRIAGE NOTES
Patient arrives via [facility] from home with mask in place. Normally walks with walker. Patient reports left leg buckling, falling on right leg. Pulses documented in right foot. Patient with dislocation of right knee. Can wiggle toes, move hip.

## 2020-10-24 NOTE — ED PROVIDER NOTES
18-year-old male patient presents to the emergency department via EMS with reports of right knee pain after he was ambulating and suffered a fall while at home. Patient states he was attempting to ambulate down 2 steps when his left knee buckled, causing him to fall forward awkwardly on his right knee. Patient denies direct impact on the anterior aspect of the knee. EMS report states that patient's knee appeared dislocated on scene. Patient reports pain at the time but states his pain is improved now and is really not received any medication for discomfort since arriving to the department. He denies numbness, tingling or weakness. He reports history of bilateral knee replacements states that the right side was done most recently. Patient denies head strike or loss of consciousness. Reports no dizzy or lightheaded feeling prior to or after the event. Past Medical History:   Diagnosis Date    Arthritis     HLD (hyperlipidemia) 2012    Macular degeneration     Osteoarthritis of left knee        Past Surgical History:   Procedure Laterality Date    HX COLONOSCOPY      HX COLONOSCOPY  13    diverticulosis, internal hemorrhoids. no further screening recommended         No family history on file. Social History     Socioeconomic History    Marital status:      Spouse name: Not on file    Number of children: Not on file    Years of education: Not on file    Highest education level: Not on file   Occupational History    Not on file   Social Needs    Financial resource strain: Not on file    Food insecurity     Worry: Not on file     Inability: Not on file    Transportation needs     Medical: Not on file     Non-medical: Not on file   Tobacco Use    Smoking status: Former Smoker     Types: Cigarettes     Last attempt to quit: 1976     Years since quittin.8    Smokeless tobacco: Never Used   Substance and Sexual Activity    Alcohol use:  Yes     Alcohol/week: 0.0 standard drinks     Comment: 1weekly    Drug use: Never    Sexual activity: Not on file   Lifestyle    Physical activity     Days per week: Not on file     Minutes per session: Not on file    Stress: Not on file   Relationships    Social connections     Talks on phone: Not on file     Gets together: Not on file     Attends Mosque service: Not on file     Active member of club or organization: Not on file     Attends meetings of clubs or organizations: Not on file     Relationship status: Not on file    Intimate partner violence     Fear of current or ex partner: Not on file     Emotionally abused: Not on file     Physically abused: Not on file     Forced sexual activity: Not on file   Other Topics Concern    Not on file   Social History Narrative    Not on file         ALLERGIES: Patient has no known allergies. Review of Systems   Constitutional: Negative for chills, diaphoresis and fever. HENT: Negative for congestion, sneezing and sore throat. Eyes: Negative for visual disturbance. Respiratory: Negative for cough, chest tightness, shortness of breath and wheezing. Cardiovascular: Negative for chest pain and leg swelling. Gastrointestinal: Negative for abdominal pain, blood in stool, diarrhea, nausea and vomiting. Endocrine: Negative for polyuria. Genitourinary: Negative for difficulty urinating, dysuria, flank pain, hematuria and urgency. Musculoskeletal: Positive for arthralgias and joint swelling. Negative for back pain, myalgias, neck pain and neck stiffness. Skin: Negative for color change and rash. Neurological: Negative for dizziness, syncope, speech difficulty, weakness, light-headedness, numbness and headaches. Psychiatric/Behavioral: Negative for behavioral problems. All other systems reviewed and are negative.       Vitals:    10/24/20 1419   BP: (!) 176/86   Pulse: 76   Resp: 16   Temp: 98.5 °F (36.9 °C)   SpO2: 98%   Weight: 95.3 kg (210 lb)   Height: 5' 10\" (1.778 m)            Physical Exam  Vitals signs and nursing note reviewed. Constitutional:       General: He is not in acute distress. Appearance: He is well-developed. He is not diaphoretic. Comments: Generally well-appearing, elderly male patient. Alert and oriented to person place and time. No acute distress, speaks in clear, fluid sentences. HENT:      Head: Normocephalic and atraumatic. Right Ear: External ear normal.      Left Ear: External ear normal.      Nose: Nose normal.   Eyes:      Pupils: Pupils are equal, round, and reactive to light. Neck:      Musculoskeletal: Normal range of motion. Cardiovascular:      Rate and Rhythm: Normal rate and regular rhythm. Heart sounds: Normal heart sounds. No murmur. No friction rub. No gallop. Pulmonary:      Effort: Pulmonary effort is normal. No respiratory distress. Breath sounds: Normal breath sounds. No stridor. No decreased breath sounds, wheezing, rhonchi or rales. Chest:      Chest wall: No tenderness. Abdominal:      General: There is no distension. Palpations: Abdomen is soft. There is no mass. Tenderness: There is no abdominal tenderness. There is no guarding or rebound. Hernia: No hernia is present. Musculoskeletal: Normal range of motion. General: No tenderness or deformity. Comments: Swollen right knee with well-healed surgical incision site over the anterior aspect. Knee is held in slight flexion though supported by pillow underneath. Distal pulses are intact. Sensation maintained. The hip on the right side appears unremarkable and is nontender to palpation. Compartments of the upper lower leg are soft. Skin:     General: Skin is warm and dry. Neurological:      Mental Status: He is alert and oriented to person, place, and time. Cranial Nerves: No cranial nerve deficit.           MDM  Number of Diagnoses or Management Options  Diagnosis management comments: EMS reports evidence of dislocation on exam exam initially. Patient's story is unclear as to the exact mechanism or point of impact during his fall. Will obtain CT imaging to rule out vascular injury given reports of by EMS personnel. There is evidence of a patellar fracture on plain film imaging. The joint does appear intact with no evidence of dislocation.        Amount and/or Complexity of Data Reviewed  Clinical lab tests: ordered and reviewed  Tests in the radiology section of CPT®: ordered and reviewed    Risk of Complications, Morbidity, and/or Mortality  Presenting problems: moderate  Diagnostic procedures: low  Management options: moderate    Patient Progress  Patient progress: stable         Procedures

## 2020-10-24 NOTE — ED NOTES
Report given to [facility], RN. Tamara Fields assumes pt care at this time.

## 2020-10-25 NOTE — DISCHARGE INSTRUCTIONS
Patient Education      Knee immobilizer in place until follow-up with Dr. Riki Landis. Please call this office on Monday to arrange this appointment. Take the medication prescribed as needed for pain. Keep your leg elevated with ice in place to help with pain and swelling. Return for worsening symptoms, concerns or questions. Broken Kneecap: Care Instructions  Your Care Instructions     The kneecap (patella) is a bone that protects the front of your knee joint. It takes the brunt of any blows to your knee, such as a fall onto your knee or your knee hitting the dashboard. Symptoms of a broken kneecap (fracture) are swelling and pain, especially when moving the knee back and forth. You may not need surgery if the fracture has not moved your kneecap out of position. But sometimes surgery is needed to move the pieces of the kneecap back where they belong and to repair damage. Whether or not you have surgery, you probably will wear a cast or immobilizer on your leg for several weeks while the kneecap heals. Wear and take care of the cast or immobilizer exactly as your doctor advises. You may need to ask for help with daily tasks. You heal best when you take good care of yourself. Eat a variety of healthy foods, and don't smoke. Follow-up care is a key part of your treatment and safety. Be sure to make and go to all appointments, and call your doctor if you are having problems. It's also a good idea to know your test results and keep a list of the medicines you take. How can you care for yourself at home? · Follow your doctor's instructions for taking care of your cast or immobilizer, which is a protective brace that keeps your knee from moving. Do not remove it until your doctor says you can. · Prop up the sore leg on a pillow when you ice it or anytime you sit or lie down during the next 3 days. Try to keep it above the level of your heart. This will help reduce swelling.   · Put ice or a cold pack on your knee for 10 to 20 minutes at a time. Try to do this every 1 to 2 hours for the next 3 days (when you are awake) or until the swelling goes down. Put a thin cloth between the ice and your skin. Be careful not to get the cast or immobilizer wet. · Do not use oils or lotions near your cast. If the skin becomes red or sore around the edge of the cast, you may pad the edges with a soft material, such as moleskin, or use tape to cover the edges. · Be safe with medicines. Read and follow all instructions on the label. ? If the doctor gave you a prescription medicine for pain, take it as prescribed. ? If you are not taking a prescription pain medicine, ask your doctor if you can take an over-the-counter medicine. When should you call for help? Call 911 anytime you think you may need emergency care. For example, call if:    · You have chest pain, are short of breath, or you cough up blood.     · You are very sleepy and you have trouble waking up. Call your doctor now or seek immediate medical care if:    · You have new or worse nausea or vomiting.     · You have new or worse pain.     · Your foot is cool or pale or changes color.     · You have tingling, weakness, or numbness in your toes.     · Your cast or splint feels too tight.     · You have signs of a blood clot in your leg (called a deep vein thrombosis), such as:  ? Pain in your calf, back of the knee, thigh, or groin. ? Redness or swelling in your leg. Watch closely for changes in your health, and be sure to contact your doctor if:    · You have a problem with your splint or cast.     · You do not get better as expected. Where can you learn more? Go to http://www.gray.com/  Enter Z583 in the search box to learn more about \"Broken Kneecap: Care Instructions. \"  Current as of: March 2, 2020               Content Version: 12.6  © 7334-8961 [employer].    Care instructions adapted under license by [employer] (which disclaims liability or warranty for this information). If you have questions about a medical condition or this instruction, always ask your healthcare professional. [address] any warranty or liability for your use of this information.

## 2020-10-25 NOTE — ED NOTES
I have reviewed discharge instructions with the patient. The patient verbalized understanding. Patient left ED via Discharge Method: ambulatory to Home with family    Opportunity for questions and clarification provided. Patient given 1 scripts. Pt in no acute distress at time of d/c      To continue your aftercare when you leave the hospital, you may receive an automated call from our care team to check in on how you are doing. This is a free service and part of our promise to provide the best care and service to meet your aftercare needs.  If you have questions, or wish to unsubscribe from this service please call [phone]. Thank you for Choosing our [facility] Emergency Department.

## 2020-10-26 ENCOUNTER — HOSPITAL ENCOUNTER (INPATIENT)
Age: 80
LOS: 3 days | Discharge: HOME | DRG: 470 | End: 2020-10-29
Attending: ORTHOPAEDIC SURGERY | Admitting: ORTHOPAEDIC SURGERY
Payer: MEDICARE

## 2020-10-26 ENCOUNTER — ANESTHESIA EVENT (OUTPATIENT)
Dept: SURGERY | Age: 80
DRG: 470 | End: 2020-10-26
Payer: MEDICARE

## 2020-10-26 DIAGNOSIS — Z96.651 TOTAL KNEE REPLACEMENT STATUS, RIGHT: Primary | ICD-10-CM

## 2020-10-26 PROBLEM — S82.001A RIGHT PATELLA FRACTURE: Status: ACTIVE | Noted: 2020-10-26

## 2020-10-26 LAB
ABO + RH BLD: NORMAL
ANION GAP SERPL CALC-SCNC: 5 MMOL/L (ref 7–16)
APTT PPP: 32.4 SEC (ref 24.1–35.1)
BASOPHILS # BLD: 0.1 K/UL (ref 0–0.2)
BASOPHILS NFR BLD: 1 % (ref 0–2)
BLOOD GROUP ANTIBODIES SERPL: NORMAL
BUN SERPL-MCNC: 22 MG/DL (ref 8–23)
CALCIUM SERPL-MCNC: 9 MG/DL (ref 8.3–10.4)
CHLORIDE SERPL-SCNC: 105 MMOL/L (ref 98–107)
CO2 SERPL-SCNC: 30 MMOL/L (ref 21–32)
CREAT SERPL-MCNC: 0.92 MG/DL (ref 0.8–1.5)
DIFFERENTIAL METHOD BLD: ABNORMAL
EOSINOPHIL # BLD: 0.1 K/UL (ref 0–0.8)
EOSINOPHIL NFR BLD: 2 % (ref 0.5–7.8)
ERYTHROCYTE [DISTWIDTH] IN BLOOD BY AUTOMATED COUNT: 14.3 % (ref 11.9–14.6)
EST. AVERAGE GLUCOSE BLD GHB EST-MCNC: 105 MG/DL
GLUCOSE SERPL-MCNC: 93 MG/DL (ref 65–100)
HBA1C MFR BLD: 5.3 %
HCT VFR BLD AUTO: 39.6 % (ref 41.1–50.3)
HGB BLD-MCNC: 13.1 G/DL (ref 13.6–17.2)
IMM GRANULOCYTES # BLD AUTO: 0 K/UL (ref 0–0.5)
IMM GRANULOCYTES NFR BLD AUTO: 0 % (ref 0–5)
INR PPP: 1
LYMPHOCYTES # BLD: 1.3 K/UL (ref 0.5–4.6)
LYMPHOCYTES NFR BLD: 16 % (ref 13–44)
MCH RBC QN AUTO: 30 PG (ref 26.1–32.9)
MCHC RBC AUTO-ENTMCNC: 33.1 G/DL (ref 31.4–35)
MCV RBC AUTO: 90.6 FL (ref 79.6–97.8)
MONOCYTES # BLD: 0.7 K/UL (ref 0.1–1.3)
MONOCYTES NFR BLD: 9 % (ref 4–12)
NEUTS SEG # BLD: 5.7 K/UL (ref 1.7–8.2)
NEUTS SEG NFR BLD: 72 % (ref 43–78)
NRBC # BLD: 0 K/UL (ref 0–0.2)
PLATELET # BLD AUTO: 231 K/UL (ref 150–450)
PMV BLD AUTO: 11 FL (ref 9.4–12.3)
POTASSIUM SERPL-SCNC: 3.7 MMOL/L (ref 3.5–5.1)
PROTHROMBIN TIME: 14 SEC (ref 12.5–14.7)
RBC # BLD AUTO: 4.37 M/UL (ref 4.23–5.6)
SODIUM SERPL-SCNC: 140 MMOL/L (ref 136–145)
SPECIMEN EXP DATE BLD: NORMAL
WBC # BLD AUTO: 7.9 K/UL (ref 4.3–11.1)

## 2020-10-26 PROCEDURE — 85730 THROMBOPLASTIN TIME PARTIAL: CPT

## 2020-10-26 PROCEDURE — 83036 HEMOGLOBIN GLYCOSYLATED A1C: CPT

## 2020-10-26 PROCEDURE — 74011000302 HC RX REV CODE- 302: Performed by: ORTHOPAEDIC SURGERY

## 2020-10-26 PROCEDURE — 65270000029 HC RM PRIVATE

## 2020-10-26 PROCEDURE — 77030040361 HC SLV COMPR DVT MDII -B

## 2020-10-26 PROCEDURE — 74011250637 HC RX REV CODE- 250/637: Performed by: ORTHOPAEDIC SURGERY

## 2020-10-26 PROCEDURE — 74011250636 HC RX REV CODE- 250/636: Performed by: ANESTHESIOLOGY

## 2020-10-26 PROCEDURE — 86580 TB INTRADERMAL TEST: CPT | Performed by: ORTHOPAEDIC SURGERY

## 2020-10-26 PROCEDURE — 85025 COMPLETE CBC W/AUTO DIFF WBC: CPT

## 2020-10-26 PROCEDURE — 80048 BASIC METABOLIC PNL TOTAL CA: CPT

## 2020-10-26 PROCEDURE — 2709999900 HC NON-CHARGEABLE SUPPLY

## 2020-10-26 PROCEDURE — 86900 BLOOD TYPING SEROLOGIC ABO: CPT

## 2020-10-26 PROCEDURE — 85610 PROTHROMBIN TIME: CPT

## 2020-10-26 RX ORDER — CELECOXIB 200 MG/1
200 CAPSULE ORAL ONCE
Status: COMPLETED | OUTPATIENT
Start: 2020-10-27 | End: 2020-10-27

## 2020-10-26 RX ORDER — VIT C/E/ZN/COPPR/LUTEIN/ZEAXAN 250MG-90MG
1 CAPSULE ORAL 2 TIMES DAILY WITH MEALS
COMMUNITY
End: 2020-10-29

## 2020-10-26 RX ORDER — HYDROMORPHONE HYDROCHLORIDE 1 MG/ML
1 INJECTION, SOLUTION INTRAMUSCULAR; INTRAVENOUS; SUBCUTANEOUS
Status: DISCONTINUED | OUTPATIENT
Start: 2020-10-26 | End: 2020-10-27 | Stop reason: SDUPTHER

## 2020-10-26 RX ORDER — FENTANYL CITRATE 50 UG/ML
100 INJECTION, SOLUTION INTRAMUSCULAR; INTRAVENOUS ONCE
Status: COMPLETED | OUTPATIENT
Start: 2020-10-27 | End: 2020-10-27

## 2020-10-26 RX ORDER — OXYCODONE HYDROCHLORIDE 5 MG/1
10 TABLET ORAL
Status: DISCONTINUED | OUTPATIENT
Start: 2020-10-26 | End: 2020-10-28

## 2020-10-26 RX ORDER — CHOLECALCIFEROL (VITAMIN D3) 50 MCG
CAPSULE ORAL
COMMUNITY
End: 2020-10-29

## 2020-10-26 RX ORDER — LIDOCAINE HYDROCHLORIDE 10 MG/ML
0.1 INJECTION INFILTRATION; PERINEURAL AS NEEDED
Status: DISCONTINUED | OUTPATIENT
Start: 2020-10-26 | End: 2020-10-27 | Stop reason: HOSPADM

## 2020-10-26 RX ORDER — FENTANYL CITRATE 50 UG/ML
100 INJECTION, SOLUTION INTRAMUSCULAR; INTRAVENOUS ONCE
Status: DISCONTINUED | OUTPATIENT
Start: 2020-10-26 | End: 2020-10-26

## 2020-10-26 RX ORDER — MIDAZOLAM HYDROCHLORIDE 1 MG/ML
2 INJECTION, SOLUTION INTRAMUSCULAR; INTRAVENOUS ONCE
Status: DISCONTINUED | OUTPATIENT
Start: 2020-10-27 | End: 2020-10-27 | Stop reason: HOSPADM

## 2020-10-26 RX ORDER — ASPIRIN 81 MG/1
81 TABLET ORAL DAILY
COMMUNITY
End: 2020-10-29

## 2020-10-26 RX ORDER — MIDAZOLAM HYDROCHLORIDE 1 MG/ML
2 INJECTION, SOLUTION INTRAMUSCULAR; INTRAVENOUS ONCE
Status: DISCONTINUED | OUTPATIENT
Start: 2020-10-26 | End: 2020-10-26

## 2020-10-26 RX ORDER — CELECOXIB 200 MG/1
200 CAPSULE ORAL ONCE
Status: DISCONTINUED | OUTPATIENT
Start: 2020-10-26 | End: 2020-10-26

## 2020-10-26 RX ORDER — SODIUM CHLORIDE, SODIUM LACTATE, POTASSIUM CHLORIDE, CALCIUM CHLORIDE 600; 310; 30; 20 MG/100ML; MG/100ML; MG/100ML; MG/100ML
75 INJECTION, SOLUTION INTRAVENOUS CONTINUOUS
Status: DISPENSED | OUTPATIENT
Start: 2020-10-26 | End: 2020-10-27

## 2020-10-26 RX ORDER — MIDAZOLAM HYDROCHLORIDE 1 MG/ML
2 INJECTION, SOLUTION INTRAMUSCULAR; INTRAVENOUS
Status: COMPLETED | OUTPATIENT
Start: 2020-10-26 | End: 2020-10-27

## 2020-10-26 RX ADMIN — TUBERCULIN PURIFIED PROTEIN DERIVATIVE 5 UNITS: 5 INJECTION, SOLUTION INTRADERMAL at 15:00

## 2020-10-26 RX ADMIN — OXYCODONE 10 MG: 5 TABLET ORAL at 16:06

## 2020-10-26 RX ADMIN — SODIUM CHLORIDE, SODIUM LACTATE, POTASSIUM CHLORIDE, AND CALCIUM CHLORIDE 75 ML/HR: 600; 310; 30; 20 INJECTION, SOLUTION INTRAVENOUS at 15:00

## 2020-10-26 RX ADMIN — Medication 3 AMPULE: at 15:01

## 2020-10-26 NOTE — ANESTHESIA PREPROCEDURE EVALUATION
Relevant Problems   No relevant active problems       Anesthetic History   No history of anesthetic complications            Review of Systems / Medical History  Patient summary reviewed and pertinent labs reviewed    Pulmonary  Within defined limits                 Neuro/Psych   Within defined limits           Cardiovascular              Hyperlipidemia    Exercise tolerance: >4 METS  Comments: Denies CP, SOB or changes in functional status   GI/Hepatic/Renal  Within defined limits              Endo/Other        Obesity and arthritis     Other Findings              Physical Exam    Airway  Mallampati: II  TM Distance: 4 - 6 cm  Neck ROM: normal range of motion   Mouth opening: Normal     Cardiovascular    Rhythm: regular  Rate: normal         Dental    Dentition: Caps/crowns     Pulmonary  Breath sounds clear to auscultation               Abdominal  GI exam deferred       Other Findings            Anesthetic Plan    ASA: 2  Anesthesia type: spinal      Post-op pain plan if not by surgeon: peripheral nerve block single      Anesthetic plan and risks discussed with: Patient

## 2020-10-26 NOTE — PROGRESS NOTES
Admission assessment complete. Pt resting in bed. Call light within reach. Pt oriented to room and menu. Knee brace in place. Pain controlled at this time. No needs at this time.

## 2020-10-26 NOTE — H&P
H&P    Patient ID:  Leila Roberts  [id number]  54 y.o.  1940  Surgeon:  Navi Hanna MD  Date of Surgery: * No surgery date entered *  Procedure: Right Knee Total Arthroplasty patella ORIF  Primary Care Physician: José Miguel Munoz MD        Subjective:  Leila Roberts is a 79 y.o. WHITE [place]  male who presents with Right Knee pain. he has history of Right Knee pain for 3 days. He had a fall on Saturday and went to the ER and xrays showed a displaced patella fracture with stable total knee implants in place. . Symptoms worse with walking long distances and relieved with rest. Conservative treatment consisting of  activity modification and injections have not helped. The patient lives with their family. The patients goal after surgery is improved pain and function. Past Medical History:   Diagnosis Date    Arthritis     HLD (hyperlipidemia) 2012    Macular degeneration     Osteoarthritis of left knee       Past Surgical History:   Procedure Laterality Date    HX COLONOSCOPY      HX COLONOSCOPY  13    diverticulosis, internal hemorrhoids. no further screening recommended     No family history on file. Social History     Tobacco Use    Smoking status: Former Smoker     Types: Cigarettes     Last attempt to quit: 1976     Years since quittin.8    Smokeless tobacco: Never Used   Substance Use Topics    Alcohol use: Yes     Alcohol/week: 0.0 standard drinks     Comment: 1weekly       Prior to Admission medications    Medication Sig Start Date End Date Taking? Authorizing Provider   meloxicam (MOBIC) 7.5 mg tablet Take 7.5 mg by mouth two (2) times a day. José Miguel Munoz MD   HYDROcodone-acetaminophen (NORCO) 5-325 mg per tablet Take 1 Tab by mouth every four (4) hours as needed for Pain for up to 3 days. Max Daily Amount: 6 Tabs. 10/24/20 10/27/20  Suleiman Talamantes DO   polyethylene glycol (MIRALAX) 17 gram packet Take 17 g by mouth daily.  Mix with 8oz water    Provider, Historical   docusate sodium (COLACE) 100 mg capsule Take 100 mg by mouth two (2) times daily as needed for Constipation. Reza Resendez MD   acetaminophen (TYLENOL PO) Take 1,000 mg by mouth every six (6) hours as needed for Pain. Provider, Historical     No Known Allergies     REVIEW OF SYSTEMS:  CONSTITUTIONAL: Denies fever, decreased appetite, weight loss/gain, night sweats or fatigue. HEENT: Denies vision or hearing changes. denies glasses. denies hearing aids. CARDIAC: Denies CP, palpitations, rheumatic fever, murmur, peripheral edema, carotid artery disease or syncopal episodes. RESPIRATORY: Denies dyspnea on exertion, asthma, COPD or orthopnea. GI: Denies GERD, history of GI bleed or melena, PUD, hepatitis or cirrhosis. : Denies dysuria, hematuria. denies BPH symptoms. HEMATOLOGIC: Denies anemia or blood disorders. ENDOCRINE: Denies thyroid disease. MUSCULOSKELETAL: See HPI. NEUROLOGIC: Denies seizure, peripheral neuropathy or memory loss. PSYCH: Denies depression, anxiety or insomnia. SKIN: Denies rash or open sores. Objective:    PHYSICAL EXAM  GENERAL: No data found. EYES: PERRL. EOM intact. MOUTH:Teeth and Gums normal. NECK: Full ROM. Trachea midline. No thyromegaly or JVD. CARDIOVASCULAR: Regular rate and rhythm. No murmur or gallops. No carotid bruits. Peripheral pulses: radial 2 +, PT 2+, DP 2+ bilaterally. LUNGS: CTA bilaterally. No wheezes, rhonchi or rales. GI: positive BS. Abdomen nontender. NEUROLOGIC: Alert and oriented x 3. Bilateral equal strong had grasp and bilateral equal strong plantar flexion and dorsiflexion. GAIT: abnormal MUSCULOSKELETAL: ROM: full with pain. Tenderness: over the medial and lateral joint lines. Crepitus: present. SKIN: There is swellling and some bruusing No rash, bruising, redness or warmth. Labs:  No results found for this or any previous visit (from the past 24 hour(s)).     Xray Right Knee: displaced right knee patella fracture with stable total knee implants in place. Assessment:  Right total knee patella fracute   Total Right Knee Arthroplasty petalla ORIF Indicated. Patient Active Problem List   Diagnosis Code    HLD (hyperlipidemia) E78.5    Primary osteoarthritis involving multiple joints M89.49    Arthritis of knee, left M17.12    Total knee replacement status, left Z96.652    Unilateral primary osteoarthritis, left knee M17.12    Arthritis of knee, right M17.11    Total knee replacement status, right Z96.651       Plan:  I have advised the patient of the risks and consequences, including possible complications of performing total joint replacement, as well as not doing this operation. The patient had the opportunity to ask questions and have them answered to their satisfaction.      Signed:  TRUDY Gill 10/26/2020

## 2020-10-26 NOTE — PROGRESS NOTES
PT     This pt is going to OR tomorrow for patella ORIF.   Will await post op orders before we initiate PT eval.    Marlene Sanches, PT

## 2020-10-27 ENCOUNTER — ANESTHESIA (OUTPATIENT)
Dept: SURGERY | Age: 80
DRG: 470 | End: 2020-10-27
Payer: MEDICARE

## 2020-10-27 LAB
HGB BLD-MCNC: 13.1 G/DL (ref 13.6–17.2)
MM INDURATION POC: 0 MM (ref 0–5)
PPD POC: NEGATIVE NEGATIVE

## 2020-10-27 PROCEDURE — 3E0T3BZ INTRODUCTION OF ANESTHETIC AGENT INTO PERIPHERAL NERVES AND PLEXI, PERCUTANEOUS APPROACH: ICD-10-PCS | Performed by: ANESTHESIOLOGY

## 2020-10-27 PROCEDURE — 74011250636 HC RX REV CODE- 250/636: Performed by: NURSE ANESTHETIST, CERTIFIED REGISTERED

## 2020-10-27 PROCEDURE — 77030003602 HC NDL NRV BLK BBMI -B: Performed by: ANESTHESIOLOGY

## 2020-10-27 PROCEDURE — 77030007880 HC KT SPN EPDRL BBMI -B: Performed by: ANESTHESIOLOGY

## 2020-10-27 PROCEDURE — 74011250636 HC RX REV CODE- 250/636: Performed by: ANESTHESIOLOGY

## 2020-10-27 PROCEDURE — 77030033067 HC SUT PDO STRATFX SPIR J&J -B: Performed by: ORTHOPAEDIC SURGERY

## 2020-10-27 PROCEDURE — 3E0T33Z INTRODUCTION OF ANTI-INFLAMMATORY INTO PERIPHERAL NERVES AND PLEXI, PERCUTANEOUS APPROACH: ICD-10-PCS | Performed by: ANESTHESIOLOGY

## 2020-10-27 PROCEDURE — 0SRC0JZ REPLACEMENT OF RIGHT KNEE JOINT WITH SYNTHETIC SUBSTITUTE, OPEN APPROACH: ICD-10-PCS | Performed by: ORTHOPAEDIC SURGERY

## 2020-10-27 PROCEDURE — 76010010054 HC POST OP PAIN BLOCK: Performed by: ORTHOPAEDIC SURGERY

## 2020-10-27 PROCEDURE — 77030009534: Performed by: ORTHOPAEDIC SURGERY

## 2020-10-27 PROCEDURE — 76060000035 HC ANESTHESIA 2 TO 2.5 HR: Performed by: ORTHOPAEDIC SURGERY

## 2020-10-27 PROCEDURE — 74011000250 HC RX REV CODE- 250: Performed by: ORTHOPAEDIC SURGERY

## 2020-10-27 PROCEDURE — 77030040922 HC BLNKT HYPOTHRM STRY -A: Performed by: ANESTHESIOLOGY

## 2020-10-27 PROCEDURE — 2709999900 HC NON-CHARGEABLE SUPPLY

## 2020-10-27 PROCEDURE — 77030012935 HC DRSG AQUACEL BMS -B: Performed by: ORTHOPAEDIC SURGERY

## 2020-10-27 PROCEDURE — 85018 HEMOGLOBIN: CPT

## 2020-10-27 PROCEDURE — 36415 COLL VENOUS BLD VENIPUNCTURE: CPT

## 2020-10-27 PROCEDURE — 74011000250 HC RX REV CODE- 250: Performed by: NURSE ANESTHETIST, CERTIFIED REGISTERED

## 2020-10-27 PROCEDURE — 74011250636 HC RX REV CODE- 250/636: Performed by: ORTHOPAEDIC SURGERY

## 2020-10-27 PROCEDURE — 77030018547 HC SUT ETHBND1 J&J -B: Performed by: ORTHOPAEDIC SURGERY

## 2020-10-27 PROCEDURE — 74011250637 HC RX REV CODE- 250/637: Performed by: ORTHOPAEDIC SURGERY

## 2020-10-27 PROCEDURE — 94762 N-INVAS EAR/PLS OXIMTRY CONT: CPT

## 2020-10-27 PROCEDURE — 74011000258 HC RX REV CODE- 258: Performed by: ORTHOPAEDIC SURGERY

## 2020-10-27 PROCEDURE — 2709999900 HC NON-CHARGEABLE SUPPLY: Performed by: ORTHOPAEDIC SURGERY

## 2020-10-27 PROCEDURE — 0LUQ0KZ SUPPLEMENT RIGHT KNEE TENDON WITH NONAUTOLOGOUS TISSUE SUBSTITUTE, OPEN APPROACH: ICD-10-PCS | Performed by: ORTHOPAEDIC SURGERY

## 2020-10-27 PROCEDURE — 0QSD0ZZ REPOSITION RIGHT PATELLA, OPEN APPROACH: ICD-10-PCS | Performed by: ORTHOPAEDIC SURGERY

## 2020-10-27 PROCEDURE — 77030000032 HC CUF TRNQT ZIMM -B: Performed by: ORTHOPAEDIC SURGERY

## 2020-10-27 PROCEDURE — 74011000250 HC RX REV CODE- 250: Performed by: ANESTHESIOLOGY

## 2020-10-27 PROCEDURE — 76210000006 HC OR PH I REC 0.5 TO 1 HR: Performed by: ORTHOPAEDIC SURGERY

## 2020-10-27 PROCEDURE — 65270000029 HC RM PRIVATE

## 2020-10-27 PROCEDURE — 77030003862 HC BIT DRL SYNT -B: Performed by: ORTHOPAEDIC SURGERY

## 2020-10-27 PROCEDURE — C1713 ANCHOR/SCREW BN/BN,TIS/BN: HCPCS | Performed by: ORTHOPAEDIC SURGERY

## 2020-10-27 PROCEDURE — 76942 ECHO GUIDE FOR BIOPSY: CPT | Performed by: ORTHOPAEDIC SURGERY

## 2020-10-27 PROCEDURE — 77030006777 HC BLD SAW OSC CNMD -B: Performed by: ORTHOPAEDIC SURGERY

## 2020-10-27 PROCEDURE — 77030002937 HC SUT MERS J&J -B: Performed by: ORTHOPAEDIC SURGERY

## 2020-10-27 PROCEDURE — 77030006804 HC BLD SAW RECIP CNMD -B: Performed by: ORTHOPAEDIC SURGERY

## 2020-10-27 PROCEDURE — 77030020275 HC MISC ORTHOPEDIC

## 2020-10-27 PROCEDURE — 77030008462 HC STPLR SKN PROX J&J -A: Performed by: ORTHOPAEDIC SURGERY

## 2020-10-27 PROCEDURE — 77030040830 HC CATH URETH FOL MDII -A: Performed by: ORTHOPAEDIC SURGERY

## 2020-10-27 PROCEDURE — 76010000172 HC OR TIME 2.5 TO 3 HR INTENSV-TIER 1: Performed by: ORTHOPAEDIC SURGERY

## 2020-10-27 PROCEDURE — 77030031139 HC SUT VCRL2 J&J -A: Performed by: ORTHOPAEDIC SURGERY

## 2020-10-27 PROCEDURE — 77030025623 HC BUR RND PRECIS STRY -D: Performed by: ORTHOPAEDIC SURGERY

## 2020-10-27 PROCEDURE — 77030003665 HC NDL SPN BBMI -A: Performed by: ANESTHESIOLOGY

## 2020-10-27 PROCEDURE — 77030019557 HC ELECTRD VES SEAL MEDT -F: Performed by: ORTHOPAEDIC SURGERY

## 2020-10-27 DEVICE — 4.0MM CANCELLOUS BONE SCREW FULLY THREADED/50MM: Type: IMPLANTABLE DEVICE | Site: KNEE | Status: FUNCTIONAL

## 2020-10-27 DEVICE — IMPLANTABLE DEVICE: Type: IMPLANTABLE DEVICE | Site: KNEE | Status: FUNCTIONAL

## 2020-10-27 DEVICE — WASHER 7.0MM: Type: IMPLANTABLE DEVICE | Site: KNEE | Status: FUNCTIONAL

## 2020-10-27 RX ORDER — CEFAZOLIN SODIUM/WATER 2 G/20 ML
2 SYRINGE (ML) INTRAVENOUS EVERY 8 HOURS
Status: COMPLETED | OUTPATIENT
Start: 2020-10-27 | End: 2020-10-28

## 2020-10-27 RX ORDER — NALOXONE HYDROCHLORIDE 0.4 MG/ML
.2-.4 INJECTION, SOLUTION INTRAMUSCULAR; INTRAVENOUS; SUBCUTANEOUS
Status: DISCONTINUED | OUTPATIENT
Start: 2020-10-27 | End: 2020-10-29 | Stop reason: HOSPADM

## 2020-10-27 RX ORDER — ASPIRIN 81 MG/1
81 TABLET ORAL EVERY 12 HOURS
Status: DISCONTINUED | OUTPATIENT
Start: 2020-10-27 | End: 2020-10-29 | Stop reason: HOSPADM

## 2020-10-27 RX ORDER — EPHEDRINE SULFATE/0.9% NACL/PF 50 MG/5 ML
SYRINGE (ML) INTRAVENOUS AS NEEDED
Status: DISCONTINUED | OUTPATIENT
Start: 2020-10-27 | End: 2020-10-27 | Stop reason: HOSPADM

## 2020-10-27 RX ORDER — SODIUM CHLORIDE 9 MG/ML
100 INJECTION, SOLUTION INTRAVENOUS CONTINUOUS
Status: DISPENSED | OUTPATIENT
Start: 2020-10-27 | End: 2020-10-29

## 2020-10-27 RX ORDER — PROMETHAZINE HYDROCHLORIDE 25 MG/1
25 TABLET ORAL
Status: DISCONTINUED | OUTPATIENT
Start: 2020-10-27 | End: 2020-10-29 | Stop reason: HOSPADM

## 2020-10-27 RX ORDER — BUPIVACAINE HYDROCHLORIDE 7.5 MG/ML
INJECTION, SOLUTION INTRASPINAL
Status: COMPLETED | OUTPATIENT
Start: 2020-10-27 | End: 2020-10-27

## 2020-10-27 RX ORDER — DEXAMETHASONE SODIUM PHOSPHATE 4 MG/ML
INJECTION, SOLUTION INTRA-ARTICULAR; INTRALESIONAL; INTRAMUSCULAR; INTRAVENOUS; SOFT TISSUE
Status: COMPLETED | OUTPATIENT
Start: 2020-10-27 | End: 2020-10-27

## 2020-10-27 RX ORDER — POLYETHYLENE GLYCOL 3350 17 G/17G
17 POWDER, FOR SOLUTION ORAL DAILY
Status: DISCONTINUED | OUTPATIENT
Start: 2020-10-28 | End: 2020-10-29 | Stop reason: HOSPADM

## 2020-10-27 RX ORDER — SODIUM CHLORIDE 0.9 % (FLUSH) 0.9 %
5-40 SYRINGE (ML) INJECTION AS NEEDED
Status: DISCONTINUED | OUTPATIENT
Start: 2020-10-27 | End: 2020-10-29 | Stop reason: HOSPADM

## 2020-10-27 RX ORDER — AMOXICILLIN 250 MG
2 CAPSULE ORAL DAILY
Status: DISCONTINUED | OUTPATIENT
Start: 2020-10-28 | End: 2020-10-29 | Stop reason: HOSPADM

## 2020-10-27 RX ORDER — DEXAMETHASONE SODIUM PHOSPHATE 100 MG/10ML
10 INJECTION INTRAMUSCULAR; INTRAVENOUS ONCE
Status: ACTIVE | OUTPATIENT
Start: 2020-10-28 | End: 2020-10-29

## 2020-10-27 RX ORDER — SODIUM CHLORIDE, SODIUM LACTATE, POTASSIUM CHLORIDE, CALCIUM CHLORIDE 600; 310; 30; 20 MG/100ML; MG/100ML; MG/100ML; MG/100ML
75 INJECTION, SOLUTION INTRAVENOUS CONTINUOUS
Status: DISCONTINUED | OUTPATIENT
Start: 2020-10-27 | End: 2020-10-27 | Stop reason: HOSPADM

## 2020-10-27 RX ORDER — ONDANSETRON 8 MG/1
8 TABLET, ORALLY DISINTEGRATING ORAL
Status: DISCONTINUED | OUTPATIENT
Start: 2020-10-27 | End: 2020-10-29 | Stop reason: HOSPADM

## 2020-10-27 RX ORDER — NALOXONE HYDROCHLORIDE 0.4 MG/ML
.2-.4 INJECTION, SOLUTION INTRAMUSCULAR; INTRAVENOUS; SUBCUTANEOUS
Status: DISCONTINUED | OUTPATIENT
Start: 2020-10-27 | End: 2020-10-27 | Stop reason: SDUPTHER

## 2020-10-27 RX ORDER — HYDROCODONE BITARTRATE AND ACETAMINOPHEN 7.5; 325 MG/1; MG/1
1-2 TABLET ORAL
Status: DISCONTINUED | OUTPATIENT
Start: 2020-10-27 | End: 2020-10-29 | Stop reason: HOSPADM

## 2020-10-27 RX ORDER — CEFAZOLIN SODIUM/WATER 2 G/20 ML
SYRINGE (ML) INTRAVENOUS AS NEEDED
Status: DISCONTINUED | OUTPATIENT
Start: 2020-10-27 | End: 2020-10-27 | Stop reason: HOSPADM

## 2020-10-27 RX ORDER — FLUMAZENIL 0.1 MG/ML
0.2 INJECTION INTRAVENOUS AS NEEDED
Status: DISCONTINUED | OUTPATIENT
Start: 2020-10-27 | End: 2020-10-27 | Stop reason: HOSPADM

## 2020-10-27 RX ORDER — DEXAMETHASONE SODIUM PHOSPHATE 100 MG/10ML
INJECTION INTRAMUSCULAR; INTRAVENOUS AS NEEDED
Status: DISCONTINUED | OUTPATIENT
Start: 2020-10-27 | End: 2020-10-27 | Stop reason: HOSPADM

## 2020-10-27 RX ORDER — OXYCODONE HYDROCHLORIDE 5 MG/1
5 TABLET ORAL
Status: DISCONTINUED | OUTPATIENT
Start: 2020-10-27 | End: 2020-10-27 | Stop reason: HOSPADM

## 2020-10-27 RX ORDER — SODIUM CHLORIDE 9 MG/ML
100 INJECTION, SOLUTION INTRAVENOUS CONTINUOUS
Status: DISCONTINUED | OUTPATIENT
Start: 2020-10-27 | End: 2020-10-27 | Stop reason: SDUPTHER

## 2020-10-27 RX ORDER — PROPOFOL 10 MG/ML
INJECTION, EMULSION INTRAVENOUS AS NEEDED
Status: DISCONTINUED | OUTPATIENT
Start: 2020-10-27 | End: 2020-10-27 | Stop reason: HOSPADM

## 2020-10-27 RX ORDER — PROMETHAZINE HYDROCHLORIDE 25 MG/1
25 TABLET ORAL
Status: DISCONTINUED | OUTPATIENT
Start: 2020-10-27 | End: 2020-10-27 | Stop reason: SDUPTHER

## 2020-10-27 RX ORDER — TRANEXAMIC ACID 100 MG/ML
INJECTION, SOLUTION INTRAVENOUS AS NEEDED
Status: DISCONTINUED | OUTPATIENT
Start: 2020-10-27 | End: 2020-10-27 | Stop reason: HOSPADM

## 2020-10-27 RX ORDER — HYDROMORPHONE HYDROCHLORIDE 1 MG/ML
1 INJECTION, SOLUTION INTRAMUSCULAR; INTRAVENOUS; SUBCUTANEOUS
Status: DISCONTINUED | OUTPATIENT
Start: 2020-10-27 | End: 2020-10-29 | Stop reason: HOSPADM

## 2020-10-27 RX ORDER — OXYCODONE HYDROCHLORIDE 5 MG/1
10 TABLET ORAL
Status: DISCONTINUED | OUTPATIENT
Start: 2020-10-27 | End: 2020-10-27 | Stop reason: HOSPADM

## 2020-10-27 RX ORDER — ACETAMINOPHEN 500 MG
1000 TABLET ORAL EVERY 6 HOURS
Status: DISCONTINUED | OUTPATIENT
Start: 2020-10-27 | End: 2020-10-29 | Stop reason: HOSPADM

## 2020-10-27 RX ORDER — HYDROCODONE BITARTRATE AND ACETAMINOPHEN 7.5; 325 MG/1; MG/1
1-2 TABLET ORAL
Status: DISCONTINUED | OUTPATIENT
Start: 2020-10-27 | End: 2020-10-27 | Stop reason: SDUPTHER

## 2020-10-27 RX ORDER — NALOXONE HYDROCHLORIDE 0.4 MG/ML
0.1 INJECTION, SOLUTION INTRAMUSCULAR; INTRAVENOUS; SUBCUTANEOUS
Status: DISCONTINUED | OUTPATIENT
Start: 2020-10-27 | End: 2020-10-27 | Stop reason: HOSPADM

## 2020-10-27 RX ORDER — VANCOMYCIN HYDROCHLORIDE 1 G/20ML
INJECTION, POWDER, LYOPHILIZED, FOR SOLUTION INTRAVENOUS AS NEEDED
Status: DISCONTINUED | OUTPATIENT
Start: 2020-10-27 | End: 2020-10-27 | Stop reason: HOSPADM

## 2020-10-27 RX ORDER — HYDROMORPHONE HYDROCHLORIDE 2 MG/ML
0.5 INJECTION, SOLUTION INTRAMUSCULAR; INTRAVENOUS; SUBCUTANEOUS
Status: DISCONTINUED | OUTPATIENT
Start: 2020-10-27 | End: 2020-10-27 | Stop reason: HOSPADM

## 2020-10-27 RX ORDER — DIPHENHYDRAMINE HCL 25 MG
25 CAPSULE ORAL
Status: DISCONTINUED | OUTPATIENT
Start: 2020-10-27 | End: 2020-10-27 | Stop reason: SDUPTHER

## 2020-10-27 RX ORDER — SODIUM CHLORIDE 0.9 % (FLUSH) 0.9 %
5-40 SYRINGE (ML) INJECTION EVERY 8 HOURS
Status: DISCONTINUED | OUTPATIENT
Start: 2020-10-27 | End: 2020-10-29 | Stop reason: HOSPADM

## 2020-10-27 RX ORDER — PROPOFOL 10 MG/ML
INJECTION, EMULSION INTRAVENOUS
Status: DISCONTINUED | OUTPATIENT
Start: 2020-10-27 | End: 2020-10-27 | Stop reason: HOSPADM

## 2020-10-27 RX ORDER — ACETAMINOPHEN 500 MG
1000 TABLET ORAL EVERY 6 HOURS
Status: DISCONTINUED | OUTPATIENT
Start: 2020-10-27 | End: 2020-10-27 | Stop reason: SDUPTHER

## 2020-10-27 RX ORDER — DIPHENHYDRAMINE HCL 25 MG
25 CAPSULE ORAL
Status: DISCONTINUED | OUTPATIENT
Start: 2020-10-27 | End: 2020-10-29 | Stop reason: HOSPADM

## 2020-10-27 RX ORDER — CELECOXIB 200 MG/1
200 CAPSULE ORAL EVERY 12 HOURS
Status: DISCONTINUED | OUTPATIENT
Start: 2020-10-27 | End: 2020-10-29 | Stop reason: HOSPADM

## 2020-10-27 RX ORDER — ROPIVACAINE HYDROCHLORIDE 2 MG/ML
INJECTION, SOLUTION EPIDURAL; INFILTRATION; PERINEURAL AS NEEDED
Status: DISCONTINUED | OUTPATIENT
Start: 2020-10-27 | End: 2020-10-27 | Stop reason: HOSPADM

## 2020-10-27 RX ORDER — DIPHENHYDRAMINE HYDROCHLORIDE 50 MG/ML
12.5 INJECTION, SOLUTION INTRAMUSCULAR; INTRAVENOUS
Status: DISCONTINUED | OUTPATIENT
Start: 2020-10-27 | End: 2020-10-27 | Stop reason: HOSPADM

## 2020-10-27 RX ORDER — ONDANSETRON 2 MG/ML
INJECTION INTRAMUSCULAR; INTRAVENOUS AS NEEDED
Status: DISCONTINUED | OUTPATIENT
Start: 2020-10-27 | End: 2020-10-27 | Stop reason: HOSPADM

## 2020-10-27 RX ADMIN — Medication 15 MG: at 14:02

## 2020-10-27 RX ADMIN — OXYCODONE 10 MG: 5 TABLET ORAL at 01:49

## 2020-10-27 RX ADMIN — ONDANSETRON 4 MG: 2 INJECTION INTRAMUSCULAR; INTRAVENOUS at 14:15

## 2020-10-27 RX ADMIN — DEXAMETHASONE SODIUM PHOSPHATE 4 MG: 4 INJECTION, SOLUTION INTRAMUSCULAR; INTRAVENOUS at 13:10

## 2020-10-27 RX ADMIN — CELECOXIB 200 MG: 200 CAPSULE ORAL at 08:12

## 2020-10-27 RX ADMIN — ROPIVACAINE HYDROCHLORIDE 20 ML: 150 INJECTION, SOLUTION EPIDURAL; INFILTRATION; PERINEURAL at 13:10

## 2020-10-27 RX ADMIN — SODIUM CHLORIDE, SODIUM LACTATE, POTASSIUM CHLORIDE, AND CALCIUM CHLORIDE: 600; 310; 30; 20 INJECTION, SOLUTION INTRAVENOUS at 14:13

## 2020-10-27 RX ADMIN — BUPIVACAINE HYDROCHLORIDE IN DEXTROSE 15 MG: 7.5 INJECTION, SOLUTION SUBARACHNOID at 13:55

## 2020-10-27 RX ADMIN — SODIUM CHLORIDE, SODIUM LACTATE, POTASSIUM CHLORIDE, AND CALCIUM CHLORIDE 75 ML/HR: 600; 310; 30; 20 INJECTION, SOLUTION INTRAVENOUS at 01:49

## 2020-10-27 RX ADMIN — TRANEXAMIC ACID 1 G: 100 INJECTION, SOLUTION INTRAVENOUS at 14:01

## 2020-10-27 RX ADMIN — ASPIRIN 81 MG: 81 TABLET, COATED ORAL at 22:48

## 2020-10-27 RX ADMIN — PROPOFOL 50 MG: 10 INJECTION, EMULSION INTRAVENOUS at 13:58

## 2020-10-27 RX ADMIN — Medication 10 MG: at 15:07

## 2020-10-27 RX ADMIN — MIDAZOLAM 1 MG: 1 INJECTION INTRAMUSCULAR; INTRAVENOUS at 13:04

## 2020-10-27 RX ADMIN — Medication 10 ML: at 22:47

## 2020-10-27 RX ADMIN — Medication 1 AMPULE: at 22:47

## 2020-10-27 RX ADMIN — SODIUM CHLORIDE, SODIUM LACTATE, POTASSIUM CHLORIDE, AND CALCIUM CHLORIDE: 600; 310; 30; 20 INJECTION, SOLUTION INTRAVENOUS at 11:48

## 2020-10-27 RX ADMIN — HYDROCODONE BITARTRATE AND ACETAMINOPHEN 2 TABLET: 7.5; 325 TABLET ORAL at 22:48

## 2020-10-27 RX ADMIN — FENTANYL CITRATE 50 MCG: 50 INJECTION, SOLUTION INTRAMUSCULAR; INTRAVENOUS at 13:05

## 2020-10-27 RX ADMIN — PROPOFOL 50 MCG/KG/MIN: 10 INJECTION, EMULSION INTRAVENOUS at 13:58

## 2020-10-27 RX ADMIN — CEFAZOLIN SODIUM 2 G: 100 INJECTION, POWDER, LYOPHILIZED, FOR SOLUTION INTRAVENOUS at 22:47

## 2020-10-27 RX ADMIN — Medication 10 MG: at 14:59

## 2020-10-27 RX ADMIN — CELECOXIB 200 MG: 200 CAPSULE ORAL at 22:53

## 2020-10-27 RX ADMIN — Medication 2 G: at 14:00

## 2020-10-27 RX ADMIN — DEXAMETHASONE SODIUM PHOSPHATE 4 MG: 10 INJECTION INTRAMUSCULAR; INTRAVENOUS at 14:06

## 2020-10-27 RX ADMIN — HYDROCODONE BITARTRATE AND ACETAMINOPHEN 2 TABLET: 7.5; 325 TABLET ORAL at 17:46

## 2020-10-27 NOTE — PROGRESS NOTES
Physical therapy - pt. Just back from surgery. Reports some numbness/weakness still in LE's and did not feel safe getting up. Will see tomorrow.

## 2020-10-27 NOTE — PERIOP NOTES
TRANSFER - IN REPORT:    Verbal report received from Jennifer Baca RN(name) on Deysi Griffin  being received from Ortho(unit) for routine progression of care      Report consisted of patients Situation, Background, Assessment and   Recommendations(SBAR). Information from the following report(s) SBAR and Kardex was reviewed with the receiving nurse. Opportunity for questions and clarification was provided. Assessment completed upon patients arrival to unit and care assumed.

## 2020-10-27 NOTE — BRIEF OP NOTE
Brief Postoperative Note    Patient: Ash Chong  YOB: 1940  MRN: [id number]    Date of Procedure: 10/27/2020     Pre-Op Diagnosis: Other closed fracture of right patella, initial encounter [S82.091A]  Periprosthetic fracture around internal prosthetic knee joint [M97. 8XXA, Z96.659]    Post-Op Diagnosis: Same as preoperative diagnosis. Procedure(s):  RIGHT KNEE RECONSTRUCTIVE MECHANISM OF RIGHT KNEE ARTHROPLASTY TOTAL/ DEPUY/ ACHILLES BONE GRAFT TO BE ADMITTED 10/26    Surgeon(s):  Selvin Rae MD    Surgical Assistant: Physician Assistant: TRUDY Chowdhury    Anesthesia: Spinal     Estimated Blood Loss (mL): less than 542     Complications: None    Specimens: * No specimens in log *     Implants:   Implant Name Type Inv. Item Serial No.  Lot No. LRB No. Used Action   GRAFT 11MM 10-13MM 190-280MM N BONE TENDONSACHILLES - [id number]  GRAFT 11MM 10-13MM 190-280MM N BONE TENDONSACHILLES [id number] [id number] Right 1 Implanted   SCR BNE CANC FT 4X50MM SS --  - S3 [id number]  SCR BNE CANC FT 4X50MM SS --  3 [id number] [employer] [place] 3 8 [id number] Right 1 Implanted   WASHER MINI SCR 7.0MM SS --  - S3 [id number]  WASHER MINI SCR 7.0MM SS --  3 [id number] [employer]ES [place] 3 [id number] Right 1 Implanted       Drains: * No LDAs found *    Findings: Traumatic disruption of extensor mechanism with inferior pole patella fracture with marked displacement and complete retincular disruption. Intact and stable components.      Electronically Signed by Ria Choi MD on 10/27/2020 at 3:01 PM

## 2020-10-27 NOTE — PROGRESS NOTES
TRANSFER - IN REPORT:    Verbal report received from [facility] on [employer]  being received from PACU for routine post - op      Report consisted of patients Situation, Background, Assessment and   Recommendations(SBAR). Information from the following report(s) SBAR, Kardex, OR Summary, Intake/Output, MAR and Recent Results was reviewed with the receiving nurse. Opportunity for questions and clarification was provided.

## 2020-10-27 NOTE — PROGRESS NOTES
Shift assessment complete. Pt resting in bed. A&Ox4. Knee brace in place. Pt denies pain at this time. IVF infusing without difficulty. Bed in lowest position, call light within reach, side rails x3. Encouraged to call for help when needed.

## 2020-10-27 NOTE — ANESTHESIA PROCEDURE NOTES
Peripheral Block    Start time: 10/27/2020 1:04 PM  End time: 10/27/2020 1:06 PM  Performed by: Mica Ba MD  Authorized by: Mica Ba MD       Pre-procedure: Indications: at surgeon's request, post-op pain management and procedure for pain    Preanesthetic Checklist: patient identified, risks and benefits discussed, site marked, timeout performed, anesthesia consent given and patient being monitored    Timeout Time: 13:04          Block Type:   Block Type: Adductor canal  Laterality:  Right  Monitoring:  Standard ASA monitoring, responsive to questions, oxygen, continuous pulse ox, frequent vital sign checks and heart rate  Injection Technique:  Single shot  Procedures: ultrasound guided    Patient Position: supine  Prep: chlorhexidine    Location:  Mid thigh  Needle Type:  Stimuplex  Needle Gauge:  22 G  Needle Localization:  Ultrasound guidance  Medication Injected:  Ropivacaine 0.375% with epi 1:200,000 in NS injection, 20 mL (Mixture components: ropivacaine (PF) 5 mg/mL (0.5 %) Soln, . 75 mL; EPINEPHrine HCl (PF) 1 mg/mL (1 mL) Soln, . 005 mL; 0.9% sodium chloride Soln, .245 mL)  dexamethasone (DECADRON) 4 mg/mL injection, 4 mg  Med Admin Time: 10/27/2020 1:10 PM    Assessment:  Number of attempts:  1  Injection Assessment:  Incremental injection every 5 mL, no paresthesia, ultrasound image on chart, no intravascular symptoms, negative aspiration for blood and local visualized surrounding nerve on ultrasound  Patient tolerance:  Patient tolerated the procedure well with no immediate complications

## 2020-10-27 NOTE — PROGRESS NOTES
Pt received from PACU. A&Ox4. Aquacel to right knee with knee immobilizer in place. Pedal pulses +2 and palpable. Weak dorsi/plantar flexion at this time. IV capped and patent. Bilateral SCDs in place. Bed in lowest position, call light within reach, side rails x3. Encouraged to call for help when needed.

## 2020-10-27 NOTE — PROGRESS NOTES
Problem: Falls - Risk of  Goal: *Absence of Falls  Description: Document Anish Gutierrez Fall Risk and appropriate interventions in the flowsheet. Outcome: Progressing Towards Goal  Note: Fall Risk Interventions:  Mobility Interventions: Bed/chair exit alarm    Mentation Interventions: Bed/chair exit alarm    Medication Interventions: Bed/chair exit alarm    Elimination Interventions: Call light in reach    History of Falls Interventions: Bed/chair exit alarm         Problem: Pressure Injury - Risk of  Goal: *Prevention of pressure injury  Description: Document Pankaj Scale and appropriate interventions in the flowsheet.   Outcome: Progressing Towards Goal  Note: Pressure Injury Interventions:       Moisture Interventions: Absorbent underpads    Activity Interventions: Increase time out of bed    Mobility Interventions: PT/OT evaluation    Nutrition Interventions: Offer support with meals,snacks and hydration

## 2020-10-27 NOTE — ANESTHESIA POSTPROCEDURE EVALUATION
Procedure(s):  RIGHT KNEE RECONSTRUCTIVE MECHANISM OF RIGHT KNEE ARTHROPLASTY TOTAL/ DEPUY/ ACHILLES BONE GRAFT .     spinal    Anesthesia Post Evaluation      Multimodal analgesia: multimodal analgesia used between 6 hours prior to anesthesia start to PACU discharge  Patient location during evaluation: bedside  Patient participation: complete - patient participated  Level of consciousness: responsive to verbal stimuli  Pain management: adequate  Airway patency: patent  Anesthetic complications: no  Cardiovascular status: hemodynamically stable  Respiratory status: spontaneous ventilation  Hydration status: stable    Final Post Anesthesia Temperature Assessment:  Normothermia (36.0-37.5 degrees C)      INITIAL Post-op Vital signs:   Vitals Value Taken Time   /61 10/27/2020  3:59 PM   Temp 36.6 °C (97.8 °F) 10/27/2020  3:44 PM   Pulse 64 10/27/2020  3:59 PM   Resp 16 10/27/2020  3:59 PM   SpO2 93 % 10/27/2020  3:59 PM

## 2020-10-27 NOTE — PERIOP NOTES
TRANSFER - OUT REPORT:    Verbal report given to [facility] RN(name) on [employer]  being transferred to [facility](unit) for routine progression of care       Report consisted of patients Situation, Background, Assessment and   Recommendations(SBAR). Information from the following report(s) SBAR, OR Summary, Intake/Output, MAR and Cardiac Rhythm NSR was reviewed with the receiving nurse. Lines:   Peripheral IV 10/27/20 Left Hand (Active)   Site Assessment Clean, dry, & intact 10/27/20 1545   Phlebitis Assessment 0 10/27/20 1545   Infiltration Assessment 0 10/27/20 1545   Dressing Status Clean, dry, & intact 10/27/20 1545   Dressing Type Transparent;Tape 10/27/20 1545   Hub Color/Line Status Blue; Infusing 10/27/20 1545        Opportunity for questions and clarification was provided.       Patient transported with:   room air

## 2020-10-27 NOTE — PROGRESS NOTES
TRANSFER - OUT REPORT:    Verbal report given to Paula RN on [employer]  being transferred to preop for ordered procedure       Report consisted of patients Situation, Background, Assessment and   Recommendations(SBAR). Information from the following report(s) SBAR, Kardex, Intake/Output, MAR and Recent Results was reviewed with the receiving nurse. Lines:   Peripheral IV 10/27/20 Left Hand (Active)   Site Assessment Clean, dry, & intact 10/27/20 0941   Phlebitis Assessment 0 10/27/20 0941   Infiltration Assessment 0 10/27/20 0941   Dressing Status Clean, dry, & intact 10/27/20 0941   Dressing Type Transparent;Tape 10/27/20 0941   Hub Color/Line Status Capped; Patent 10/27/20 0941        Opportunity for questions and clarification was provided.

## 2020-10-27 NOTE — INTERVAL H&P NOTE
Update History & Physical 
 
The Patient's History and Physical of October 23, 2020 was reviewed with the patient and I examined the patient. There was no change. The surgical site was confirmed by the patient and me. Plan:  The risk, benefits, expected outcome, and alternative to the recommended procedure have been discussed with the patient. Patient understands and wants to proceed with the procedure.  
 
Electronically signed by TRUDY Hurtado on 10/27/2020 at 11:36 AM

## 2020-10-27 NOTE — ANESTHESIA PROCEDURE NOTES
Spinal Block    Start time: 10/27/2020 1:47 PM  End time: 10/27/2020 1:51 PM  Performed by: Penny Rodriguez MD  Authorized by: Penny Rodriguez MD     Pre-procedure:   Indications: at surgeon's request, post-op pain management, procedure for pain and primary anesthetic  Preanesthetic Checklist: patient identified, risks and benefits discussed, anesthesia consent, site marked, patient being monitored and timeout performed    Timeout Time: 13:47          Spinal Block:   Patient Position:  Seated  Prep Region:  Lumbar  Prep: chlorhexidine      Location:  L3-4  Technique:  Single shot        Needle:   Needle Type:  Pencan  Needle Gauge:  25 G  Attempts:  1      Events: CSF confirmed, no blood with aspiration and no paresthesia        Assessment:  Insertion:  Uncomplicated  Patient tolerance:  Patient tolerated the procedure well with no immediate complications

## 2020-10-28 ENCOUNTER — APPOINTMENT (OUTPATIENT)
Dept: GENERAL RADIOLOGY | Age: 80
DRG: 470 | End: 2020-10-28
Attending: ORTHOPAEDIC SURGERY
Payer: MEDICARE

## 2020-10-28 ENCOUNTER — APPOINTMENT (OUTPATIENT)
Dept: PHYSICAL THERAPY | Age: 80
End: 2020-10-28
Payer: MEDICARE

## 2020-10-28 PROCEDURE — 65270000029 HC RM PRIVATE

## 2020-10-28 PROCEDURE — 74011250637 HC RX REV CODE- 250/637: Performed by: ORTHOPAEDIC SURGERY

## 2020-10-28 PROCEDURE — 74011000250 HC RX REV CODE- 250: Performed by: ORTHOPAEDIC SURGERY

## 2020-10-28 PROCEDURE — 97166 OT EVAL MOD COMPLEX 45 MIN: CPT

## 2020-10-28 PROCEDURE — 97530 THERAPEUTIC ACTIVITIES: CPT

## 2020-10-28 PROCEDURE — 73560 X-RAY EXAM OF KNEE 1 OR 2: CPT

## 2020-10-28 PROCEDURE — 97161 PT EVAL LOW COMPLEX 20 MIN: CPT

## 2020-10-28 PROCEDURE — 97535 SELF CARE MNGMENT TRAINING: CPT

## 2020-10-28 PROCEDURE — 74011250636 HC RX REV CODE- 250/636: Performed by: ORTHOPAEDIC SURGERY

## 2020-10-28 RX ORDER — HYDROCODONE BITARTRATE AND ACETAMINOPHEN 7.5; 325 MG/1; MG/1
1-2 TABLET ORAL
Qty: 60 TAB | Refills: 0 | Status: SHIPPED | OUTPATIENT
Start: 2020-10-28 | End: 2020-11-04

## 2020-10-28 RX ORDER — ASPIRIN 81 MG/1
81 TABLET ORAL EVERY 12 HOURS
Qty: 60 TAB | Refills: 0 | Status: SHIPPED | OUTPATIENT
Start: 2020-10-28 | End: 2020-11-27

## 2020-10-28 RX ORDER — CEFAZOLIN SODIUM/WATER 2 G/20 ML
2 SYRINGE (ML) INTRAVENOUS EVERY 8 HOURS
Status: COMPLETED | OUTPATIENT
Start: 2020-10-28 | End: 2020-10-29

## 2020-10-28 RX ADMIN — Medication 10 ML: at 05:33

## 2020-10-28 RX ADMIN — CELECOXIB 200 MG: 200 CAPSULE ORAL at 08:08

## 2020-10-28 RX ADMIN — CEFAZOLIN SODIUM 2 G: 100 INJECTION, POWDER, LYOPHILIZED, FOR SOLUTION INTRAVENOUS at 05:33

## 2020-10-28 RX ADMIN — Medication 1 AMPULE: at 08:08

## 2020-10-28 RX ADMIN — ASPIRIN 81 MG: 81 TABLET, COATED ORAL at 21:36

## 2020-10-28 RX ADMIN — Medication 10 ML: at 12:20

## 2020-10-28 RX ADMIN — CELECOXIB 200 MG: 200 CAPSULE ORAL at 21:36

## 2020-10-28 RX ADMIN — HYDROCODONE BITARTRATE AND ACETAMINOPHEN 2 TABLET: 7.5; 325 TABLET ORAL at 08:08

## 2020-10-28 RX ADMIN — POLYETHYLENE GLYCOL 3350 17 G: 17 POWDER, FOR SOLUTION ORAL at 08:09

## 2020-10-28 RX ADMIN — Medication 10 ML: at 21:36

## 2020-10-28 RX ADMIN — DOCUSATE SODIUM 50 MG AND SENNOSIDES 8.6 MG 2 TABLET: 8.6; 5 TABLET, FILM COATED ORAL at 08:08

## 2020-10-28 RX ADMIN — HYDROCODONE BITARTRATE AND ACETAMINOPHEN 2 TABLET: 7.5; 325 TABLET ORAL at 12:17

## 2020-10-28 RX ADMIN — CEFAZOLIN SODIUM 2 G: 10 INJECTION, POWDER, FOR SOLUTION INTRAVENOUS at 21:36

## 2020-10-28 RX ADMIN — Medication 1 AMPULE: at 21:36

## 2020-10-28 RX ADMIN — ASPIRIN 81 MG: 81 TABLET, COATED ORAL at 08:08

## 2020-10-28 RX ADMIN — CEFAZOLIN SODIUM 2 G: 10 INJECTION, POWDER, FOR SOLUTION INTRAVENOUS at 12:17

## 2020-10-28 RX ADMIN — ACETAMINOPHEN 1000 MG: 500 TABLET, FILM COATED ORAL at 05:33

## 2020-10-28 RX ADMIN — HYDROCODONE BITARTRATE AND ACETAMINOPHEN 2 TABLET: 7.5; 325 TABLET ORAL at 21:36

## 2020-10-28 NOTE — PROGRESS NOTES
Problem: Falls - Risk of  Goal: *Absence of Falls  Description: Document Marlena Sanchez Fall Risk and appropriate interventions in the flowsheet. Outcome: Progressing Towards Goal  Note: Fall Risk Interventions:  Mobility Interventions: Bed/chair exit alarm    Mentation Interventions: Bed/chair exit alarm    Medication Interventions: Bed/chair exit alarm    Elimination Interventions: Call light in reach    History of Falls Interventions: Bed/chair exit alarm         Problem: Pressure Injury - Risk of  Goal: *Prevention of pressure injury  Description: Document Pankaj Scale and appropriate interventions in the flowsheet.   Outcome: Progressing Towards Goal  Note: Pressure Injury Interventions:  Sensory Interventions: Assess changes in LOC    Moisture Interventions: Absorbent underpads    Activity Interventions: Increase time out of bed    Mobility Interventions: PT/OT evaluation    Nutrition Interventions: Offer support with meals,snacks and hydration

## 2020-10-28 NOTE — PROGRESS NOTES
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 8XXA, Z96.659]  Right patella fracture [S82.001A]  Right patella fracture [S82.001A]    1 Day Post-Op    Subjective:     Verneda Flack awake and alert    Review of Systems: Pertinent items are noted in HPI. Objective:     PT/OT:     PATIENT MOBILITY                           Vital Signs:    Blood pressure 116/64, pulse 79, temperature 97.5 °F (36.4 °C), resp. rate 16, SpO2 96 %.   Temp (24hrs), Av.9 °F (36.6 °C), Min:97.5 °F (36.4 °C), Max:99.1 °F (37.3 °C)      Pain Control:   Pain Assessment  Pain Scale 1: Numeric (0 - 10)  Pain Intensity 1: 3  Pain Location 1: Knee  Pain Orientation 1: Right  Pain Description 1: Aching  Pain Intervention(s) 1: Medication (see MAR)    Meds:  Current Facility-Administered Medications   Medication Dose Route Frequency    alcohol 62% (NOZIN) nasal  1 Ampule  1 Ampule Topical Q12H    sodium chloride (NS) flush 5-40 mL  5-40 mL IntraVENous Q8H    sodium chloride (NS) flush 5-40 mL  5-40 mL IntraVENous PRN    acetaminophen (TYLENOL) tablet 1,000 mg  1,000 mg Oral Q6H    diphenhydrAMINE (BENADRYL) capsule 25 mg  25 mg Oral Q4H PRN    senna-docusate (PERICOLACE) 8.6-50 mg per tablet 2 Tab  2 Tab Oral DAILY    HYDROcodone-acetaminophen (NORCO) 7.5-325 mg per tablet 1-2 Tab  1-2 Tab Oral Q4H PRN    polyethylene glycol (MIRALAX) packet 17 g  17 g Oral DAILY    0.9% sodium chloride infusion  100 mL/hr IntraVENous CONTINUOUS    sodium chloride (NS) flush 5-40 mL  5-40 mL IntraVENous Q8H    sodium chloride (NS) flush 5-40 mL  5-40 mL IntraVENous PRN    celecoxib (CELEBREX) capsule 200 mg  200 mg Oral Q12H    HYDROmorphone (PF) (DILAUDID) injection 1 mg  1 mg IntraVENous Q3H PRN    naloxone (NARCAN) injection 0.2-0.4 mg  0.2-0.4 mg IntraVENous Q10MIN PRN    dexamethasone (DECADRON) 10 mg/mL injection 10 mg  10 mg IntraVENous ONCE    promethazine (PHENERGAN) tablet 25 mg  25 mg Oral Q6H PRN    senna-docusate (PERICOLACE) 8.6-50 mg per tablet 2 Tab  2 Tab Oral DAILY    aspirin delayed-release tablet 81 mg  81 mg Oral Q12H    ondansetron (ZOFRAN ODT) tablet 8 mg  8 mg Oral Q8H PRN    oxyCODONE IR (ROXICODONE) tablet 10 mg  10 mg Oral Q4H PRN        LAB:    Lab Results   Component Value Date/Time    INR 1.0 10/26/2020 03:05 PM    INR 1.1 05/21/2020 12:19 PM    INR 1.0 11/26/2019 11:20 AM     Lab Results   Component Value Date/Time    HGB 13.1 (L) 10/27/2020 07:53 PM    HGB 13.1 (L) 10/26/2020 03:05 PM    HGB 13.4 (L) 10/24/2020 04:12 PM       Wound Knee Left (Active)   Number of days: 321       Wound Knee Right (Active)   Number of days: 125       Wound Knee Right (Active)   Dressing Status Clean, dry, and intact 10/27/20 1938   Dressing Type Aquacel 10/27/20 1938   Splint Type/Material Knee immobilizer 10/27/20 1938   Drainage Amount None 10/27/20 1938   Number of days: 1         Physical Exam:  Calves soft/ neuro intact  KI in place    Assessment:      Principal Problem:    Right patella fracture (10/26/2020)    Active Problems:     Total knee replacement status, 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

## 2020-10-28 NOTE — DISCHARGE SUMMARY
[address] [place] [address]  Total Joint Discharge Summary      Patient ID:  Lucrecia Dhaliwal  [id number]  5 y.o.  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 If allergic to Ancef or due to other indications, patient was given Vancomycin. Hospital Medications given:   [unfilled]  [unfilled]  [unfilled]    Discharge Medications given:  Current Discharge Medication List      START taking these medications    Details   HYDROcodone-acetaminophen (NORCO) 7.5-325 mg per tablet Take 1-2 Tabs by mouth every four (4) hours as needed for Pain for up to 7 days. Max Daily Amount: 12 Tabs. Qty: 60 Tab, Refills: 0    Associated Diagnoses: Total knee replacement status, right         CONTINUE these medications which have CHANGED    Details   aspirin delayed-release 81 mg tablet Take 1 Tab by mouth every twelve (12) hours every twelve (12) hours for 30 days. Qty: 60 Tab, Refills: 0         CONTINUE these medications which have NOT CHANGED    Details   polyethylene glycol (MIRALAX) 17 gram packet Take 17 g by mouth daily. Mix with 8oz water      acetaminophen (TYLENOL PO) Take 1,000 mg by mouth every six (6) hours as needed for Pain.          STOP taking these medications       vit A/vit C/vit E/zinc/copper (PRESERVISION AREDS PO) Comments:   Reason for Stopping:         vit C,F-Mk-oqocn-lutein-zeaxan (PreserVision AREDS-2) [phone] mg-unit-mg-mg cap capsule Comments:   Reason for Stopping:         omega 3-dha-epa-fish oil (Fish Oil) 100-160-1,000 mg cap Comments:   Reason for Stopping:         meloxicam (MOBIC) 7.5 mg tablet Comments:   Reason for Stopping:         HYDROcodone-acetaminophen (NORCO) 5-325 mg per tablet Comments:   Reason for Stopping:         docusate sodium (COLACE) 100 mg capsule Comments:   Reason for Stopping:                Additional DVT Prophylaxis:  KHUSHI Hose,Plexi-Pulse    Postoperative transfusions:   none  Post Op complications: none    Hemoglobin at discharge:   Lab Results   Component Value Date/Time    HGB 13.1 (L) 10/27/2020 07:53 PM       Wound appears to be healing without any evidence of infection. Physical Therapy started on the day following surgery and progressed to independent ambulation with the aid of a walker. At the time of discharge, able to go up and down stairs and had understanding of precautions needed following surgery.       PT/OT:            Assistive Device: Fall prevention device                Discharged to: home    Discharge instructions:  -Rx pain medication given  - Anticoagulate with: Ecotrin 81 mg PO BID x 4 weeks  -Resume pre hospital diet             -Resume home medications per medical continuation form     -Ambulate with walker, appropriate total joint protocol  -Follow up in office as scheduled       Signed:  TRUDY Murray  10/28/2020  7:59 AM Implemented All Fall Risk Interventions:  [place] to call system. Call bell, personal items and telephone within reach. Instruct patient to call for assistance. Room bathroom lighting operational. Non-slip footwear when patient is off stretcher. Physically safe environment: no spills, clutter or unnecessary equipment. Stretcher in lowest position, wheels locked, appropriate side rails in place. Provide visual cue, wrist band, yellow gown, etc. Monitor gait and stability. Monitor for mental status changes and reorient to person, place, and time. Review medications for side effects contributing to fall risk. Reinforce activity limits and safety measures with patient and family.

## 2020-10-28 NOTE — PROGRESS NOTES
Problem: Self Care Deficits Care Plan (Adult)  Goal: *Acute Goals and Plan of Care (Insert Text)  Description: GOALS:   DISCHARGE GOALS (in preparation for going home/rehab):  3 days  1. Mr. Kiet Wagner will perform one lower body dressing activity with minimal assistance required to demonstrate improved functional mobility and safety. 2.  Mr. Kiet Wagner will perform one lower body bathing activity with minimal assistance required to demonstrate improved functional mobility and safety. 3.  Mr. Kiet Wagner will perform toileting/toilet transfer with contact guard assistance to demonstrate improved functional mobility and safety. 4.  Mr. Kiet Wagner will perform shower transfer with contact guard assistance to demonstrate improved functional mobility and safety. JOINT CAMP OCCUPATIONAL THERAPY TKA: Initial Assessment and Daily Note 10/28/2020  INPATIENT: Hospital Day: 3  [place] / [place] / Product Type: Medicare /      NAME/AGE/GENDER: Jaky Eden is a 79 y.o. 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 8XXA, Z96.659]   Procedure(s) and Anesthesia Type:     * RIGHT KNEE RECONSTRUCTIVE MECHANISM OF RIGHT KNEE ARTHROPLASTY TOTAL/ DEPUY/ ACHILLES BONE GRAFT  - Spinal (Right)  ICD-10: Treatment Diagnosis:    Pain in Right Knee (M25.561)  Stiffness of Right Knee, Not elsewhere classified (S39.562)      ASSESSMENT:     Mr. Kiet Wagner is s/p right patellar fx and presents with brace locked in knee extension. He is WBAT in brace. Limited mobility using a RW and min assist. Trained on AE for LB dressing and bathing. Spouse in room and helpful. Patient can not take brace off to shower. Initiate OT to advance towards goals.      This section established at most recent assessment   PROBLEM LIST (Impairments causing functional limitations):  Decreased Strength  Decreased ADL/Functional Activities  Decreased Transfer Abilities  Increased Pain  Increased Fatigue  Decreased Flexibility/Joint Mobility  Decreased Knowledge of Precautions   INTERVENTIONS PLANNED: (Benefits and precautions of occupational therapy have been discussed with the patient.)  Activities of daily living training  Adaptive equipment training  Balance training  Clothing management  Donning&doffing training  Theraputic activity     TREATMENT PLAN: Frequency/Duration: Follow patient 3x a week to address above goals. Rehabilitation Potential For Stated Goals: Good     RECOMMENDED REHABILITATION/EQUIPMENT: (at time of discharge pending progress): Continue Skilled Therapy. OCCUPATIONAL PROFILE AND HISTORY:   History of Present Injury/Illness (Reason for Referral): Pt presents this date s/p (Right) patellar fx. Past Medical History/Comorbidities:   Mr. Samuel Mendoza  has a past medical history of Arthritis, HLD (hyperlipidemia) (12/7/2012), Macular degeneration, and Osteoarthritis of left knee. He also has no past medical history of Malignant hyperthermia due to anesthesia or Pseudocholinesterase deficiency. Mr. Samuel Mendoza  has a past surgical history that includes hx colonoscopy (2002) and hx colonoscopy (11-1-13). Social History/Living Environment:   Home Environment: Private residence  One/Two Story Residence: One story  Living Alone: No  Support Systems: Spouse/Significant Other/Partner  Patient Expects to be Discharged to[de-identified] Private residence  Current DME Used/Available at Home: Walker, rolling  Prior Level of Function/Work/Activity:  Independent prior. Number of Personal Factors/Comorbidities that affect the Plan of Care: Expanded review of therapy/medical records (1-2):  MODERATE COMPLEXITY   ASSESSMENT OF OCCUPATIONAL PERFORMANCE[de-identified]   Most Recent Physical Functioning:   Balance  Sitting: Intact  Standing: Pull to stand; With support       Gross Assessment: Yes  Gross Assessment  AROM: Generally decreased, functional(left LE)  Strength: Generally decreased, functional(left  LE)            Coordination  Fine Motor Skills-Upper: Left Intact; Right Intact  Gross Motor Skills-Upper: Left Intact; Right Intact         Mental Status  Neurologic State: Alert  Orientation Level: Oriented X4          RLE AROM  R Knee Extension: (not measure, pt. locked in extension in brace)     Basic ADLs (From Assessment) Complex ADLs (From Assessment)   Basic ADL  Feeding: Independent  Oral Facial Hygiene/Grooming: Supervision  Bathing: Minimum assistance  Type of Bath: Chlorhexidine (CHG), Bath Pack, Partial  Upper Body Dressing: Setup  Lower Body Dressing: Minimum assistance  Toileting: Minimum assistance     Grooming/Bathing/Dressing Activities of Daily Living                             Bed/Mat Mobility  Supine to Sit: Contact guard assistance  Sit to Stand: Minimum assistance; Additional time  Stand to Sit: Additional time;Minimum assistance  Bed to Chair: Additional time;Minimum assistance         Physical Skills Involved:  Range of Motion  Balance  Strength Cognitive Skills Affected (resulting in the inability to perform in a timely and safe manner):  [facility] Psychosocial Skills Affected:  WFL   Number of elements that affect the Plan of Care: 3-5:  MODERATE COMPLEXITY   CLINICAL DECISION MAKIN[address] [place], [place] [id number] AM-PAC 6 Clicks   Daily Activity Inpatient Short Form  How much help from another person does the patient currently need. .. Total A Lot A Little None   1. Putting on and taking off regular lower body clothing? [] 1   [x] 2   [] 3   [] 4   2. Bathing (including washing, rinsing, drying)? [] 1   [x] 2   [] 3   [] 4   3. Toileting, which includes using toilet, bedpan or urinal?   [] 1   [x] 2   [] 3   [] 4   4. Putting on and taking off regular upper body clothing? [] 1   [] 2   [] 3   [x] 4   5. Taking care of personal grooming such as brushing teeth? [] 1   [] 2   [] 3   [x] 4   6. Eating meals?    [] 1 [] 2   [] 3   [x] 4   © 2007, Trustees of [address] [place] [address] [address] [address], under license to [employer]. All rights reserved     Score:  Initial: 18 Most Recent: X (Date: -- )    Interpretation of Tool:  Represents activities that are increasingly more difficult (i.e. Bed mobility, Transfers, Gait). Medical Necessity:     Skilled intervention continues to be required due to Deficits noted above. Reason for Services/Other Comments:  Patient continues to require skilled intervention due to   Dx above  . Use of outcome tool(s) and clinical judgement create a POC that gives a: MODERATE COMPLEXITY            TREATMENT:   (In addition to Assessment/Re-Assessment sessions the following treatments were rendered)     Pre-treatment Symptoms/Complaints:    Pain: Initial:   Pain Intensity 1: 3  Pain Location 1: Knee  Post Session:  3     Self Care: (30): Procedure(s) (per grid) utilized to improve and/or restore self-care/home management as related to dressing and toileting. Required minimal verbal, manual, and tactile cueing to facilitate activities of daily living skills. Initial evaluation 10 minutes. Treatment/Session Assessment:     Response to Treatment:  Good, sitting up in recliner. Education:  [] Home Exercises  [x] Fall Precautions  [] Hip Precautions [] Going Home Video  [x] Knee/Hip Prosthesis Review  [x] Walker Management/Safety [x] Adaptive Equipment as Needed       Interdisciplinary Collaboration:   Physical Therapist  Occupational Therapist  Registered Nurse    After treatment position/precautions:   Up in chair  Bed/Chair-wheels locked  Caregiver at bedside  Call light within reach  RN notified     Compliance with Program/Exercises: Compliant all of the time, Will assess as treatment progresses.     Recommendations/Intent for next treatment session:  Treatment next visit will focus on increasing Mr. Celina Gonzalez independence with bed mobility, transfers, self care, functional mobility, modalities for pain, and patient education.       Total Treatment Duration:  OT Patient Time In/Time Out  Time In: 7385  Time Out: [address], [place]

## 2020-10-28 NOTE — PROGRESS NOTES
Patient placed on continuous sat monitor  HS per protocol. Monitor history deleted prior to placing on patient.  Patient working on IS

## 2020-10-28 NOTE — OP NOTES
[place]  OPERATIVE REPORT    Name:  Vinicius Menon  MR#:  [id number]  :  1940  ACCOUNT #:  [de-identified]  DATE OF SERVICE:  10/27/2020    ORTHOPEDIC OPERATIVE SUMMARY    PREOPERATIVE DIAGNOSIS:  Extensor mechanism disruption with inferior pole patella fracture, status post right total knee arthroplasty. POSTOPERATIVE DIAGNOSIS:  Extensor mechanism disruption with inferior pole patella fracture, status post right total knee arthroplasty. PROCEDURE PERFORMED:  Right total knee arthroplasty extensor mechanism reconstruction with primary repair with Achilles tendon allograft reinforcement. SURGEON:  Christopher Spears MD    ATTENDING:  Christopher Spears MD    ASSISTANT:  Scot Bernardo PA-C. This first surgical assistant was surgically necessary for intraoperative retraction and positioning needs. ANESTHESIA:  Spinal following an adductor nerve block for postoperative pain release. COMPLICATIONS:  None. SPECIMENS REMOVED:  None. IMPLANTS:  One 4.0 Synthes cancellous screw with a washer, other all knee replacement implants were retained. ESTIMATED BLOOD LOSS:  Less than 100 mL. DRAINS:  None. TOURNIQUET TIME:  44 minutes. OPERATIVE FINDINGS:  Upon exposure of the extensor mechanism, there was a complete disruption of the retinaculum transversely. There was an inferior pole patella fracture with remnant bone in the patellar tendon itself. The patellar tendon was in good condition, it appeared to be vascular. The patellar component was visible in the inferior few millimeters that were uncovered, but it was not loose and not appeared to be in danger of loosening. The remainder of the knee replacement was visible through the retinacular disruption and was in good position without sign of issue. There was a hematoma present.     PROCEDURE IN DETAIL:  The patient was taken to the operating room where he was placed on the operating room table in the supine position. Once an adequate level of regional anesthesia had been achieved, a tourniquet was placed on the patient's right lower extremity and the right lower extremity was prepped and draped in the usual sterile fashion. Following a surgical time-out via routine procedure, the limb was exsanguinated and tourniquet was inflated. The patient's previous incision was utilized to expose the extensor mechanism and findings were noted as described above. Again there was an inferior pole patella fracture and through this at this point, there was complete disruption of the retinaculum with retraction of the patella proximally. Once this had been identified, the soft tissue dissection was carried out to expose the patellar tendon, distal to the tibial tubercle and exposure was gained into the area of the quadriceps tendon. The hematoma was evacuated and the knee was debrided of hematoma and devitalized tissue. The fracture was then reduced and held in place with a towel clip establishing the proper position of the patella relative to the joint line. The towel clip was then released and the knee was held in extension while #2 Mersilene suture was woven with a Carson weave into the inferior pole of the patella bone into the patellar tendon and brought back proximally and 2 drill holes were placed on either side of the patellar component in the body of the patella itself and the patella was reduced once again with the knee in extension and the sutures were passed through the drill holes over the bony bridges and sutured again with the knee in extension as tight as could be allowed. Once this was done, the retinaculum was then repaired with multiple figure-of-eight #5 Mersilene sutures and following a good repair there, it was oversewn with a running #5 Mersilene suture. With this primary repair carried out, the decision was made to go ahead as planned and placed an Achilles tendon allograft to reinforce this repair. An Achilles tendon with a calcaneal bone block was thawed, this was a fresh frozen allograft from [email or web address]. A trough was made with a mojgan just medial and inferior to the tibial tubercle and through this, the bone block was inserted. A 50 mm long 4.0 Synthes cancellous screw with a washer was then used to affix the bone block into this trough and it was flush with the tibial cortex. The allograft was then draped over the patellar tendon over the patella itself and into the quadriceps and a running #5 Mersilene suture was used to affix it to the native retinaculum running distally and then proximally. An additional multiple #5 Mersilene sutures were used to additionally affix the remaining graft into the quadriceps tendon itself. Excess graft was removed sharply. This appeared to be an adequate repair with reinforcement and there was good native tissue and good tissue to cover this repair following the completion of the extensor retinaculum reconstruction. Then, 1 g of vancomycin powder was poured over the extensor mechanism and the remainder of the wound was closed, closing the thick soft tissue flaps over these area with multiple #1 Vicryl interrupted sutures, skin was closed in layers with staples for the skin. The patient will be maintained in full extension for 4 to 6 weeks, not allowing any planned active flexion. A sterile dressing was applied. The patient was awakened and transferred to the recovery room in stable condition. Again, we will plan on him to be on antibiotics for joint care protocol and he will be touchdown weightbearing with the knee locked in extension with brace.     Kallie Ty MD [id number]  D:  10/27/2020 15:52  T:  10/28/2020 4:40  JOB #:  [id number]

## 2020-10-28 NOTE — PROGRESS NOTES
Problem: Mobility Impaired (Adult and Pediatric)  Goal: *Acute Goals and Plan of Care (Insert Text)  Outcome: Progressing Towards Goal  Note:   LTG:  (1.)Mr. Socorro Hsieh will move from supine to sit and sit to supine  in bed with STAND BY ASSIST within 7 treatment day(s). (2.)Mr. Socorro Hsieh will transfer from bed to chair and chair to bed with STAND BY ASSIST using the least restrictive device within 7 treatment day(s). (3.)Mr. Zapata will ambulate with STAND BY ASSIST for 20 feet with the least restrictive device within 7 treatment day(s). (4)Mr. Socorro Hsieh will go up 2 steps mod assist in 7 days. ________________________________________________________________________________________________    PHYSICAL THERAPY: Initial Assessment and AM 10/28/2020  INPATIENT: PT Visit Days : 1  [place] / Product Type: Medicare /       NAME/AGE/GENDER: Padma Valdes is a 79 y.o. 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 8XXA, Z96.659]  Right patella fracture [S82.001A]  Right patella fracture [S82.001A] Right patella fracture Right patella fracture  Procedure(s) (LRB):  RIGHT KNEE RECONSTRUCTIVE MECHANISM OF RIGHT KNEE ARTHROPLASTY TOTAL/ DEPUY/ ACHILLES BONE GRAFT  (Right)  1 Day Post-Op  ICD-10: Treatment Diagnosis:    Generalized Muscle Weakness (M62.81)  Other abnormalities of gait and mobility (R26.89)   Precaution/Allergies:  Patient has no known allergies. ASSESSMENT:     Mr. Socorro Hsieh presents with decreased functional mobility and gait s/p Right total knee arthroplasty extensor mechanism reconstruction with primary repair with Achilles tendon allograft reinforcement. He is normally independent and lives with wife but had a fall causing injury. He plans to go home with support from wife and family.   MD order for WBAT with knee immobilizer/bishnu brace locked in extension at all times. No flexion exercises. Spoke with PA as well to not do any exercises at this point. He has knee immobilizer on in supine. He got to side of bed, stood with RW and KI, and took some steps to chair with RW and KI on. Brace seemed to work well keeping knee straight, no buckle. Will follow. This section established at most recent assessment   PROBLEM LIST (Impairments causing functional limitations):  Decreased Strength  Decreased ADL/Functional Activities  Decreased Transfer Abilities  Decreased Ambulation Ability/Technique  Decreased Balance  Increased Pain  Decreased Activity Tolerance   INTERVENTIONS PLANNED: (Benefits and precautions of physical therapy have been discussed with the patient.)  Balance Exercise  Bed Mobility  Gait Training  Therapeutic Activites  Transfer Training     TREATMENT PLAN: Frequency/Duration: daily or twice daily  Rehabilitation Potential For Stated Goals: Good     REHAB RECOMMENDATIONS (at time of discharge pending progress):    Placement:  Home with HHPT  Equipment:   None at this time              HISTORY:   History of Present Injury/Illness (Reason for Referral):  Right total knee arthroplasty extensor mechanism reconstruction with primary repair with Achilles tendon allograft reinforcement. Past Medical History/Comorbidities:   Mr. Nic Quinn  has a past medical history of Arthritis, HLD (hyperlipidemia) (12/7/2012), Macular degeneration, and Osteoarthritis of left knee. He also has no past medical history of Malignant hyperthermia due to anesthesia or Pseudocholinesterase deficiency. Mr. Nic Quinn  has a past surgical history that includes hx colonoscopy (2002) and hx colonoscopy (11-1-13).   Social History/Living Environment:   Home Environment: Private residence  One/Two Story Residence: One story  Living Alone: No  Support Systems: Spouse/Significant Other/Partner  Patient Expects to be Discharged to[de-identified] Private residence  Current DME Used/Available at Home: Walker  Prior Level of Function/Work/Activity:  Independent, fall     Number of Personal Factors/Comorbidities that affect the Plan of Care: 1-2: MODERATE COMPLEXITY   EXAMINATION:   Most Recent Physical Functioning:   Gross Assessment:  AROM: Generally decreased, functional(left LE)  Strength: Generally decreased, functional(left  LE)      RLE AROM  R Knee Extension: (not measure, pt. locked in extension in brace)        Posture:     Balance:  Sitting: Intact  Standing: Pull to stand; With support Bed Mobility:  Supine to Sit: Contact guard assistance  Wheelchair Mobility:     Transfers:  Sit to Stand: Minimum assistance; Additional time  Stand to Sit: Additional time;Minimum assistance  Bed to Chair: Additional time;Minimum assistance  Gait:  Right Side Weight Bearing: As tolerated(with KI/bishnu brace locked in extension, no flexion exercises)  Speed/Marilyn: Delayed  Step Length: Left shortened;Right shortened  Stance: Right decreased  Gait Abnormalities: Antalgic  Distance (ft): 3 Feet (ft)  Assistive Device: Walker, rolling;Brace/Splint  Ambulation - Level of Assistance: Minimal assistance  Interventions: Safety awareness training;Manual cues; Tactile cues; Verbal cues      Body Structures Involved:  Bones  Joints  Muscles  Ligaments Body Functions Affected: Movement Related Activities and Participation Affected: Mobility   Number of elements that affect the Plan of Care: 3: MODERATE COMPLEXITY   CLINICAL PRESENTATION:   Presentation: Stable and uncomplicated: LOW COMPLEXITY   CLINICAL DECISION MAKIN[address] [place] [id number] AM-PAC 6 Clicks   Basic Mobility Inpatient Short Form  How much difficulty does the patient currently have. .. Unable A Lot A Little None   1. Turning over in bed (including adjusting bedclothes, sheets and blankets)? [] 1   [] 2   [x] 3   [] 4   2.   Sitting down on and standing up from a chair with arms ( e.g., wheelchair, bedside commode, etc.)   [] 1   [] 2 [x] 3   [] 4   3. Moving from lying on back to sitting on the side of the bed? [] 1   [] 2   [x] 3   [] 4   How much help from another person does the patient currently need. .. Total A Lot A Little None   4. Moving to and from a bed to a chair (including a wheelchair)? [] 1   [] 2   [x] 3   [] 4   5. Need to walk in hospital room? [] 1   [] 2   [x] 3   [] 4   6. Climbing 3-5 steps with a railing? [] 1   [x] 2   [] 3   [] 4   © 2007, Trustees of [address] [place] [address] [address] [address], under license to [employer]. All rights reserved      Score:  Initial: 17 Most Recent: X (Date: -- )    Interpretation of Tool:  Represents activities that are increasingly more difficult (i.e. Bed mobility, Transfers, Gait). Medical Necessity:     Patient is expected to demonstrate progress in   strength, range of motion, and balance   to   decrease assistance required with exercises and functional mobility   . Reason for Services/Other Comments:  Patient   continues to require present interventions due to patient's inability to perform exercises and functional mobility independently  . Use of outcome tool(s) and clinical judgement create a POC that gives a: Clear prediction of patient's progress: LOW COMPLEXITY            TREATMENT:   (In addition to Assessment/Re-Assessment sessions the following treatments were rendered)   Pre-treatment Symptoms/Complaints:  none  Pain: Initial:      Post Session:  1 knee pain     Assessment/Reassessment only, no treatment provided today    Braces/Orthotics/Lines/Etc:   O2 Device: Room air  Treatment/Session Assessment:    Response to Treatment:  did fine  Interdisciplinary Collaboration:   Occupational Therapist  Registered Nurse  After treatment position/precautions:   Up in chair  Bed/Chair-wheels locked  Bed in low position  Caregiver at bedside  Call light within reach  Family at bedside   Compliance with Program/Exercises:  Will assess as treatment progresses  Recommendations/Intent for next treatment session: \"Next visit will focus on reduction in assistance provided\".   Total Treatment Duration:  PT Patient Time In/Time Out  Time In: 1030  Time Out: [address], PT

## 2020-10-28 NOTE — PROGRESS NOTES
Diallo Owen from [facility] [employer] [facility] [employer] applied bishnu brace locked in extension to right lower leg as MD ordered.

## 2020-10-28 NOTE — PROGRESS NOTES
Shift assessment complete. Pt resting in bed. A&Ox4. Aquacel to right knee c/d/i. Knee immobilizer in place and iceman in place. Pedal pulses +2 and palpable. Pt able to dorsi/plantar flex. IV capped and patent. Bed in lowest position, call light within reach, side rails x3. Encouraged to call for help when needed.

## 2020-10-28 NOTE — PROGRESS NOTES
Problem: Mobility Impaired (Adult and Pediatric)  Goal: *Acute Goals and Plan of Care (Insert Text)  Outcome: Progressing Towards Goal  Note:   LTG:  (1.)Mr. Royal Page will move from supine to sit and sit to supine  in bed with STAND BY ASSIST within 7 treatment day(s). (2.)Mr. Royal Page will transfer from bed to chair and chair to bed with STAND BY ASSIST using the least restrictive device within 7 treatment day(s). (3.)Mr. Zapata will ambulate with STAND BY ASSIST for 20 feet with the least restrictive device within 7 treatment day(s). (4)Mr. Royal Page will go up 2 steps mod assist in 7 days. ________________________________________________________________________________________________    PHYSICAL THERAPY: Daily Note and PM 10/28/2020  INPATIENT: PT Visit Days : 1  [place] / Product Type: Medicare /       NAME/AGE/GENDER: So Reed is a 79 y.o. 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 8XXA, Z96.659]  Right patella fracture [S82.001A]  Right patella fracture [S82.001A] Right patella fracture Right patella fracture  Procedure(s) (LRB):  RIGHT KNEE RECONSTRUCTIVE MECHANISM OF RIGHT KNEE ARTHROPLASTY TOTAL/ DEPUY/ ACHILLES BONE GRAFT  (Right)  1 Day Post-Op  ICD-10: Treatment Diagnosis:    · Generalized Muscle Weakness (M62.81)  · Other abnormalities of gait and mobility (R26.89)   Precaution/Allergies:  Patient has no known allergies. ASSESSMENT:     Mr. Royal Page presents with decreased functional mobility and gait s/p Right total knee arthroplasty extensor mechanism reconstruction with primary repair with Achilles tendon allograft reinforcement. He is normally independent and lives with wife but had a fall causing injury. He plans to go home with support from wife and family.   MD order for WBAT with knee immobilizer/bishnu brace locked in extension at all times. No flexion exercises. Spoke with PA as well to not do any exercises at this point. This pm, he is still up in the chair without complaints. He had a new bishnu brace on and already fit in the chair. He stood with RW and ambulated around the bed with cues and assistance. Brace locked in extension and seemed supportive. Gait is slow and guarded and a little unsteady especially when first standing. He returned to supine in the bed without complaints. \"I am moving better than I thought\" he reported. Long discussion about safety and how is wife will need to be right with him for safety and he needs a little help and he is a little unsteady. No questions. They have 2 steps but their sons and pick him up and get him the house while he is in w/c. This section established at most recent assessment   PROBLEM LIST (Impairments causing functional limitations):  1. Decreased Strength  2. Decreased ADL/Functional Activities  3. Decreased Transfer Abilities  4. Decreased Ambulation Ability/Technique  5. Decreased Balance  6. Increased Pain  7. Decreased Activity Tolerance   INTERVENTIONS PLANNED: (Benefits and precautions of physical therapy have been discussed with the patient.)  1. Balance Exercise  2. Bed Mobility  3. Gait Training  4. Therapeutic Activites  5. Transfer Training     TREATMENT PLAN: Frequency/Duration: daily or twice daily  Rehabilitation Potential For Stated Goals: Good     REHAB RECOMMENDATIONS (at time of discharge pending progress):    Placement:  Home with HHPT  Equipment:    None at this time              HISTORY:   History of Present Injury/Illness (Reason for Referral):  Right total knee arthroplasty extensor mechanism reconstruction with primary repair with Achilles tendon allograft reinforcement.   Past Medical History/Comorbidities:   Mr. Yeni Rutherford  has a past medical history of Arthritis, HLD (hyperlipidemia) (12/7/2012), Macular degeneration, and Osteoarthritis of left knee. He also has no past medical history of Malignant hyperthermia due to anesthesia or Pseudocholinesterase deficiency. Mr. Lilly Jones  has a past surgical history that includes hx colonoscopy (2002) and hx colonoscopy (11-1-13). Social History/Living Environment:   Home Environment: Private residence  One/Two Story Residence: One story  Living Alone: No  Support Systems: Spouse/Significant Other/Partner  Patient Expects to be Discharged to[de-identified] Private residence  Current DME Used/Available at Home: Walker, rolling  Prior Level of Function/Work/Activity:  Independent, fall     Number of Personal Factors/Comorbidities that affect the Plan of Care: 1-2: MODERATE COMPLEXITY   EXAMINATION:   Most Recent Physical Functioning:   Gross Assessment:  AROM: Generally decreased, functional(left LE)  Strength: Generally decreased, functional(left  LE)      RLE AROM  R Knee Extension: (in bishnu brace)        Posture:     Balance:  Sitting: Intact  Standing: With support;Pull to stand Bed Mobility:  Supine to Sit: Contact guard assistance  Sit to Supine: Minimum assistance  Wheelchair Mobility:     Transfers:  Sit to Stand: Additional time;Minimum assistance  Stand to Sit: Minimum assistance; Additional time  Bed to Chair: Additional time;Minimum assistance  Duration: 25 Minutes  Gait:  Right Side Weight Bearing: As tolerated(with bishnu brace locked in extension, no flexion exercises)  Speed/Marilyn: Delayed  Step Length: Left shortened;Right shortened  Stance: Right decreased  Gait Abnormalities: Antalgic;Decreased step clearance;Shuffling gait  Distance (ft): 15 Feet (ft)  Assistive Device: Walker, rolling;Brace/Splint  Ambulation - Level of Assistance: Minimal assistance;Contact guard assistance  Interventions: Safety awareness training;Verbal cues      Body Structures Involved:  1. Bones  2. Joints  3. Muscles  4. Ligaments Body Functions Affected:  1.  Movement Related Activities and Participation Affected:  1. Mobility   Number of elements that affect the Plan of Care: 3: MODERATE COMPLEXITY   CLINICAL PRESENTATION:   Presentation: Stable and uncomplicated: LOW COMPLEXITY   CLINICAL DECISION MAKIN [facility] [address] [address] AM-PAC 6 Clicks   Basic Mobility Inpatient Short Form  How much difficulty does the patient currently have. .. Unable A Lot A Little None   1. Turning over in bed (including adjusting bedclothes, sheets and blankets)? [] 1   [] 2   [x] 3   [] 4   2. Sitting down on and standing up from a chair with arms ( e.g., wheelchair, bedside commode, etc.)   [] 1   [] 2   [x] 3   [] 4   3. Moving from lying on back to sitting on the side of the bed? [] 1   [] 2   [x] 3   [] 4   How much help from another person does the patient currently need. .. Total A Lot A Little None   4. Moving to and from a bed to a chair (including a wheelchair)? [] 1   [] 2   [x] 3   [] 4   5. Need to walk in hospital room? [] 1   [] 2   [x] 3   [] 4   6. Climbing 3-5 steps with a railing? [] 1   [x] 2   [] 3   [] 4   © , Trustees of [address] [facility] [address] [address], under license to [employer]. All rights reserved      Score:  Initial: 17 Most Recent: X (Date: -- )    Interpretation of Tool:  Represents activities that are increasingly more difficult (i.e. Bed mobility, Transfers, Gait). Medical Necessity:     · Patient is expected to demonstrate progress in   · strength, range of motion, and balance  ·  to   · decrease assistance required with exercises and functional mobility   · . Reason for Services/Other Comments:  · Patient   · continues to require present interventions due to patient's inability to perform exercises and functional mobility independently  · .    Use of outcome tool(s) and clinical judgement create a POC that gives a: Clear prediction of patient's progress: LOW COMPLEXITY            TREATMENT:   (In addition to Assessment/Re-Assessment sessions the following treatments were rendered) Pre-treatment Symptoms/Complaints:  none  Pain: Initial:      Post Session:  1 knee pain     Therapeutic Activity: (  25 Minutes ):  Therapeutic activities including Bed transfers, Chair transfers, Ambulation on level ground and standing with RW to improve mobility, strength and balance. Required minimal Safety awareness training;Verbal cues to promote static and dynamic balance in standing. Braces/Orthotics/Lines/Etc:   · O2 Device: Room air  Treatment/Session Assessment:    · Response to Treatment:  did little better  · Interdisciplinary Collaboration:   o Registered Nurse  · After treatment position/precautions:   o Supine in bed  o Bed/Chair-wheels locked  o Bed in low position  o Caregiver at bedside  o Call light within reach  o Family at bedside   · Compliance with Program/Exercises: Will assess as treatment progresses  · Recommendations/Intent for next treatment session: \"Next visit will focus on reduction in assistance provided\".   Total Treatment Duration:  PT Patient Time In/Time Out  Time In: 1410  Time Out: [address] [facility],

## 2020-10-29 ENCOUNTER — HOME HEALTH ADMISSION (OUTPATIENT)
Dept: HOME HEALTH SERVICES | Facility: HOME HEALTH | Age: 80
End: 2020-10-29
Payer: MEDICARE

## 2020-10-29 VITALS
RESPIRATION RATE: 16 BRPM | SYSTOLIC BLOOD PRESSURE: 106 MMHG | TEMPERATURE: 97.7 F | DIASTOLIC BLOOD PRESSURE: 56 MMHG | OXYGEN SATURATION: 97 % | HEART RATE: 52 BPM

## 2020-10-29 PROCEDURE — 97530 THERAPEUTIC ACTIVITIES: CPT

## 2020-10-29 PROCEDURE — 74011250636 HC RX REV CODE- 250/636: Performed by: ORTHOPAEDIC SURGERY

## 2020-10-29 PROCEDURE — 74011250637 HC RX REV CODE- 250/637: Performed by: ORTHOPAEDIC SURGERY

## 2020-10-29 PROCEDURE — 74011000250 HC RX REV CODE- 250: Performed by: ORTHOPAEDIC SURGERY

## 2020-10-29 RX ADMIN — Medication 1 AMPULE: at 09:15

## 2020-10-29 RX ADMIN — DOCUSATE SODIUM 50 MG AND SENNOSIDES 8.6 MG 2 TABLET: 8.6; 5 TABLET, FILM COATED ORAL at 09:00

## 2020-10-29 RX ADMIN — POLYETHYLENE GLYCOL 3350 17 G: 17 POWDER, FOR SOLUTION ORAL at 09:15

## 2020-10-29 RX ADMIN — CELECOXIB 200 MG: 200 CAPSULE ORAL at 09:15

## 2020-10-29 RX ADMIN — HYDROCODONE BITARTRATE AND ACETAMINOPHEN 2 TABLET: 7.5; 325 TABLET ORAL at 09:15

## 2020-10-29 RX ADMIN — ASPIRIN 81 MG: 81 TABLET, COATED ORAL at 09:15

## 2020-10-29 RX ADMIN — CEFAZOLIN SODIUM 2 G: 10 INJECTION, POWDER, FOR SOLUTION INTRAVENOUS at 05:27

## 2020-10-29 RX ADMIN — Medication 10 ML: at 05:27

## 2020-10-29 RX ADMIN — HYDROCODONE BITARTRATE AND ACETAMINOPHEN 2 TABLET: 7.5; 325 TABLET ORAL at 12:45

## 2020-10-29 RX ADMIN — Medication 10 ML: at 05:33

## 2020-10-29 NOTE — DISCHARGE INSTRUCTIONS
[id number] [facility]   Patient Discharge Instructions    Leila Roberts / [id number] : 1940    Admitted 10/26/2020 Discharged: 10/29/2020     IF YOU HAVE ANY PROBLEMS ONCE YOU ARE AT HOME CALL THE FOLLOWING NUMBERS:   Main office number: [phone]    Take Home Medications     · It is important that you take the medication exactly as they are prescribed. · Keep your medication in the bottles provided by the pharmacist and keep a list of the medication names, dosages, and times to be taken in your wallet. · Do not take other medications without consulting your doctor. What to do at [address] your prehospital diet. If you have excessive nausea or vomitting call your doctor's office     Home Physical Therapy is arranged. Use rolling walker when walking. Patients who have had a joint replacement should not drive until you are seen for your follow up appointment by Dr. Neisha Matute. When to Call    - Call if you have a temperature greater then 101  - Unable to keep food down  - Loose control of your bladder or bowel function  - Are unable to bear any weight   - Need a pain medication refill       DISCHARGE SUMMARY from Nurse    The following personal items collected during your admission are returned to you:   Dental Appliance: Dental Appliances: None  Vision: Visual Aid: None  Hearing Aid:    Jewelry: Jewelry: None  Clothing: Clothing: None  Other Valuables: Other Valuables: None  Valuables sent to safe:      PATIENT INSTRUCTIONS:    After general anesthesia or intravenous sedation, for 24 hours or while taking prescription Narcotics:  · Limit your activities  · Do not drive and operate hazardous machinery  · Do not make important personal or business decisions  · Do  not drink alcoholic beverages  · If you have not urinated within 8 hours after discharge, please contact your surgeon on call.     Report the following to your surgeon:  · Excessive pain, swelling, redness or odor of or around the surgical area  · Temperature over 101  · Nausea and vomiting lasting longer than 4 hours or if unable to take medications  · Any signs of decreased circulation or nerve impairment to extremity: change in color, persistent  numbness, tingling, coldness or increase pain  · Any questions, call office @ [phone]      Keep scheduled follow up appointment. If need to change, call office @ [phone]. *  Please give a list of your current medications to your Primary Care Provider. *  Please update this list whenever your medications are discontinued, doses are      changed, or new medications (including over-the-counter products) are added. *  Please carry medication information at all times in case of emergency situations. Patient Education        Total Knee Replacement: What to Expect at [address] [place] [address] Drive had a total knee replacement. The doctor replaced the worn ends of the bones that connect to your knee (thighbone and lower leg bone) with plastic and metal parts. When you leave the hospital, you should be able to move around with a walker or crutches. But you will need someone to help you at home for the next few weeks or until you have more energy and can move around better. If you need more extensive rehab, you may go to a specialized rehab center for more treatment. You will go home with a bandage and stitches, staples, tissue glue, or tape strips. Change the bandage as your doctor tells you to. If you have stitches or staples, your doctor will remove them 10 to 21 days after your surgery. Glue or tape strips will fall off on their own over time. You may still have some mild pain, and the area may be swollen for 3 to 6 months after surgery. Your knee will continue to improve for 6 to 12 months. You will probably use a walker for 1 to 3 weeks and then use crutches. When you are ready, you can use a cane. You will probably be able to walk on your own in 4 to 8 weeks.   You will need to do months of physical rehabilitation (rehab) after a knee replacement. Rehab will help you strengthen the muscles of the knee and help you regain movement. After you recover, your artificial knee will allow you to do normal daily activities with less pain or no pain at all. You may be able to hike, dance, ride a bike, and play golf. Talk to your doctor about whether you can do more strenuous activities. Always tell your caregivers that you have an artificial knee. How long it will take to walk on your own, return to normal activities, and go back to work depends on your health and how well your rehabilitation (rehab) program goes. The better you do with your rehab exercises, the quicker you will get your strength and movement back. This care sheet gives you a general idea about how long it will take for you to recover. But each person recovers at a different pace. Follow the steps below to get better as quickly as possible. How can you care for yourself at home? Activity    · Rest when you feel tired. You may take a nap, but don't stay in bed all day. When you sit, use a chair with arms. You can use the arms to help you stand up.     · Work with your physical therapist to find the best way to exercise. What you can do as your knee heals will depend on whether your new knee is cemented or uncemented. You may not be able to do certain things for a while if your new knee is uncemented.     · After your knee has healed enough, you can do more strenuous activities with caution. ? You can golf, but use a golf cart. And don't wear shoes with spikes. ? You can bike on a flat road or on a stationary bike. Avoid biking up hills. ? Your doctor may suggest that you stay away from activities that put stress on your knee. These include tennis, badminton, squash, racquetball, contact sports like football, jumping (such as in basketball), jogging, and running. ? Avoid activities where you might fall.  These include horseback riding, skiing, and mountain biking.     · Do not sit for more than 1 hour at a time. Get up and walk around for a while before you sit again. If you must sit for a long time, prop up your leg with a chair or footstool. This will help you avoid swelling.     · Ask your doctor when you can drive again. It may take up to 8 weeks after knee replacement surgery before it's safe for you to drive.     · When you get into a car, sit on the edge of the seat. Then pull in your legs, and turn to face the front.     · You should be able to do many everyday activities 3 to 6 weeks after your surgery. You will probably need to take 4 to 16 weeks off from work. When you can go back to work depends on the type of work you do and how you feel.     · Ask your doctor when it is okay for you to have sex.     · For 12 weeks, do not lift anything heavier than 10 pounds and do not lift weights. Diet    · By the time you leave the hospital, you should be eating your normal diet. If your stomach is upset, try bland, low-fat foods like plain rice, broiled chicken, toast, and yogurt. Your doctor may suggest that you take iron and vitamin supplements.     · Drink plenty of fluids (unless your doctor tells you not to).   · Eat healthy foods, and watch your portion sizes. Try to stay at your ideal weight. Too much weight puts more stress on your new knee.     · You may notice that your bowel movements are not regular right after your surgery. This is common. Try to avoid constipation and straining with bowel movements. You may want to take a fiber supplement every day. If you have not had a bowel movement after a couple of days, ask your doctor about taking a mild laxative. Medicines    · Your doctor will tell you if and when you can restart your medicines.  He or she will also give you instructions about taking any new medicines.     · If you take aspirin or some other blood thinner, ask your doctor if and when to start taking it again. Make sure that you understand exactly what your doctor wants you to do.     · Your doctor may give you a blood-thinning medicine to prevent blood clots. If you take a blood thinner, be sure you get instructions about how to take your medicine safely. Blood thinners can cause serious bleeding problems. This medicine could be in pill form or as a shot (injection). If a shot is needed, your doctor will tell you how to do this.     · Be safe with medicines. Take pain medicines exactly as directed. ? If the doctor gave you a prescription medicine for pain, take it as prescribed. ? If you are not taking a prescription pain medicine, ask your doctor if you can take an over-the-counter medicine. ? Plan to take your pain medicine 30 minutes before exercises. It is easier to prevent pain before it starts than to stop it after it has started.     · If you think your pain medicine is making you sick to your stomach:  ? Take your medicine after meals (unless your doctor has told you not to). ? Ask your doctor for a different pain medicine.     · If your doctor prescribed antibiotics, take them as directed. Do not stop taking them just because you feel better. You need to take the full course of antibiotics. Incision care    · If your doctor told you how to care for your cut (incision), follow your doctor's instructions. You will have a dressing over the cut. A dressing helps the incision heal and protects it. Your doctor will tell you how to take care of this.     · If you did not get instructions, follow this general advice:  ? If you have strips of tape on the cut the doctor made, leave the tape on for a week or until it falls off.  ? If you have stitches or staples, your doctor will tell you when to come back to have them removed. ? If you have skin adhesive on the cut, leave it on until it falls off. Skin adhesive is also called glue or liquid stitches. ? Change the bandage every day. ?  Wash the area daily with warm water, and pat it dry. Don't use hydrogen peroxide or alcohol. They can slow healing. ? You may cover the area with a gauze bandage if it oozes fluid or rubs against clothing. ? You may shower 24 to 48 hours after surgery. Pat the incision dry. Don't swim or take a bath for the first 2 weeks, or until your doctor tells you it is okay. Exercise    · Your rehab program will give you a number of exercises to do to help you get back your knee's range of motion and strength. Always do them as your therapist tells you. Ice    · For pain and swelling, put ice or a cold pack on the area for 10 to 20 minutes at a time. Put a thin cloth between the ice and your skin. Other instructions    · Keep wearing your support stockings as your doctor says. These help to prevent blood clots. How long you'll have to wear them depends on your activity level and the amount of swelling.     · Carry a medical alert card that says you have an artificial joint. You have metal pieces in your knee. These may set off some airport metal detectors. Follow-up care is a key part of your treatment and safety. Be sure to make and go to all appointments, and call your doctor if you are having problems. It's also a good idea to know your test results and keep a list of the medicines you take. When should you call for help? Call 911 anytime you think you may need emergency care. For example, call if:    · You passed out (lost consciousness).     · You have severe trouble breathing.     · You have sudden chest pain and shortness of breath, or you cough up blood. Call your doctor now or seek immediate medical care if:    · You have signs of infection, such as:  ? Increased pain, swelling, warmth, or redness. ? Red streaks leading from the incision. ? Pus draining from the incision. ? A fever.     · You have signs of a blood clot, such as:  ? Pain in your calf, back of the knee, thigh, or groin. ?  Redness and swelling in your leg or groin.     · Your incision comes open and begins to bleed, or the bleeding increases.     · You have pain that does not get better after you take pain medicine. Watch closely for changes in your health, and be sure to contact your doctor if:    · You do not have a bowel movement after taking a laxative. Where can you learn more? Go to http://www.gray.com/  Enter T054 in the search box to learn more about \"Total Knee Replacement: What to Expect at Home. \"  Current as of: March 2, 2020               Content Version: 12.6  © 7706-7115 [employer]. Care instructions adapted under license by [employer] (which disclaims liability or warranty for this information). If you have questions about a medical condition or this instruction, always ask your healthcare professional. [address] any warranty or liability for your use of this information. These are general instructions for a healthy lifestyle:    No smoking/ No tobacco products/ Avoid exposure to second hand smoke    Surgeon General's Warning:  Quitting smoking now greatly reduces serious risk to your health. Obesity, smoking, and sedentary lifestyle greatly increases your risk for illness    A healthy diet, regular physical exercise & weight monitoring are important for maintaining a healthy lifestyle    You may be retaining fluid if you have a history of heart failure or if you experience any of the following symptoms:  Weight gain of 3 pounds or more overnight or 5 pounds in a week, increased swelling in our hands or feet or shortness of breath while lying flat in bed. Please call your doctor as soon as you notice any of these symptoms; do not wait until your next office visit.     Recognize signs and symptoms of STROKE:    F-face looks uneven    A-arms unable to move or move even    S-speech slurred or non-existent    T-time-call 911 as soon as signs and symptoms begin-DO NOT go       Back to bed or wait to see if you get better-TIME IS BRAIN. The discharge information has been reviewed with the patient. The patient verbalized understanding. Information obtained by :  I understand that if any problems occur once I am at home I am to contact my physician. I understand and acknowledge receipt of the instructions indicated above.                                                                                                                                            Physician's or R.N.'s Signature                                                                  Date/Time                                                                                                                                              Patient or Representative Signature                                                          Date/Time

## 2020-10-29 NOTE — PROGRESS NOTES
Problem: Mobility Impaired (Adult and Pediatric)  Goal: *Acute Goals and Plan of Care (Insert Text)  Outcome: Progressing Towards Goal  Note:   LTG:  (1.)Mr. Akbar Boyle will move from supine to sit and sit to supine  in bed with STAND BY ASSIST within 7 treatment day(s). (2.)Mr. Akbar Boyle will transfer from bed to chair and chair to bed with STAND BY ASSIST using the least restrictive device within 7 treatment day(s). (3.)Mr. Zapata will ambulate with STAND BY ASSIST for 20 feet with the least restrictive device within 7 treatment day(s). (4)Mr. Akbar Boyle will go up 2 steps mod assist in 7 days. ________________________________________________________________________________________________    PHYSICAL THERAPY: Daily Note and AM 10/29/2020  INPATIENT: PT Visit Days : 2  [place] / [place] / Product Type: Medicare /       NAME/AGE/GENDER: Daina Mixon is a 79 y.o. 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 8XXA, Z96.659]  Right patella fracture [S82.001A]  Right patella fracture [S82.001A] Right patella fracture Right patella fracture  Procedure(s) (LRB):  RIGHT KNEE RECONSTRUCTIVE MECHANISM OF RIGHT KNEE ARTHROPLASTY TOTAL/ DEPUY/ ACHILLES BONE GRAFT  (Right)  2 Days Post-Op  ICD-10: Treatment Diagnosis:    · Generalized Muscle Weakness (M62.81)  · Other abnormalities of gait and mobility (R26.89)   Precaution/Allergies:  Patient has no known allergies. ASSESSMENT:     Mr. Akbar Boyle presents with decreased functional mobility and gait s/p Right total knee arthroplasty extensor mechanism reconstruction with primary repair with Achilles tendon allograft reinforcement. He is normally independent and lives with wife but had a fall causing injury. He plans to go home with support from wife and family.   MD order for WBAT with knee immobilizer/bishnu brace locked in extension at all times. No flexion exercises. Spoke with PA as well to not do any exercises at this point. Pt. And wife want to go home today. He needed more assistance with mobility this morning. He needed more assistance to stand and tends to lean backward until he can get his balance. He did not walk as far due to fatigue, some knee pain. His wife observed and said she is able to help him at home fine. They have all the equipment at home so they just plan on him using bs. No questions. They have 2 steps but their sons and pick him up and get him the house while he is in w/c. He was in brace the entire time and know the importance of keeping that on at all times. This section established at most recent assessment   PROBLEM LIST (Impairments causing functional limitations):  1. Decreased Strength  2. Decreased ADL/Functional Activities  3. Decreased Transfer Abilities  4. Decreased Ambulation Ability/Technique  5. Decreased Balance  6. Increased Pain  7. Decreased Activity Tolerance   INTERVENTIONS PLANNED: (Benefits and precautions of physical therapy have been discussed with the patient.)  1. Balance Exercise  2. Bed Mobility  3. Gait Training  4. Therapeutic Activites  5. Transfer Training     TREATMENT PLAN: Frequency/Duration: daily or twice daily  Rehabilitation Potential For Stated Goals: Good     REHAB RECOMMENDATIONS (at time of discharge pending progress):    Placement:  Home with HHPT  Equipment:    None at this time              HISTORY:   History of Present Injury/Illness (Reason for Referral):  Right total knee arthroplasty extensor mechanism reconstruction with primary repair with Achilles tendon allograft reinforcement. Past Medical History/Comorbidities:   Mr. Tate Rosas  has a past medical history of Arthritis, HLD (hyperlipidemia) (12/7/2012), Macular degeneration, and Osteoarthritis of left knee.  He also has no past medical history of Malignant hyperthermia due to anesthesia or Pseudocholinesterase deficiency. Mr. Familia De Los Santos  has a past surgical history that includes hx colonoscopy (2002) and hx colonoscopy (11-1-13). Social History/Living Environment:   Home Environment: Private residence  One/Two Story Residence: One story  Living Alone: No  Support Systems: Spouse/Significant Other/Partner  Patient Expects to be Discharged to[de-identified] Private residence  Current DME Used/Available at Home: Walker, rolling  Prior Level of Function/Work/Activity:  Independent, fall     Number of Personal Factors/Comorbidities that affect the Plan of Care: 1-2: MODERATE COMPLEXITY   EXAMINATION:   Most Recent Physical Functioning:   Gross Assessment:  AROM: Generally decreased, functional(left LE)  Strength: Generally decreased, functional(left  LE)               Posture:     Balance:  Sitting: Intact  Standing: With support;Pull to stand Bed Mobility:  Supine to Sit: Minimum assistance  Wheelchair Mobility:     Transfers:  Sit to Stand: Moderate assistance; Additional time  Stand to Sit: Minimum assistance; Additional time  Bed to Chair: Moderate assistance;Minimum assistance; Additional time  Duration: 25 Minutes  Gait:  Right Side Weight Bearing: As tolerated  Speed/Marilyn: Delayed  Step Length: Right shortened;Left shortened  Stance: Right decreased  Gait Abnormalities: Antalgic;Decreased step clearance  Distance (ft): 8 Feet (ft)  Assistive Device: Walker, rolling;Brace/Splint  Ambulation - Level of Assistance: Contact guard assistance;Minimal assistance  Interventions: Safety awareness training;Verbal cues;Manual cues; Tactile cues      Body Structures Involved:  1. Bones  2. Joints  3. Muscles  4. Ligaments Body Functions Affected:  1. Movement Related Activities and Participation Affected:  1.  Mobility   Number of elements that affect the Plan of Care: 3: MODERATE COMPLEXITY   CLINICAL PRESENTATION:   Presentation: Stable and uncomplicated: LOW COMPLEXITY   CLINICAL DECISION MAKING:   [employer] AM-PAC 6 Clicks   Basic Mobility Inpatient Short Form  How much difficulty does the patient currently have. .. Unable A Lot A Little None   1. Turning over in bed (including adjusting bedclothes, sheets and blankets)? [] 1   [] 2   [x] 3   [] 4   2. Sitting down on and standing up from a chair with arms ( e.g., wheelchair, bedside commode, etc.)   [] 1   [] 2   [x] 3   [] 4   3. Moving from lying on back to sitting on the side of the bed? [] 1   [] 2   [x] 3   [] 4   How much help from another person does the patient currently need. .. Total A Lot A Little None   4. Moving to and from a bed to a chair (including a wheelchair)? [] 1   [] 2   [x] 3   [] 4   5. Need to walk in hospital room? [] 1   [] 2   [x] 3   [] 4   6. Climbing 3-5 steps with a railing? [] 1   [x] 2   [] 3   [] 4   © 2007, Trustees of [address] [place] [address], under license to [employer]. All rights reserved      Score:  Initial: 17 Most Recent: X (Date: -- )    Interpretation of Tool:  Represents activities that are increasingly more difficult (i.e. Bed mobility, Transfers, Gait). Medical Necessity:     · Patient is expected to demonstrate progress in   · strength, range of motion, and balance  ·  to   · decrease assistance required with exercises and functional mobility   · . Reason for Services/Other Comments:  · Patient   · continues to require present interventions due to patient's inability to perform exercises and functional mobility independently  · .    Use of outcome tool(s) and clinical judgement create a POC that gives a: Clear prediction of patient's progress: LOW COMPLEXITY            TREATMENT:   (In addition to Assessment/Re-Assessment sessions the following treatments were rendered)   Pre-treatment Symptoms/Complaints:  Knee pain, fatigue  Pain: Initial:      Post Session:  4 knee pain     Therapeutic Activity: (  25 Minutes ):  Therapeutic activities including Bed transfers, Chair transfers, Ambulation on level ground and standing with RW to improve mobility, strength and balance. Required minimal Safety awareness training;Verbal cues;Manual cues; Tactile cues to promote static and dynamic balance in standing. Braces/Orthotics/Lines/Etc:   · O2 Device: Room air  Treatment/Session Assessment:    · Response to Treatment:  did worse this am  · Interdisciplinary Collaboration:   o Registered Nurse  · After treatment position/precautions:   o Up in chair  o Bed/Chair-wheels locked  o Bed in low position  o Caregiver at bedside  o Call light within reach  o Family at bedside   · Compliance with Program/Exercises: Will assess as treatment progresses  · Recommendations/Intent for next treatment session: \"Next visit will focus on reduction in assistance provided\".   Total Treatment Duration:  PT Patient Time In/Time Out  Time In: 0910  Time Out: Cristino Velez PT

## 2020-10-29 NOTE — PROGRESS NOTES
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 8XXA, Z96.659]  Right patella fracture [S82.001A]  Right patella fracture [S82.001A]    2 Days Post-Op    Subjective:     Marvine Broderick awake and alert    Review of Systems: Pertinent items are noted in HPI. Objective:     PT/OT:     PATIENT MOBILITY    Bed Mobility  Supine to Sit: Contact guard assistance  Sit to Supine: Minimum assistance  Transfers  Sit to Stand: Additional time, Minimum assistance  Stand to Sit: Minimum assistance, Additional time  Bed to Chair: Additional time, Minimum assistance      Gait  Speed/Marilyn: Delayed  Step Length: Left shortened, Right shortened  Stance: Right decreased  Gait Abnormalities: Antalgic, Decreased step clearance, Shuffling gait  Ambulation - Level of Assistance: Minimal assistance, Contact guard assistance  Distance (ft): 15 Feet (ft)  Assistive Device: Walker, rolling, Brace/Splint  Interventions: Safety awareness training, Verbal cues   Weight Bearing Status  Right Side Weight Bearing: As tolerated(with bishnu brace locked in extension, no flexion exercises)        Vital Signs:    Blood pressure 123/68, pulse 60, temperature 98.2 °F (36.8 °C), resp. rate 16, SpO2 98 %.   Temp (24hrs), Av.7 °F (36.5 °C), Min:97.3 °F (36.3 °C), Max:98.2 °F (36.8 °C)      Pain Control:   Pain Assessment  Pain Scale 1: Numeric (0 - 10)  Pain Intensity 1: 5  Pain Location 1: Knee  Pain Orientation 1: Right  Pain Description 1: Aching  Pain Intervention(s) 1: Medication (see MAR)    Meds:  Current Facility-Administered Medications   Medication Dose Route Frequency    alcohol 62% (NOZIN) nasal  1 Ampule  1 Ampule Topical Q12H    sodium chloride (NS) flush 5-40 mL  5-40 mL IntraVENous Q8H    sodium chloride (NS) flush 5-40 mL  5-40 mL IntraVENous PRN    acetaminophen (TYLENOL) tablet 1,000 mg  1,000 mg Oral Q6H    diphenhydrAMINE (BENADRYL) capsule 25 mg  25 mg Oral Q4H PRN    senna-docusate (PERICOLACE) 8.6-50 mg per tablet 2 Tab  2 Tab Oral DAILY    HYDROcodone-acetaminophen (NORCO) 7.5-325 mg per tablet 1-2 Tab  1-2 Tab Oral Q4H PRN    polyethylene glycol (MIRALAX) packet 17 g  17 g Oral DAILY    sodium chloride (NS) flush 5-40 mL  5-40 mL IntraVENous Q8H    sodium chloride (NS) flush 5-40 mL  5-40 mL IntraVENous PRN    celecoxib (CELEBREX) capsule 200 mg  200 mg Oral Q12H    HYDROmorphone (PF) (DILAUDID) injection 1 mg  1 mg IntraVENous Q3H PRN    naloxone (NARCAN) injection 0.2-0.4 mg  0.2-0.4 mg IntraVENous Q10MIN PRN    promethazine (PHENERGAN) tablet 25 mg  25 mg Oral Q6H PRN    senna-docusate (PERICOLACE) 8.6-50 mg per tablet 2 Tab  2 Tab Oral DAILY    aspirin delayed-release tablet 81 mg  81 mg Oral Q12H    ondansetron (ZOFRAN ODT) tablet 8 mg  8 mg Oral Q8H PRN        LAB:    Lab Results   Component Value Date/Time    INR 1.0 10/26/2020 03:05 PM    INR 1.1 05/21/2020 12:19 PM    INR 1.0 11/26/2019 11:20 AM     Lab Results   Component Value Date/Time    HGB 13.1 (L) 10/27/2020 07:53 PM    HGB 13.1 (L) 10/26/2020 03:05 PM    HGB 13.4 (L) 10/24/2020 04:12 PM       Wound Knee Left (Active)   Number of days: 322       Wound Knee Right (Active)   Number of days: 126       Wound Knee Right (Active)   Dressing Status Clean, dry, and intact 10/28/20 2143   Dressing Type Aquacel 10/28/20 2143   Splint Type/Material Knee immobilizer 10/28/20 2143   Drainage Amount None 10/27/20 1938   Number of days: 2         Physical Exam:  No significant changes    Assessment:      Principal Problem:    Right patella fracture (10/26/2020)    Active Problems:     Total knee replacement status, right (6/25/2020)         Plan:     Continue PT/OT/Rehab  Consult: Rehab team including PT, OT, recreational therapy, and    Home today    Patient Expects to be Discharged to[de-identified] Private residence

## 2020-10-29 NOTE — PROGRESS NOTES
Problem: Mobility Impaired (Adult and Pediatric)  Goal: *Acute Goals and Plan of Care (Insert Text)  Outcome: Progressing Towards Goal  Note:   LTG:  (1.)Mr. Brandon Askew will move from supine to sit and sit to supine  in bed with STAND BY ASSIST within 7 treatment day(s). (2.)Mr. Brandon Askew will transfer from bed to chair and chair to bed with STAND BY ASSIST using the least restrictive device within 7 treatment day(s). (3.)Mr. Zapata will ambulate with STAND BY ASSIST for 20 feet with the least restrictive device within 7 treatment day(s). (4)Mr. Brandon Askew will go up 2 steps mod assist in 7 days. ________________________________________________________________________________________________    PHYSICAL THERAPY: Daily Note and PM 10/29/2020  INPATIENT: PT Visit Days : 2  [place] / [place] / Product Type: Medicare /       NAME/AGE/GENDER: Kiana Hackett is a 79 y.o. 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 8XXA, Z96.659]  Right patella fracture [S82.001A]  Right patella fracture [S82.001A] Right patella fracture Right patella fracture  Procedure(s) (LRB):  RIGHT KNEE RECONSTRUCTIVE MECHANISM OF RIGHT KNEE ARTHROPLASTY TOTAL/ DEPUY/ ACHILLES BONE GRAFT  (Right)  2 Days Post-Op  ICD-10: Treatment Diagnosis:    · Generalized Muscle Weakness (M62.81)  · Other abnormalities of gait and mobility (R26.89)   Precaution/Allergies:  Patient has no known allergies. ASSESSMENT:     Mr. Brandon Askew presents with decreased functional mobility and gait s/p Right total knee arthroplasty extensor mechanism reconstruction with primary repair with Achilles tendon allograft reinforcement. He is normally independent and lives with wife but had a fall causing injury. He plans to go home with support from wife and family.   MD order for WBAT with knee immobilizer/bishnu brace locked in extension at all times. No flexion exercises. Spoke with PA as well to not do any exercises at this point. Pt. Did better this pm.  He was in chair after eating lunch. bishnu inspected and seemed fine. He ambulated an increased distance compared to this am with RW with knee stable and locked in extension in brace. He then did a practice run of chair to [facility] and back to chair. He continues to need some assistance with transfers and mobility but did better this pm. Wife present and very eager to help at home and they have sons close by to assist as needed and get in him the house today in w/c. We discussed safety and that he will need some help whenever he moves, wife understanding and willing to assist.  They had no questions or concerns and eager to go home. This section established at most recent assessment   PROBLEM LIST (Impairments causing functional limitations):  1. Decreased Strength  2. Decreased ADL/Functional Activities  3. Decreased Transfer Abilities  4. Decreased Ambulation Ability/Technique  5. Decreased Balance  6. Increased Pain  7. Decreased Activity Tolerance   INTERVENTIONS PLANNED: (Benefits and precautions of physical therapy have been discussed with the patient.)  1. Balance Exercise  2. Bed Mobility  3. Gait Training  4. Therapeutic Activites  5. Transfer Training     TREATMENT PLAN: Frequency/Duration: daily or twice daily  Rehabilitation Potential For Stated Goals: Good     REHAB RECOMMENDATIONS (at time of discharge pending progress):    Placement:  Home with HHPT  Equipment:    None at this time              HISTORY:   History of Present Injury/Illness (Reason for Referral):  Right total knee arthroplasty extensor mechanism reconstruction with primary repair with Achilles tendon allograft reinforcement.   Past Medical History/Comorbidities:   Mr. Min Manzanares  has a past medical history of Arthritis, HLD (hyperlipidemia) (12/7/2012), Macular degeneration, and Osteoarthritis of left knee. He also has no past medical history of Malignant hyperthermia due to anesthesia or Pseudocholinesterase deficiency. Mr. Geneva Webster  has a past surgical history that includes hx colonoscopy (2002) and hx colonoscopy (11-1-13). Social History/Living Environment:   Home Environment: Private residence  One/Two Story Residence: One story  Living Alone: No  Support Systems: Spouse/Significant Other/Partner  Patient Expects to be Discharged to[de-identified] Private residence  Current DME Used/Available at Home: Walker, rolling  Prior Level of Function/Work/Activity:  Independent, fall     Number of Personal Factors/Comorbidities that affect the Plan of Care: 1-2: MODERATE COMPLEXITY   EXAMINATION:   Most Recent Physical Functioning:   Gross Assessment:  AROM: Generally decreased, functional(left LE)  Strength: Generally decreased, functional(left  LE)      RLE AROM  R Knee Extension: (not assessed, knee locked in extension in bishnu brace)        Posture:     Balance:  Sitting: Intact  Standing: Pull to stand; With support Bed Mobility:  Supine to Sit: (in chair)  Wheelchair Mobility:     Transfers:  Sit to Stand: Additional time;Minimum assistance  Stand to Sit: Minimum assistance; Additional time  Bed to Chair: Moderate assistance;Minimum assistance; Additional time  Toilet Transfer : Additional time;Minimum assistance(bsc)  Duration: 25 Minutes  Gait:  Right Side Weight Bearing: As tolerated(with bishnu locked in extension at all times)  Speed/Marilyn: Delayed  Step Length: Left shortened;Right shortened  Stance: Right decreased  Gait Abnormalities: Antalgic;Decreased step clearance  Distance (ft): 15 Feet (ft)  Assistive Device: Brace/Splint; Walker, rolling  Ambulation - Level of Assistance: Contact guard assistance;Minimal assistance  Interventions: Safety awareness training;Verbal cues      Body Structures Involved:  1. Bones  2. Joints  3. Muscles  4. Ligaments Body Functions Affected:  1.  Movement Related Activities and Participation Affected:  1. Mobility   Number of elements that affect the Plan of Care: 3: MODERATE COMPLEXITY   CLINICAL PRESENTATION:   Presentation: Stable and uncomplicated: LOW COMPLEXITY   CLINICAL DECISION MAKIN [facility] [address] [id number] AM-PAC 6 Clicks   Basic Mobility Inpatient Short Form  How much difficulty does the patient currently have. .. Unable A Lot A Little None   1. Turning over in bed (including adjusting bedclothes, sheets and blankets)? [] 1   [] 2   [x] 3   [] 4   2. Sitting down on and standing up from a chair with arms ( e.g., wheelchair, bedside commode, etc.)   [] 1   [] 2   [x] 3   [] 4   3. Moving from lying on back to sitting on the side of the bed? [] 1   [] 2   [x] 3   [] 4   How much help from another person does the patient currently need. .. Total A Lot A Little None   4. Moving to and from a bed to a chair (including a wheelchair)? [] 1   [] 2   [x] 3   [] 4   5. Need to walk in hospital room? [] 1   [] 2   [x] 3   [] 4   6. Climbing 3-5 steps with a railing? [] 1   [x] 2   [] 3   [] 4   © , Trustees of [address] [place] [address] [address] [address], under license to [employer]. All rights reserved      Score:  Initial: 17 Most Recent: X (Date: -- )    Interpretation of Tool:  Represents activities that are increasingly more difficult (i.e. Bed mobility, Transfers, Gait). Medical Necessity:     · Patient is expected to demonstrate progress in   · strength, range of motion, and balance  ·  to   · decrease assistance required with exercises and functional mobility   · . Reason for Services/Other Comments:  · Patient   · continues to require present interventions due to patient's inability to perform exercises and functional mobility independently  · .    Use of outcome tool(s) and clinical judgement create a POC that gives a: Clear prediction of patient's progress: LOW COMPLEXITY            TREATMENT:   (In addition to Assessment/Re-Assessment sessions the following treatments were rendered)   Pre-treatment Symptoms/Complaints:  Knee pain, fatigue  Pain: Initial:      Post Session:  4-5 knee pain     Therapeutic Activity: (  25 Minutes ):  Therapeutic activities including Bed transfers, Chair transfers, toilet tranfers, Ambulation on level ground and standing with RW to improve mobility, strength and balance. Required minimal Safety awareness training;Verbal cues to promote static and dynamic balance in standing. Braces/Orthotics/Lines/Etc:   · O2 Device: Room air  Treatment/Session Assessment:    · Response to Treatment:  did better  · Interdisciplinary Collaboration:   o Registered Nurse  · After treatment position/precautions:   o Up in chair  o Bed/Chair-wheels locked  o Bed in low position  o Caregiver at bedside  o Call light within reach  o Family at bedside   · Compliance with Program/Exercises: Compliant most of the time  · Recommendations/Intent for next treatment session: \"Next visit will focus on reduction in assistance provided\".   Total Treatment Duration:  PT Patient Time In/Time Out  Time In: 1315  Time Out: 5200 Cheryl Zhou PT

## 2020-10-30 ENCOUNTER — HOME CARE VISIT (OUTPATIENT)
Dept: SCHEDULING | Facility: HOME HEALTH | Age: 80
End: 2020-10-30
Payer: MEDICARE

## 2020-10-30 ENCOUNTER — PATIENT OUTREACH (OUTPATIENT)
Dept: CASE MANAGEMENT | Age: 80
End: 2020-10-30

## 2020-10-30 VITALS
SYSTOLIC BLOOD PRESSURE: 146 MMHG | RESPIRATION RATE: 18 BRPM | DIASTOLIC BLOOD PRESSURE: 80 MMHG | HEART RATE: 70 BPM | TEMPERATURE: 98.5 F

## 2020-10-30 PROCEDURE — 3331090001 HH PPS REVENUE CREDIT

## 2020-10-30 PROCEDURE — 3331090002 HH PPS REVENUE DEBIT

## 2020-10-30 PROCEDURE — G0151 HHCP-SERV OF PT,EA 15 MIN: HCPCS

## 2020-10-30 PROCEDURE — 400013 HH SOC

## 2020-10-30 NOTE — PROGRESS NOTES
Patient was admitted to [facility] on 10/26/20 and discharged on 10/29/20 for Right patella fracture;  RIGHT KNEE RECONSTRUCTIVE MECHANISM OF RIGHT KNEE ARTHROPLASTY TOTAL/ DEPUY/ ACHILLES BONE GRAFT . Outreach made within 2 business days of discharge: Yes    Top Discharge Challenges to be reviewed by the provider   Additional needs identified to be addressed with provider no  none  Discussed COVID-19 related testing which was not done at this time. Test results were not done. Patient informed of results, if available? n/a   Method of communication with provider : none       Advance Care Planning:   Does patient have an Advance Directive:  currently not on file; patient declined education    Inpatient Readmission Risk score: 7  Was this a readmission? No, planned elective/surgery  Patient stated reason for the admission: n/a  Patients top risk factors for readmission: complication of surgery  Interventions to address risk factors: n/a    LPN Care Coordinator contacted the patient by telephone to perform post hospital discharge assessment. Verified name and  with patient as identifiers. Provided introduction to self, and explanation of the CTN role. CC reviewed discharge instructions, medical action plan and red flags with patient who verbalized understanding. Patient given an opportunity to ask questions and does not have any further questions or concerns at this time. The patient agrees to contact the PCP office for questions related to their healthcare. Medication reconciliation was declined by patient, who verbalizes understanding of administration of home medications. Advised obtaining a 90-day supply of all daily and as-needed medications.    Referral to Pharm D needed: no     Home Health/Outpatient orders at discharge: [address]: [facility] [place]  Date of initial visit: 10/30/20    Durable Medical Equipment ordered at discharge: patient has walker  Durable Medical Equipment Company: n/a  Durable Medical Equipment received: n/a    Covid Risk Education    Patient has following risk factors of: no known risk factors. Education provided regarding infection prevention, and signs and symptoms of COVID-19 and when to seek medical attention with patient who verbalized understanding. Discussed exposure protocols and quarantine From CDC: Are you at higher risk for severe illness?  and given an opportunity for questions and concerns. The patient agrees to contact the COVID-19 hotline [phone] or PCP office for questions related to COVID-19. For more information on steps you can take to protect yourself, see CDC's How to Protect Yourself     Patient/family/caregiver given information for GetWell Loop and agrees to enroll no      Discussed follow-up appointments. If no appointment was previously scheduled, appointment scheduling offered: yes  [address] follow up appointment(s):   Future Appointments   Date Time Provider Laine Valencia   10/30/2020 12:15 PM Jeanna Leos PT [facility]     Non-[facility] follow up appointment(s): Dr. Darshan Cortez, [address]- patient will call if need arises; Dr. Chris Sánchez, patient is waiting on call from office with post op appointment  Plan for follow up call in 14 to 21 days based on severity of symptoms and risk factors. CC provided contact information for future needs.

## 2020-10-31 PROCEDURE — 3331090001 HH PPS REVENUE CREDIT

## 2020-10-31 PROCEDURE — 3331090002 HH PPS REVENUE DEBIT

## 2020-11-01 PROCEDURE — 3331090002 HH PPS REVENUE DEBIT

## 2020-11-01 PROCEDURE — 3331090001 HH PPS REVENUE CREDIT

## 2020-11-02 ENCOUNTER — HOME CARE VISIT (OUTPATIENT)
Dept: SCHEDULING | Facility: HOME HEALTH | Age: 80
End: 2020-11-02
Payer: MEDICARE

## 2020-11-02 PROCEDURE — 3331090001 HH PPS REVENUE CREDIT

## 2020-11-02 PROCEDURE — 3331090002 HH PPS REVENUE DEBIT

## 2020-11-02 PROCEDURE — G0157 HHC PT ASSISTANT EA 15: HCPCS

## 2020-11-03 VITALS
SYSTOLIC BLOOD PRESSURE: 124 MMHG | DIASTOLIC BLOOD PRESSURE: 64 MMHG | RESPIRATION RATE: 18 BRPM | TEMPERATURE: 97.5 F | HEART RATE: 80 BPM

## 2020-11-03 PROCEDURE — 3331090001 HH PPS REVENUE CREDIT

## 2020-11-03 PROCEDURE — 3331090002 HH PPS REVENUE DEBIT

## 2020-11-04 ENCOUNTER — HOME CARE VISIT (OUTPATIENT)
Dept: SCHEDULING | Facility: HOME HEALTH | Age: 80
End: 2020-11-04
Payer: MEDICARE

## 2020-11-04 VITALS
SYSTOLIC BLOOD PRESSURE: 130 MMHG | HEART RATE: 78 BPM | TEMPERATURE: 97.5 F | DIASTOLIC BLOOD PRESSURE: 62 MMHG | RESPIRATION RATE: 16 BRPM

## 2020-11-04 PROCEDURE — 3331090001 HH PPS REVENUE CREDIT

## 2020-11-04 PROCEDURE — G0157 HHC PT ASSISTANT EA 15: HCPCS

## 2020-11-04 PROCEDURE — 3331090002 HH PPS REVENUE DEBIT

## 2020-11-05 PROCEDURE — 3331090002 HH PPS REVENUE DEBIT

## 2020-11-05 PROCEDURE — 3331090001 HH PPS REVENUE CREDIT

## 2020-11-06 ENCOUNTER — HOME CARE VISIT (OUTPATIENT)
Dept: SCHEDULING | Facility: HOME HEALTH | Age: 80
End: 2020-11-06
Payer: MEDICARE

## 2020-11-06 PROCEDURE — G0157 HHC PT ASSISTANT EA 15: HCPCS

## 2020-11-06 PROCEDURE — 3331090001 HH PPS REVENUE CREDIT

## 2020-11-06 PROCEDURE — 3331090002 HH PPS REVENUE DEBIT

## 2020-11-07 VITALS
TEMPERATURE: 97.3 F | SYSTOLIC BLOOD PRESSURE: 122 MMHG | HEART RATE: 74 BPM | RESPIRATION RATE: 16 BRPM | DIASTOLIC BLOOD PRESSURE: 60 MMHG

## 2020-11-07 PROCEDURE — 3331090001 HH PPS REVENUE CREDIT

## 2020-11-07 PROCEDURE — 3331090002 HH PPS REVENUE DEBIT

## 2020-11-08 PROCEDURE — 3331090002 HH PPS REVENUE DEBIT

## 2020-11-08 PROCEDURE — 3331090001 HH PPS REVENUE CREDIT

## 2020-11-09 ENCOUNTER — HOME CARE VISIT (OUTPATIENT)
Dept: SCHEDULING | Facility: HOME HEALTH | Age: 80
End: 2020-11-09
Payer: MEDICARE

## 2020-11-09 PROCEDURE — 3331090001 HH PPS REVENUE CREDIT

## 2020-11-09 PROCEDURE — G0157 HHC PT ASSISTANT EA 15: HCPCS

## 2020-11-09 PROCEDURE — 3331090002 HH PPS REVENUE DEBIT

## 2020-11-10 VITALS
HEART RATE: 80 BPM | RESPIRATION RATE: 17 BRPM | SYSTOLIC BLOOD PRESSURE: 118 MMHG | TEMPERATURE: 97.3 F | DIASTOLIC BLOOD PRESSURE: 64 MMHG

## 2020-11-10 PROCEDURE — 3331090002 HH PPS REVENUE DEBIT

## 2020-11-10 PROCEDURE — 3331090001 HH PPS REVENUE CREDIT

## 2020-11-11 PROCEDURE — 3331090001 HH PPS REVENUE CREDIT

## 2020-11-11 PROCEDURE — 3331090002 HH PPS REVENUE DEBIT

## 2020-11-12 ENCOUNTER — HOME CARE VISIT (OUTPATIENT)
Dept: SCHEDULING | Facility: HOME HEALTH | Age: 80
End: 2020-11-12
Payer: MEDICARE

## 2020-11-12 PROCEDURE — 3331090002 HH PPS REVENUE DEBIT

## 2020-11-12 PROCEDURE — G0157 HHC PT ASSISTANT EA 15: HCPCS

## 2020-11-12 PROCEDURE — 3331090001 HH PPS REVENUE CREDIT

## 2020-11-13 ENCOUNTER — HOME CARE VISIT (OUTPATIENT)
Dept: HOME HEALTH SERVICES | Facility: HOME HEALTH | Age: 80
End: 2020-11-13
Payer: MEDICARE

## 2020-11-13 ENCOUNTER — HOME CARE VISIT (OUTPATIENT)
Dept: SCHEDULING | Facility: HOME HEALTH | Age: 80
End: 2020-11-13
Payer: MEDICARE

## 2020-11-13 VITALS
SYSTOLIC BLOOD PRESSURE: 122 MMHG | HEART RATE: 80 BPM | DIASTOLIC BLOOD PRESSURE: 60 MMHG | RESPIRATION RATE: 16 BRPM | TEMPERATURE: 97.5 F

## 2020-11-13 PROCEDURE — 3331090002 HH PPS REVENUE DEBIT

## 2020-11-13 PROCEDURE — G0157 HHC PT ASSISTANT EA 15: HCPCS

## 2020-11-13 PROCEDURE — 3331090001 HH PPS REVENUE CREDIT

## 2020-11-14 VITALS
DIASTOLIC BLOOD PRESSURE: 60 MMHG | SYSTOLIC BLOOD PRESSURE: 124 MMHG | RESPIRATION RATE: 16 BRPM | TEMPERATURE: 97.6 F | HEART RATE: 78 BPM

## 2020-11-14 PROCEDURE — 3331090001 HH PPS REVENUE CREDIT

## 2020-11-14 PROCEDURE — 3331090002 HH PPS REVENUE DEBIT

## 2020-11-15 PROCEDURE — 3331090002 HH PPS REVENUE DEBIT

## 2020-11-15 PROCEDURE — 3331090001 HH PPS REVENUE CREDIT

## 2020-11-16 ENCOUNTER — HOME CARE VISIT (OUTPATIENT)
Dept: SCHEDULING | Facility: HOME HEALTH | Age: 80
End: 2020-11-16
Payer: MEDICARE

## 2020-11-16 PROCEDURE — 3331090002 HH PPS REVENUE DEBIT

## 2020-11-16 PROCEDURE — G0157 HHC PT ASSISTANT EA 15: HCPCS

## 2020-11-16 PROCEDURE — 3331090001 HH PPS REVENUE CREDIT

## 2020-11-17 VITALS
HEART RATE: 76 BPM | SYSTOLIC BLOOD PRESSURE: 122 MMHG | TEMPERATURE: 97.3 F | DIASTOLIC BLOOD PRESSURE: 62 MMHG | RESPIRATION RATE: 16 BRPM

## 2020-11-17 PROCEDURE — 3331090002 HH PPS REVENUE DEBIT

## 2020-11-17 PROCEDURE — 3331090001 HH PPS REVENUE CREDIT

## 2020-11-18 ENCOUNTER — HOME CARE VISIT (OUTPATIENT)
Dept: SCHEDULING | Facility: HOME HEALTH | Age: 80
End: 2020-11-18
Payer: MEDICARE

## 2020-11-18 VITALS
SYSTOLIC BLOOD PRESSURE: 122 MMHG | DIASTOLIC BLOOD PRESSURE: 72 MMHG | RESPIRATION RATE: 16 BRPM | TEMPERATURE: 98.2 F | HEART RATE: 67 BPM

## 2020-11-18 PROCEDURE — A4649 SURGICAL SUPPLIES: HCPCS

## 2020-11-18 PROCEDURE — 3331090002 HH PPS REVENUE DEBIT

## 2020-11-18 PROCEDURE — G0151 HHCP-SERV OF PT,EA 15 MIN: HCPCS

## 2020-11-18 PROCEDURE — 3331090001 HH PPS REVENUE CREDIT

## 2020-11-19 ENCOUNTER — PATIENT OUTREACH (OUTPATIENT)
Dept: CASE MANAGEMENT | Age: 80
End: 2020-11-19

## 2020-11-19 PROCEDURE — 3331090001 HH PPS REVENUE CREDIT

## 2020-11-19 PROCEDURE — 3331090002 HH PPS REVENUE DEBIT

## 2020-11-19 NOTE — PROGRESS NOTES
Transition of Care Hospital Discharge Follow-Up      Date/Time:  2020 11:52 AM    LPN Care Coordinator contacted the patient by telephone to perform post hospital follow up. Verified name and  with patient as identifiers. LPN reinforced discharge instructions and red flags with patient who verbalized understanding. Patient given an opportunity to ask questions and does not have any further questions or concerns at this time. The patient agrees to contact the PCP office for questions related to their healthcare    [facility] follow up appointment(s):   Future Appointments   Date Time Provider Liane Annie   2020 To Be Determined Polly Goodman [address] [facility] [address]   2020 To Be Determined Polly Goodman [address]   12/10/2020 To Be Determined Frandy Mosqueda [facility]      Non-[facility] follow up appointment(s): Dr. Neisha Matute, 3 weeks  Patient attended follow up appointments since last contact: Patient states he is progressing, attended follow up and returns in 3 weeks  What was the outcome of the appointment:     [address]: [address] [place] [address] Street: [facility] [place]  Any issues/ progress:  (Assist with coordination of services if necessary.)      Medication(s):   Medication changes since discharge:     Current Outpatient Medications   Medication Sig    cephALEXin (KEFLEX) 500 mg capsule Take 500 mg by mouth two (2) times a day.  aspirin delayed-release 81 mg tablet Take 1 Tab by mouth every twelve (12) hours every twelve (12) hours for 30 days.  polyethylene glycol (MIRALAX) 17 gram packet Take 17 g by mouth daily. Mix with 8oz water    acetaminophen (TYLENOL PO) Take 1,000 mg by mouth every six (6) hours as needed for Pain. No current facility-administered medications for this visit. Other Issues/ Miscellaneous?  (Transportation, access to meals, ability to perform ADLs, adequate caregiver support, etc.) no  Referrals needed? (, Diabetes education, HH, etc.) no    Any other questions or concerns expressed by patient?  Not at this time    Schedule next appointment with BRIDGETT or referral to CTN/ ACM: n/a  Graduation: yes  Next Outreach Scheduled: n/a

## 2020-11-20 PROCEDURE — 3331090002 HH PPS REVENUE DEBIT

## 2020-11-20 PROCEDURE — 3331090001 HH PPS REVENUE CREDIT

## 2020-11-21 PROCEDURE — 3331090001 HH PPS REVENUE CREDIT

## 2020-11-21 PROCEDURE — 3331090002 HH PPS REVENUE DEBIT

## 2020-11-22 PROCEDURE — 3331090001 HH PPS REVENUE CREDIT

## 2020-11-22 PROCEDURE — 3331090002 HH PPS REVENUE DEBIT

## 2020-11-23 PROCEDURE — 3331090002 HH PPS REVENUE DEBIT

## 2020-11-23 PROCEDURE — 3331090001 HH PPS REVENUE CREDIT

## 2020-11-24 ENCOUNTER — HOME CARE VISIT (OUTPATIENT)
Dept: SCHEDULING | Facility: HOME HEALTH | Age: 80
End: 2020-11-24
Payer: MEDICARE

## 2020-11-24 VITALS
RESPIRATION RATE: 18 BRPM | TEMPERATURE: 98.2 F | DIASTOLIC BLOOD PRESSURE: 82 MMHG | SYSTOLIC BLOOD PRESSURE: 140 MMHG | HEART RATE: 66 BPM

## 2020-11-24 PROCEDURE — 3331090002 HH PPS REVENUE DEBIT

## 2020-11-24 PROCEDURE — 3331090001 HH PPS REVENUE CREDIT

## 2020-11-24 PROCEDURE — G0157 HHC PT ASSISTANT EA 15: HCPCS

## 2020-11-25 PROCEDURE — 3331090001 HH PPS REVENUE CREDIT

## 2020-11-25 PROCEDURE — 3331090002 HH PPS REVENUE DEBIT

## 2020-11-26 PROCEDURE — 3331090002 HH PPS REVENUE DEBIT

## 2020-11-26 PROCEDURE — 3331090001 HH PPS REVENUE CREDIT

## 2020-11-27 PROCEDURE — 3331090002 HH PPS REVENUE DEBIT

## 2020-11-27 PROCEDURE — 3331090001 HH PPS REVENUE CREDIT

## 2020-11-28 PROCEDURE — 3331090001 HH PPS REVENUE CREDIT

## 2020-11-28 PROCEDURE — 3331090002 HH PPS REVENUE DEBIT

## 2020-11-29 PROCEDURE — 3331090001 HH PPS REVENUE CREDIT

## 2020-11-29 PROCEDURE — 3331090002 HH PPS REVENUE DEBIT

## 2020-11-30 PROCEDURE — 3331090001 HH PPS REVENUE CREDIT

## 2020-11-30 PROCEDURE — 3331090002 HH PPS REVENUE DEBIT

## 2020-12-01 ENCOUNTER — HOME CARE VISIT (OUTPATIENT)
Dept: HOME HEALTH SERVICES | Facility: HOME HEALTH | Age: 80
End: 2020-12-01
Payer: MEDICARE

## 2020-12-01 ENCOUNTER — HOME CARE VISIT (OUTPATIENT)
Dept: SCHEDULING | Facility: HOME HEALTH | Age: 80
End: 2020-12-01
Payer: MEDICARE

## 2020-12-01 PROCEDURE — 3331090001 HH PPS REVENUE CREDIT

## 2020-12-01 PROCEDURE — 3331090002 HH PPS REVENUE DEBIT

## 2020-12-01 PROCEDURE — G0157 HHC PT ASSISTANT EA 15: HCPCS

## 2020-12-01 PROCEDURE — 400013 HH SOC

## 2020-12-02 VITALS
TEMPERATURE: 97.3 F | HEART RATE: 76 BPM | RESPIRATION RATE: 16 BRPM | SYSTOLIC BLOOD PRESSURE: 122 MMHG | DIASTOLIC BLOOD PRESSURE: 62 MMHG

## 2020-12-02 PROCEDURE — 3331090002 HH PPS REVENUE DEBIT

## 2020-12-02 PROCEDURE — 3331090001 HH PPS REVENUE CREDIT

## 2020-12-03 PROCEDURE — 3331090002 HH PPS REVENUE DEBIT

## 2020-12-03 PROCEDURE — 3331090001 HH PPS REVENUE CREDIT

## 2020-12-04 PROCEDURE — 3331090002 HH PPS REVENUE DEBIT

## 2020-12-04 PROCEDURE — 3331090001 HH PPS REVENUE CREDIT

## 2020-12-05 PROCEDURE — 3331090001 HH PPS REVENUE CREDIT

## 2020-12-05 PROCEDURE — 3331090002 HH PPS REVENUE DEBIT

## 2020-12-06 PROCEDURE — 3331090001 HH PPS REVENUE CREDIT

## 2020-12-06 PROCEDURE — 3331090002 HH PPS REVENUE DEBIT

## 2020-12-07 PROCEDURE — 3331090002 HH PPS REVENUE DEBIT

## 2020-12-07 PROCEDURE — 3331090001 HH PPS REVENUE CREDIT

## 2020-12-08 PROCEDURE — 3331090001 HH PPS REVENUE CREDIT

## 2020-12-08 PROCEDURE — 3331090002 HH PPS REVENUE DEBIT

## 2020-12-09 PROCEDURE — 3331090002 HH PPS REVENUE DEBIT

## 2020-12-09 PROCEDURE — 3331090001 HH PPS REVENUE CREDIT

## 2020-12-10 ENCOUNTER — HOME CARE VISIT (OUTPATIENT)
Dept: SCHEDULING | Facility: HOME HEALTH | Age: 80
End: 2020-12-10
Payer: MEDICARE

## 2020-12-10 VITALS
TEMPERATURE: 98 F | DIASTOLIC BLOOD PRESSURE: 76 MMHG | SYSTOLIC BLOOD PRESSURE: 130 MMHG | HEART RATE: 62 BPM | RESPIRATION RATE: 18 BRPM

## 2020-12-10 PROCEDURE — 3331090002 HH PPS REVENUE DEBIT

## 2020-12-10 PROCEDURE — G0151 HHCP-SERV OF PT,EA 15 MIN: HCPCS

## 2020-12-10 PROCEDURE — 3331090001 HH PPS REVENUE CREDIT

## 2020-12-11 PROCEDURE — 3331090001 HH PPS REVENUE CREDIT

## 2020-12-11 PROCEDURE — 3331090002 HH PPS REVENUE DEBIT

## 2020-12-12 PROCEDURE — 3331090002 HH PPS REVENUE DEBIT

## 2020-12-12 PROCEDURE — 3331090001 HH PPS REVENUE CREDIT

## 2020-12-13 PROCEDURE — 3331090001 HH PPS REVENUE CREDIT

## 2020-12-13 PROCEDURE — 3331090002 HH PPS REVENUE DEBIT

## 2020-12-14 PROCEDURE — 3331090001 HH PPS REVENUE CREDIT

## 2020-12-14 PROCEDURE — 3331090002 HH PPS REVENUE DEBIT

## 2020-12-15 ENCOUNTER — HOME CARE VISIT (OUTPATIENT)
Dept: SCHEDULING | Facility: HOME HEALTH | Age: 80
End: 2020-12-15
Payer: MEDICARE

## 2020-12-15 VITALS
TEMPERATURE: 97.7 F | RESPIRATION RATE: 16 BRPM | SYSTOLIC BLOOD PRESSURE: 132 MMHG | DIASTOLIC BLOOD PRESSURE: 78 MMHG | HEART RATE: 72 BPM

## 2020-12-15 PROCEDURE — 3331090001 HH PPS REVENUE CREDIT

## 2020-12-15 PROCEDURE — G0157 HHC PT ASSISTANT EA 15: HCPCS

## 2020-12-15 PROCEDURE — 3331090002 HH PPS REVENUE DEBIT

## 2020-12-16 PROCEDURE — 3331090002 HH PPS REVENUE DEBIT

## 2020-12-16 PROCEDURE — 3331090001 HH PPS REVENUE CREDIT

## 2020-12-17 ENCOUNTER — HOME CARE VISIT (OUTPATIENT)
Dept: SCHEDULING | Facility: HOME HEALTH | Age: 80
End: 2020-12-17
Payer: MEDICARE

## 2020-12-17 PROCEDURE — 3331090002 HH PPS REVENUE DEBIT

## 2020-12-17 PROCEDURE — G0157 HHC PT ASSISTANT EA 15: HCPCS

## 2020-12-17 PROCEDURE — 3331090001 HH PPS REVENUE CREDIT

## 2020-12-18 VITALS
SYSTOLIC BLOOD PRESSURE: 122 MMHG | RESPIRATION RATE: 16 BRPM | DIASTOLIC BLOOD PRESSURE: 70 MMHG | HEART RATE: 76 BPM | TEMPERATURE: 97.5 F

## 2020-12-18 PROCEDURE — 3331090002 HH PPS REVENUE DEBIT

## 2020-12-18 PROCEDURE — 3331090001 HH PPS REVENUE CREDIT

## 2020-12-19 PROCEDURE — 3331090002 HH PPS REVENUE DEBIT

## 2020-12-19 PROCEDURE — 3331090001 HH PPS REVENUE CREDIT

## 2020-12-20 PROCEDURE — 3331090002 HH PPS REVENUE DEBIT

## 2020-12-20 PROCEDURE — 3331090001 HH PPS REVENUE CREDIT

## 2020-12-21 ENCOUNTER — HOME CARE VISIT (OUTPATIENT)
Dept: SCHEDULING | Facility: HOME HEALTH | Age: 80
End: 2020-12-21
Payer: MEDICARE

## 2020-12-21 PROCEDURE — 3331090001 HH PPS REVENUE CREDIT

## 2020-12-21 PROCEDURE — 3331090002 HH PPS REVENUE DEBIT

## 2020-12-21 PROCEDURE — G0157 HHC PT ASSISTANT EA 15: HCPCS

## 2020-12-22 VITALS
TEMPERATURE: 97.3 F | RESPIRATION RATE: 16 BRPM | SYSTOLIC BLOOD PRESSURE: 134 MMHG | DIASTOLIC BLOOD PRESSURE: 80 MMHG | HEART RATE: 76 BPM

## 2020-12-22 PROCEDURE — 3331090001 HH PPS REVENUE CREDIT

## 2020-12-22 PROCEDURE — 3331090002 HH PPS REVENUE DEBIT

## 2020-12-23 ENCOUNTER — HOME CARE VISIT (OUTPATIENT)
Dept: SCHEDULING | Facility: HOME HEALTH | Age: 80
End: 2020-12-23
Payer: MEDICARE

## 2020-12-23 VITALS
DIASTOLIC BLOOD PRESSURE: 72 MMHG | SYSTOLIC BLOOD PRESSURE: 120 MMHG | HEART RATE: 76 BPM | TEMPERATURE: 98.9 F | RESPIRATION RATE: 16 BRPM

## 2020-12-23 PROCEDURE — 3331090002 HH PPS REVENUE DEBIT

## 2020-12-23 PROCEDURE — 3331090001 HH PPS REVENUE CREDIT

## 2020-12-23 PROCEDURE — G0157 HHC PT ASSISTANT EA 15: HCPCS

## 2020-12-24 PROCEDURE — 3331090001 HH PPS REVENUE CREDIT

## 2020-12-24 PROCEDURE — 3331090002 HH PPS REVENUE DEBIT

## 2020-12-25 PROCEDURE — 3331090002 HH PPS REVENUE DEBIT

## 2020-12-25 PROCEDURE — 3331090001 HH PPS REVENUE CREDIT

## 2020-12-26 PROCEDURE — 3331090001 HH PPS REVENUE CREDIT

## 2020-12-26 PROCEDURE — 3331090002 HH PPS REVENUE DEBIT

## 2020-12-27 PROCEDURE — 3331090002 HH PPS REVENUE DEBIT

## 2020-12-27 PROCEDURE — 3331090001 HH PPS REVENUE CREDIT

## 2020-12-28 ENCOUNTER — HOME CARE VISIT (OUTPATIENT)
Dept: SCHEDULING | Facility: HOME HEALTH | Age: 80
End: 2020-12-28
Payer: MEDICARE

## 2020-12-28 VITALS
SYSTOLIC BLOOD PRESSURE: 132 MMHG | RESPIRATION RATE: 17 BRPM | DIASTOLIC BLOOD PRESSURE: 76 MMHG | HEART RATE: 83 BPM | TEMPERATURE: 96.8 F

## 2020-12-28 PROCEDURE — G0151 HHCP-SERV OF PT,EA 15 MIN: HCPCS

## 2020-12-28 PROCEDURE — 3331090001 HH PPS REVENUE CREDIT

## 2020-12-28 PROCEDURE — 3331090002 HH PPS REVENUE DEBIT

## 2020-12-29 PROCEDURE — 3331090001 HH PPS REVENUE CREDIT

## 2020-12-29 PROCEDURE — 3331090002 HH PPS REVENUE DEBIT

## 2020-12-30 ENCOUNTER — HOME CARE VISIT (OUTPATIENT)
Dept: SCHEDULING | Facility: HOME HEALTH | Age: 80
End: 2020-12-30
Payer: MEDICARE

## 2020-12-30 PROCEDURE — 3331090002 HH PPS REVENUE DEBIT

## 2020-12-30 PROCEDURE — 400014 HH F/U

## 2020-12-30 PROCEDURE — G0157 HHC PT ASSISTANT EA 15: HCPCS

## 2020-12-30 PROCEDURE — 3331090001 HH PPS REVENUE CREDIT

## 2020-12-31 VITALS
RESPIRATION RATE: 16 BRPM | DIASTOLIC BLOOD PRESSURE: 78 MMHG | HEART RATE: 74 BPM | SYSTOLIC BLOOD PRESSURE: 138 MMHG | TEMPERATURE: 97.7 F

## 2020-12-31 PROCEDURE — 3331090002 HH PPS REVENUE DEBIT

## 2020-12-31 PROCEDURE — 3331090001 HH PPS REVENUE CREDIT

## 2021-01-01 PROCEDURE — 3331090001 HH PPS REVENUE CREDIT

## 2021-01-01 PROCEDURE — 3331090002 HH PPS REVENUE DEBIT

## 2021-01-02 PROCEDURE — 3331090002 HH PPS REVENUE DEBIT

## 2021-01-02 PROCEDURE — 3331090001 HH PPS REVENUE CREDIT

## 2021-01-03 PROCEDURE — 3331090001 HH PPS REVENUE CREDIT

## 2021-01-03 PROCEDURE — 3331090002 HH PPS REVENUE DEBIT

## 2021-01-04 ENCOUNTER — HOME CARE VISIT (OUTPATIENT)
Dept: SCHEDULING | Facility: HOME HEALTH | Age: 81
End: 2021-01-04
Payer: MEDICARE

## 2021-01-04 PROCEDURE — 3331090001 HH PPS REVENUE CREDIT

## 2021-01-04 PROCEDURE — 3331090002 HH PPS REVENUE DEBIT

## 2021-01-04 PROCEDURE — G0157 HHC PT ASSISTANT EA 15: HCPCS

## 2021-01-05 PROCEDURE — 3331090002 HH PPS REVENUE DEBIT

## 2021-01-05 PROCEDURE — 3331090001 HH PPS REVENUE CREDIT

## 2021-01-05 NOTE — THERAPY DISCHARGE
Saurav Mariee  : 1940  [place]  Secondary: [address] at [address]  [address] [place] [address], [facility] [address]  [phone]   [phone]      Saurav Mariee has been seen in physical therapy from 2020 to 10/7/2020 for 17 visits. Treatment has been discontinued at this time due to Decline in medical status and patient had additional injuries that required a new surgery. The below goals were met prior to discontinuation. Some goals were not met due to premature discharge and decline in medical status.    Thank you for this referral.       Deatrice Gilford, PT

## 2021-01-06 ENCOUNTER — HOME CARE VISIT (OUTPATIENT)
Dept: SCHEDULING | Facility: HOME HEALTH | Age: 81
End: 2021-01-06
Payer: MEDICARE

## 2021-01-06 VITALS
RESPIRATION RATE: 16 BRPM | TEMPERATURE: 97.3 F | SYSTOLIC BLOOD PRESSURE: 124 MMHG | DIASTOLIC BLOOD PRESSURE: 78 MMHG | HEART RATE: 72 BPM

## 2021-01-06 PROCEDURE — 3331090001 HH PPS REVENUE CREDIT

## 2021-01-06 PROCEDURE — 3331090002 HH PPS REVENUE DEBIT

## 2021-01-06 PROCEDURE — G0157 HHC PT ASSISTANT EA 15: HCPCS

## 2021-01-07 VITALS
DIASTOLIC BLOOD PRESSURE: 70 MMHG | SYSTOLIC BLOOD PRESSURE: 124 MMHG | HEART RATE: 76 BPM | RESPIRATION RATE: 16 BRPM | TEMPERATURE: 97.1 F

## 2021-01-07 PROCEDURE — 3331090001 HH PPS REVENUE CREDIT

## 2021-01-07 PROCEDURE — 3331090002 HH PPS REVENUE DEBIT

## 2021-01-08 PROCEDURE — 3331090002 HH PPS REVENUE DEBIT

## 2021-01-08 PROCEDURE — 3331090001 HH PPS REVENUE CREDIT

## 2021-01-09 PROCEDURE — 3331090001 HH PPS REVENUE CREDIT

## 2021-01-09 PROCEDURE — 3331090002 HH PPS REVENUE DEBIT

## 2021-01-10 PROCEDURE — 3331090001 HH PPS REVENUE CREDIT

## 2021-01-10 PROCEDURE — 3331090002 HH PPS REVENUE DEBIT

## 2021-01-11 PROCEDURE — 3331090002 HH PPS REVENUE DEBIT

## 2021-01-11 PROCEDURE — 3331090001 HH PPS REVENUE CREDIT

## 2021-01-12 ENCOUNTER — HOME CARE VISIT (OUTPATIENT)
Dept: SCHEDULING | Facility: HOME HEALTH | Age: 81
End: 2021-01-12
Payer: MEDICARE

## 2021-01-12 VITALS — SYSTOLIC BLOOD PRESSURE: 140 MMHG | TEMPERATURE: 98.2 F | HEART RATE: 64 BPM | DIASTOLIC BLOOD PRESSURE: 76 MMHG

## 2021-01-12 PROCEDURE — 3331090002 HH PPS REVENUE DEBIT

## 2021-01-12 PROCEDURE — G0151 HHCP-SERV OF PT,EA 15 MIN: HCPCS

## 2021-01-12 PROCEDURE — 3331090001 HH PPS REVENUE CREDIT

## 2021-01-13 PROCEDURE — 3331090002 HH PPS REVENUE DEBIT

## 2021-01-13 PROCEDURE — 3331090001 HH PPS REVENUE CREDIT

## 2021-01-14 ENCOUNTER — HOME CARE VISIT (OUTPATIENT)
Dept: SCHEDULING | Facility: HOME HEALTH | Age: 81
End: 2021-01-14
Payer: MEDICARE

## 2021-01-14 VITALS
DIASTOLIC BLOOD PRESSURE: 78 MMHG | HEART RATE: 72 BPM | TEMPERATURE: 97.1 F | RESPIRATION RATE: 16 BRPM | SYSTOLIC BLOOD PRESSURE: 132 MMHG

## 2021-01-14 PROCEDURE — 3331090002 HH PPS REVENUE DEBIT

## 2021-01-14 PROCEDURE — 3331090001 HH PPS REVENUE CREDIT

## 2021-01-14 PROCEDURE — G0157 HHC PT ASSISTANT EA 15: HCPCS

## 2021-01-15 PROCEDURE — 3331090002 HH PPS REVENUE DEBIT

## 2021-01-15 PROCEDURE — 3331090001 HH PPS REVENUE CREDIT

## 2021-01-16 PROCEDURE — 3331090001 HH PPS REVENUE CREDIT

## 2021-01-16 PROCEDURE — 3331090002 HH PPS REVENUE DEBIT

## 2021-01-17 PROCEDURE — 3331090001 HH PPS REVENUE CREDIT

## 2021-01-17 PROCEDURE — 3331090002 HH PPS REVENUE DEBIT

## 2021-01-18 PROCEDURE — 3331090001 HH PPS REVENUE CREDIT

## 2021-01-18 PROCEDURE — 3331090002 HH PPS REVENUE DEBIT

## 2021-01-19 ENCOUNTER — HOME CARE VISIT (OUTPATIENT)
Dept: SCHEDULING | Facility: HOME HEALTH | Age: 81
End: 2021-01-19
Payer: MEDICARE

## 2021-01-19 VITALS
DIASTOLIC BLOOD PRESSURE: 68 MMHG | RESPIRATION RATE: 16 BRPM | TEMPERATURE: 97.1 F | HEART RATE: 72 BPM | SYSTOLIC BLOOD PRESSURE: 118 MMHG

## 2021-01-19 PROCEDURE — 3331090002 HH PPS REVENUE DEBIT

## 2021-01-19 PROCEDURE — G0157 HHC PT ASSISTANT EA 15: HCPCS

## 2021-01-19 PROCEDURE — 3331090001 HH PPS REVENUE CREDIT

## 2021-01-20 PROCEDURE — 3331090001 HH PPS REVENUE CREDIT

## 2021-01-20 PROCEDURE — 3331090002 HH PPS REVENUE DEBIT

## 2021-01-21 ENCOUNTER — HOME CARE VISIT (OUTPATIENT)
Dept: SCHEDULING | Facility: HOME HEALTH | Age: 81
End: 2021-01-21
Payer: MEDICARE

## 2021-01-21 VITALS
TEMPERATURE: 97.6 F | HEART RATE: 67 BPM | RESPIRATION RATE: 17 BRPM | DIASTOLIC BLOOD PRESSURE: 75 MMHG | SYSTOLIC BLOOD PRESSURE: 132 MMHG

## 2021-01-21 PROCEDURE — 3331090002 HH PPS REVENUE DEBIT

## 2021-01-21 PROCEDURE — G0151 HHCP-SERV OF PT,EA 15 MIN: HCPCS

## 2021-01-21 PROCEDURE — 3331090001 HH PPS REVENUE CREDIT

## 2021-01-22 PROCEDURE — 3331090001 HH PPS REVENUE CREDIT

## 2021-01-22 PROCEDURE — 3331090002 HH PPS REVENUE DEBIT

## 2021-01-23 PROCEDURE — 3331090001 HH PPS REVENUE CREDIT

## 2021-01-23 PROCEDURE — 3331090002 HH PPS REVENUE DEBIT

## 2021-01-24 PROCEDURE — 3331090002 HH PPS REVENUE DEBIT

## 2021-01-24 PROCEDURE — 3331090001 HH PPS REVENUE CREDIT

## 2021-01-25 PROCEDURE — 3331090002 HH PPS REVENUE DEBIT

## 2021-01-25 PROCEDURE — 3331090001 HH PPS REVENUE CREDIT

## 2021-01-26 PROCEDURE — 3331090002 HH PPS REVENUE DEBIT

## 2021-01-26 PROCEDURE — 3331090001 HH PPS REVENUE CREDIT

## 2021-01-27 ENCOUNTER — HOME CARE VISIT (OUTPATIENT)
Dept: SCHEDULING | Facility: HOME HEALTH | Age: 81
End: 2021-01-27
Payer: MEDICARE

## 2021-01-27 VITALS
SYSTOLIC BLOOD PRESSURE: 140 MMHG | HEART RATE: 76 BPM | TEMPERATURE: 97.1 F | RESPIRATION RATE: 17 BRPM | DIASTOLIC BLOOD PRESSURE: 78 MMHG

## 2021-01-27 PROCEDURE — 3331090002 HH PPS REVENUE DEBIT

## 2021-01-27 PROCEDURE — G0157 HHC PT ASSISTANT EA 15: HCPCS

## 2021-01-27 PROCEDURE — 3331090001 HH PPS REVENUE CREDIT

## 2021-01-28 PROCEDURE — 3331090001 HH PPS REVENUE CREDIT

## 2021-01-28 PROCEDURE — 400018 HH-NO PAY CLAIM PROCEDURE

## 2021-01-28 PROCEDURE — 3331090002 HH PPS REVENUE DEBIT

## 2021-01-29 ENCOUNTER — HOME CARE VISIT (OUTPATIENT)
Dept: SCHEDULING | Facility: HOME HEALTH | Age: 81
End: 2021-01-29
Payer: MEDICARE

## 2021-01-29 PROCEDURE — 3331090002 HH PPS REVENUE DEBIT

## 2021-01-29 PROCEDURE — 3331090001 HH PPS REVENUE CREDIT

## 2021-01-29 PROCEDURE — G0157 HHC PT ASSISTANT EA 15: HCPCS

## 2021-01-29 PROCEDURE — 400014 HH F/U

## 2021-01-30 VITALS
DIASTOLIC BLOOD PRESSURE: 80 MMHG | SYSTOLIC BLOOD PRESSURE: 132 MMHG | HEART RATE: 74 BPM | RESPIRATION RATE: 16 BRPM | TEMPERATURE: 97.3 F

## 2021-01-30 PROCEDURE — 3331090001 HH PPS REVENUE CREDIT

## 2021-01-30 PROCEDURE — 3331090002 HH PPS REVENUE DEBIT

## 2021-01-31 PROCEDURE — 3331090002 HH PPS REVENUE DEBIT

## 2021-01-31 PROCEDURE — 3331090001 HH PPS REVENUE CREDIT

## 2021-02-01 PROCEDURE — 3331090002 HH PPS REVENUE DEBIT

## 2021-02-01 PROCEDURE — 3331090001 HH PPS REVENUE CREDIT

## 2021-02-02 ENCOUNTER — HOME CARE VISIT (OUTPATIENT)
Dept: SCHEDULING | Facility: HOME HEALTH | Age: 81
End: 2021-02-02
Payer: MEDICARE

## 2021-02-02 PROCEDURE — 3331090001 HH PPS REVENUE CREDIT

## 2021-02-02 PROCEDURE — G0157 HHC PT ASSISTANT EA 15: HCPCS

## 2021-02-02 PROCEDURE — 3331090002 HH PPS REVENUE DEBIT

## 2021-02-03 VITALS
RESPIRATION RATE: 16 BRPM | DIASTOLIC BLOOD PRESSURE: 70 MMHG | HEART RATE: 72 BPM | TEMPERATURE: 97.1 F | SYSTOLIC BLOOD PRESSURE: 124 MMHG

## 2021-02-03 PROCEDURE — 3331090002 HH PPS REVENUE DEBIT

## 2021-02-03 PROCEDURE — 3331090001 HH PPS REVENUE CREDIT

## 2021-02-04 ENCOUNTER — HOME CARE VISIT (OUTPATIENT)
Dept: SCHEDULING | Facility: HOME HEALTH | Age: 81
End: 2021-02-04
Payer: MEDICARE

## 2021-02-04 PROCEDURE — 3331090001 HH PPS REVENUE CREDIT

## 2021-02-04 PROCEDURE — 3331090002 HH PPS REVENUE DEBIT

## 2021-02-04 PROCEDURE — G0157 HHC PT ASSISTANT EA 15: HCPCS

## 2021-02-05 VITALS
HEART RATE: 72 BPM | SYSTOLIC BLOOD PRESSURE: 128 MMHG | DIASTOLIC BLOOD PRESSURE: 82 MMHG | TEMPERATURE: 97.3 F | RESPIRATION RATE: 16 BRPM

## 2021-02-05 PROCEDURE — 3331090002 HH PPS REVENUE DEBIT

## 2021-02-05 PROCEDURE — 3331090001 HH PPS REVENUE CREDIT

## 2021-02-05 NOTE — PROGRESS NOTES
patuents wife tested yesterday for covid. patient has no symptoms. both have no fever. patient to contact me with her results.

## 2021-02-06 PROCEDURE — 3331090001 HH PPS REVENUE CREDIT

## 2021-02-06 PROCEDURE — 3331090002 HH PPS REVENUE DEBIT

## 2021-02-07 PROCEDURE — 3331090002 HH PPS REVENUE DEBIT

## 2021-02-07 PROCEDURE — 3331090001 HH PPS REVENUE CREDIT

## 2021-02-08 ENCOUNTER — HOME CARE VISIT (OUTPATIENT)
Dept: SCHEDULING | Facility: HOME HEALTH | Age: 81
End: 2021-02-08
Payer: MEDICARE

## 2021-02-08 PROCEDURE — 3331090001 HH PPS REVENUE CREDIT

## 2021-02-08 PROCEDURE — G0157 HHC PT ASSISTANT EA 15: HCPCS

## 2021-02-08 PROCEDURE — 3331090002 HH PPS REVENUE DEBIT

## 2021-02-09 VITALS
RESPIRATION RATE: 16 BRPM | TEMPERATURE: 97.3 F | DIASTOLIC BLOOD PRESSURE: 78 MMHG | SYSTOLIC BLOOD PRESSURE: 128 MMHG | HEART RATE: 76 BPM

## 2021-02-09 PROCEDURE — 3331090002 HH PPS REVENUE DEBIT

## 2021-02-09 PROCEDURE — 3331090001 HH PPS REVENUE CREDIT

## 2021-02-10 ENCOUNTER — HOME CARE VISIT (OUTPATIENT)
Dept: SCHEDULING | Facility: HOME HEALTH | Age: 81
End: 2021-02-10
Payer: MEDICARE

## 2021-02-10 PROCEDURE — 3331090001 HH PPS REVENUE CREDIT

## 2021-02-10 PROCEDURE — 3331090002 HH PPS REVENUE DEBIT

## 2021-02-10 PROCEDURE — G0157 HHC PT ASSISTANT EA 15: HCPCS

## 2021-02-11 VITALS
RESPIRATION RATE: 16 BRPM | TEMPERATURE: 97.1 F | SYSTOLIC BLOOD PRESSURE: 124 MMHG | HEART RATE: 78 BPM | DIASTOLIC BLOOD PRESSURE: 70 MMHG

## 2021-02-11 PROCEDURE — 3331090001 HH PPS REVENUE CREDIT

## 2021-02-11 PROCEDURE — 3331090002 HH PPS REVENUE DEBIT

## 2021-02-12 PROCEDURE — 3331090002 HH PPS REVENUE DEBIT

## 2021-02-12 PROCEDURE — 3331090001 HH PPS REVENUE CREDIT

## 2021-02-13 PROCEDURE — 3331090001 HH PPS REVENUE CREDIT

## 2021-02-13 PROCEDURE — 3331090002 HH PPS REVENUE DEBIT

## 2021-02-14 PROCEDURE — 3331090002 HH PPS REVENUE DEBIT

## 2021-02-14 PROCEDURE — 3331090001 HH PPS REVENUE CREDIT

## 2021-02-15 PROCEDURE — 3331090002 HH PPS REVENUE DEBIT

## 2021-02-15 PROCEDURE — 3331090001 HH PPS REVENUE CREDIT

## 2021-02-16 ENCOUNTER — HOME CARE VISIT (OUTPATIENT)
Dept: SCHEDULING | Facility: HOME HEALTH | Age: 81
End: 2021-02-16
Payer: MEDICARE

## 2021-02-16 VITALS
RESPIRATION RATE: 16 BRPM | DIASTOLIC BLOOD PRESSURE: 76 MMHG | TEMPERATURE: 98.1 F | SYSTOLIC BLOOD PRESSURE: 124 MMHG | HEART RATE: 76 BPM

## 2021-02-16 PROCEDURE — G0151 HHCP-SERV OF PT,EA 15 MIN: HCPCS

## 2021-02-16 PROCEDURE — 3331090001 HH PPS REVENUE CREDIT

## 2021-02-16 PROCEDURE — 3331090002 HH PPS REVENUE DEBIT

## 2021-02-17 PROCEDURE — 3331090001 HH PPS REVENUE CREDIT

## 2021-02-17 PROCEDURE — 3331090002 HH PPS REVENUE DEBIT

## 2021-02-18 ENCOUNTER — HOME CARE VISIT (OUTPATIENT)
Dept: SCHEDULING | Facility: HOME HEALTH | Age: 81
End: 2021-02-18
Payer: MEDICARE

## 2021-02-18 PROCEDURE — 3331090002 HH PPS REVENUE DEBIT

## 2021-02-18 PROCEDURE — 3331090001 HH PPS REVENUE CREDIT

## 2021-02-18 PROCEDURE — G0157 HHC PT ASSISTANT EA 15: HCPCS

## 2021-02-19 VITALS
RESPIRATION RATE: 16 BRPM | DIASTOLIC BLOOD PRESSURE: 70 MMHG | TEMPERATURE: 97.1 F | SYSTOLIC BLOOD PRESSURE: 122 MMHG | HEART RATE: 72 BPM

## 2021-02-19 PROCEDURE — 3331090002 HH PPS REVENUE DEBIT

## 2021-02-19 PROCEDURE — 3331090001 HH PPS REVENUE CREDIT

## 2021-02-20 PROCEDURE — 3331090002 HH PPS REVENUE DEBIT

## 2021-02-20 PROCEDURE — 3331090001 HH PPS REVENUE CREDIT

## 2021-02-21 PROCEDURE — 3331090001 HH PPS REVENUE CREDIT

## 2021-02-21 PROCEDURE — 3331090002 HH PPS REVENUE DEBIT

## 2021-02-22 PROCEDURE — 3331090001 HH PPS REVENUE CREDIT

## 2021-02-22 PROCEDURE — 3331090002 HH PPS REVENUE DEBIT

## 2021-02-23 ENCOUNTER — HOME CARE VISIT (OUTPATIENT)
Dept: SCHEDULING | Facility: HOME HEALTH | Age: 81
End: 2021-02-23
Payer: MEDICARE

## 2021-02-23 VITALS
TEMPERATURE: 98.1 F | SYSTOLIC BLOOD PRESSURE: 132 MMHG | DIASTOLIC BLOOD PRESSURE: 80 MMHG | RESPIRATION RATE: 17 BRPM | HEART RATE: 81 BPM

## 2021-02-23 PROCEDURE — 3331090001 HH PPS REVENUE CREDIT

## 2021-02-23 PROCEDURE — 3331090002 HH PPS REVENUE DEBIT

## 2021-02-23 PROCEDURE — G0151 HHCP-SERV OF PT,EA 15 MIN: HCPCS

## 2021-02-24 PROCEDURE — 3331090002 HH PPS REVENUE DEBIT

## 2021-02-24 PROCEDURE — 3331090001 HH PPS REVENUE CREDIT

## 2021-02-25 PROCEDURE — 3331090001 HH PPS REVENUE CREDIT

## 2021-02-25 PROCEDURE — 3331090002 HH PPS REVENUE DEBIT

## 2021-02-26 PROCEDURE — 3331090001 HH PPS REVENUE CREDIT

## 2021-02-26 PROCEDURE — 3331090002 HH PPS REVENUE DEBIT

## 2021-02-27 PROCEDURE — 400018 HH-NO PAY CLAIM PROCEDURE

## 2021-02-27 PROCEDURE — 3331090001 HH PPS REVENUE CREDIT

## 2021-02-27 PROCEDURE — 3331090002 HH PPS REVENUE DEBIT

## 2021-02-28 PROCEDURE — 3331090002 HH PPS REVENUE DEBIT

## 2021-02-28 PROCEDURE — 3331090001 HH PPS REVENUE CREDIT

## 2021-03-01 ENCOUNTER — HOME CARE VISIT (OUTPATIENT)
Dept: SCHEDULING | Facility: HOME HEALTH | Age: 81
End: 2021-03-01
Payer: MEDICARE

## 2021-03-01 PROCEDURE — 3331090002 HH PPS REVENUE DEBIT

## 2021-03-01 PROCEDURE — 3331090001 HH PPS REVENUE CREDIT

## 2021-03-01 PROCEDURE — 400014 HH F/U

## 2021-03-01 PROCEDURE — G0157 HHC PT ASSISTANT EA 15: HCPCS

## 2021-03-02 VITALS
SYSTOLIC BLOOD PRESSURE: 124 MMHG | RESPIRATION RATE: 16 BRPM | HEART RATE: 76 BPM | DIASTOLIC BLOOD PRESSURE: 76 MMHG | TEMPERATURE: 97.3 F

## 2021-03-02 PROCEDURE — 3331090002 HH PPS REVENUE DEBIT

## 2021-03-02 PROCEDURE — 3331090001 HH PPS REVENUE CREDIT

## 2021-03-03 PROCEDURE — 3331090002 HH PPS REVENUE DEBIT

## 2021-03-03 PROCEDURE — 3331090001 HH PPS REVENUE CREDIT

## 2021-03-04 ENCOUNTER — HOME CARE VISIT (OUTPATIENT)
Dept: SCHEDULING | Facility: HOME HEALTH | Age: 81
End: 2021-03-04
Payer: MEDICARE

## 2021-03-04 PROCEDURE — G0157 HHC PT ASSISTANT EA 15: HCPCS

## 2021-03-04 PROCEDURE — 3331090002 HH PPS REVENUE DEBIT

## 2021-03-04 PROCEDURE — 3331090001 HH PPS REVENUE CREDIT

## 2021-03-05 VITALS
HEART RATE: 74 BPM | RESPIRATION RATE: 16 BRPM | TEMPERATURE: 97.1 F | SYSTOLIC BLOOD PRESSURE: 132 MMHG | DIASTOLIC BLOOD PRESSURE: 82 MMHG

## 2021-03-05 PROCEDURE — 3331090001 HH PPS REVENUE CREDIT

## 2021-03-05 PROCEDURE — 3331090002 HH PPS REVENUE DEBIT

## 2021-03-06 PROCEDURE — 3331090002 HH PPS REVENUE DEBIT

## 2021-03-06 PROCEDURE — 3331090001 HH PPS REVENUE CREDIT

## 2021-03-07 PROCEDURE — 3331090001 HH PPS REVENUE CREDIT

## 2021-03-07 PROCEDURE — 3331090002 HH PPS REVENUE DEBIT

## 2021-03-08 PROCEDURE — 3331090001 HH PPS REVENUE CREDIT

## 2021-03-08 PROCEDURE — 3331090002 HH PPS REVENUE DEBIT

## 2021-03-09 ENCOUNTER — HOME CARE VISIT (OUTPATIENT)
Dept: SCHEDULING | Facility: HOME HEALTH | Age: 81
End: 2021-03-09
Payer: MEDICARE

## 2021-03-09 PROCEDURE — 3331090002 HH PPS REVENUE DEBIT

## 2021-03-09 PROCEDURE — 3331090001 HH PPS REVENUE CREDIT

## 2021-03-09 PROCEDURE — G0157 HHC PT ASSISTANT EA 15: HCPCS

## 2021-03-10 VITALS
TEMPERATURE: 44.8 F | HEART RATE: 76 BPM | RESPIRATION RATE: 16 BRPM | SYSTOLIC BLOOD PRESSURE: 124 MMHG | DIASTOLIC BLOOD PRESSURE: 82 MMHG

## 2021-03-10 PROCEDURE — 3331090001 HH PPS REVENUE CREDIT

## 2021-03-10 PROCEDURE — 3331090002 HH PPS REVENUE DEBIT

## 2021-03-11 ENCOUNTER — HOME CARE VISIT (OUTPATIENT)
Dept: SCHEDULING | Facility: HOME HEALTH | Age: 81
End: 2021-03-11
Payer: MEDICARE

## 2021-03-11 VITALS
RESPIRATION RATE: 16 BRPM | HEART RATE: 72 BPM | SYSTOLIC BLOOD PRESSURE: 130 MMHG | TEMPERATURE: 97.1 F | DIASTOLIC BLOOD PRESSURE: 78 MMHG

## 2021-03-11 PROCEDURE — 3331090001 HH PPS REVENUE CREDIT

## 2021-03-11 PROCEDURE — 3331090002 HH PPS REVENUE DEBIT

## 2021-03-11 PROCEDURE — G0157 HHC PT ASSISTANT EA 15: HCPCS

## 2021-03-12 PROCEDURE — 3331090002 HH PPS REVENUE DEBIT

## 2021-03-12 PROCEDURE — 3331090001 HH PPS REVENUE CREDIT

## 2021-03-13 PROCEDURE — 3331090002 HH PPS REVENUE DEBIT

## 2021-03-13 PROCEDURE — 3331090001 HH PPS REVENUE CREDIT

## 2021-03-14 PROCEDURE — 3331090001 HH PPS REVENUE CREDIT

## 2021-03-14 PROCEDURE — 3331090002 HH PPS REVENUE DEBIT

## 2021-03-15 PROCEDURE — 3331090001 HH PPS REVENUE CREDIT

## 2021-03-15 PROCEDURE — 3331090002 HH PPS REVENUE DEBIT

## 2021-03-16 ENCOUNTER — HOME CARE VISIT (OUTPATIENT)
Dept: SCHEDULING | Facility: HOME HEALTH | Age: 81
End: 2021-03-16
Payer: MEDICARE

## 2021-03-16 PROCEDURE — 3331090002 HH PPS REVENUE DEBIT

## 2021-03-16 PROCEDURE — G0157 HHC PT ASSISTANT EA 15: HCPCS

## 2021-03-16 PROCEDURE — 3331090001 HH PPS REVENUE CREDIT

## 2021-03-17 VITALS
TEMPERATURE: 97.1 F | HEART RATE: 74 BPM | DIASTOLIC BLOOD PRESSURE: 70 MMHG | SYSTOLIC BLOOD PRESSURE: 122 MMHG | RESPIRATION RATE: 16 BRPM

## 2021-03-17 PROCEDURE — 3331090001 HH PPS REVENUE CREDIT

## 2021-03-17 PROCEDURE — 3331090002 HH PPS REVENUE DEBIT

## 2021-03-18 ENCOUNTER — HOME CARE VISIT (OUTPATIENT)
Dept: SCHEDULING | Facility: HOME HEALTH | Age: 81
End: 2021-03-18
Payer: MEDICARE

## 2021-03-18 VITALS
SYSTOLIC BLOOD PRESSURE: 132 MMHG | HEART RATE: 76 BPM | TEMPERATURE: 97.2 F | DIASTOLIC BLOOD PRESSURE: 74 MMHG | RESPIRATION RATE: 16 BRPM

## 2021-03-18 PROCEDURE — 3331090002 HH PPS REVENUE DEBIT

## 2021-03-18 PROCEDURE — G0157 HHC PT ASSISTANT EA 15: HCPCS

## 2021-03-18 PROCEDURE — 3331090001 HH PPS REVENUE CREDIT

## 2021-03-19 PROCEDURE — 3331090002 HH PPS REVENUE DEBIT

## 2021-03-19 PROCEDURE — 3331090001 HH PPS REVENUE CREDIT

## 2021-03-20 PROCEDURE — 3331090002 HH PPS REVENUE DEBIT

## 2021-03-20 PROCEDURE — 3331090001 HH PPS REVENUE CREDIT

## 2021-03-21 PROCEDURE — 3331090002 HH PPS REVENUE DEBIT

## 2021-03-21 PROCEDURE — 3331090001 HH PPS REVENUE CREDIT

## 2021-03-22 ENCOUNTER — HOME CARE VISIT (OUTPATIENT)
Dept: SCHEDULING | Facility: HOME HEALTH | Age: 81
End: 2021-03-22
Payer: MEDICARE

## 2021-03-22 PROCEDURE — 3331090001 HH PPS REVENUE CREDIT

## 2021-03-22 PROCEDURE — G0157 HHC PT ASSISTANT EA 15: HCPCS

## 2021-03-22 PROCEDURE — 3331090002 HH PPS REVENUE DEBIT

## 2021-03-23 VITALS
RESPIRATION RATE: 16 BRPM | HEART RATE: 80 BPM | DIASTOLIC BLOOD PRESSURE: 80 MMHG | TEMPERATURE: 97.1 F | SYSTOLIC BLOOD PRESSURE: 132 MMHG

## 2021-03-23 PROCEDURE — 3331090002 HH PPS REVENUE DEBIT

## 2021-03-23 PROCEDURE — 3331090001 HH PPS REVENUE CREDIT

## 2021-03-24 ENCOUNTER — HOME CARE VISIT (OUTPATIENT)
Dept: SCHEDULING | Facility: HOME HEALTH | Age: 81
End: 2021-03-24
Payer: MEDICARE

## 2021-03-24 VITALS
HEART RATE: 83 BPM | SYSTOLIC BLOOD PRESSURE: 138 MMHG | DIASTOLIC BLOOD PRESSURE: 82 MMHG | TEMPERATURE: 98.3 F | RESPIRATION RATE: 16 BRPM

## 2021-03-24 PROCEDURE — 3331090002 HH PPS REVENUE DEBIT

## 2021-03-24 PROCEDURE — G0151 HHCP-SERV OF PT,EA 15 MIN: HCPCS

## 2021-03-24 PROCEDURE — 3331090001 HH PPS REVENUE CREDIT

## 2021-03-25 PROCEDURE — 3331090002 HH PPS REVENUE DEBIT

## 2021-03-25 PROCEDURE — 3331090001 HH PPS REVENUE CREDIT

## 2021-03-26 PROCEDURE — 3331090001 HH PPS REVENUE CREDIT

## 2021-03-26 PROCEDURE — 3331090002 HH PPS REVENUE DEBIT

## 2021-03-27 PROCEDURE — 3331090002 HH PPS REVENUE DEBIT

## 2021-03-27 PROCEDURE — 3331090001 HH PPS REVENUE CREDIT

## 2021-03-28 PROCEDURE — 3331090001 HH PPS REVENUE CREDIT

## 2021-03-28 PROCEDURE — 3331090002 HH PPS REVENUE DEBIT

## 2021-03-29 PROCEDURE — 3331090002 HH PPS REVENUE DEBIT

## 2021-03-29 PROCEDURE — 400018 HH-NO PAY CLAIM PROCEDURE

## 2021-03-29 PROCEDURE — 3331090001 HH PPS REVENUE CREDIT

## 2021-03-30 ENCOUNTER — HOME CARE VISIT (OUTPATIENT)
Dept: SCHEDULING | Facility: HOME HEALTH | Age: 81
End: 2021-03-30
Payer: MEDICARE

## 2021-03-30 PROCEDURE — 3331090002 HH PPS REVENUE DEBIT

## 2021-03-30 PROCEDURE — 3331090001 HH PPS REVENUE CREDIT

## 2021-03-30 PROCEDURE — 400014 HH F/U

## 2021-03-30 PROCEDURE — G0157 HHC PT ASSISTANT EA 15: HCPCS

## 2021-03-31 VITALS
SYSTOLIC BLOOD PRESSURE: 120 MMHG | DIASTOLIC BLOOD PRESSURE: 70 MMHG | TEMPERATURE: 97.2 F | RESPIRATION RATE: 16 BRPM | HEART RATE: 72 BPM

## 2021-03-31 PROCEDURE — 3331090002 HH PPS REVENUE DEBIT

## 2021-03-31 PROCEDURE — 3331090001 HH PPS REVENUE CREDIT

## 2021-04-01 PROCEDURE — 3331090002 HH PPS REVENUE DEBIT

## 2021-04-01 PROCEDURE — 3331090001 HH PPS REVENUE CREDIT

## 2021-04-02 PROCEDURE — 3331090001 HH PPS REVENUE CREDIT

## 2021-04-02 PROCEDURE — 3331090002 HH PPS REVENUE DEBIT

## 2021-04-03 PROCEDURE — 3331090002 HH PPS REVENUE DEBIT

## 2021-04-03 PROCEDURE — 3331090001 HH PPS REVENUE CREDIT

## 2021-04-04 PROCEDURE — 3331090002 HH PPS REVENUE DEBIT

## 2021-04-04 PROCEDURE — 3331090001 HH PPS REVENUE CREDIT

## 2021-04-05 PROCEDURE — 3331090002 HH PPS REVENUE DEBIT

## 2021-04-05 PROCEDURE — 3331090001 HH PPS REVENUE CREDIT

## 2021-04-06 PROCEDURE — 3331090002 HH PPS REVENUE DEBIT

## 2021-04-06 PROCEDURE — 3331090001 HH PPS REVENUE CREDIT

## 2021-04-07 ENCOUNTER — HOME CARE VISIT (OUTPATIENT)
Dept: SCHEDULING | Facility: HOME HEALTH | Age: 81
End: 2021-04-07
Payer: MEDICARE

## 2021-04-07 VITALS
TEMPERATURE: 97.1 F | SYSTOLIC BLOOD PRESSURE: 124 MMHG | DIASTOLIC BLOOD PRESSURE: 76 MMHG | RESPIRATION RATE: 16 BRPM | HEART RATE: 74 BPM

## 2021-04-07 PROCEDURE — 3331090001 HH PPS REVENUE CREDIT

## 2021-04-07 PROCEDURE — 3331090002 HH PPS REVENUE DEBIT

## 2021-04-07 PROCEDURE — G0157 HHC PT ASSISTANT EA 15: HCPCS

## 2021-04-08 PROCEDURE — 3331090002 HH PPS REVENUE DEBIT

## 2021-04-08 PROCEDURE — 3331090001 HH PPS REVENUE CREDIT

## 2021-04-09 PROCEDURE — 3331090002 HH PPS REVENUE DEBIT

## 2021-04-09 PROCEDURE — 3331090001 HH PPS REVENUE CREDIT

## 2021-04-10 PROCEDURE — 3331090002 HH PPS REVENUE DEBIT

## 2021-04-10 PROCEDURE — 3331090001 HH PPS REVENUE CREDIT

## 2021-04-11 PROCEDURE — 3331090001 HH PPS REVENUE CREDIT

## 2021-04-11 PROCEDURE — 3331090002 HH PPS REVENUE DEBIT

## 2021-04-12 PROCEDURE — 3331090001 HH PPS REVENUE CREDIT

## 2021-04-12 PROCEDURE — 3331090002 HH PPS REVENUE DEBIT

## 2021-04-13 ENCOUNTER — HOME CARE VISIT (OUTPATIENT)
Dept: SCHEDULING | Facility: HOME HEALTH | Age: 81
End: 2021-04-13
Payer: MEDICARE

## 2021-04-13 VITALS
DIASTOLIC BLOOD PRESSURE: 70 MMHG | SYSTOLIC BLOOD PRESSURE: 124 MMHG | TEMPERATURE: 96.9 F | RESPIRATION RATE: 16 BRPM | HEART RATE: 76 BPM

## 2021-04-13 PROCEDURE — G0157 HHC PT ASSISTANT EA 15: HCPCS

## 2021-04-13 PROCEDURE — 3331090002 HH PPS REVENUE DEBIT

## 2021-04-13 PROCEDURE — 3331090001 HH PPS REVENUE CREDIT

## 2021-04-14 PROCEDURE — 3331090001 HH PPS REVENUE CREDIT

## 2021-04-14 PROCEDURE — 3331090002 HH PPS REVENUE DEBIT

## 2021-04-15 PROCEDURE — 3331090001 HH PPS REVENUE CREDIT

## 2021-04-15 PROCEDURE — 3331090002 HH PPS REVENUE DEBIT

## 2021-04-16 PROCEDURE — 3331090002 HH PPS REVENUE DEBIT

## 2021-04-16 PROCEDURE — 3331090001 HH PPS REVENUE CREDIT

## 2021-04-17 PROCEDURE — 3331090002 HH PPS REVENUE DEBIT

## 2021-04-17 PROCEDURE — 3331090001 HH PPS REVENUE CREDIT

## 2021-04-18 PROCEDURE — 3331090002 HH PPS REVENUE DEBIT

## 2021-04-18 PROCEDURE — 3331090001 HH PPS REVENUE CREDIT

## 2021-04-19 ENCOUNTER — HOME CARE VISIT (OUTPATIENT)
Dept: HOME HEALTH SERVICES | Facility: HOME HEALTH | Age: 81
End: 2021-04-19
Payer: MEDICARE

## 2021-04-19 ENCOUNTER — HOME CARE VISIT (OUTPATIENT)
Dept: SCHEDULING | Facility: HOME HEALTH | Age: 81
End: 2021-04-19
Payer: MEDICARE

## 2021-04-19 VITALS
HEART RATE: 78 BPM | DIASTOLIC BLOOD PRESSURE: 72 MMHG | TEMPERATURE: 98.1 F | RESPIRATION RATE: 17 BRPM | SYSTOLIC BLOOD PRESSURE: 125 MMHG

## 2021-04-19 PROCEDURE — 3331090002 HH PPS REVENUE DEBIT

## 2021-04-19 PROCEDURE — G0151 HHCP-SERV OF PT,EA 15 MIN: HCPCS

## 2021-04-19 PROCEDURE — 3331090001 HH PPS REVENUE CREDIT

## 2021-04-20 PROCEDURE — 3331090002 HH PPS REVENUE DEBIT

## 2021-04-20 PROCEDURE — 3331090001 HH PPS REVENUE CREDIT

## 2021-04-21 PROCEDURE — 3331090002 HH PPS REVENUE DEBIT

## 2021-04-21 PROCEDURE — 3331090001 HH PPS REVENUE CREDIT

## 2021-04-22 PROCEDURE — 3331090001 HH PPS REVENUE CREDIT

## 2021-04-22 PROCEDURE — 3331090002 HH PPS REVENUE DEBIT

## 2021-04-23 PROCEDURE — 3331090001 HH PPS REVENUE CREDIT

## 2021-04-23 PROCEDURE — 3331090002 HH PPS REVENUE DEBIT

## 2021-04-24 PROCEDURE — 3331090001 HH PPS REVENUE CREDIT

## 2021-04-24 PROCEDURE — 3331090002 HH PPS REVENUE DEBIT

## 2021-04-25 PROCEDURE — 3331090001 HH PPS REVENUE CREDIT

## 2021-04-25 PROCEDURE — 3331090002 HH PPS REVENUE DEBIT

## 2021-04-26 ENCOUNTER — HOME CARE VISIT (OUTPATIENT)
Dept: SCHEDULING | Facility: HOME HEALTH | Age: 81
End: 2021-04-26
Payer: MEDICARE

## 2021-04-26 VITALS
SYSTOLIC BLOOD PRESSURE: 122 MMHG | HEART RATE: 81 BPM | RESPIRATION RATE: 17 BRPM | TEMPERATURE: 97.9 F | DIASTOLIC BLOOD PRESSURE: 70 MMHG

## 2021-04-26 PROCEDURE — 3331090002 HH PPS REVENUE DEBIT

## 2021-04-26 PROCEDURE — G0151 HHCP-SERV OF PT,EA 15 MIN: HCPCS

## 2021-04-26 PROCEDURE — 3331090001 HH PPS REVENUE CREDIT

## 2021-12-10 ENCOUNTER — HOSPITAL ENCOUNTER (INPATIENT)
Age: 81
LOS: 5 days | Discharge: NURSING, REHAB OR LONG-TERM CARE | DRG: 234 | End: 2021-12-15
Attending: INTERNAL MEDICINE | Admitting: INTERNAL MEDICINE
Payer: MEDICARE

## 2021-12-10 ENCOUNTER — ANESTHESIA (OUTPATIENT)
Dept: SURGERY | Age: 81
DRG: 234 | End: 2021-12-10
Payer: MEDICARE

## 2021-12-10 ENCOUNTER — APPOINTMENT (OUTPATIENT)
Dept: NON INVASIVE DIAGNOSTICS | Age: 81
DRG: 234 | End: 2021-12-10
Attending: INTERNAL MEDICINE
Payer: MEDICARE

## 2021-12-10 ENCOUNTER — APPOINTMENT (OUTPATIENT)
Dept: GENERAL RADIOLOGY | Age: 81
DRG: 234 | End: 2021-12-10
Attending: THORACIC SURGERY (CARDIOTHORACIC VASCULAR SURGERY)
Payer: MEDICARE

## 2021-12-10 ENCOUNTER — APPOINTMENT (OUTPATIENT)
Dept: GENERAL RADIOLOGY | Age: 81
DRG: 234 | End: 2021-12-10
Attending: EMERGENCY MEDICINE
Payer: MEDICARE

## 2021-12-10 ENCOUNTER — HOSPITAL ENCOUNTER (EMERGENCY)
Age: 81
Discharge: OTHER INSTITUTION | DRG: 234 | End: 2021-12-10
Attending: EMERGENCY MEDICINE
Payer: MEDICARE

## 2021-12-10 ENCOUNTER — ANESTHESIA EVENT (OUTPATIENT)
Dept: SURGERY | Age: 81
DRG: 234 | End: 2021-12-10
Payer: MEDICARE

## 2021-12-10 VITALS
SYSTOLIC BLOOD PRESSURE: 141 MMHG | WEIGHT: 215 LBS | OXYGEN SATURATION: 100 % | BODY MASS INDEX: 30.78 KG/M2 | RESPIRATION RATE: 16 BRPM | DIASTOLIC BLOOD PRESSURE: 63 MMHG | TEMPERATURE: 97.8 F | HEIGHT: 70 IN | HEART RATE: 78 BPM

## 2021-12-10 DIAGNOSIS — R53.81 DEBILITY: ICD-10-CM

## 2021-12-10 DIAGNOSIS — Z95.1 HX OF CABG: ICD-10-CM

## 2021-12-10 DIAGNOSIS — I20.0 UNSTABLE ANGINA (HCC): Primary | ICD-10-CM

## 2021-12-10 DIAGNOSIS — D64.9 ANEMIA, UNSPECIFIED TYPE: ICD-10-CM

## 2021-12-10 DIAGNOSIS — I10 PRIMARY HYPERTENSION: ICD-10-CM

## 2021-12-10 DIAGNOSIS — I25.9 CARDIAC ISCHEMIA: ICD-10-CM

## 2021-12-10 DIAGNOSIS — E78.5 HYPERLIPIDEMIA, UNSPECIFIED HYPERLIPIDEMIA TYPE: ICD-10-CM

## 2021-12-10 DIAGNOSIS — Z95.1 S/P CABG X 4: ICD-10-CM

## 2021-12-10 DIAGNOSIS — I21.4 NSTEMI (NON-ST ELEVATED MYOCARDIAL INFARCTION) (HCC): ICD-10-CM

## 2021-12-10 DIAGNOSIS — E78.2 MIXED HYPERLIPIDEMIA: ICD-10-CM

## 2021-12-10 DIAGNOSIS — Z99.11 ENCOUNTER FOR WEANING FROM VENTILATOR (HCC): ICD-10-CM

## 2021-12-10 DIAGNOSIS — I25.10 CAD, MULTIPLE VESSEL: ICD-10-CM

## 2021-12-10 PROBLEM — R19.7 DIARRHEA: Status: ACTIVE | Noted: 2021-12-10

## 2021-12-10 LAB
ACT AVERAGE - AAVG: 554 SEC
ACT AVERAGE - AAVG: 603 SEC
ACT AVERAGE - AAVG: 698 SEC
ACT BLD: 208 SECS (ref 70–128)
ALBUMIN SERPL-MCNC: 3.8 G/DL (ref 3.2–4.6)
ALBUMIN/GLOB SERPL: 1.1 {RATIO} (ref 1.2–3.5)
ALP SERPL-CCNC: 72 U/L (ref 50–136)
ALT SERPL-CCNC: 28 U/L (ref 12–65)
ANION GAP SERPL CALC-SCNC: 10 MMOL/L (ref 7–16)
ANION GAP SERPL CALC-SCNC: 9 MMOL/L (ref 7–16)
APTT PPP: 29.5 SEC (ref 24.1–35.1)
APTT PPP: 35 SEC (ref 24.1–35.1)
ARTERIAL PATENCY WRIST A: ABNORMAL
AST SERPL-CCNC: 16 U/L (ref 15–37)
ATRIAL RATE: 83 BPM
BASE DEFICIT BLD-SCNC: 0.5 MMOL/L
BASE DEFICIT BLD-SCNC: 0.5 MMOL/L
BASE DEFICIT BLD-SCNC: 0.8 MMOL/L
BASE DEFICIT BLD-SCNC: 1.3 MMOL/L
BASE DEFICIT BLD-SCNC: 2.9 MMOL/L
BASE DEFICIT BLD-SCNC: 3.6 MMOL/L
BASE DEFICIT BLDV-SCNC: 1.7 MMOL/L
BASELINE ACT - BAACT: 256 SEC
BASOPHILS # BLD: 0.1 K/UL (ref 0–0.2)
BASOPHILS NFR BLD: 1 % (ref 0–2)
BDY SITE: ABNORMAL
BILIRUB SERPL-MCNC: 0.5 MG/DL (ref 0.2–1.1)
BUN SERPL-MCNC: 10 MG/DL (ref 8–23)
BUN SERPL-MCNC: 14 MG/DL (ref 8–23)
CA-I BLD-MCNC: 1.02 MMOL/L (ref 1.12–1.32)
CA-I BLD-MCNC: 1.08 MMOL/L (ref 1.12–1.32)
CA-I BLD-MCNC: 1.1 MMOL/L (ref 1.12–1.32)
CA-I BLD-MCNC: 1.23 MMOL/L (ref 1.12–1.32)
CA-I BLD-MCNC: 1.25 MMOL/L (ref 1.12–1.32)
CA-I BLD-MCNC: 1.25 MMOL/L (ref 1.12–1.32)
CALCIUM SERPL-MCNC: 7.8 MG/DL (ref 8.3–10.4)
CALCIUM SERPL-MCNC: 8.9 MG/DL (ref 8.3–10.4)
CALCULATED P AXIS, ECG09: 48 DEGREES
CALCULATED R AXIS, ECG10: 17 DEGREES
CALCULATED T AXIS, ECG11: 3 DEGREES
CHLORIDE SERPL-SCNC: 107 MMOL/L (ref 98–107)
CHLORIDE SERPL-SCNC: 115 MMOL/L (ref 98–107)
CITRATED FUNCTIONAL FIBRINOGEN MAXIMUM AMPLITUDE: 14.4 MM (ref 15–32)
CITRATED KAOLIN ANGLE: 69.2 DEG (ref 63–78)
CITRATED KAOLIN K-TIME: 1.8 MINS (ref 0.8–2.1)
CITRATED KAOLIN MAXIMUM AMPLITUDE: 53.1 MM (ref 52–69)
CITRATED KAOLIN R-TIME: 6.2 MINS (ref 4.6–9.1)
CITRATED KAOLIN/HEPARINASE R-TIME: 6.3 MINS (ref 4.3–8.3)
CITRATED RAPIDTEG MAXIMUM AMPLITUDE: 51.7 MM (ref 52–70)
CO2 BLD-SCNC: 23 MMOL/L (ref 13–23)
CO2 BLD-SCNC: 23 MMOL/L (ref 13–23)
CO2 BLD-SCNC: 25 MMOL/L (ref 13–23)
CO2 BLD-SCNC: 25 MMOL/L (ref 13–23)
CO2 BLD-SCNC: 26 MMOL/L (ref 13–23)
CO2 SERPL-SCNC: 23 MMOL/L (ref 21–32)
CO2 SERPL-SCNC: 25 MMOL/L (ref 21–32)
CREAT SERPL-MCNC: 0.76 MG/DL (ref 0.8–1.5)
CREAT SERPL-MCNC: 0.91 MG/DL (ref 0.8–1.5)
DIAGNOSIS, 93000: NORMAL
DIFFERENTIAL METHOD BLD: ABNORMAL
EOSINOPHIL # BLD: 0.1 K/UL (ref 0–0.8)
EOSINOPHIL NFR BLD: 1 % (ref 0.5–7.8)
ERYTHROCYTE [DISTWIDTH] IN BLOOD BY AUTOMATED COUNT: 14.2 % (ref 11.9–14.6)
ERYTHROCYTE [DISTWIDTH] IN BLOOD BY AUTOMATED COUNT: 15 % (ref 11.9–14.6)
FIBRINOGEN PPP-MCNC: 153 MG/DL (ref 190–501)
FIO2 ON VENT: 60 %
FUNCTIONAL FIBRINOGEN LEVEL: 262.8 MG/DL (ref 278–581)
GAS FLOW.O2 O2 DELIVERY SYS: ABNORMAL L/MIN
GLOBULIN SER CALC-MCNC: 3.4 G/DL (ref 2.3–3.5)
GLUCOSE BLD STRIP.AUTO-MCNC: 104 MG/DL (ref 65–100)
GLUCOSE BLD STRIP.AUTO-MCNC: 105 MG/DL (ref 65–100)
GLUCOSE BLD STRIP.AUTO-MCNC: 121 MG/DL (ref 65–100)
GLUCOSE BLD STRIP.AUTO-MCNC: 122 MG/DL (ref 65–100)
GLUCOSE BLD STRIP.AUTO-MCNC: 130 MG/DL (ref 65–100)
GLUCOSE BLD STRIP.AUTO-MCNC: 136 MG/DL (ref 65–100)
GLUCOSE BLD STRIP.AUTO-MCNC: 145 MG/DL (ref 65–100)
GLUCOSE BLD STRIP.AUTO-MCNC: 158 MG/DL (ref 65–100)
GLUCOSE BLD STRIP.AUTO-MCNC: 159 MG/DL (ref 65–100)
GLUCOSE BLD STRIP.AUTO-MCNC: 160 MG/DL (ref 65–100)
GLUCOSE BLD STRIP.AUTO-MCNC: 160 MG/DL (ref 65–100)
GLUCOSE SERPL-MCNC: 118 MG/DL (ref 65–100)
GLUCOSE SERPL-MCNC: 138 MG/DL (ref 65–100)
HCO3 BLD-SCNC: 21.7 MMOL/L (ref 22–26)
HCO3 BLD-SCNC: 22.4 MMOL/L (ref 22–26)
HCO3 BLD-SCNC: 24.4 MMOL/L (ref 22–26)
HCO3 BLD-SCNC: 24.7 MMOL/L (ref 22–26)
HCO3 BLD-SCNC: 24.8 MMOL/L (ref 22–26)
HCO3 BLD-SCNC: 25.1 MMOL/L (ref 22–26)
HCO3 BLDV-SCNC: 24.2 MMOL/L (ref 23–28)
HCT VFR BLD AUTO: 22.3 % (ref 41.1–50.3)
HCT VFR BLD AUTO: 32 % (ref 41.1–50.3)
HEPARIN BOLUS-PATIENT - HBPAT: NORMAL UNITS
HEPARIN BOLUS-PUMP - HBPUMP: 0 UNITS
HEPARIN BOLUS-TOTAL - HBTOT: NORMAL UNITS
HEPARIN REQUIRED-PATIENT - HRPAT: 0
HEPARIN REQUIRED-PATIENT - HRPAT: 4557
HEPARIN REQUIRED-TOTAL - HRTOT: 0 UNITS
HEPARIN REQUIRED-TOTAL - HRTOT: 5237 UNITS
HEPARIN TEST CONCENTRATE - HEPTC: 2 MG/KG
HEPARIN TEST CONCENTRATE - HEPTC: 2.5 MG/KG
HGB BLD-MCNC: 10.6 G/DL (ref 13.6–17.2)
HGB BLD-MCNC: 7.5 G/DL (ref 13.6–17.2)
HISTORY CHECKED?,CKHIST: NORMAL
IMM GRANULOCYTES # BLD AUTO: 0 K/UL (ref 0–0.5)
IMM GRANULOCYTES NFR BLD AUTO: 1 % (ref 0–5)
INR PPP: 1.7
INSPIRATION.DURATION SETTING TIME VENT: 0.9 SEC
IPAP/PIP, IPAPIP: 19
LIPASE SERPL-CCNC: 180 U/L (ref 73–393)
LYMPHOCYTES # BLD: 1.7 K/UL (ref 0.5–4.6)
LYMPHOCYTES NFR BLD: 23 % (ref 13–44)
MAGNESIUM SERPL-MCNC: 2 MG/DL (ref 1.8–2.4)
MAGNESIUM SERPL-MCNC: 2.7 MG/DL (ref 1.8–2.4)
MCH RBC QN AUTO: 31.7 PG (ref 26.1–32.9)
MCH RBC QN AUTO: 32.5 PG (ref 26.1–32.9)
MCHC RBC AUTO-ENTMCNC: 33.1 G/DL (ref 31.4–35)
MCHC RBC AUTO-ENTMCNC: 33.6 G/DL (ref 31.4–35)
MCV RBC AUTO: 95.8 FL (ref 79.6–97.8)
MCV RBC AUTO: 96.5 FL (ref 79.6–97.8)
MONOCYTES # BLD: 0.5 K/UL (ref 0.1–1.3)
MONOCYTES NFR BLD: 7 % (ref 4–12)
NEUTS SEG # BLD: 4.7 K/UL (ref 1.7–8.2)
NEUTS SEG NFR BLD: 67 % (ref 43–78)
NRBC # BLD: 0 K/UL (ref 0–0.2)
NRBC # BLD: 0 K/UL (ref 0–0.2)
P-R INTERVAL, ECG05: 200 MS
PAW @ MEAN EXP FLOW ON VENT: 11 CMH2O
PCO2 BLD: 39.2 MMHG (ref 35–45)
PCO2 BLD: 40.5 MMHG (ref 35–45)
PCO2 BLD: 40.6 MMHG (ref 35–45)
PCO2 BLD: 43.2 MMHG (ref 35–45)
PCO2 BLD: 47.4 MMHG (ref 35–45)
PCO2 BLD: 47.5 MMHG (ref 35–45)
PCO2 BLDV: 46.8 MMHG (ref 41–51)
PEEP RESPIRATORY: 8 CM[H2O]
PH BLD: 7.32 [PH] (ref 7.35–7.45)
PH BLD: 7.33 [PH] (ref 7.35–7.45)
PH BLD: 7.35 [PH] (ref 7.35–7.45)
PH BLD: 7.35 [PH] (ref 7.35–7.45)
PH BLD: 7.37 [PH] (ref 7.35–7.45)
PH BLD: 7.39 [PH] (ref 7.35–7.45)
PH BLDV: 7.32 [PH] (ref 7.32–7.42)
PLATELET # BLD AUTO: 164 K/UL (ref 150–450)
PLATELET # BLD AUTO: 310 K/UL (ref 150–450)
PMV BLD AUTO: 10.7 FL (ref 9.4–12.3)
PMV BLD AUTO: 10.8 FL (ref 9.4–12.3)
PO2 BLD: 110 MMHG (ref 75–100)
PO2 BLD: 250 MMHG (ref 75–100)
PO2 BLD: 273 MMHG (ref 75–100)
PO2 BLD: 275 MMHG (ref 75–100)
PO2 BLD: 291 MMHG (ref 75–100)
PO2 BLD: 472 MMHG (ref 75–100)
PO2 BLDV: 41 MMHG
POTASSIUM BLD-SCNC: 3.5 MMOL/L (ref 3.5–5.1)
POTASSIUM BLD-SCNC: 3.7 MMOL/L (ref 3.5–5.1)
POTASSIUM BLD-SCNC: 3.8 MMOL/L (ref 3.5–5.1)
POTASSIUM BLD-SCNC: 3.9 MMOL/L (ref 3.5–5.1)
POTASSIUM BLD-SCNC: 4.5 MMOL/L (ref 3.5–5.1)
POTASSIUM BLD-SCNC: 4.5 MMOL/L (ref 3.5–5.1)
POTASSIUM SERPL-SCNC: 3.5 MMOL/L (ref 3.5–5.1)
POTASSIUM SERPL-SCNC: 3.8 MMOL/L (ref 3.5–5.1)
PRESSURE SUPPORT SETTING VENT: 8 CM[H2O]
PROJECTED HEPARIN CONC - PHEP: 1.4 MG/KG
PROT SERPL-MCNC: 7.2 G/DL (ref 6.3–8.2)
PROTAMINE DOSE-PUMP - PDPUMP: 30 MG
PROTAMINE DOSE-PUMP - PDPUMP: 37 MG
PROTAMINE DOSE-TOTAL - PDTOT: 231 MG
PROTAMINE DOSE-TOTAL - PDTOT: 288 MG
PROTHROMBIN TIME: 20 SEC (ref 12.6–14.5)
Q-T INTERVAL, ECG07: 388 MS
QRS DURATION, ECG06: 128 MS
QTC CALCULATION (BEZET), ECG08: 455 MS
RBC # BLD AUTO: 2.31 M/UL (ref 4.23–5.6)
RBC # BLD AUTO: 3.34 M/UL (ref 4.23–5.6)
RESPIRATORY RATE: 18 (ref 5–40)
SAO2 % BLD: 100 %
SAO2 % BLD: 98 %
SAO2 % BLDV: 71.3 % (ref 65–88)
SERVICE CMNT-IMP: ABNORMAL
SLOPE: 114
SODIUM BLD-SCNC: 141 MMOL/L (ref 136–145)
SODIUM BLD-SCNC: 142 MMOL/L (ref 136–145)
SODIUM BLD-SCNC: 142 MMOL/L (ref 136–145)
SODIUM BLD-SCNC: 143 MMOL/L (ref 136–145)
SODIUM BLD-SCNC: 143 MMOL/L (ref 136–145)
SODIUM BLD-SCNC: 144 MMOL/L (ref 136–145)
SODIUM SERPL-SCNC: 141 MMOL/L (ref 136–145)
SODIUM SERPL-SCNC: 148 MMOL/L (ref 136–145)
SPECIMEN SITE: ABNORMAL
SPECIMEN TYPE: ABNORMAL
TOTAL RESP. RATE, ITRR: 18
TROPONIN-HIGH SENSITIVITY: 204.1 PG/ML (ref 0–14)
VENTILATION MODE VENT: ABNORMAL
VENTRICULAR RATE, ECG03: 83 BPM
VT SETTING VENT: 450 ML
WBC # BLD AUTO: 14.4 K/UL (ref 4.3–11.1)
WBC # BLD AUTO: 7.1 K/UL (ref 4.3–11.1)

## 2021-12-10 PROCEDURE — 85384 FIBRINOGEN ACTIVITY: CPT

## 2021-12-10 PROCEDURE — 77030034888 HC SUT PROL 2 J&J -B: Performed by: THORACIC SURGERY (CARDIOTHORACIC VASCULAR SURGERY)

## 2021-12-10 PROCEDURE — 77030008771 HC TU NG SALEM SUMP -A: Performed by: THORACIC SURGERY (CARDIOTHORACIC VASCULAR SURGERY)

## 2021-12-10 PROCEDURE — 74011250636 HC RX REV CODE- 250/636: Performed by: NURSE ANESTHETIST, CERTIFIED REGISTERED

## 2021-12-10 PROCEDURE — P9035 PLATELET PHERES LEUKOREDUCED: HCPCS

## 2021-12-10 PROCEDURE — B2111ZZ FLUOROSCOPY OF MULTIPLE CORONARY ARTERIES USING LOW OSMOLAR CONTRAST: ICD-10-PCS | Performed by: INTERNAL MEDICINE

## 2021-12-10 PROCEDURE — 33533 CABG ARTERIAL SINGLE: CPT | Performed by: THORACIC SURGERY (CARDIOTHORACIC VASCULAR SURGERY)

## 2021-12-10 PROCEDURE — 77030018548 HC SUT ETHBND2 J&J -B: Performed by: THORACIC SURGERY (CARDIOTHORACIC VASCULAR SURGERY)

## 2021-12-10 PROCEDURE — 77030013797 HC KT TRNSDUC PRSSR EDWD -A: Performed by: THORACIC SURGERY (CARDIOTHORACIC VASCULAR SURGERY)

## 2021-12-10 PROCEDURE — 74011000250 HC RX REV CODE- 250: Performed by: INTERNAL MEDICINE

## 2021-12-10 PROCEDURE — 83735 ASSAY OF MAGNESIUM: CPT

## 2021-12-10 PROCEDURE — 77030012468 HC VLV BLEEDBK CNTRL ABBT -B: Performed by: INTERNAL MEDICINE

## 2021-12-10 PROCEDURE — 83690 ASSAY OF LIPASE: CPT

## 2021-12-10 PROCEDURE — 74011000636 HC RX REV CODE- 636: Performed by: INTERNAL MEDICINE

## 2021-12-10 PROCEDURE — 77030013292 HC BOWL MX PRSM J&J -A: Performed by: ANESTHESIOLOGY

## 2021-12-10 PROCEDURE — 5A02210 ASSISTANCE WITH CARDIAC OUTPUT USING BALLOON PUMP, CONTINUOUS: ICD-10-PCS | Performed by: INTERNAL MEDICINE

## 2021-12-10 PROCEDURE — 77030020407 HC IV BLD WRMR ST 3M -A: Performed by: ANESTHESIOLOGY

## 2021-12-10 PROCEDURE — 77030019908 HC STETH ESOPH SIMS -A: Performed by: ANESTHESIOLOGY

## 2021-12-10 PROCEDURE — 77030025827 HC BG BLD DNR AUTLG MEDT -A: Performed by: THORACIC SURGERY (CARDIOTHORACIC VASCULAR SURGERY)

## 2021-12-10 PROCEDURE — 85014 HEMATOCRIT: CPT

## 2021-12-10 PROCEDURE — 85025 COMPLETE CBC W/AUTO DIFF WBC: CPT

## 2021-12-10 PROCEDURE — 93454 CORONARY ARTERY ANGIO S&I: CPT | Performed by: INTERNAL MEDICINE

## 2021-12-10 PROCEDURE — 74011250636 HC RX REV CODE- 250/636: Performed by: THORACIC SURGERY (CARDIOTHORACIC VASCULAR SURGERY)

## 2021-12-10 PROCEDURE — 85730 THROMBOPLASTIN TIME PARTIAL: CPT

## 2021-12-10 PROCEDURE — 77030018673: Performed by: THORACIC SURGERY (CARDIOTHORACIC VASCULAR SURGERY)

## 2021-12-10 PROCEDURE — 77030010514 HC APPL CLP LIG COVD -B: Performed by: THORACIC SURGERY (CARDIOTHORACIC VASCULAR SURGERY)

## 2021-12-10 PROCEDURE — 77030018571 HC SUT PROL1 J&J -B: Performed by: THORACIC SURGERY (CARDIOTHORACIC VASCULAR SURGERY)

## 2021-12-10 PROCEDURE — 77030018547 HC SUT ETHBND1 J&J -B: Performed by: THORACIC SURGERY (CARDIOTHORACIC VASCULAR SURGERY)

## 2021-12-10 PROCEDURE — 06BQ4ZZ EXCISION OF LEFT SAPHENOUS VEIN, PERCUTANEOUS ENDOSCOPIC APPROACH: ICD-10-PCS | Performed by: THORACIC SURGERY (CARDIOTHORACIC VASCULAR SURGERY)

## 2021-12-10 PROCEDURE — 74011000250 HC RX REV CODE- 250: Performed by: THORACIC SURGERY (CARDIOTHORACIC VASCULAR SURGERY)

## 2021-12-10 PROCEDURE — 74011250636 HC RX REV CODE- 250/636

## 2021-12-10 PROCEDURE — C1769 GUIDE WIRE: HCPCS | Performed by: INTERNAL MEDICINE

## 2021-12-10 PROCEDURE — 77030039425 HC BLD LARYNG TRULITE DISP TELE -A: Performed by: ANESTHESIOLOGY

## 2021-12-10 PROCEDURE — 74011250637 HC RX REV CODE- 250/637: Performed by: EMERGENCY MEDICINE

## 2021-12-10 PROCEDURE — P9016 RBC LEUKOCYTES REDUCED: HCPCS

## 2021-12-10 PROCEDURE — 33967 INSERT I-AORT PERCUT DEVICE: CPT | Performed by: INTERNAL MEDICINE

## 2021-12-10 PROCEDURE — 77030019905 HC CATH URETH INTMIT MDII -A: Performed by: THORACIC SURGERY (CARDIOTHORACIC VASCULAR SURGERY)

## 2021-12-10 PROCEDURE — 86580 TB INTRADERMAL TEST: CPT | Performed by: THORACIC SURGERY (CARDIOTHORACIC VASCULAR SURGERY)

## 2021-12-10 PROCEDURE — 77030010512 HC APPL CLP LIG J&J -C: Performed by: THORACIC SURGERY (CARDIOTHORACIC VASCULAR SURGERY)

## 2021-12-10 PROCEDURE — 99223 1ST HOSP IP/OBS HIGH 75: CPT | Performed by: THORACIC SURGERY (CARDIOTHORACIC VASCULAR SURGERY)

## 2021-12-10 PROCEDURE — 71045 X-RAY EXAM CHEST 1 VIEW: CPT

## 2021-12-10 PROCEDURE — P9045 ALBUMIN (HUMAN), 5%, 250 ML: HCPCS | Performed by: NURSE ANESTHETIST, CERTIFIED REGISTERED

## 2021-12-10 PROCEDURE — 74011000250 HC RX REV CODE- 250: Performed by: NURSE ANESTHETIST, CERTIFIED REGISTERED

## 2021-12-10 PROCEDURE — 93005 ELECTROCARDIOGRAM TRACING: CPT | Performed by: EMERGENCY MEDICINE

## 2021-12-10 PROCEDURE — 77030022513 HC GD NDL ACUSIT CIVC -B: Performed by: INTERNAL MEDICINE

## 2021-12-10 PROCEDURE — 74011250636 HC RX REV CODE- 250/636: Performed by: NURSE PRACTITIONER

## 2021-12-10 PROCEDURE — 77030014007 HC SPNG HEMSTAT J&J -B: Performed by: THORACIC SURGERY (CARDIOTHORACIC VASCULAR SURGERY)

## 2021-12-10 PROCEDURE — 74011000258 HC RX REV CODE- 258: Performed by: NURSE ANESTHETIST, CERTIFIED REGISTERED

## 2021-12-10 PROCEDURE — C1887 CATHETER, GUIDING: HCPCS | Performed by: INTERNAL MEDICINE

## 2021-12-10 PROCEDURE — 99152 MOD SED SAME PHYS/QHP 5/>YRS: CPT | Performed by: INTERNAL MEDICINE

## 2021-12-10 PROCEDURE — 77030018729 HC ELECTRD DEFIB PAD CARD -B: Performed by: THORACIC SURGERY (CARDIOTHORACIC VASCULAR SURGERY)

## 2021-12-10 PROCEDURE — 77030012390 HC DRN CHST BTL GTNG -B: Performed by: THORACIC SURGERY (CARDIOTHORACIC VASCULAR SURGERY)

## 2021-12-10 PROCEDURE — 77030002970 HC SUT PLEDG TELE -A: Performed by: THORACIC SURGERY (CARDIOTHORACIC VASCULAR SURGERY)

## 2021-12-10 PROCEDURE — 85027 COMPLETE CBC AUTOMATED: CPT

## 2021-12-10 PROCEDURE — 02100Z9 BYPASS CORONARY ARTERY, ONE ARTERY FROM LEFT INTERNAL MAMMARY, OPEN APPROACH: ICD-10-PCS | Performed by: THORACIC SURGERY (CARDIOTHORACIC VASCULAR SURGERY)

## 2021-12-10 PROCEDURE — 77030020751 HC FLTR TBNG TRNSFUS HAEM -A: Performed by: ANESTHESIOLOGY

## 2021-12-10 PROCEDURE — 36600 WITHDRAWAL OF ARTERIAL BLOOD: CPT

## 2021-12-10 PROCEDURE — 77030020751 HC FLTR TBNG TRNSFUS HAEM -A: Performed by: THORACIC SURGERY (CARDIOTHORACIC VASCULAR SURGERY)

## 2021-12-10 PROCEDURE — 33519 CABG ARTERY-VEIN THREE: CPT | Performed by: THORACIC SURGERY (CARDIOTHORACIC VASCULAR SURGERY)

## 2021-12-10 PROCEDURE — 85530 HEPARIN-PROTAMINE TOLERANCE: CPT

## 2021-12-10 PROCEDURE — C1894 INTRO/SHEATH, NON-LASER: HCPCS | Performed by: INTERNAL MEDICINE

## 2021-12-10 PROCEDURE — 74011250637 HC RX REV CODE- 250/637: Performed by: THORACIC SURGERY (CARDIOTHORACIC VASCULAR SURGERY)

## 2021-12-10 PROCEDURE — 2709999900 HC NON-CHARGEABLE SUPPLY

## 2021-12-10 PROCEDURE — 76060000040 HC ANESTHESIA 4.5 TO 5 HR: Performed by: THORACIC SURGERY (CARDIOTHORACIC VASCULAR SURGERY)

## 2021-12-10 PROCEDURE — 65610000006 HC RM INTENSIVE CARE

## 2021-12-10 PROCEDURE — 021209W BYPASS CORONARY ARTERY, THREE ARTERIES FROM AORTA WITH AUTOLOGOUS VENOUS TISSUE, OPEN APPROACH: ICD-10-PCS | Performed by: THORACIC SURGERY (CARDIOTHORACIC VASCULAR SURGERY)

## 2021-12-10 PROCEDURE — 99223 1ST HOSP IP/OBS HIGH 75: CPT | Performed by: INTERNAL MEDICINE

## 2021-12-10 PROCEDURE — 77030037088 HC TUBE ENDOTRACH ORAL NSL COVD-A: Performed by: ANESTHESIOLOGY

## 2021-12-10 PROCEDURE — 85347 COAGULATION TIME ACTIVATED: CPT

## 2021-12-10 PROCEDURE — 99153 MOD SED SAME PHYS/QHP EA: CPT | Performed by: INTERNAL MEDICINE

## 2021-12-10 PROCEDURE — 77030002520 HC INSRT CLMP LATIS STLTH AMR -B: Performed by: THORACIC SURGERY (CARDIOTHORACIC VASCULAR SURGERY)

## 2021-12-10 PROCEDURE — 77030010813: Performed by: THORACIC SURGERY (CARDIOTHORACIC VASCULAR SURGERY)

## 2021-12-10 PROCEDURE — 5A1221Z PERFORMANCE OF CARDIAC OUTPUT, CONTINUOUS: ICD-10-PCS | Performed by: THORACIC SURGERY (CARDIOTHORACIC VASCULAR SURGERY)

## 2021-12-10 PROCEDURE — 74011636637 HC RX REV CODE- 636/637: Performed by: THORACIC SURGERY (CARDIOTHORACIC VASCULAR SURGERY)

## 2021-12-10 PROCEDURE — 84132 ASSAY OF SERUM POTASSIUM: CPT

## 2021-12-10 PROCEDURE — P9045 ALBUMIN (HUMAN), 5%, 250 ML: HCPCS

## 2021-12-10 PROCEDURE — 74011000250 HC RX REV CODE- 250

## 2021-12-10 PROCEDURE — 82803 BLOOD GASES ANY COMBINATION: CPT

## 2021-12-10 PROCEDURE — 85520 HEPARIN ASSAY: CPT

## 2021-12-10 PROCEDURE — 82947 ASSAY GLUCOSE BLOOD QUANT: CPT

## 2021-12-10 PROCEDURE — 77030002986 HC SUT PROL J&J -A: Performed by: THORACIC SURGERY (CARDIOTHORACIC VASCULAR SURGERY)

## 2021-12-10 PROCEDURE — 30243R1 TRANSFUSION OF NONAUTOLOGOUS PLATELETS INTO CENTRAL VEIN, PERCUTANEOUS APPROACH: ICD-10-PCS | Performed by: THORACIC SURGERY (CARDIOTHORACIC VASCULAR SURGERY)

## 2021-12-10 PROCEDURE — 74011000272 HC RX REV CODE- 272: Performed by: THORACIC SURGERY (CARDIOTHORACIC VASCULAR SURGERY)

## 2021-12-10 PROCEDURE — 77030009995: Performed by: THORACIC SURGERY (CARDIOTHORACIC VASCULAR SURGERY)

## 2021-12-10 PROCEDURE — 84295 ASSAY OF SERUM SODIUM: CPT

## 2021-12-10 PROCEDURE — 30243K1 TRANSFUSION OF NONAUTOLOGOUS FROZEN PLASMA INTO CENTRAL VEIN, PERCUTANEOUS APPROACH: ICD-10-PCS | Performed by: THORACIC SURGERY (CARDIOTHORACIC VASCULAR SURGERY)

## 2021-12-10 PROCEDURE — 74011000258 HC RX REV CODE- 258: Performed by: PHYSICIAN ASSISTANT

## 2021-12-10 PROCEDURE — 77030013861 HC PNCH AORT CLNCUT QUES -B: Performed by: THORACIC SURGERY (CARDIOTHORACIC VASCULAR SURGERY)

## 2021-12-10 PROCEDURE — 77030004731 HC CATH BLN INTAORT GTNG -G: Performed by: INTERNAL MEDICINE

## 2021-12-10 PROCEDURE — 84484 ASSAY OF TROPONIN QUANT: CPT

## 2021-12-10 PROCEDURE — 85018 HEMOGLOBIN: CPT

## 2021-12-10 PROCEDURE — 80053 COMPREHEN METABOLIC PANEL: CPT

## 2021-12-10 PROCEDURE — 77030015766: Performed by: INTERNAL MEDICINE

## 2021-12-10 PROCEDURE — 77030010818: Performed by: THORACIC SURGERY (CARDIOTHORACIC VASCULAR SURGERY)

## 2021-12-10 PROCEDURE — 74011636637 HC RX REV CODE- 636/637: Performed by: PHYSICIAN ASSISTANT

## 2021-12-10 PROCEDURE — 77030041244 HC CBL PACE EXT TEMP REMG -B: Performed by: THORACIC SURGERY (CARDIOTHORACIC VASCULAR SURGERY)

## 2021-12-10 PROCEDURE — P9045 ALBUMIN (HUMAN), 5%, 250 ML: HCPCS | Performed by: THORACIC SURGERY (CARDIOTHORACIC VASCULAR SURGERY)

## 2021-12-10 PROCEDURE — 77030005401 HC CATH RAD ARRO -A: Performed by: ANESTHESIOLOGY

## 2021-12-10 PROCEDURE — P9047 ALBUMIN (HUMAN), 25%, 50ML: HCPCS

## 2021-12-10 PROCEDURE — 77030013794 HC KT TRNSDUC BLD EDWD -B: Performed by: ANESTHESIOLOGY

## 2021-12-10 PROCEDURE — C1751 CATH, INF, PER/CENT/MIDLINE: HCPCS | Performed by: ANESTHESIOLOGY

## 2021-12-10 PROCEDURE — 77030022199 HC SYS HARV VESL GTNG -G1: Performed by: THORACIC SURGERY (CARDIOTHORACIC VASCULAR SURGERY)

## 2021-12-10 PROCEDURE — 74011250636 HC RX REV CODE- 250/636: Performed by: PHYSICIAN ASSISTANT

## 2021-12-10 PROCEDURE — 85610 PROTHROMBIN TIME: CPT

## 2021-12-10 PROCEDURE — 80048 BASIC METABOLIC PNL TOTAL CA: CPT

## 2021-12-10 PROCEDURE — 77030003010 HC SUT SURG STL J&J -B: Performed by: THORACIC SURGERY (CARDIOTHORACIC VASCULAR SURGERY)

## 2021-12-10 PROCEDURE — 4A023N7 MEASUREMENT OF CARDIAC SAMPLING AND PRESSURE, LEFT HEART, PERCUTANEOUS APPROACH: ICD-10-PCS | Performed by: INTERNAL MEDICINE

## 2021-12-10 PROCEDURE — 76010000200 HC CV SURG 4 TO 4.5 HR INTENSV-TIER 1: Performed by: THORACIC SURGERY (CARDIOTHORACIC VASCULAR SURGERY)

## 2021-12-10 PROCEDURE — 77030002966 HC SUT PDS J&J -A: Performed by: THORACIC SURGERY (CARDIOTHORACIC VASCULAR SURGERY)

## 2021-12-10 PROCEDURE — 77030016688: Performed by: THORACIC SURGERY (CARDIOTHORACIC VASCULAR SURGERY)

## 2021-12-10 PROCEDURE — 77030003037 HC SUT WRE STRNOTMY AEMC -B: Performed by: THORACIC SURGERY (CARDIOTHORACIC VASCULAR SURGERY)

## 2021-12-10 PROCEDURE — 74011250636 HC RX REV CODE- 250/636: Performed by: INTERNAL MEDICINE

## 2021-12-10 PROCEDURE — P9059 PLASMA, FRZ BETWEEN 8-24HOUR: HCPCS

## 2021-12-10 PROCEDURE — 77030031139 HC SUT VCRL2 J&J -A: Performed by: THORACIC SURGERY (CARDIOTHORACIC VASCULAR SURGERY)

## 2021-12-10 PROCEDURE — 74011000250 HC RX REV CODE- 250: Performed by: PHYSICIAN ASSISTANT

## 2021-12-10 PROCEDURE — 86901 BLOOD TYPING SEROLOGIC RH(D): CPT

## 2021-12-10 PROCEDURE — 93308 TTE F-UP OR LMTD: CPT

## 2021-12-10 PROCEDURE — 77030002987 HC SUT PROL J&J -B: Performed by: THORACIC SURGERY (CARDIOTHORACIC VASCULAR SURGERY)

## 2021-12-10 PROCEDURE — 77030016564 HC BLD STRNL SAW4 CNMD -B: Performed by: THORACIC SURGERY (CARDIOTHORACIC VASCULAR SURGERY)

## 2021-12-10 PROCEDURE — 93005 ELECTROCARDIOGRAM TRACING: CPT | Performed by: PHYSICIAN ASSISTANT

## 2021-12-10 PROCEDURE — 2709999900 HC NON-CHARGEABLE SUPPLY: Performed by: THORACIC SURGERY (CARDIOTHORACIC VASCULAR SURGERY)

## 2021-12-10 PROCEDURE — 77030009355 HC CRDPLG DEL SET QUES -C: Performed by: THORACIC SURGERY (CARDIOTHORACIC VASCULAR SURGERY)

## 2021-12-10 PROCEDURE — 77030006689 HC BLD OPHTH BVR BD -A: Performed by: THORACIC SURGERY (CARDIOTHORACIC VASCULAR SURGERY)

## 2021-12-10 PROCEDURE — 77030004558 HC CATH ANGI DX SUPR TORQ CARD -A: Performed by: INTERNAL MEDICINE

## 2021-12-10 PROCEDURE — C1729 CATH, DRAINAGE: HCPCS | Performed by: THORACIC SURGERY (CARDIOTHORACIC VASCULAR SURGERY)

## 2021-12-10 PROCEDURE — 77030002996 HC SUT SLK J&J -A: Performed by: THORACIC SURGERY (CARDIOTHORACIC VASCULAR SURGERY)

## 2021-12-10 PROCEDURE — 77030005518 HC CATH URETH FOL 2W BARD -B: Performed by: THORACIC SURGERY (CARDIOTHORACIC VASCULAR SURGERY)

## 2021-12-10 PROCEDURE — 33508 ENDOSCOPIC VEIN HARVEST: CPT | Performed by: THORACIC SURGERY (CARDIOTHORACIC VASCULAR SURGERY)

## 2021-12-10 PROCEDURE — 30243N1 TRANSFUSION OF NONAUTOLOGOUS RED BLOOD CELLS INTO CENTRAL VEIN, PERCUTANEOUS APPROACH: ICD-10-PCS | Performed by: THORACIC SURGERY (CARDIOTHORACIC VASCULAR SURGERY)

## 2021-12-10 PROCEDURE — 77030025646 HC AUTOTRNSFUS KT TERU -C: Performed by: THORACIC SURGERY (CARDIOTHORACIC VASCULAR SURGERY)

## 2021-12-10 PROCEDURE — 82962 GLUCOSE BLOOD TEST: CPT

## 2021-12-10 PROCEDURE — 74011250636 HC RX REV CODE- 250/636: Performed by: EMERGENCY MEDICINE

## 2021-12-10 PROCEDURE — 94002 VENT MGMT INPAT INIT DAY: CPT

## 2021-12-10 PROCEDURE — C1769 GUIDE WIRE: HCPCS | Performed by: THORACIC SURGERY (CARDIOTHORACIC VASCULAR SURGERY)

## 2021-12-10 PROCEDURE — 77030002996 HC SUT SLK J&J -A: Performed by: INTERNAL MEDICINE

## 2021-12-10 PROCEDURE — 86923 COMPATIBILITY TEST ELECTRIC: CPT

## 2021-12-10 RX ORDER — MIDAZOLAM HYDROCHLORIDE 1 MG/ML
1 INJECTION, SOLUTION INTRAMUSCULAR; INTRAVENOUS
Status: DISCONTINUED | OUTPATIENT
Start: 2021-12-10 | End: 2021-12-10 | Stop reason: SDUPTHER

## 2021-12-10 RX ORDER — SODIUM CHLORIDE 0.9 % (FLUSH) 0.9 %
5-40 SYRINGE (ML) INJECTION EVERY 8 HOURS
Status: DISCONTINUED | OUTPATIENT
Start: 2021-12-10 | End: 2021-12-11

## 2021-12-10 RX ORDER — AMIODARONE HYDROCHLORIDE 200 MG/1
200 TABLET ORAL EVERY 12 HOURS
Status: DISCONTINUED | OUTPATIENT
Start: 2021-12-10 | End: 2021-12-10 | Stop reason: SDUPTHER

## 2021-12-10 RX ORDER — NITROGLYCERIN 0.4 MG/1
0.4 TABLET SUBLINGUAL
Status: DISCONTINUED | OUTPATIENT
Start: 2021-12-10 | End: 2021-12-10 | Stop reason: HOSPADM

## 2021-12-10 RX ORDER — DEXTROSE 50 % IN WATER (D50W) INTRAVENOUS SYRINGE
25 AS NEEDED
Status: DISCONTINUED | OUTPATIENT
Start: 2021-12-10 | End: 2021-12-13

## 2021-12-10 RX ORDER — ASPIRIN 81 MG/1
81 TABLET ORAL DAILY
Status: DISCONTINUED | OUTPATIENT
Start: 2021-12-11 | End: 2021-12-13

## 2021-12-10 RX ORDER — HEPARIN SODIUM 5000 [USP'U]/100ML
12-25 INJECTION, SOLUTION INTRAVENOUS
Status: DISCONTINUED | OUTPATIENT
Start: 2021-12-10 | End: 2021-12-10 | Stop reason: HOSPADM

## 2021-12-10 RX ORDER — DEXTROSE, SODIUM CHLORIDE, AND POTASSIUM CHLORIDE 5; .45; .15 G/100ML; G/100ML; G/100ML
25 INJECTION INTRAVENOUS CONTINUOUS
Status: DISCONTINUED | OUTPATIENT
Start: 2021-12-10 | End: 2021-12-10 | Stop reason: SDUPTHER

## 2021-12-10 RX ORDER — ALBUMIN HUMAN 50 G/1000ML
25 SOLUTION INTRAVENOUS ONCE
Status: DISCONTINUED | OUTPATIENT
Start: 2021-12-10 | End: 2021-12-10 | Stop reason: SDUPTHER

## 2021-12-10 RX ORDER — ATORVASTATIN CALCIUM 40 MG/1
40 TABLET, FILM COATED ORAL
Status: DISCONTINUED | OUTPATIENT
Start: 2021-12-10 | End: 2021-12-10

## 2021-12-10 RX ORDER — LIDOCAINE HCL/PF 100 MG/5ML
50-100 SYRINGE (ML) INTRAVENOUS
Status: ACTIVE | OUTPATIENT
Start: 2021-12-10 | End: 2021-12-11

## 2021-12-10 RX ORDER — NITROGLYCERIN 20 MG/100ML
0-20 INJECTION INTRAVENOUS
Status: DISCONTINUED | OUTPATIENT
Start: 2021-12-10 | End: 2021-12-10

## 2021-12-10 RX ORDER — OXYCODONE AND ACETAMINOPHEN 5; 325 MG/1; MG/1
1 TABLET ORAL
Status: DISCONTINUED | OUTPATIENT
Start: 2021-12-10 | End: 2021-12-13

## 2021-12-10 RX ORDER — PROTAMINE SULFATE 10 MG/ML
INJECTION, SOLUTION INTRAVENOUS AS NEEDED
Status: DISCONTINUED | OUTPATIENT
Start: 2021-12-10 | End: 2021-12-10 | Stop reason: HOSPADM

## 2021-12-10 RX ORDER — HEPARIN SODIUM 10000 [USP'U]/ML
INJECTION, SOLUTION INTRAVENOUS; SUBCUTANEOUS AS NEEDED
Status: DISCONTINUED | OUTPATIENT
Start: 2021-12-10 | End: 2021-12-10 | Stop reason: HOSPADM

## 2021-12-10 RX ORDER — NITROGLYCERIN 0.4 MG/1
0.4 TABLET SUBLINGUAL
Status: DISCONTINUED | OUTPATIENT
Start: 2021-12-10 | End: 2021-12-13

## 2021-12-10 RX ORDER — CEFAZOLIN SODIUM/WATER 2 G/20 ML
2 SYRINGE (ML) INTRAVENOUS ONCE
Status: DISCONTINUED | OUTPATIENT
Start: 2021-12-10 | End: 2021-12-10 | Stop reason: HOSPADM

## 2021-12-10 RX ORDER — SODIUM CHLORIDE 0.9 % (FLUSH) 0.9 %
5-10 SYRINGE (ML) INJECTION AS NEEDED
Status: DISCONTINUED | OUTPATIENT
Start: 2021-12-10 | End: 2021-12-10 | Stop reason: HOSPADM

## 2021-12-10 RX ORDER — VECURONIUM BROMIDE FOR INJECTION 1 MG/ML
INJECTION, POWDER, LYOPHILIZED, FOR SOLUTION INTRAVENOUS AS NEEDED
Status: DISCONTINUED | OUTPATIENT
Start: 2021-12-10 | End: 2021-12-10 | Stop reason: HOSPADM

## 2021-12-10 RX ORDER — GUAIFENESIN 100 MG/5ML
243 LIQUID (ML) ORAL
Status: COMPLETED | OUTPATIENT
Start: 2021-12-10 | End: 2021-12-10

## 2021-12-10 RX ORDER — METOPROLOL TARTRATE 25 MG/1
25 TABLET, FILM COATED ORAL EVERY 12 HOURS
Status: DISCONTINUED | OUTPATIENT
Start: 2021-12-10 | End: 2021-12-13

## 2021-12-10 RX ORDER — CEFAZOLIN SODIUM/WATER 2 G/20 ML
SYRINGE (ML) INTRAVENOUS AS NEEDED
Status: DISCONTINUED | OUTPATIENT
Start: 2021-12-10 | End: 2021-12-10 | Stop reason: HOSPADM

## 2021-12-10 RX ORDER — HEPARIN SODIUM 200 [USP'U]/100ML
INJECTION, SOLUTION INTRAVENOUS
Status: COMPLETED | OUTPATIENT
Start: 2021-12-10 | End: 2021-12-10

## 2021-12-10 RX ORDER — DOBUTAMINE HYDROCHLORIDE 200 MG/100ML
0-10 INJECTION INTRAVENOUS
Status: DISCONTINUED | OUTPATIENT
Start: 2021-12-10 | End: 2021-12-11

## 2021-12-10 RX ORDER — HEPARIN SODIUM 5000 [USP'U]/100ML
12-25 INJECTION, SOLUTION INTRAVENOUS
Status: DISCONTINUED | OUTPATIENT
Start: 2021-12-10 | End: 2021-12-11

## 2021-12-10 RX ORDER — SODIUM CHLORIDE, SODIUM LACTATE, POTASSIUM CHLORIDE, CALCIUM CHLORIDE 600; 310; 30; 20 MG/100ML; MG/100ML; MG/100ML; MG/100ML
INJECTION, SOLUTION INTRAVENOUS
Status: DISCONTINUED | OUTPATIENT
Start: 2021-12-10 | End: 2021-12-10 | Stop reason: HOSPADM

## 2021-12-10 RX ORDER — EPHEDRINE SULFATE/0.9% NACL/PF 50 MG/5 ML
SYRINGE (ML) INTRAVENOUS AS NEEDED
Status: DISCONTINUED | OUTPATIENT
Start: 2021-12-10 | End: 2021-12-10 | Stop reason: HOSPADM

## 2021-12-10 RX ORDER — MIDAZOLAM HYDROCHLORIDE 1 MG/ML
INJECTION, SOLUTION INTRAMUSCULAR; INTRAVENOUS AS NEEDED
Status: DISCONTINUED | OUTPATIENT
Start: 2021-12-10 | End: 2021-12-10 | Stop reason: HOSPADM

## 2021-12-10 RX ORDER — NOREPINEPHRINE BITARTRATE/D5W 4MG/250ML
.01-.5 PLASTIC BAG, INJECTION (ML) INTRAVENOUS
Status: DISCONTINUED | OUTPATIENT
Start: 2021-12-10 | End: 2021-12-11

## 2021-12-10 RX ORDER — POTASSIUM CHLORIDE 14.9 MG/ML
10 INJECTION INTRAVENOUS AS NEEDED
Status: DISCONTINUED | OUTPATIENT
Start: 2021-12-10 | End: 2021-12-13

## 2021-12-10 RX ORDER — ATORVASTATIN CALCIUM 40 MG/1
40 TABLET, FILM COATED ORAL DAILY
Status: DISCONTINUED | OUTPATIENT
Start: 2021-12-11 | End: 2021-12-10

## 2021-12-10 RX ORDER — ALBUMIN HUMAN 50 G/1000ML
25 SOLUTION INTRAVENOUS ONCE
Status: COMPLETED | OUTPATIENT
Start: 2021-12-10 | End: 2021-12-11

## 2021-12-10 RX ORDER — DEXTROSE, SODIUM CHLORIDE, AND POTASSIUM CHLORIDE 5; .45; .15 G/100ML; G/100ML; G/100ML
25 INJECTION INTRAVENOUS CONTINUOUS
Status: DISCONTINUED | OUTPATIENT
Start: 2021-12-10 | End: 2021-12-11

## 2021-12-10 RX ORDER — HEPARIN SODIUM 1000 [USP'U]/ML
60 INJECTION, SOLUTION INTRAVENOUS; SUBCUTANEOUS ONCE
Status: COMPLETED | OUTPATIENT
Start: 2021-12-10 | End: 2021-12-10

## 2021-12-10 RX ORDER — ETOMIDATE 2 MG/ML
INJECTION INTRAVENOUS AS NEEDED
Status: DISCONTINUED | OUTPATIENT
Start: 2021-12-10 | End: 2021-12-10 | Stop reason: HOSPADM

## 2021-12-10 RX ORDER — ACETAMINOPHEN 325 MG/1
650 TABLET ORAL
Status: DISCONTINUED | OUTPATIENT
Start: 2021-12-10 | End: 2021-12-13

## 2021-12-10 RX ORDER — SODIUM CHLORIDE 9 MG/ML
250 INJECTION, SOLUTION INTRAVENOUS AS NEEDED
Status: DISCONTINUED | OUTPATIENT
Start: 2021-12-10 | End: 2021-12-11

## 2021-12-10 RX ORDER — CEFAZOLIN SODIUM/WATER 2 G/20 ML
2 SYRINGE (ML) INTRAVENOUS EVERY 8 HOURS
Status: DISCONTINUED | OUTPATIENT
Start: 2021-12-10 | End: 2021-12-10

## 2021-12-10 RX ORDER — LIDOCAINE HYDROCHLORIDE 10 MG/ML
INJECTION INFILTRATION; PERINEURAL AS NEEDED
Status: DISCONTINUED | OUTPATIENT
Start: 2021-12-10 | End: 2021-12-10 | Stop reason: HOSPADM

## 2021-12-10 RX ORDER — DEXMEDETOMIDINE HYDROCHLORIDE 4 UG/ML
.1-1.5 INJECTION, SOLUTION INTRAVENOUS
Status: DISCONTINUED | OUTPATIENT
Start: 2021-12-10 | End: 2021-12-11

## 2021-12-10 RX ORDER — MIDAZOLAM HYDROCHLORIDE 1 MG/ML
1 INJECTION, SOLUTION INTRAMUSCULAR; INTRAVENOUS
Status: DISCONTINUED | OUTPATIENT
Start: 2021-12-10 | End: 2021-12-11

## 2021-12-10 RX ORDER — SODIUM CHLORIDE 0.9 % (FLUSH) 0.9 %
5-40 SYRINGE (ML) INJECTION AS NEEDED
Status: DISCONTINUED | OUTPATIENT
Start: 2021-12-10 | End: 2021-12-13

## 2021-12-10 RX ORDER — GUAIFENESIN 100 MG/5ML
81 LIQUID (ML) ORAL DAILY
Status: DISCONTINUED | OUTPATIENT
Start: 2021-12-11 | End: 2021-12-10 | Stop reason: SDUPTHER

## 2021-12-10 RX ORDER — SUFENTANIL CITRATE 50 UG/ML
INJECTION EPIDURAL; INTRAVENOUS AS NEEDED
Status: DISCONTINUED | OUTPATIENT
Start: 2021-12-10 | End: 2021-12-10 | Stop reason: HOSPADM

## 2021-12-10 RX ORDER — CHLORHEXIDINE GLUCONATE 1.2 MG/ML
10 RINSE ORAL 2 TIMES DAILY
Status: DISCONTINUED | OUTPATIENT
Start: 2021-12-10 | End: 2021-12-11

## 2021-12-10 RX ORDER — MORPHINE SULFATE 4 MG/ML
3-5 INJECTION INTRAVENOUS
Status: DISCONTINUED | OUTPATIENT
Start: 2021-12-10 | End: 2021-12-13

## 2021-12-10 RX ORDER — MAGNESIUM SULFATE 1 G/100ML
1 INJECTION INTRAVENOUS AS NEEDED
Status: DISCONTINUED | OUTPATIENT
Start: 2021-12-10 | End: 2021-12-13

## 2021-12-10 RX ORDER — METOPROLOL TARTRATE 25 MG/1
25 TABLET, FILM COATED ORAL 2 TIMES DAILY
Status: DISCONTINUED | OUTPATIENT
Start: 2021-12-10 | End: 2021-12-10 | Stop reason: SDUPTHER

## 2021-12-10 RX ORDER — ALBUMIN HUMAN 50 G/1000ML
SOLUTION INTRAVENOUS
Status: COMPLETED | OUTPATIENT
Start: 2021-12-10 | End: 2021-12-10

## 2021-12-10 RX ORDER — HEPARIN SODIUM 1000 [USP'U]/ML
INJECTION, SOLUTION INTRAVENOUS; SUBCUTANEOUS AS NEEDED
Status: DISCONTINUED | OUTPATIENT
Start: 2021-12-10 | End: 2021-12-10 | Stop reason: HOSPADM

## 2021-12-10 RX ORDER — SODIUM CHLORIDE 0.9 % (FLUSH) 0.9 %
5-40 SYRINGE (ML) INJECTION EVERY 8 HOURS
Status: DISCONTINUED | OUTPATIENT
Start: 2021-12-10 | End: 2021-12-13

## 2021-12-10 RX ORDER — POTASSIUM CHLORIDE 14.9 MG/ML
10 INJECTION INTRAVENOUS AS NEEDED
Status: DISCONTINUED | OUTPATIENT
Start: 2021-12-10 | End: 2021-12-10 | Stop reason: SDUPTHER

## 2021-12-10 RX ORDER — EPINEPHRINE HCL IN 0.9 % NACL 4MG/250ML
1-10 PLASTIC BAG, INJECTION (ML) INTRAVENOUS
Status: DISCONTINUED | OUTPATIENT
Start: 2021-12-10 | End: 2021-12-10 | Stop reason: SDUPTHER

## 2021-12-10 RX ORDER — MUPIROCIN 20 MG/G
OINTMENT TOPICAL 2 TIMES DAILY
Status: DISCONTINUED | OUTPATIENT
Start: 2021-12-10 | End: 2021-12-11

## 2021-12-10 RX ORDER — LIDOCAINE HYDROCHLORIDE 20 MG/ML
INJECTION, SOLUTION EPIDURAL; INFILTRATION; INTRACAUDAL; PERINEURAL AS NEEDED
Status: DISCONTINUED | OUTPATIENT
Start: 2021-12-10 | End: 2021-12-10 | Stop reason: HOSPADM

## 2021-12-10 RX ORDER — AMIODARONE HYDROCHLORIDE 200 MG/1
200 TABLET ORAL 2 TIMES DAILY
Status: DISCONTINUED | OUTPATIENT
Start: 2021-12-10 | End: 2021-12-13

## 2021-12-10 RX ORDER — CEFAZOLIN SODIUM/WATER 2 G/20 ML
2 SYRINGE (ML) INTRAVENOUS EVERY 8 HOURS
Status: COMPLETED | OUTPATIENT
Start: 2021-12-11 | End: 2021-12-11

## 2021-12-10 RX ORDER — ATORVASTATIN CALCIUM 80 MG/1
80 TABLET, FILM COATED ORAL
Status: DISCONTINUED | OUTPATIENT
Start: 2021-12-10 | End: 2021-12-13

## 2021-12-10 RX ORDER — DEXTROSE 50 % IN WATER (D50W) INTRAVENOUS SYRINGE
25 AS NEEDED
Status: DISCONTINUED | OUTPATIENT
Start: 2021-12-10 | End: 2021-12-12

## 2021-12-10 RX ORDER — NITROGLYCERIN 20 MG/100ML
INJECTION INTRAVENOUS
Status: DISCONTINUED | OUTPATIENT
Start: 2021-12-10 | End: 2021-12-10 | Stop reason: HOSPADM

## 2021-12-10 RX ORDER — SODIUM CHLORIDE 9 MG/ML
25 INJECTION, SOLUTION INTRAVENOUS CONTINUOUS
Status: DISCONTINUED | OUTPATIENT
Start: 2021-12-10 | End: 2021-12-11

## 2021-12-10 RX ORDER — ALBUMIN HUMAN 50 G/1000ML
SOLUTION INTRAVENOUS AS NEEDED
Status: DISCONTINUED | OUTPATIENT
Start: 2021-12-10 | End: 2021-12-10 | Stop reason: HOSPADM

## 2021-12-10 RX ORDER — SODIUM BICARBONATE 84 MG/ML
50 INJECTION, SOLUTION INTRAVENOUS AS NEEDED
Status: DISCONTINUED | OUTPATIENT
Start: 2021-12-10 | End: 2021-12-13

## 2021-12-10 RX ORDER — SODIUM CHLORIDE 0.9 % (FLUSH) 0.9 %
5-40 SYRINGE (ML) INJECTION AS NEEDED
Status: DISCONTINUED | OUTPATIENT
Start: 2021-12-10 | End: 2021-12-11

## 2021-12-10 RX ORDER — PAPAVERINE HYDROCHLORIDE 30 MG/ML
INJECTION INTRAMUSCULAR; INTRAVENOUS AS NEEDED
Status: DISCONTINUED | OUTPATIENT
Start: 2021-12-10 | End: 2021-12-10 | Stop reason: HOSPADM

## 2021-12-10 RX ORDER — ROCURONIUM BROMIDE 10 MG/ML
INJECTION, SOLUTION INTRAVENOUS AS NEEDED
Status: DISCONTINUED | OUTPATIENT
Start: 2021-12-10 | End: 2021-12-10 | Stop reason: HOSPADM

## 2021-12-10 RX ORDER — POLYETHYLENE GLYCOL 3350 17 G/17G
17 POWDER, FOR SOLUTION ORAL DAILY
Status: DISCONTINUED | OUTPATIENT
Start: 2021-12-11 | End: 2021-12-15 | Stop reason: HOSPADM

## 2021-12-10 RX ORDER — NALOXONE HYDROCHLORIDE 0.4 MG/ML
0.4 INJECTION, SOLUTION INTRAMUSCULAR; INTRAVENOUS; SUBCUTANEOUS AS NEEDED
Status: DISCONTINUED | OUTPATIENT
Start: 2021-12-10 | End: 2021-12-13

## 2021-12-10 RX ORDER — SODIUM CHLORIDE 0.9 % (FLUSH) 0.9 %
5-40 SYRINGE (ML) INJECTION AS NEEDED
Status: DISCONTINUED | OUTPATIENT
Start: 2021-12-10 | End: 2021-12-12

## 2021-12-10 RX ORDER — AMIODARONE HYDROCHLORIDE 200 MG/1
600 TABLET ORAL EVERY 12 HOURS
Status: DISCONTINUED | OUTPATIENT
Start: 2021-12-10 | End: 2021-12-10

## 2021-12-10 RX ORDER — MORPHINE SULFATE 2 MG/ML
2 INJECTION, SOLUTION INTRAMUSCULAR; INTRAVENOUS
Status: DISCONTINUED | OUTPATIENT
Start: 2021-12-10 | End: 2021-12-13

## 2021-12-10 RX ORDER — OXYCODONE AND ACETAMINOPHEN 5; 325 MG/1; MG/1
1 TABLET ORAL
Status: DISCONTINUED | OUTPATIENT
Start: 2021-12-10 | End: 2021-12-15 | Stop reason: HOSPADM

## 2021-12-10 RX ORDER — SODIUM CHLORIDE 0.9 % (FLUSH) 0.9 %
5-10 SYRINGE (ML) INJECTION EVERY 8 HOURS
Status: DISCONTINUED | OUTPATIENT
Start: 2021-12-10 | End: 2021-12-10 | Stop reason: HOSPADM

## 2021-12-10 RX ORDER — SODIUM CHLORIDE 9 MG/ML
INJECTION, SOLUTION INTRAVENOUS
Status: DISCONTINUED | OUTPATIENT
Start: 2021-12-10 | End: 2021-12-10 | Stop reason: HOSPADM

## 2021-12-10 RX ORDER — NITROGLYCERIN 20 MG/100ML
0-100 INJECTION INTRAVENOUS
Status: DISCONTINUED | OUTPATIENT
Start: 2021-12-10 | End: 2021-12-11

## 2021-12-10 RX ORDER — METOPROLOL TARTRATE 25 MG/1
25 TABLET, FILM COATED ORAL EVERY 12 HOURS
Status: DISCONTINUED | OUTPATIENT
Start: 2021-12-11 | End: 2021-12-10 | Stop reason: SDUPTHER

## 2021-12-10 RX ORDER — MAGNESIUM SULFATE 1 G/100ML
1 INJECTION INTRAVENOUS AS NEEDED
Status: DISCONTINUED | OUTPATIENT
Start: 2021-12-10 | End: 2021-12-10 | Stop reason: SDUPTHER

## 2021-12-10 RX ORDER — FENTANYL CITRATE 50 UG/ML
INJECTION, SOLUTION INTRAMUSCULAR; INTRAVENOUS AS NEEDED
Status: DISCONTINUED | OUTPATIENT
Start: 2021-12-10 | End: 2021-12-10 | Stop reason: HOSPADM

## 2021-12-10 RX ORDER — ONDANSETRON 2 MG/ML
4-8 INJECTION INTRAMUSCULAR; INTRAVENOUS
Status: DISCONTINUED | OUTPATIENT
Start: 2021-12-10 | End: 2021-12-13

## 2021-12-10 RX ORDER — NALOXONE HYDROCHLORIDE 0.4 MG/ML
0.4 INJECTION, SOLUTION INTRAMUSCULAR; INTRAVENOUS; SUBCUTANEOUS AS NEEDED
Status: DISCONTINUED | OUTPATIENT
Start: 2021-12-10 | End: 2021-12-10

## 2021-12-10 RX ORDER — HEPARIN SODIUM 5000 [USP'U]/ML
INJECTION, SOLUTION INTRAVENOUS; SUBCUTANEOUS AS NEEDED
Status: DISCONTINUED | OUTPATIENT
Start: 2021-12-10 | End: 2021-12-10 | Stop reason: HOSPADM

## 2021-12-10 RX ADMIN — EPINEPHRINE 0.02 MCG/KG/MIN: 1 INJECTION INTRAMUSCULAR; INTRAVENOUS; SUBCUTANEOUS at 18:08

## 2021-12-10 RX ADMIN — SODIUM CHLORIDE: 900 INJECTION, SOLUTION INTRAVENOUS at 19:05

## 2021-12-10 RX ADMIN — MORPHINE SULFATE 2 MG: 2 INJECTION, SOLUTION INTRAMUSCULAR; INTRAVENOUS at 20:32

## 2021-12-10 RX ADMIN — Medication 5 MG: at 15:18

## 2021-12-10 RX ADMIN — SODIUM CHLORIDE 60 MCG: 900 INJECTION, SOLUTION INTRAVENOUS at 15:18

## 2021-12-10 RX ADMIN — POTASSIUM CHLORIDE 10 MEQ: 14.9 INJECTION, SOLUTION INTRAVENOUS at 22:24

## 2021-12-10 RX ADMIN — SODIUM CHLORIDE 25 ML/HR: 900 INJECTION, SOLUTION INTRAVENOUS at 19:54

## 2021-12-10 RX ADMIN — PROTAMINE SULFATE 230 MG: 10 INJECTION, SOLUTION INTRAVENOUS at 18:16

## 2021-12-10 RX ADMIN — NOREPINEPHRINE BITARTRATE 0.02 MCG/KG/MIN: 1 SOLUTION INTRAVENOUS at 18:23

## 2021-12-10 RX ADMIN — ETOMIDATE 6 MG: 2 INJECTION, SOLUTION INTRAVENOUS at 15:08

## 2021-12-10 RX ADMIN — MIDAZOLAM 2 MG: 1 INJECTION INTRAMUSCULAR; INTRAVENOUS at 15:59

## 2021-12-10 RX ADMIN — POTASSIUM CHLORIDE 10 MEQ: 14.9 INJECTION, SOLUTION INTRAVENOUS at 21:00

## 2021-12-10 RX ADMIN — ALBUMIN (HUMAN) 250 ML: 12.5 INJECTION, SOLUTION INTRAVENOUS at 18:38

## 2021-12-10 RX ADMIN — AMINOCAPROIC ACID 10 G: 250 INJECTION, SOLUTION INTRAVENOUS at 15:55

## 2021-12-10 RX ADMIN — CHLORHEXIDINE GLUCONATE 10 ML: 1.2 RINSE ORAL at 20:21

## 2021-12-10 RX ADMIN — SUFENTANIL CITRATE 50 MCG: 50 INJECTION EPIDURAL; INTRAVENOUS at 15:07

## 2021-12-10 RX ADMIN — CEFAZOLIN 2 G: 1 INJECTION, POWDER, FOR SOLUTION INTRAVENOUS at 18:36

## 2021-12-10 RX ADMIN — ETOMIDATE 14 MG: 2 INJECTION, SOLUTION INTRAVENOUS at 15:07

## 2021-12-10 RX ADMIN — ROCURONIUM BROMIDE 10 MG: 10 INJECTION, SOLUTION INTRAVENOUS at 15:07

## 2021-12-10 RX ADMIN — SUFENTANIL CITRATE 50 MCG: 50 INJECTION EPIDURAL; INTRAVENOUS at 16:57

## 2021-12-10 RX ADMIN — Medication 10 ML: at 20:28

## 2021-12-10 RX ADMIN — CEFAZOLIN 2 G: 1 INJECTION, POWDER, FOR SOLUTION INTRAVENOUS at 15:15

## 2021-12-10 RX ADMIN — TUBERCULIN PURIFIED PROTEIN DERIVATIVE 5 UNITS: 5 INJECTION, SOLUTION INTRADERMAL at 20:39

## 2021-12-10 RX ADMIN — SODIUM CHLORIDE 1 G/HR: 900 INJECTION, SOLUTION INTRAVENOUS at 16:26

## 2021-12-10 RX ADMIN — HEPARIN SODIUM 28000 UNITS: 1000 INJECTION, SOLUTION INTRAVENOUS; SUBCUTANEOUS at 16:36

## 2021-12-10 RX ADMIN — LIDOCAINE HYDROCHLORIDE 60 MG: 20 INJECTION, SOLUTION EPIDURAL; INFILTRATION; INTRACAUDAL; PERINEURAL at 15:07

## 2021-12-10 RX ADMIN — ROCURONIUM BROMIDE 40 MG: 10 INJECTION, SOLUTION INTRAVENOUS at 15:08

## 2021-12-10 RX ADMIN — POTASSIUM CHLORIDE, DEXTROSE MONOHYDRATE AND SODIUM CHLORIDE 25 ML/HR: 150; 5; 450 INJECTION, SOLUTION INTRAVENOUS at 19:50

## 2021-12-10 RX ADMIN — MIDAZOLAM 3 MG: 1 INJECTION INTRAMUSCULAR; INTRAVENOUS at 17:45

## 2021-12-10 RX ADMIN — SODIUM CHLORIDE 100 MCG: 900 INJECTION, SOLUTION INTRAVENOUS at 15:10

## 2021-12-10 RX ADMIN — FAMOTIDINE 20 MG: 10 INJECTION INTRAVENOUS at 20:34

## 2021-12-10 RX ADMIN — SODIUM CHLORIDE 2 UNITS/HR: 900 INJECTION, SOLUTION INTRAVENOUS at 21:20

## 2021-12-10 RX ADMIN — HEPARIN SODIUM AND DEXTROSE 12 UNITS/KG/HR: 5000; 5 INJECTION INTRAVENOUS at 12:01

## 2021-12-10 RX ADMIN — SODIUM CHLORIDE 100 MCG: 900 INJECTION, SOLUTION INTRAVENOUS at 15:08

## 2021-12-10 RX ADMIN — VECURONIUM BROMIDE 4 MG: 1 INJECTION, POWDER, LYOPHILIZED, FOR SOLUTION INTRAVENOUS at 16:08

## 2021-12-10 RX ADMIN — ALBUMIN (HUMAN) 25 G: 12.5 INJECTION, SOLUTION INTRAVENOUS at 21:24

## 2021-12-10 RX ADMIN — VECURONIUM BROMIDE 3 MG: 1 INJECTION, POWDER, LYOPHILIZED, FOR SOLUTION INTRAVENOUS at 17:45

## 2021-12-10 RX ADMIN — SUFENTANIL CITRATE 50 MCG: 50 INJECTION EPIDURAL; INTRAVENOUS at 15:59

## 2021-12-10 RX ADMIN — VECURONIUM BROMIDE 3 MG: 1 INJECTION, POWDER, LYOPHILIZED, FOR SOLUTION INTRAVENOUS at 16:57

## 2021-12-10 RX ADMIN — SODIUM CHLORIDE, SODIUM LACTATE, POTASSIUM CHLORIDE, AND CALCIUM CHLORIDE: 600; 310; 30; 20 INJECTION, SOLUTION INTRAVENOUS at 15:30

## 2021-12-10 RX ADMIN — Medication 10 MG: at 15:09

## 2021-12-10 RX ADMIN — HEPARIN SODIUM 5850 UNITS: 1000 INJECTION INTRAVENOUS; SUBCUTANEOUS at 11:59

## 2021-12-10 RX ADMIN — ASPIRIN 81 MG CHEWABLE TABLET 243 MG: 81 TABLET CHEWABLE at 12:00

## 2021-12-10 RX ADMIN — MORPHINE SULFATE 4 MG: 4 INJECTION INTRAVENOUS at 22:55

## 2021-12-10 RX ADMIN — SODIUM CHLORIDE, SODIUM LACTATE, POTASSIUM CHLORIDE, CALCIUM CHLORIDE: 600; 310; 30; 20 INJECTION, SOLUTION INTRAVENOUS at 14:55

## 2021-12-10 RX ADMIN — DEXMEDETOMIDINE HYDROCHLORIDE 0.6 MCG/KG/HR: 400 INJECTION INTRAVENOUS at 20:16

## 2021-12-10 RX ADMIN — ATORVASTATIN CALCIUM 80 MG: 80 TABLET, FILM COATED ORAL at 20:34

## 2021-12-10 RX ADMIN — ALBUMIN (HUMAN) 25 G: 12.5 INJECTION, SOLUTION INTRAVENOUS at 22:40

## 2021-12-10 RX ADMIN — DEXMEDETOMIDINE 0.5 MCG/KG/HR: 100 INJECTION, SOLUTION, CONCENTRATE INTRAVENOUS at 18:41

## 2021-12-10 RX ADMIN — NITROGLYCERIN 10 MCG/MIN: 200 INJECTION, SOLUTION INTRAVENOUS at 15:55

## 2021-12-10 RX ADMIN — SODIUM CHLORIDE: 900 INJECTION, SOLUTION INTRAVENOUS at 15:30

## 2021-12-10 NOTE — ED TRIAGE NOTES
Pt states he has had chest pain for a few weeks that went away, came back yesterday. Some SOB started yesterday, denies cardiac hx. Mask applied to pt.

## 2021-12-10 NOTE — Clinical Note
Sheath: left in place. Site secured by suture and transparent dressing.  IABP sheath 8 fr RFA, sutured in place 2-0 silk

## 2021-12-10 NOTE — ED NOTES
TRANSFER - OUT REPORT:    Verbal report given to Minda(name) on Carole Sat  being transferred to [facility] for ordered procedure       Report consisted of patients Situation, Background, Assessment and   Recommendations(SBAR). Information from the following report(s) SBAR, ED Summary and MAR was reviewed with the receiving nurse. Lines:   Peripheral IV 12/10/21 Right; Upper Arm (Active)   Site Assessment Clean, dry, & intact 12/10/21 1159   Phlebitis Assessment 0 12/10/21 1159   Infiltration Assessment 0 12/10/21 1159   Dressing Status Clean, dry, & intact 12/10/21 1159        Opportunity for questions and clarification was provided.       Patient transported with:   [facility]

## 2021-12-10 NOTE — H&P
[facility] CARDIOLOGY History &Physical                 Primary Cardiologist: None    Primary Care Physician: Paige Lemon MD    Admitting Physician: Dr Lizzie Doty:     Patient is a 79 y.o. male with a hx of hyperlipidemia, arthritis, macular degeneration, osteoarthritis of the knee. Patient came to [facility] emergency department with complaints of chest pain that is being ongoing for few weeks but went away. Pain returned yesterday and is described as substernal in nature and then he started to have some shortness of breath as well. He has no known cardiac history and does not see a cardiologist regularly. Patient was noted to have inverted T waves and high lateral leads with ST elevation in 1 noncontiguous lead only in lead III. Patient was transferred urgently for left heart cath by Dr. MIKAYLA BERNAL.  On arrival patient also admits to having diarrhea for couple weeks but no fever. On arrival patient had no other complaints. Lab work drawn at [facility] shows hemoglobin 10.6, hematocrit 32.0, platelets 268, with further lab work still pending. Patient got total 324 mg of aspirin today and was started on a heparin drip at [facility] before being transferred. Patient arrived in stable condition was taken directly to the cardiac Cath Lab. Recent Cardiac Synopsis w/ Labs  LHC/NST: Never  Echo: Pending  EKG: As noted above       Past Medical History:   Diagnosis Date    Arthritis     HLD (hyperlipidemia) 2012    Macular degeneration     Osteoarthritis of left knee       Past Surgical History:   Procedure Laterality Date    HX COLONOSCOPY      HX COLONOSCOPY  13    diverticulosis, internal hemorrhoids. no further screening recommended      No Known Allergies  Social History     Tobacco Use    Smoking status: Former Smoker     Types: Cigarettes     Quit date: 1976     Years since quittin.9    Smokeless tobacco: Never Used   Substance Use Topics    Alcohol use:  Yes Alcohol/week: 0.0 standard drinks     Comment: 1weekly      FH: No family history on file. Review of Systems   Constitutional: Negative for weight gain and weight loss. HENT: Negative. Eyes: Negative. Cardiovascular: Positive for chest pain (Currently chest pain-free, substernal chest pressure, some been ongoing for several weeks, does not radiate, 6 out of 10). Negative for claudication, cyanosis, dyspnea on exertion, irregular heartbeat, leg swelling, near-syncope, orthopnea, palpitations, paroxysmal nocturnal dyspnea and syncope. Respiratory: Negative for cough, shortness of breath and wheezing. Endocrine: Negative. Skin: Negative. Musculoskeletal: Negative. Gastrointestinal: Positive for diarrhea. Negative for nausea and vomiting. Genitourinary: Negative. Neurological: Negative for dizziness. Psychiatric/Behavioral: Negative. Allergic/Immunologic: Negative. Objective: There were no vitals taken for this visit. There were no vitals filed for this visit. No intake/output data recorded. No intake/output data recorded.       Physical Exam:  General: Well Developed, overweight, appears frail,, No Acute Distress  HEENT: pupils equal and round, no abnormalities noted  Neck: supple, no JVD, no carotid bruits  Heart: S1S2 with RRR without murmurs or gallops  Lungs: Clear throughout auscultation bilaterally without adventitious sounds  Abd: soft, nontender, nondistended, with good bowel sounds  Ext: warm, no edema, calves supple/nontender, pulses 2+ bilaterally arthritic  Skin: warm and dry  Psychiatric: Normal mood and affect  Neurologic: Alert and oriented X 3        Data Review:   Recent Labs     12/10/21  1138   WBC 7.1   HGB 10.6*   HCT 32.0*            CXR Results  (Last 48 hours)    None          Assessment/Plan:   Principal Problem:    Unstable angina ([address] (12/10/2021) patient be admitted from separate [facility] [place] directly to the cardiac Cath Lab for urgent heart cath. Patient will have additional lab work including trending troponins, lipid panel, and will monitor BMP and CBC. We'll plan for echocardiogram in the days following procedure, initiation of lipid therapy, will start low-dose beta-blocker twice daily. Further recommendations pending clinical course and attending recommendations. Active Problems:    HLD (hyperlipidemia) (12/7/2012) start high-dose statin and will draw a lipid panel in the morning      Diarrhea (12/10/2021) continue to monitor, unclear cause at this time, no reports of blood in stool      HTN (hypertension) (12/10/2021) we'll initiate low-dose beta-blocker therapy twice daily along with suspected coronary artery disease.             Vinh Peña NP  12/10/2021  12:36 PM

## 2021-12-10 NOTE — PROGRESS NOTES
TRANSFER - OUT REPORT:    Right radial [facility] with Dr. Holt Has consult for Emergent CABG    Aortic Balloon placement in Right femoral artery, sutured in place with tegaderm    Right radial 6 fr Sheath converted to Artline   No bleeding or hematoma, sites soft     3 mg Versed  75 mcg Fentanyl   10,000 Units heparin      Verbal report given to Oscar Garzon CRNA on [facility]  being transferred to OR for ordered procedure       Report consisted of patients Situation, Background, Assessment and Recommendations(SBAR). Information from the following report(s) Procedure Summary and MAR was reviewed with the receiving nurse. Opportunity for questions and clarification was provided.       Patient transported with:   Registered Nurse

## 2021-12-10 NOTE — PROGRESS NOTES
TRANSFER - IN REPORT:    Verbal report received from [facility] ER RN(name) on James Redman  being received from [facility] ER(unit) for urgent transfer      Report consisted of patients Situation, Background, Assessment and   Recommendations(SBAR). Information from the following report(s) ED Summary and MAR was reviewed with the receiving nurse. Opportunity for questions and clarification was provided. Assessment completed upon patients arrival to unit and care assumed.

## 2021-12-10 NOTE — Clinical Note
Sheath: left in place. Site secured by Tegaderm and transparent dressing. Sheath connected to heparin pressure bag.

## 2021-12-10 NOTE — Clinical Note
TRANSFER - OUT REPORT:     Verbal report given to: CT surgical staff/CVICU. Report consisted of patient's Situation, Background, Assessment and   Recommendations(SBAR). Opportunity for questions and clarification was provided.

## 2021-12-10 NOTE — Clinical Note
Right femoral artery. Accessed successfully. Femoral access needle used. Using ultrasound guidance.  Number of attempts =  1.

## 2021-12-10 NOTE — MANAGEMENT PLAN
Avis Guzman. Bonne Renshaw, MD Maeola Gilford. Crystal Vuong MD           MANAGEMENT PLAN    Monty Kussmaul         12/10/2021        1940    REFERRING PHYSICIAN:  Dr. Shahbaz Kenney:  The patient is a 79 y.o. male who was admitted for NSTEMI (non-ST elevated myocardial infarction). Pt presented to the ER at [facility] with recurrent chest pain. He has noted intermittent chest pain for a couple weeks that continued to worsen. Last night, he woke up with chest pain and felt like he couldn't breathe. Troponin was elevated on arrival to the ER. He was transferred to [facility] for urgent cardiac catheterization. LHC showed severe multivessel disease including critical LM disease. He states he has been essentially wheelchair bound for the last year. He underwent knee surgery then suffered a fall. Since then, he has been unable to walk. Risk factors include history of tobacco abuse, HTN, dyslipidemia    ** No history of stroke, TIA, prior cardiac surgery, prior vascular surgery, anesthetic complication, lung disease, DVT or PE, GI bleeding       Past Medical History:   Diagnosis Date    Arthritis     HLD (hyperlipidemia) 2012    Macular degeneration     Osteoarthritis of left knee        Past Surgical History:   Procedure Laterality Date    HX COLONOSCOPY      HX COLONOSCOPY  13    diverticulosis, internal hemorrhoids. no further screening recommended       No family history on file. Social History     Socioeconomic History    Marital status:      Spouse name: Not on file    Number of children: Not on file    Years of education: Not on file    Highest education level: Not on file   Occupational History    Not on file   Tobacco Use    Smoking status: Former Smoker     Types: Cigarettes     Quit date: 1976     Years since quittin.9    Smokeless tobacco: Never Used   Substance and Sexual Activity    Alcohol use:  Yes     Alcohol/week: 0.0 standard drinks     Comment: Sharlene Bass Drug use: Never    Sexual activity: Not on file   Other Topics Concern    Not on file   Social History Narrative    Not on file     Social Determinants of Health     Financial Resource Strain:     Difficulty of Paying Living Expenses: Not on file   Food Insecurity:     Worried About Running Out of Food in the Last Year: Not on file    Raquel of Food in the Last Year: Not on file   Transportation Needs:     Lack of Transportation (Medical): Not on file    Lack of Transportation (Non-Medical):  Not on file   Physical Activity:     Days of Exercise per Week: Not on file    Minutes of Exercise per Session: Not on file   Stress:     Feeling of Stress : Not on file   Social Connections:     Frequency of Communication with Friends and Family: Not on file    Frequency of Social Gatherings with Friends and Family: Not on file    Attends Rastafarian Services: Not on file    Active Member of [employer] or Organizations: Not on file    Attends Club or Organization Meetings: Not on file    Marital Status: Not on file   Intimate Partner Violence:     Fear of Current or Ex-Partner: Not on file    Emotionally Abused: Not on file    Physically Abused: Not on file    Sexually Abused: Not on file   Housing Stability:     Unable to Pay for Housing in the Last Year: Not on file    Number of [place] in the Last Year: Not on file    Unstable Housing in the Last Year: Not on file       No Known Allergies    Current Facility-Administered Medications on File Prior to Encounter   Medication Dose Route Frequency Provider Last Rate Last Admin    [COMPLETED] aspirin chewable tablet 243 mg  243 mg Oral NOW Kaleb Singleton MD   243 mg at 12/10/21 1200    [COMPLETED] heparin (porcine) 1,000 unit/mL injection 5,850 Units  60 Units/kg IntraVENous ONCE Kaleb Singleton MD   5,850 Units at 12/10/21 1159    [DISCONTINUED] sodium chloride (NS) flush 5-10 mL  5-10 mL IntraVENous Q8H Xiomy Singleton MD        [DISCONTINUED] sodium chloride (NS) flush 5-10 mL  5-10 mL IntraVENous PRN Mayte Singleton MD        [DISCONTINUED] nitroglycerin (NITROSTAT) tablet 0.4 mg  0.4 mg SubLINGual Q5MIN PRN Mayte Singleton MD        [DISCONTINUED] heparin 25,000 units in dextrose 500 mL infusion  12-25 Units/kg/hr IntraVENous TITRATE Kaleb Singleton MD   Continued On External Transport at 12/10/21 1207     Current Outpatient Medications on File Prior to Encounter   Medication Sig Dispense Refill    amoxicillin (AMOXIL) 500 mg capsule Take 4 tablets by mouth 1 hour prior to dental procedure 4 Capsule 1    glucosam/gluc palomares/oq-tet-j-gluc (GLUCOSAMINE COMPLEX PO) Take 1,500 mg by mouth daily.  chondroitin sulfate A sodium (CHONDROITIN SULFATE PO) Take 1,200 mg by mouth daily.  vit A/vit C/vit E/zinc/copper (PRESERVISION AREDS PO) Take 1 Tab by mouth two (2) times a day.  meloxicam (MOBIC) 7.5 mg tablet Take 7.5 mg by mouth daily.  docosahexaenoic acid/epa (FISH OIL PO) Take 1,200 mg by mouth daily.  aspirin delayed-release 81 mg tablet Take 81 mg by mouth daily.  cephALEXin (KEFLEX) 500 mg capsule Take 500 mg by mouth two (2) times a day.  polyethylene glycol (MIRALAX) 17 gram packet Take 17 g by mouth daily. Mix with 8oz water      acetaminophen (TYLENOL PO) Take 1,000 mg by mouth every six (6) hours as needed for Pain. REVIEW OF SYSTEMS:  Review of Systems   Constitutional: Negative for chills, fever, malaise/fatigue, weight gain and weight loss. HENT: Negative for ear pain, hearing loss, nosebleeds, sore throat and tinnitus. Eyes: Negative for blurred vision, vision loss in left eye and vision loss in right eye. Cardiovascular: Positive for chest pain. Negative for dyspnea on exertion, leg swelling, near-syncope, orthopnea, palpitations, paroxysmal nocturnal dyspnea and syncope. Respiratory: Positive for shortness of breath.  Negative for cough, hemoptysis, sputum production and wheezing. Endocrine: Negative for cold intolerance, heat intolerance and polydipsia. Hematologic/Lymphatic: Does not bruise/bleed easily. Skin: Negative for color change and rash. Musculoskeletal: Negative for back pain, joint pain, joint swelling and myalgias. Gastrointestinal: Negative for abdominal pain, constipation, diarrhea, dysphagia, heartburn, hematemesis, melena, nausea and vomiting. Genitourinary: Negative for dysuria, frequency, hematuria and urgency. Neurological: Negative for difficulty with concentration, dizziness, headaches, light-headedness, numbness, paresthesias, seizures, vertigo and weakness. Psychiatric/Behavioral: Negative for altered mental status and depression.        Physical Exam:  General: Well Developed, Well Nourished, No Acute Distress  HEENT: Normocephalic, pupils equal and round, no scleral icterus  Neck: supple, no JVD  Chest wall: No deformity  Heart: S1S2 with RRR  Lungs: Clear throughout auscultation bilaterally  Abd: soft, nontender, nondistended, with good bowel sounds, no pulsatile masses  Ext: warm, no edema, calves supple/nontender, pulses 2+ bilaterally  Skin: warm and dry, no rashes, cyanosis, jaundice, ecchymoses or evidence of skin breakdown  Psychiatric: Normal mood and affect  Neurologic: Alert and oriented X 3, no focal deficit noted    Labs:   Recent Labs     12/10/21  1138      K 3.8   MG 2.0   BUN 14   CREA 0.91   *   WBC 7.1   HGB 10.6*   HCT 32.0*          Lab Results   Component Value Date/Time    Cholesterol, total 195 04/11/2019 09:05 AM    HDL Cholesterol 34 (L) 04/11/2019 09:05 AM    LDL, calculated 132 (H) 04/11/2019 09:05 AM    VLDL, calculated 29 04/11/2019 09:05 AM    Triglyceride 145 04/11/2019 09:05 AM       Assessment:     Principal Problem:    Unstable angina ([address] (12/10/2021)  Active Problems:    HLD (hyperlipidemia) (12/7/2012)    Diarrhea (12/10/2021)    HTN (hypertension) (12/10/2021)    CAD, multiple vessel (12/10/2021)        Plan:     Pt underwent cardiac catheterization that showed severe MVCAD with critical LM stenosis. Will proceed with emergent CABG surgery. Pt voices understanding and agrees to proceed with surgery. He continues to have 2/10 chest pain currently and dyspnea. He has required STR in the past and will most likely need STR after CABG surgery. He has NYHA Class IV symptoms. STS Mortality risk for surgery is at least 10%.        Vicki Santizo PA-C

## 2021-12-10 NOTE — Clinical Note
TRANSFER - IN REPORT:     Verbal report received from: PRAFUL RN. Report consisted of patient's Situation, Background, Assessment and   Recommendations(SBAR). Opportunity for questions and clarification was provided. Assessment completed upon patient's arrival to unit and care assumed.

## 2021-12-10 NOTE — ED PROVIDER NOTES
Mask was worn during the entire patient examination. Nabeel Brown is a 79 y.o. male who presents to the ED with a chief complaint of chest pain and shortness of breath. Patient states he has had some intermittent symptoms over the last week. He reports becoming very short of breath last night and developing a tightness across his chest this morning. He has no past cardiac history states he is never had a heart cath. He has had some intermittent symptoms this year but has not been evaluated for it. Past Medical History:   Diagnosis Date    Arthritis     HLD (hyperlipidemia) 2012    Macular degeneration     Osteoarthritis of left knee        Past Surgical History:   Procedure Laterality Date    HX COLONOSCOPY      HX COLONOSCOPY  13    diverticulosis, internal hemorrhoids. no further screening recommended         History reviewed. No pertinent family history. Social History     Socioeconomic History    Marital status:      Spouse name: Not on file    Number of children: Not on file    Years of education: Not on file    Highest education level: Not on file   Occupational History    Not on file   Tobacco Use    Smoking status: Former Smoker     Types: Cigarettes     Quit date: 1976     Years since quittin.9    Smokeless tobacco: Never Used   Substance and Sexual Activity    Alcohol use: Yes     Alcohol/week: 0.0 standard drinks     Comment: 1weekly    Drug use: Never    Sexual activity: Not on file   Other Topics Concern    Not on file   Social History Narrative    Not on file     Social Determinants of Health     Financial Resource Strain:     Difficulty of Paying Living Expenses: Not on file   Food Insecurity:     Worried About Running Out of Food in the Last Year: Not on file    Raquel of Food in the Last Year: Not on file   Transportation Needs:     Lack of Transportation (Medical): Not on file    Lack of Transportation (Non-Medical):  Not on file   Physical Activity:     Days of Exercise per Week: Not on file    Minutes of Exercise per Session: Not on file   Stress:     Feeling of Stress : Not on file   Social Connections:     Frequency of Communication with Friends and Family: Not on file    Frequency of Social Gatherings with Friends and Family: Not on file    Attends Evangelical Services: Not on file    Active Member of [address] [place] [address] or Organizations: Not on file    Attends Club or Organization Meetings: Not on file    Marital Status: Not on file   Intimate Partner Violence:     Fear of Current or Ex-Partner: Not on file    Emotionally Abused: Not on file    Physically Abused: Not on file    Sexually Abused: Not on file   Housing Stability:     Unable to Pay for Housing in the Last Year: Not on file    Number of [place] in the Last Year: Not on file    Unstable Housing in the Last Year: Not on file         ALLERGIES: Patient has no known allergies. Review of Systems   Constitutional: Negative for chills, fatigue and fever. Respiratory: Positive for chest tightness and shortness of breath. Negative for wheezing and stridor. Cardiovascular: Positive for chest pain. Negative for palpitations and leg swelling. Gastrointestinal: Negative for abdominal pain, diarrhea, nausea and vomiting. Musculoskeletal: Negative for neck pain and neck stiffness. Skin: Negative for color change, pallor and wound. Neurological: Negative for dizziness, weakness and numbness. All other systems reviewed and are negative. Vitals:    12/10/21 1128 12/10/21 1205   BP: (!) 179/81 (!) 141/63   Pulse: (!) 101 78   Resp: 18 16   Temp:  97.8 °F (36.6 °C)   SpO2: 100% 100%   Weight: 97.5 kg (215 lb)    Height: 5' 10\" (1.778 m)             Physical Exam  Vitals and nursing note reviewed. Constitutional:       General: He is not in acute distress. Appearance: He is well-developed. He is not ill-appearing, toxic-appearing or diaphoretic.    HENT: Head: Normocephalic and atraumatic. Eyes:      General: No scleral icterus. Conjunctiva/sclera: Conjunctivae normal.   Neck:      Trachea: No tracheal deviation. Cardiovascular:      Rate and Rhythm: Normal rate and regular rhythm. Pulmonary:      Effort: Pulmonary effort is normal. No tachypnea, accessory muscle usage or respiratory distress. Breath sounds: Normal breath sounds. No stridor. No decreased breath sounds, wheezing, rhonchi or rales. Skin:     Capillary Refill: Capillary refill takes less than 2 seconds. Neurological:      General: No focal deficit present. Mental Status: He is alert and oriented to person, place, and time. Mental status is at baseline. Psychiatric:         Mood and Affect: Mood normal.         Behavior: Behavior normal.          MDM  Number of Diagnoses or Management Options  Cardiac ischemia  Unstable angina [facility])  Diagnosis management comments: Patient has chest pain. With concerning EKG. There is a single lead elevation in lead III and ST depressions laterally. Does not meet STEMI criteria but is very concerning. I messaged the EKG to our cardiologist Dr. Birder Fothergill who reviewed it and agree. We started aspirin, nitroglycerin, and heparin. He is going to take patient to the Cath Lab urgently but not call a STEMI. Shy May MD; 12/10/2021 @1:27 PM Voice dictation software was used during the making of this note. This software is not perfect and grammatical and other typographical errors may be present.   This note has not been proofread for errors.  ===================================================================          Amount and/or Complexity of Data Reviewed  Clinical lab tests: ordered and reviewed (Results for orders placed or performed during the hospital encounter of 12/10/21  -CBC WITH AUTOMATED DIFF:        Result                      Value             Ref Range           WBC                         7.1               4.3 - 11.1 K*       RBC                         3.34 (L)          4.23 - 5.6 M*       HGB                         10.6 (L)          13.6 - 17.2 *       HCT                         32.0 (L)          41.1 - 50.3 %       MCV                         95.8              79.6 - 97.8 *       MCH                         31.7              26.1 - 32.9 *       MCHC                        33.1              31.4 - 35.0 *       RDW                         15.0 (H)          11.9 - 14.6 %       PLATELET                    310               150 - 450 K/*       MPV                         10.8              9.4 - 12.3 FL       ABSOLUTE NRBC               0.00              0.0 - 0.2 K/*       DF                          AUTOMATED                             NEUTROPHILS                 67                43 - 78 %           LYMPHOCYTES                 23                13 - 44 %           MONOCYTES                   7                 4.0 - 12.0 %        EOSINOPHILS                 1                 0.5 - 7.8 %         BASOPHILS                   1                 0.0 - 2.0 %         IMMATURE GRANULOCYTES       1                 0.0 - 5.0 %         ABS. NEUTROPHILS            4.7               1.7 - 8.2 K/*       ABS. LYMPHOCYTES            1.7               0.5 - 4.6 K/*       ABS. MONOCYTES              0.5               0.1 - 1.3 K/*       ABS. EOSINOPHILS            0.1               0.0 - 0.8 K/*       ABS. BASOPHILS              0.1               0.0 - 0.2 K/*       ABS. IMM.  GRANS.            0.0               0.0 - 0.5 K/*  -PTT:        Result                      Value             Ref Range           aPTT                        29.5              24.1 - 35.1 *  -EKG, 12 LEAD, INITIAL:        Result                      Value             Ref Range           Ventricular Rate            83                BPM                 Atrial Rate                 83                BPM                 P-R Interval                200               ms QRS Duration                128               ms                  Q-T Interval                388               ms                  QTC Calculation (Bezet)     455               ms                  Calculated P Axis           48                degrees             Calculated R Axis           17                degrees             Calculated T Axis           3                 degrees             Diagnosis                                                     Sinus rhythm with occasional Premature ventricular complexes   Right bundle branch block   Abnormal ECG   When compared with ECG of 10-DEC-2021 11:14,   Premature ventricular complexes are now Present   Confirmed by Amber Johnston MD (), Sally Harada ([id number]) on 12/10/2021 12:30:08 PM    )  Tests in the radiology section of CPT®: ordered  Independent visualization of images, tracings, or specimens: yes           Critical Care  Performed by: Leroy Lewis MD  Authorized by: Leroy Lewis MD     Critical care provider statement:     Critical care time (minutes):  35    Critical care was necessary to treat or prevent imminent or life-threatening deterioration of the following conditions:  Cardiac failure    Critical care was time spent personally by me on the following activities:  Evaluation of patient's response to treatment, ordering and performing treatments and interventions, development of treatment plan with patient or surrogate, ordering and review of laboratory studies, discussions with consultants, re-evaluation of patient's condition and examination of patient        ============================================  ED EKG Interpretation  EKG was interpreted in the absence of a cardiologist.    EKG rhythm: normal  ST Segments: ST segment depression - NO STEMI  Lead 3 has isolated st elevation. recipical lateral depression to st segments.       Vinay Ramires MD; 12/10/2021 @1:28 PM================

## 2021-12-10 NOTE — ANESTHESIA PREPROCEDURE EVALUATION
Relevant Problems   CARDIOVASCULAR   (+) CAD, multiple vessel   (+) HTN (hypertension)   (+) Unstable angina (HCC)      ENDOCRINE   (+) Arthritis of knee, left   (+) Arthritis of knee, right       Anesthetic History   No history of anesthetic complications            Review of Systems / Medical History  Patient summary reviewed and pertinent labs reviewed    Pulmonary  Within defined limits                 Neuro/Psych   Within defined limits           Cardiovascular    Hypertension (Not previously treated)          Past MI, CAD and hyperlipidemia    Exercise tolerance: <4 METS  Comments: Admitted with CP, SOB, NSTEMI   GI/Hepatic/Renal  Within defined limits              Endo/Other        Arthritis     Other Findings   Comments: Macular degeneration           Physical Exam    Airway  Mallampati: II  TM Distance: 4 - 6 cm  Neck ROM: normal range of motion   Mouth opening: Normal     Cardiovascular    Rhythm: regular  Rate: normal         Dental    Dentition: Caps/crowns     Pulmonary  Breath sounds clear to auscultation               Abdominal  GI exam deferred       Other Findings            Anesthetic Plan    ASA: 4, emergent  Anesthesia type: general    Monitoring Plan: Arterial line, CVP and BRYON    Post procedure ventilation   Induction: Intravenous  Anesthetic plan and risks discussed with: Patient

## 2021-12-11 ENCOUNTER — APPOINTMENT (OUTPATIENT)
Dept: NON INVASIVE DIAGNOSTICS | Age: 81
DRG: 234 | End: 2021-12-11
Attending: NURSE PRACTITIONER
Payer: MEDICARE

## 2021-12-11 ENCOUNTER — APPOINTMENT (OUTPATIENT)
Dept: GENERAL RADIOLOGY | Age: 81
DRG: 234 | End: 2021-12-11
Attending: THORACIC SURGERY (CARDIOTHORACIC VASCULAR SURGERY)
Payer: MEDICARE

## 2021-12-11 LAB
ANION GAP SERPL CALC-SCNC: 6 MMOL/L (ref 7–16)
ARTERIAL PATENCY WRIST A: ABNORMAL
ARTERIAL PATENCY WRIST A: POSITIVE
BASE DEFICIT BLD-SCNC: 1.8 MMOL/L
BASE DEFICIT BLDV-SCNC: 2.6 MMOL/L
BDY SITE: ABNORMAL
BDY SITE: ABNORMAL
BLD PROD TYP BPU: NORMAL
BPU ID: NORMAL
BUN SERPL-MCNC: 9 MG/DL (ref 8–23)
CALCIUM SERPL-MCNC: 7.2 MG/DL (ref 8.3–10.4)
CHLORIDE SERPL-SCNC: 119 MMOL/L (ref 98–107)
CHOLEST SERPL-MCNC: 59 MG/DL
CO2 SERPL-SCNC: 24 MMOL/L (ref 21–32)
CREAT SERPL-MCNC: 0.92 MG/DL (ref 0.8–1.5)
ECHO AO ASC DIAM: 3.26 CM
ECHO AO ROOT DIAM: 3.41 CM
ECHO AV AREA PEAK VELOCITY: 2.47 CM2
ECHO AV AREA PEAK VELOCITY: 2.47 CM2
ECHO AV PEAK GRADIENT: 8.35 MMHG
ECHO AV PEAK VELOCITY: 144.45 CM/S
ECHO EST RA PRESSURE: 5 MMHG
ECHO LA MAJOR AXIS: 3.55 CM
ECHO LA MINOR AXIS: 1.65 CM
ECHO LV E' LATERAL VELOCITY: 10.08 CM/S
ECHO LV E' SEPTAL VELOCITY: 7.49 CM/S
ECHO LV EDV A2C: 73.69 ML
ECHO LV EDV A4C: 69.39 ML
ECHO LV EDV BP: 71.35 ML (ref 67–155)
ECHO LV EDV INDEX A4C: 32.3 ML/M2
ECHO LV EDV INDEX BP: 33.2 ML/M2
ECHO LV EDV NDEX A2C: 34.3 ML/M2
ECHO LV EJECTION FRACTION A2C: 65 PERCENT
ECHO LV EJECTION FRACTION A4C: 81 PERCENT
ECHO LV EJECTION FRACTION BIPLANE: 73.8 PERCENT (ref 55–100)
ECHO LV ESV A2C: 25.88 ML
ECHO LV ESV A4C: 13.52 ML
ECHO LV ESV BP: 18.68 ML (ref 22–58)
ECHO LV ESV INDEX A2C: 12 ML/M2
ECHO LV ESV INDEX A4C: 6.3 ML/M2
ECHO LV ESV INDEX BP: 8.7 ML/M2
ECHO LV INTERNAL DIMENSION DIASTOLIC: 4.42 CM (ref 4.2–5.9)
ECHO LV INTERNAL DIMENSION SYSTOLIC: 2.9 CM
ECHO LV IVSD: 1.23 CM (ref 0.6–1)
ECHO LV MASS 2D: 180.1 G (ref 88–224)
ECHO LV MASS INDEX 2D: 83.8 G/M2 (ref 49–115)
ECHO LV POSTERIOR WALL DIASTOLIC: 1.06 CM (ref 0.6–1)
ECHO LVOT DIAM: 2.12 CM
ECHO LVOT PEAK GRADIENT: 4.08 MMHG
ECHO LVOT PEAK VELOCITY: 101.05 CM/S
ECHO MV A VELOCITY: 74.53 CM/S
ECHO MV AREA PHT: 4.21 CM2
ECHO MV E DECELERATION TIME (DT): 180.08 MS
ECHO MV E VELOCITY: 79.36 CM/S
ECHO MV E/A RATIO: 1.06
ECHO MV E/E' LATERAL: 7.87
ECHO MV E/E' RATIO (AVERAGED): 9.23
ECHO MV E/E' SEPTAL: 10.6
ECHO MV PRESSURE HALF TIME (PHT): 52.22 MS
ECHO RIGHT VENTRICULAR SYSTOLIC PRESSURE (RVSP): 23 MMHG
ECHO RV INTERNAL DIMENSION: 2.69 CM
ECHO RV TAPSE: 1.3 CM (ref 1.5–2)
ECHO TV REGURGITANT MAX VELOCITY: 215.66 CM/S
ECHO TV REGURGITANT PEAK GRADIENT: 18.6 MMHG
ERYTHROCYTE [DISTWIDTH] IN BLOOD BY AUTOMATED COUNT: 14.6 % (ref 11.9–14.6)
GAS FLOW.O2 O2 DELIVERY SYS: ABNORMAL L/MIN
GAS FLOW.O2 O2 DELIVERY SYS: ABNORMAL L/MIN
GLUCOSE BLD STRIP.AUTO-MCNC: 102 MG/DL (ref 65–100)
GLUCOSE BLD STRIP.AUTO-MCNC: 106 MG/DL (ref 65–100)
GLUCOSE BLD STRIP.AUTO-MCNC: 110 MG/DL (ref 65–100)
GLUCOSE BLD STRIP.AUTO-MCNC: 112 MG/DL (ref 65–100)
GLUCOSE BLD STRIP.AUTO-MCNC: 118 MG/DL (ref 65–100)
GLUCOSE BLD STRIP.AUTO-MCNC: 127 MG/DL (ref 65–100)
GLUCOSE BLD STRIP.AUTO-MCNC: 128 MG/DL (ref 65–100)
GLUCOSE BLD STRIP.AUTO-MCNC: 130 MG/DL (ref 65–100)
GLUCOSE BLD STRIP.AUTO-MCNC: 132 MG/DL (ref 65–100)
GLUCOSE BLD STRIP.AUTO-MCNC: 135 MG/DL (ref 65–100)
GLUCOSE BLD STRIP.AUTO-MCNC: 90 MG/DL (ref 65–100)
GLUCOSE BLD STRIP.AUTO-MCNC: 96 MG/DL (ref 65–100)
GLUCOSE BLD STRIP.AUTO-MCNC: 96 MG/DL (ref 65–100)
GLUCOSE SERPL-MCNC: 114 MG/DL (ref 65–100)
HCO3 BLD-SCNC: 23.2 MMOL/L (ref 22–26)
HCO3 BLDV-SCNC: 22.8 MMOL/L (ref 23–28)
HCT VFR BLD AUTO: 20 % (ref 41.1–50.3)
HCT VFR BLD AUTO: 20.8 % (ref 41.1–50.3)
HCT VFR BLD AUTO: 25 % (ref 41.1–50.3)
HDLC SERPL-MCNC: 22 MG/DL (ref 40–60)
HDLC SERPL: 2.7 {RATIO}
HGB BLD-MCNC: 6.2 G/DL (ref 13.6–17.2)
HGB BLD-MCNC: 6.9 G/DL (ref 13.6–17.2)
HGB BLD-MCNC: 8.2 G/DL (ref 13.6–17.2)
HISTORY CHECKED?,CKHIST: NORMAL
LDLC SERPL CALC-MCNC: 22.8 MG/DL
MAGNESIUM SERPL-MCNC: 2.3 MG/DL (ref 1.8–2.4)
MAGNESIUM SERPL-MCNC: 2.4 MG/DL (ref 1.8–2.4)
MCH RBC QN AUTO: 29.5 PG (ref 26.1–32.9)
MCHC RBC AUTO-ENTMCNC: 31 G/DL (ref 31.4–35)
MCV RBC AUTO: 95.2 FL (ref 79.6–97.8)
MM INDURATION POC: 0 MM (ref 0–5)
NRBC # BLD: 0 K/UL (ref 0–0.2)
O2/TOTAL GAS SETTING VFR VENT: 35 %
O2/TOTAL GAS SETTING VFR VENT: 50 %
PAW @ MEAN EXP FLOW ON VENT: 12 CMH2O
PAW @ MEAN EXP FLOW ON VENT: 13 CMH2O
PCO2 BLD: 39.8 MMHG (ref 35–45)
PCO2 BLDV: 41.8 MMHG (ref 41–51)
PEEP RESPIRATORY: 8 CMH2O
PEEP RESPIRATORY: 8 CMH2O
PH BLD: 7.37 [PH] (ref 7.35–7.45)
PH BLDV: 7.35 [PH] (ref 7.32–7.42)
PIP ISTAT,IPIP: 19
PIP ISTAT,IPIP: 19
PLATELET # BLD AUTO: 184 K/UL (ref 150–450)
PMV BLD AUTO: 10.8 FL (ref 9.4–12.3)
PO2 BLD: 140 MMHG (ref 75–100)
PO2 BLDV: 30 MMHG
POTASSIUM SERPL-SCNC: 4 MMOL/L (ref 3.5–5.1)
POTASSIUM SERPL-SCNC: 4.1 MMOL/L (ref 3.5–5.1)
PPD POC: NEGATIVE NEGATIVE
PRESSURE SUPPORT SETTING VENT: 10 CMH2O
PRESSURE SUPPORT SETTING VENT: 10 CMH2O
RBC # BLD AUTO: 2.1 M/UL (ref 4.23–5.6)
SAO2 % BLD: 99.1 % (ref 95–98)
SAO2 % BLDV: 53.1 % (ref 65–88)
SERVICE CMNT-IMP: ABNORMAL
SERVICE CMNT-IMP: NORMAL
SODIUM SERPL-SCNC: 149 MMOL/L (ref 138–145)
SPECIMEN TYPE: ABNORMAL
SPECIMEN TYPE: ABNORMAL
STATUS OF UNIT,%ST: NORMAL
TOTAL RESP. RATE, ITRR: 11
TOTAL RESP. RATE, ITRR: 16
TRIGL SERPL-MCNC: 71 MG/DL (ref 35–150)
UNIT DIVISION, %UDIV: 0
VENTILATION MODE VENT: ABNORMAL
VENTILATION MODE VENT: ABNORMAL
VLDLC SERPL CALC-MCNC: 14.2 MG/DL (ref 6–23)
WBC # BLD AUTO: 12.4 K/UL (ref 4.3–11.1)

## 2021-12-11 PROCEDURE — 99233 SBSQ HOSP IP/OBS HIGH 50: CPT | Performed by: INTERNAL MEDICINE

## 2021-12-11 PROCEDURE — 80061 LIPID PANEL: CPT

## 2021-12-11 PROCEDURE — P9016 RBC LEUKOCYTES REDUCED: HCPCS

## 2021-12-11 PROCEDURE — 77010033711 HC HIGH FLOW OXYGEN

## 2021-12-11 PROCEDURE — 83735 ASSAY OF MAGNESIUM: CPT

## 2021-12-11 PROCEDURE — 77030013064 HC TRNSF BLD FENW -A

## 2021-12-11 PROCEDURE — 74011250636 HC RX REV CODE- 250/636: Performed by: THORACIC SURGERY (CARDIOTHORACIC VASCULAR SURGERY)

## 2021-12-11 PROCEDURE — 85018 HEMOGLOBIN: CPT

## 2021-12-11 PROCEDURE — 74011250636 HC RX REV CODE- 250/636: Performed by: PHYSICIAN ASSISTANT

## 2021-12-11 PROCEDURE — 74011000250 HC RX REV CODE- 250: Performed by: PHYSICIAN ASSISTANT

## 2021-12-11 PROCEDURE — 36600 WITHDRAWAL OF ARTERIAL BLOOD: CPT

## 2021-12-11 PROCEDURE — 71045 X-RAY EXAM CHEST 1 VIEW: CPT

## 2021-12-11 PROCEDURE — 82803 BLOOD GASES ANY COMBINATION: CPT

## 2021-12-11 PROCEDURE — 36592 COLLECT BLOOD FROM PICC: CPT

## 2021-12-11 PROCEDURE — 36430 TRANSFUSION BLD/BLD COMPNT: CPT

## 2021-12-11 PROCEDURE — 93005 ELECTROCARDIOGRAM TRACING: CPT | Performed by: PHYSICIAN ASSISTANT

## 2021-12-11 PROCEDURE — 74011000250 HC RX REV CODE- 250: Performed by: INTERNAL MEDICINE

## 2021-12-11 PROCEDURE — 82962 GLUCOSE BLOOD TEST: CPT

## 2021-12-11 PROCEDURE — 77030032994 HC SOL INJ SOD CL 0.9% LFCR 500ML

## 2021-12-11 PROCEDURE — 85027 COMPLETE CBC AUTOMATED: CPT

## 2021-12-11 PROCEDURE — 80048 BASIC METABOLIC PNL TOTAL CA: CPT

## 2021-12-11 PROCEDURE — 74011000250 HC RX REV CODE- 250: Performed by: THORACIC SURGERY (CARDIOTHORACIC VASCULAR SURGERY)

## 2021-12-11 PROCEDURE — 74011250636 HC RX REV CODE- 250/636: Performed by: INTERNAL MEDICINE

## 2021-12-11 PROCEDURE — C8929 TTE W OR WO FOL WCON,DOPPLER: HCPCS

## 2021-12-11 PROCEDURE — 74011000258 HC RX REV CODE- 258: Performed by: PHYSICIAN ASSISTANT

## 2021-12-11 PROCEDURE — 74011250637 HC RX REV CODE- 250/637: Performed by: THORACIC SURGERY (CARDIOTHORACIC VASCULAR SURGERY)

## 2021-12-11 PROCEDURE — 65610000006 HC RM INTENSIVE CARE

## 2021-12-11 PROCEDURE — 94003 VENT MGMT INPAT SUBQ DAY: CPT

## 2021-12-11 PROCEDURE — 2709999900 HC NON-CHARGEABLE SUPPLY

## 2021-12-11 PROCEDURE — 74011250637 HC RX REV CODE- 250/637: Performed by: NURSE PRACTITIONER

## 2021-12-11 PROCEDURE — 74011250637 HC RX REV CODE- 250/637: Performed by: PHYSICIAN ASSISTANT

## 2021-12-11 RX ORDER — KETOROLAC TROMETHAMINE 15 MG/ML
15 INJECTION, SOLUTION INTRAMUSCULAR; INTRAVENOUS
Status: DISCONTINUED | OUTPATIENT
Start: 2021-12-11 | End: 2021-12-13

## 2021-12-11 RX ORDER — SODIUM CHLORIDE 9 MG/ML
250 INJECTION, SOLUTION INTRAVENOUS AS NEEDED
Status: DISCONTINUED | OUTPATIENT
Start: 2021-12-11 | End: 2021-12-11

## 2021-12-11 RX ORDER — KETOROLAC TROMETHAMINE 30 MG/ML
30 INJECTION, SOLUTION INTRAMUSCULAR; INTRAVENOUS ONCE
Status: COMPLETED | OUTPATIENT
Start: 2021-12-11 | End: 2021-12-11

## 2021-12-11 RX ADMIN — MORPHINE SULFATE 4 MG: 4 INJECTION INTRAVENOUS at 09:52

## 2021-12-11 RX ADMIN — AMIODARONE HYDROCHLORIDE 200 MG: 200 TABLET ORAL at 18:07

## 2021-12-11 RX ADMIN — Medication 10 ML: at 06:05

## 2021-12-11 RX ADMIN — KETOROLAC TROMETHAMINE 30 MG: 30 INJECTION, SOLUTION INTRAMUSCULAR at 16:55

## 2021-12-11 RX ADMIN — ACETAMINOPHEN 650 MG: 325 TABLET ORAL at 20:00

## 2021-12-11 RX ADMIN — KETOROLAC TROMETHAMINE 15 MG: 15 INJECTION, SOLUTION INTRAMUSCULAR; INTRAVENOUS at 23:00

## 2021-12-11 RX ADMIN — CHLORHEXIDINE GLUCONATE 10 ML: 1.2 RINSE ORAL at 09:53

## 2021-12-11 RX ADMIN — Medication 10 ML: at 16:54

## 2021-12-11 RX ADMIN — PERFLUTREN 1 ML: 6.52 INJECTION, SUSPENSION INTRAVENOUS at 09:25

## 2021-12-11 RX ADMIN — MORPHINE SULFATE 4 MG: 4 INJECTION INTRAVENOUS at 13:29

## 2021-12-11 RX ADMIN — SODIUM CHLORIDE 25 ML/HR: 900 INJECTION, SOLUTION INTRAVENOUS at 18:09

## 2021-12-11 RX ADMIN — CEFAZOLIN SODIUM 2 G: 100 INJECTION, POWDER, LYOPHILIZED, FOR SOLUTION INTRAVENOUS at 00:07

## 2021-12-11 RX ADMIN — OXYCODONE HYDROCHLORIDE AND ACETAMINOPHEN 1 TABLET: 5; 325 TABLET ORAL at 09:53

## 2021-12-11 RX ADMIN — FAMOTIDINE 20 MG: 10 INJECTION INTRAVENOUS at 20:00

## 2021-12-11 RX ADMIN — AMIODARONE HYDROCHLORIDE 200 MG: 200 TABLET ORAL at 09:53

## 2021-12-11 RX ADMIN — OXYCODONE HYDROCHLORIDE AND ACETAMINOPHEN 1 TABLET: 5; 325 TABLET ORAL at 13:29

## 2021-12-11 RX ADMIN — CEFAZOLIN SODIUM 2 G: 100 INJECTION, POWDER, LYOPHILIZED, FOR SOLUTION INTRAVENOUS at 09:31

## 2021-12-11 RX ADMIN — FAMOTIDINE 20 MG: 10 INJECTION INTRAVENOUS at 09:52

## 2021-12-11 RX ADMIN — ATORVASTATIN CALCIUM 80 MG: 80 TABLET, FILM COATED ORAL at 19:59

## 2021-12-11 RX ADMIN — NOREPINEPHRINE BITARTRATE 0.5 MCG/KG/MIN: 1 INJECTION, SOLUTION, CONCENTRATE INTRAVENOUS at 08:09

## 2021-12-11 RX ADMIN — METOPROLOL TARTRATE 25 MG: 25 TABLET, FILM COATED ORAL at 19:58

## 2021-12-11 RX ADMIN — Medication 10 ML: at 06:10

## 2021-12-11 RX ADMIN — MORPHINE SULFATE 4 MG: 4 INJECTION INTRAVENOUS at 05:04

## 2021-12-11 RX ADMIN — Medication 10 ML: at 19:55

## 2021-12-11 NOTE — PROGRESS NOTES
Patient is doing very well. Extubated  VSS  AAOx3  No SOB or CP  Adequate Respiratory effort  -130 mm Hg after extubation (Levophed stopped)  Lungs CTA  Heart RR  Abdomen: Soft NT ND  Urine Output - OK  Neurol: Intact  U/o OK  C-tubes: Min output    Wounds OK    A/P: POD 1 s/p Emergent CABG           Benign Course, Extubated, doing well          Continue Resp.  Rx          Bedrest till am due to recent Hx of  IABP          OOB in AM    Discussed with the patient and medical staff

## 2021-12-11 NOTE — PROGRESS NOTES
Reviewed notes for concerns      Per notes:      Emergent CABG x 4    Spouse -  [address]      Will continue to assess how to best serve this family

## 2021-12-11 NOTE — PROGRESS NOTES
TIMEOUT performed prior to extubation on [facility] with RT, primary RN, and charge RN present on 12/11/2021 at 1:50 PM.     Per anesthesia team on admission, patient's intubation was Normal    ABG results as follows:    Lab Results   Component Value Date/Time    pH (POC) 7.37 12/11/2021 04:03 AM    pCO2 (POC) 39.8 12/11/2021 04:03 AM    pO2 (POC) 140 (H) 12/11/2021 04:03 AM    HCO3 (POC) 23.2 12/11/2021 04:03 AM    sO2 (POC) 99.1 (H) 12/11/2021 04:03 AM    Base deficit (POC) 1.8 12/11/2021 04:03 AM         The patient is hemodynamically stable and can demonstrate the following on a consistent basis:  follow commands , elevate head off the pillow, nods appropriately to questions. Dr. Lu Anderson was aware of weaning parameters and order to extubate obtained. Patient extubated by Aishwarya Jefferson RT to Airvo 24L/90% without complication.

## 2021-12-11 NOTE — ANESTHESIA PROCEDURE NOTES
Peripheral Block    Performed by: Baltazar Ramírez MD  Authorized by: Baltazar Ramírez MD       Block Type:     Assessment:    Injection Assessment:

## 2021-12-11 NOTE — PROGRESS NOTES
Patient accidentally entangled his hand in the [place] and removed it without incidence. No ectopy was appreciated to the monitor. An  Obturator was immediately placed over the introducer after completely cleaning it with [employer] wipe (and appropriate wait for dry time). sure

## 2021-12-11 NOTE — PROGRESS NOTES
[facility] CARDIOLOGY PROGRESS NOTE           12/11/2021 7:30 AM    Admit Date: 12/10/2021      Subjective:   Unable to obtain, as the patient is intubated. ROS:  Unable to obtain, as the patient is intubated. Objective:      Vitals:    12/11/21 0617 12/11/21 0630 12/11/21 0645 12/11/21 0700   BP: (!) 103/55 (!) 105/51 (!) 111/53 (!) 112/52   Pulse: 77 75 77 73   Resp: 19 18 (!) 32 18   Temp:       SpO2: 100% 100% 100% 100%   Weight:       Height:           Physical Exam:  General-No Acute Distress  Neck- supple, no JVD  CV- regular rate and rhythm no RG  Lung- clear bilaterally  Abd- soft, nontender, nondistended  Ext- no edema bilaterally. Skin- warm and dry    Data Review:   Recent Labs     12/11/21  0350 12/10/21  2326 12/10/21  1934 12/10/21  1934   *  --   --  148*   K 4.0 4.1   < > 3.5   MG 2.3 2.4   < > 2.7*   BUN 9  --   --  10   CREA 0.92  --   --  0.76*   *  --   --  138*   WBC 12.4*  --   --  14.4*   HGB 6.2* 6.9*   < > 7.5*   HCT 20.0* 20.8*   < > 22.3*     --   --  164   INR  --   --   --  1.7   CHOL 59  --   --   --    LDLC 22.8  --   --   --    HDL 22*  --   --   --     < > = values in this interval not displayed. Assessment/Plan:     1. Hx of CABG  2.  NSTEMI   3. Hyperlipidemia  4. Anemia    The patient's hemoglobin is 6.2, and he received PRBCs this morning. His IABP is currently at 1:3. The nurse discussed the case with the patient's CT surgeon on call who reports that it is acceptable to remove the IABP at this time. Due to anemia, the patient is not anticoagulated despite the IABP at 1:3. He is on Levophed;  however, he is not on inotropic support at this time with milrinone or dobutamine. The IABP was removed without evidence of immediate complication. Repeat hemoglobin drawn after IABP removal consistent with an adequate response to PRBCs. We will follow-up pulmonary medicine input for candidacy for extubation.   He is currently on p.o. aspirin. Continue with Lipitor. Consider P2Y12 inhibition once acceptable from a surgical standpoint since the patient was admitted with ACS.     Clarita Lynch MD

## 2021-12-11 NOTE — PROGRESS NOTES
200 Dr. Monica Naidu (Cardiology) at bedside, called Dr. Rodrigo Ibrahim and received ok to pull IABP.   0900 Dr. Monica Naidu explanted IABP and monica pressure held for > 30 minutes. Site is benign, soft, and non-tender without bleeding, bruising, or hematoma. Patient tolerated pull well.

## 2021-12-11 NOTE — ANESTHESIA PROCEDURE NOTES
Pulmonary Artery Catheter Placement    Start time: 12/10/2021 3:23 PM  End time: 12/10/2021 3:31 PM  Performed by: Meliton Nicole MD  Authorized by: Meliton Nicole MD     Indications: vascular access, central pressure monitoring and need for vasopressors  Preanesthetic Checklist: patient identified, risks and benefits discussed, anesthesia consent, site marked, patient being monitored and timeout performed    Timeout Time: 15:21 EST       Pre-procedure: All elements of maximal sterile barrier technique followed? Yes    2% Chlorhexidine for cutaneous antisepsis, Hand hygiene performed prior to catheter insertion and Ultrasound guidance    Sterile Ultrasound Technique followed?: Yes        Ultrasound Image Stored? Image stored    Procedure:   Prep:  Chlorhexidine    Orientation:  Right  Patient position:  Trendelenburg  Number of lumens: 9 Fr double sideport PA Introducer. Catheter size:  7 Fr (7 Fr CCO PA Catheter)  Caudal catheter size: Passed to PAOP at 46 cm depth. Number of attempts:  1  Successful placement: Yes      Assessment:   Post-procedure:  Catheter secured and sterile dressing applied  Assessment:  Free fluid flow, blood return through all ports and guidewire removal verified  Insertion:  Uncomplicated  Patient tolerance:  Patient tolerated the procedure well with no immediate complications  The selected vein was evaluated by ultrasound for patency and adequacy for catheter insertion and was deemed adequate. Using realtime ultrasound imaging, the vein was punctured with visualization of the needle entry. A permanent image was obtained and placed in the patients chart.

## 2021-12-11 NOTE — ANESTHESIA PROCEDURE NOTES
BRYON NOT PERFORMED              Procedure Note  Unable to easily pass BRYON probe  Performed by: Yareli Coley MD  Authorized by: Yareli Coley MD

## 2021-12-11 NOTE — PROGRESS NOTES
Patient on 1:3 IABP without anticoagulation, as the patient is anemic with recent PRBC transfusion. Securing sutures cut. IABP completely deflated. Sheath and IABP catheter pulled out as one unit. Blood allowed to flow briefly from the arterial access site to remove any thombi if present. Manual pressure applied for 30 minutes with hemostasis achieved. Distal arterial pulses intact with no evidence of hematoma. Patient to remain recumbent for 6 hours.

## 2021-12-11 NOTE — PROGRESS NOTES
Pulmonary CV progress Note: 12/11/2021        [facility]                                                     Admission Date: 12/10/2021     The patient's chart is reviewed and the patient is discussed with the staff. Background: 79 y.o. y/o male  has a past medical history of Arthritis, HLD (hyperlipidemia) (12/7/2012), Macular degeneration, and Osteoarthritis of left knee. He also has no past medical history of Malignant hyperthermia due to anesthesia or Pseudocholinesterase deficiency. S/P CABG x 4 1 Day Post-Op      The patient is an 79 y.o.  male seen and evaluated at the request of Dr. Bry Nunes for respiratory management post emergent CABG.     The pt has been having CP at home for about 10 days. He presented to the ER and evaluation was positive for a NSTEMI. Because of ongoing CP he was taken to the cardiac cath lab where MV CAD was noted involving the LM, LAD, RCA, OM1, and OM2. Urgent surgery was recommended and an IABP was placed. An echo revealed an LVEF of 55%  He was taken to the OR and underwent CABG x 4 by Dr. Rock Grimaldo. He is currently on low dose levophed and NTG. His IABP is set at 1:2.      The pt is a former smoker but quit nearly 50 years ago. He has not been on inhaled bronchodilators.        Subjective:     Remains on vent on rate and IABP. Was on PSV for about 5 hrs but got more dyspneic this AM. Now getting 2 U PRBC for Hgb of 6.2. Remains on low dose levophed. BP soft at times. Objective:   Blood pressure (!) 95/50, pulse 73, temperature 99.3 °F (37.4 °C), resp. rate 19, height 5' 10\" (1.778 m), weight 215 lb (97.5 kg), SpO2 100 %.      Intake/Output Summary (Last 24 hours) at 12/11/2021 0603  Last data filed at 12/11/2021 0520  Gross per 24 hour   Intake 4507.74 ml   Output 2770 ml   Net 1737.74 ml     Physical Exam:          Constitutional:  Awake, calm  EENMT:  Sclera clear, pupils equal  Respiratory: decreased BS  Cardiovascular:  RRR with no M,G,R;  Gastrointestinal:  soft; minimal bowel sounds present  Musculoskeletal:  warm with no cyanosis, no lower extremity edema. SKIN:  no jaundice or ecchymosis; pale   Neurologic:  moving all extremities  Psychiatric:  calm    CXR:      12/11, 10:            LINES:  meza, swan gita, arterial line, chest tubes times 2 in epigastric area without air leak. DRIPS:   Epinephrine Dose (mcg/min): 0 mcg/min  Levophed Dose (mcg/kg/min): 0.06 mcg/kg/min  Phenylephrine Dose (mcg/min): 0 mcg/min  Precedex Dose (mcg/kg/hr): 0 mcg/kg/hr  Levophed Dose (mcg/kg/min): 0.06 mcg/kg/min     CI:  CI (l/min/m2): 3.3 l/min/m2  CO (l/min): 7.2 l/min    Ventilator Settings  Mode FIO2 Rate Tidal Volume Pressure PEEP   Pressure support, VC+ (Switched out of ps pt. stated he was tired)  35 %    450 ml  8 cm H2O  8 cm H20    Peak airway pressure: 17 cm H2O   Minute ventilation: 9.51 l/min   Recent Labs     12/11/21  0403 12/10/21  1956 12/10/21  1838   PHI 7.37 7.35 7.35   PCO2I 39.8 40.5 39.2   PO2I 140* 110* 275*   HCO3I 23.2 22.4 21.7*      Recent Labs     12/11/21  0350 12/10/21  2326 12/10/21  1934 12/10/21  1138   WBC 12.4*  --  14.4* 7.1   HGB 6.2* 6.9* 7.5* 10.6*   HCT 20.0* 20.8* 22.3* 32.0*     --  164 310   INR  --   --  1.7  --      Recent Labs     12/11/21  0350 12/10/21  2326 12/10/21  1934 12/10/21  1138 12/10/21  1138   *  --  148*  --  141   K 4.0 4.1 3.5   < > 3.8   *  --  115*  --  107   CO2 24  --  23  --  25   *  --  138*  --  118*   BUN 9  --  10  --  14   CREA 0.92  --  0.76*  --  0.91   MG 2.3 2.4 2.7*   < > 2.0   CA 7.2*  --  7.8*  --  8.9   ALB  --   --   --   --  3.8    < > = values in this interval not displayed. No results for input(s): LCAD, LAC in the last 72 hours. Results from Hospital Encounter encounter on 12/10/21    ECHO ADULT FOLLOW-UP OR LIMITED    Interpretation Summary  · LV: Estimated LVEF is 55 - 60%.  Normal cavity size, wall thickness and systolic function (ejection fraction normal). Limited echo urgently prior to emergent CABG  Normal LVEF  No significant valvular disease detected in very limited views. Assessment and Plan :  (Medical Decision Making)   Impression: 79 y.o. y/o male s/p CV surgery for S/P CABG x 4; 1 Day Post-Op    Principal Problem:    S/P CABG x 4 (12/10/2021)    Still needing levophed and on IABP support. Wean if able with blood tx    Active Problems:      Unstable angina ([address] (12/10/2021)    Now post CABG x 4      HTN (hypertension) (12/10/2021)    BP marginal. Hopefully improves with blood tx      CAD, multiple vessel (12/10/2021)    Echo with preserved LV function. Encounter for weaning from ventilator ([address] (12/10/2021)    ABG this AM looks good. Placed back on rate after fatigue set in. Retry on PSV after blood in. Anemia (12/10/2021)    Worse. Getting 2U     More than 50% of the time documented was spent in face-to-face contact with the patient and in the care of the patient on the floor/unit where the patient is located.      Mary Hadley MD

## 2021-12-11 NOTE — PROGRESS NOTES
Verbal report given to HU Monahan on Ardath Ket  being transferred to CVICU for ordered procedure        Report consisted of patients Situation, Background, Assessment and Recommendations(SBAR).       Information from the following report(s) Procedure Summary and MAR was reviewed with the receiving nurse.     Opportunity for questions and clarification was provided.       Patient transported with:   Registered Nurse

## 2021-12-11 NOTE — CONSULTS
History and Physical Initial Visit NOTE           12/10/2021    Shagufta Free                        Date of Admission:  12/10/2021    The patient's chart is reviewed and the patient is discussed with the staff. Subjective: The patient is an 79 y.o.  male seen and evaluated at the request of Dr. Andrey Barboza for respiratory management post emergent CABG. The pt has been having CP at home for about 10 days. He presented to the ER and evaluation was positive for a NSTEMI. Because of ongoing CP he was taken to the cardiac cath lab where MV CAD was noted involving the LM, LAD, RCA, OM1, and OM2. Urgent surgery was recommended and an IABP was placed. An echo revealed an LVEF of 55%  He was taken to the OR and underwent CABG x 4 by Dr. Nova Javier. He is currently on low dose levophed and NTG. His IABP is set at 1:2. The pt is a former smoker but quit nearly 50 years ago. He has not been on inhaled bronchodilators. Review of Systems    Unobtainable at this time. Prior to Admission Medications   Prescriptions Last Dose Informant Patient Reported? Taking?   acetaminophen (TYLENOL PO)   Yes No   Sig: Take 1,000 mg by mouth every six (6) hours as needed for Pain.   amoxicillin (AMOXIL) 500 mg capsule   No No   Sig: Take 4 tablets by mouth 1 hour prior to dental procedure   aspirin delayed-release 81 mg tablet   Yes No   Sig: Take 81 mg by mouth daily. cephALEXin (KEFLEX) 500 mg capsule   Yes No   Sig: Take 500 mg by mouth two (2) times a day. chondroitin sulfate A sodium (CHONDROITIN SULFATE PO)   Yes No   Sig: Take 1,200 mg by mouth daily. docosahexaenoic acid/epa (FISH OIL PO)   Yes No   Sig: Take 1,200 mg by mouth daily. glucosam/gluc palomares/uu-ihl-r-gluc (GLUCOSAMINE COMPLEX PO)   Yes No   Sig: Take 1,500 mg by mouth daily. meloxicam (MOBIC) 7.5 mg tablet   Yes No   Sig: Take 7.5 mg by mouth daily.    polyethylene glycol (MIRALAX) 17 gram packet   Yes No   Sig: Take 17 g by mouth daily. Mix with 8oz water   vit A/vit C/vit E/zinc/copper (PRESERVISION AREDS PO)   Yes No   Sig: Take 1 Tab by mouth two (2) times a day. Facility-Administered Medications: None     Past Medical History:   Diagnosis Date    Arthritis     HLD (hyperlipidemia) 2012    Macular degeneration     Osteoarthritis of left knee      Past Surgical History:   Procedure Laterality Date    HX COLONOSCOPY      HX COLONOSCOPY  13    diverticulosis, internal hemorrhoids. no further screening recommended     Social History     Socioeconomic History    Marital status:      Spouse name: Not on file    Number of children: Not on file    Years of education: Not on file    Highest education level: Not on file   Occupational History    Not on file   Tobacco Use    Smoking status: Former Smoker     Types: Cigarettes     Quit date: 1976     Years since quittin.9    Smokeless tobacco: Never Used   Substance and Sexual Activity    Alcohol use: Yes     Alcohol/week: 0.0 standard drinks     Comment: 1weekly    Drug use: Never    Sexual activity: Not on file   Other Topics Concern    Not on file   Social History Narrative    Not on file     Social Determinants of Health     Financial Resource Strain:     Difficulty of Paying Living Expenses: Not on file   Food Insecurity:     Worried About Running Out of Food in the Last Year: Not on file    Raquel of Food in the Last Year: Not on file   Transportation Needs:     Lack of Transportation (Medical): Not on file    Lack of Transportation (Non-Medical):  Not on file   Physical Activity:     Days of Exercise per Week: Not on file    Minutes of Exercise per Session: Not on file   Stress:     Feeling of Stress : Not on file   Social Connections:     Frequency of Communication with Friends and Family: Not on file    Frequency of Social Gatherings with Friends and Family: Not on file    Attends Latter-day Services: Not on file   [place] Active Member of Clubs or Organizations: Not on file    Attends Club or Organization Meetings: Not on file    Marital Status: Not on file   Intimate Partner Violence:     Fear of Current or Ex-Partner: Not on file    Emotionally Abused: Not on file    Physically Abused: Not on file    Sexually Abused: Not on file   Housing Stability:     Unable to Pay for Housing in the Last Year: Not on file    Number of [place] in the Last Year: Not on file    Unstable Housing in the Last Year: Not on file     No family history on file.   No Known Allergies  Current Facility-Administered Medications   Medication Dose Route Frequency    [START ON 12/11/2021] polyethylene glycol (MIRALAX) packet 17 g  17 g Oral DAILY    [START ON 12/11/2021] aspirin delayed-release tablet 81 mg  81 mg Oral DAILY    heparin 25,000 units in dextrose 500 mL infusion  12-25 Units/kg/hr IntraVENous TITRATE    nitroglycerin (NITROSTAT) tablet 0.4 mg  0.4 mg SubLINGual Q5MIN PRN    sodium chloride (NS) flush 5-40 mL  5-40 mL IntraVENous Q8H    sodium chloride (NS) flush 5-40 mL  5-40 mL IntraVENous PRN    morphine injection 2 mg  2 mg IntraVENous Q4H PRN    metoprolol tartrate (LOPRESSOR) tablet 25 mg  25 mg Oral Q12H    EPINEPHrine (ADRENALIN) 4 mg in 0.9% sodium chloride 250 mL infusion  1-10 mcg/min IntraVENous TITRATE    0.9% sodium chloride infusion 250 mL  250 mL IntraVENous PRN    0.9% sodium chloride infusion  25 mL/hr IntraVENous CONTINUOUS    dextrose 5% - 0.45% NaCl with KCl 20 mEq/L infusion  25 mL/hr IntraVENous CONTINUOUS    sodium chloride (NS) flush 5-40 mL  5-40 mL IntraVENous Q8H    sodium chloride (NS) flush 5-40 mL  5-40 mL IntraVENous PRN    acetaminophen (TYLENOL) tablet 650 mg  650 mg Oral Q4H PRN    oxyCODONE-acetaminophen (PERCOCET) 5-325 mg per tablet 1 Tablet  1 Tablet Oral Q4H PRN    morphine injection 3-5 mg  3-5 mg IntraVENous Q1H PRN    naloxone (NARCAN) injection 0.4 mg  0.4 mg IntraVENous PRN  [START ON 12/11/2021] ceFAZolin (ANCEF) 2 g/20 mL in sterile water IV syringe  2 g IntraVENous Q8H    DOBUTamine (DOBUTREX) 500 mg/250 mL (2,000 mcg/mL) infusion  0-10 mcg/kg/min IntraVENous TITRATE    EPINEPHrine (ADRENALIN) 4 mg in 0.9% sodium chloride 250 mL infusion  0.01-0.1 mcg/kg/min IntraVENous TITRATE    nitroglycerin (Tridil) 200 mcg/ml infusion  0-100 mcg/min IntraVENous TITRATE    PHENYLephrine (JAGJIT-SYNEPHRINE) 30 mg in 0.9% sodium chloride 250 mL infusion   mcg/min IntraVENous TITRATE    NOREPINephrine (LEVOPHED) 4 mg in 5% dextrose 250 mL infusion  0.01-0.5 mcg/kg/min IntraVENous TITRATE    amiodarone (CORDARONE) tablet 200 mg  200 mg Oral BID    ondansetron (ZOFRAN) injection 4-8 mg  4-8 mg IntraVENous Q4H PRN    insulin regular (NOVOLIN R, HUMULIN R) 100 Units in 0.9% sodium chloride 100 mL infusion  1 Units/hr IntraVENous TITRATE    dextrose (D50W) injection syrg 12.5 g  25 mL IntraVENous PRN    magnesium sulfate 1 g/100 ml IVPB (premix or compounded)  1 g IntraVENous PRN    potassium chloride 10 mEq in 50 ml IVPB  10 mEq IntraVENous PRN    midazolam (VERSED) injection 1 mg  1 mg IntraVENous Q1H PRN    dexmedeTOMidine in 0.9 % NaCl (PRECEDEX) 400 mcg/100 mL (4 mcg/mL) infusion soln  0.1-1.5 mcg/kg/hr IntraVENous TITRATE    chlorhexidine (PERIDEX) 0.12 % mouthwash 10 mL  10 mL Oral BID    tuberculin injection 5 Units  5 Units IntraDERMal ONCE    famotidine (PF) (PEPCID) 20 mg in 0.9% sodium chloride 10 mL injection  20 mg IntraVENous Q12H    0.9% sodium chloride infusion 250 mL  250 mL IntraVENous PRN    0.9% sodium chloride infusion 250 mL  250 mL IntraVENous PRN    0.9% sodium chloride infusion  25 mL/hr IntraVENous CONTINUOUS    sodium chloride (NS) flush 5-40 mL  5-40 mL IntraVENous Q8H    sodium chloride (NS) flush 5-40 mL  5-40 mL IntraVENous PRN    oxyCODONE-acetaminophen (PERCOCET) 5-325 mg per tablet 1 Tablet  1 Tablet Oral Q4H PRN    morphine injection 3-5 mg  3-5 mg IntraVENous Q1H PRN    mupirocin (BACTROBAN) 2 % ointment   Both Nostrils BID    sodium bicarbonate (8.4%) injection 50 mEq  50 mEq IntraVENous PRN    lidocaine (PF) (XYLOCAINE) 100 mg/5 mL (2 %) injection syringe  mg   mg IntraVENous ONCE PRN    atorvastatin (LIPITOR) tablet 80 mg  80 mg Oral QHS    dextrose (D50W) injection syrg 12.5 g  25 mL IntraVENous PRN    meperidine (DEMEROL) injection 25 mg  25 mg IntraVENous Q1H PRN    chlorhexidine (PERIDEX) 0.12 % mouthwash 10 mL  10 mL Oral BID    tuberculin injection 5 Units  5 Units IntraDERMal ONCE     Objective:   Blood pressure (!) 120/58, pulse 83, temperature (!) 96 °F (35.6 °C), resp. rate 18, height 5' 10\" (1.778 m), weight 215 lb (97.5 kg), SpO2 100 %. Intake/Output Summary (Last 24 hours) at 12/10/2021 1958  Last data filed at 12/10/2021 1920  Gross per 24 hour   Intake 3402 ml   Output 1200 ml   Net 2202 ml     PHYSICAL EXAM   Constitutional:  the patient is obese and in no acute distress on the vent  EENMT:  Sclera clear, pupils equal, oral mucosa moist  Respiratory: clear bilaterally  Cardiovascular:  RRR without M,G,R  Gastrointestinal: soft and non-tender; with positive bowel sounds. Musculoskeletal: warm without cyanosis. There is no lower extremity edema. L leg wrapped. Skin:  no jaundice or rashes  Neurologic: no gross neuro deficits     Psychiatric:  Sedated    CO: 4.6  CI: 2.1  SVO2: 66    CXR:          Some SS Atx on L. ETT in satisfactory position. No  PTX. Recent Labs     12/10/21  1138   WBC 7.1   HGB 10.6*   HCT 32.0*         K 3.8      CO2 25   *   BUN 14   CREA 0.91   MG 2.0   CA 8.9   ALB 3.8   AST 16   ALT 28   AP 72   TROPHS 204.1*     ECHO: Results from Hospital Encounter encounter on 12/10/21    ECHO ADULT FOLLOW-UP OR LIMITED    Interpretation Summary  · LV: Estimated LVEF is 55 - 60%.  Normal cavity size, wall thickness and systolic function (ejection fraction normal). Limited echo urgently prior to emergent CABG  Normal LVEF  No significant valvular disease detected in very limited views. Results     ** No results found for the last 336 hours. **        Inpat Anti-Infectives (From admission, onward)     Start     Ordered Stop    12/11/21 0000  ceFAZolin (ANCEF) 2 g/20 mL in sterile water IV syringe  2 g,   IntraVENous,   EVERY 8 HOURS            12/10/21 1924 12/11/21 1559    12/10/21 2000  mupirocin (BACTROBAN) 2 % ointment  Both Nostrils,   2 TIMES DAILY            12/10/21 1924 12/14/21 1759              Assessment and Plan:  (Medical Decision Making)     Principal Problem:    Unstable angina ([address] (12/10/2021)    Now post CABG for intractable pain    Active Problems:      HTN (hypertension) (12/10/2021)    Currently BP is marginal      CAD, multiple vessel (12/10/2021)    LVEF was normal pre surgery. S/P CABG x 4 (12/10/2021)    Now on NTG, levophed, and IABP. Optimize hemodynamics. Volume as needed. Encounter for weaning from ventilator [facility]) (12/10/2021):    Currently on 60% with sat of 100%. ABG looks Ok but has not carried over into lab results. Wean vent per protocol. Anemia (12/10/2021)    Follow. Full Code    More than 50% of the time documented was spent in face-to-face contact with the patient and in the care of the patient on the floor/unit where the patient is located. Thank you very much for this referral.  We appreciate the opportunity to participate in this patient's care. Will follow along with above stated plan.     Neelam Kirkland MD

## 2021-12-11 NOTE — PROGRESS NOTES
Ventilator check complete; patient has a #8. 0 ET tube secured at the 24 at the lip. Patient is sedated. Patient is not able to follow commands. Breath sounds are diminished. Trachea is midline, Negative for subcutaneous air, and chest excursion is symmetric. Patient is also Negative for cyanosis and is Negative for pitting edema. All alarms are set and audible. Resuscitation bag is at the head of the bed.       Ventilator Settings  Mode FIO2 Rate Tidal Volume Pressure PEEP I:E Ratio   VC+, Pressure support  50 % (Decreased post abg)   18 450 ml  8 cm H2O  8 cm H20  1:2.70      Peak airway pressure: 19 cm H2O   Minute ventilation: 8.93 l/min       More Bending, RT

## 2021-12-11 NOTE — PROGRESS NOTES
Respiratory Mechanics completed and are as follows:  Weaning Parameters  Spontaneous Breathing Trial Complete: Yes  Resp Rate Observed: 11  Ve: 9.1  VT: 565  NIF: -39  Pablo Agitation Sedation Scale (RASS): Light sedation  Patient extubated to a 40L/40% HFNC. Patient is able to communicate and is negative for stridor. Breath sounds are diminished. No complications with extubation.      Amanda Dior

## 2021-12-11 NOTE — PROGRESS NOTES
Dr. Maddy Mason at bedside and updated on patients progress. Ok to extubate if patient passes respiratory mechanics and does not tire on PSV. RT notified.

## 2021-12-11 NOTE — BRIEF OP NOTE
Brief Postoperative Note    Patient: Anastacia Howe  YOB: 1940  MRN: [id number]    Date of Procedure: 12/10/2021     Pre-Op Diagnosis: CAD    Post-Op Diagnosis: Same as preoperative diagnosis. Procedure(s):  Emergent CORONARY ARTERY BYPASS GRAFT (CABG X 4)/ LIMA  LEFT GREATER SAPHENOUS VEIN HARVEST ENDOSCOPIC    Surgeon(s):   Jeffrey Lyon MD    Surgical Assistant: Surg Asst-1: Arnulfo Caldwell    Anesthesia: General     Estimated Blood Loss (mL): Minimal    Complications: None    Specimens: * No specimens in log *     Implants: * No implants in log *    Drains:   Orogastric Tube 12/10/21 (Active)       Findings: cad    Electronically Signed by Marco Antonio Gardiner MD on 12/10/2021 at 7:00 PM

## 2021-12-11 NOTE — OP NOTES
[facility]  OPERATIVE REPORT    Name:  Sean Frias  MR#:  [id number]  :  1940  ACCOUNT #:  [de-identified]  DATE OF SERVICE:  12/10/2021    PREOPERATIVE DIAGNOSES:  1.  Non-ST-elevation myocardial infarction. 2.  Severe multivessel atherosclerotic coronary artery disease including left main coronary artery disease. POSTOPERATIVE DIAGNOSES:  1.  Non-ST-elevation myocardial infarction. 2.  Severe multivessel atherosclerotic coronary artery disease including left main coronary artery disease. PROCEDURES PERFORMED:  1. Endoscopic vein harvest from left leg. 2.  Emergent coronary artery bypass grafting x4. Grafts consisting of,       a. Left internal mammary artery to LAD. b.  Reverse saphenous vein graft to OM1.       c.  Reverse saphenous vein graft to OM2.       d.  Reverse saphenous vein graft to the PDA. SURGEON:  Anitra Zavala MD    ASSISTANT:      ANESTHESIA:  General endotracheal.    COMPLICATIONS:  None. SPECIMENS REMOVED:  .    IMPLANTS:  .    ESTIMATED BLOOD LOSS:  Minimal.    OPERATIVE FINDINGS:  Saphenous vein 3-4 mm. LIMA 3 mm. Aorta is soft, no palpable plaque. LAD is 1.4 mm, severe distal disease. OM1 is 1.2 mm with severe distal disease. OM2 is 1.4 mm with severe disease. PDA is 1.4 mm with severe distal disease. INDICATIONS:  The patient is an 79-year-old gentleman who has had about a 10-day history of on and off chest pain. He presented to the emergency room, ruled in for an NSTEMI. Left heart cath showed significant multivessel disease including a significant left main and a chronically occluded right. LV function was preserved. An intra-aortic balloon pump was placed for continued chest pain and he was taken emergently to the operating room for revascularization. DESCRIPTION OF PROCEDURE:  After informed consent was obtained for the above-mentioned procedure, the patient was then taken to the operating room.   Appropriate monitoring lines were placed by the Anesthesia Department. After administration of general endotracheal anesthesia, the patient was prepped and draped in the usual fashion with Betadine scrub and paint and Ioban cardiovascular drapes. The chest was then entered through a standard mediastinotomy incision while simultaneous harvesting of peripheral conduit was undertaken. After harvesting the conduit, it was inspected and noted to be adequate. The incisions were subsequently closed in a 2-layer fashion of 3-0 and 4-0 Vicryl. The left internal mammary harvest was then undertaken in pedicle fashion and was injected with Papaverine solution. After administration of heparin, the distal pedicle was incised and noted to bleed briskly. A thoracostomy tube was then placed. This was brought out through a separate stab incision inferiorly and affixed to the skin. The pericardium was then opened and tacked up into a pericardial well, revealing the heart. Pursestrings were then placed into the aorta, the right atrial appendage, and the right atrium. After adequate ACT was obtained, the patient was then cannulated through these pursestrings, at which point cardiopulmonary bypass was started. Target vessels were identified and marked. A crossclamp was then applied. Antegrade and retrograde cardioplegia was administered initially and then given intermittently throughout the case. A latticed heart support was placed to assist with the distal anastomoses, which were performed by using 7-0 Prolene for vein to artery anastomosis and 8-0 Prolene for artery-to-artery anastomosis. Proximal anastomoses to the aorta were also placed using 6-0 Prolene. At the conclusion of the final proximal anastomosis, the aorta and right ventricle were flushed of debris and the anastomosis was completed. The crossclamp was then removed. The heart then underwent meticulous de-airing.   The patient was warmed and weaned from the cardiopulmonary bypass. The distal anastomoses were visualized and noted to be hemostatic. All grafts were dopplered and noted to have an excellent flow. The venous cannula was then removed. Ventricular and atrial pacing wires were then placed upon the heart, brought out through stab wounds inferiorly and affixed to the skin. A single straight mediastinal tube was placed, brought out through a separate stab incision inferiorly, and also affixed to the skin. After meticulous hemostasis was made, the sternum was reapproximated with heavy gauge stainless steel wire. The midline fascia was subsequently closed separately. Vicryl was then used in a running fashion for subcutaneous tissue and skin. Dressings were then applied to all wounds. The patient was then transported with monitors to the intensive care unit intubated and ventilated. ADDENDUM:  After induction of anesthesia, prepping and draping, three segments of greater saphenous vein were then taken from his left leg using the Transphorm system. These incisions were closed in two-layer fashion with 3-0 and 4-0 Vicryl. All instruments and sponge counts were correct at the end of the case.       Jazmyne Brown MD      [id number]  D:  12/10/2021 19:12  T:  12/10/2021 23:56  JOB #:  [id number]

## 2021-12-12 ENCOUNTER — APPOINTMENT (OUTPATIENT)
Dept: GENERAL RADIOLOGY | Age: 81
DRG: 234 | End: 2021-12-12
Attending: THORACIC SURGERY (CARDIOTHORACIC VASCULAR SURGERY)
Payer: MEDICARE

## 2021-12-12 PROBLEM — R53.81 DEBILITY: Status: ACTIVE | Noted: 2021-12-12

## 2021-12-12 PROBLEM — Z99.11 ENCOUNTER FOR WEANING FROM VENTILATOR (HCC): Status: RESOLVED | Noted: 2021-12-10 | Resolved: 2021-12-12

## 2021-12-12 LAB
ANION GAP SERPL CALC-SCNC: 5 MMOL/L (ref 7–16)
ATRIAL RATE: 416 BPM
ATRIAL RATE: 82 BPM
ATRIAL RATE: 91 BPM
ATRIAL RATE: 97 BPM
BUN SERPL-MCNC: 14 MG/DL (ref 8–23)
CALCIUM SERPL-MCNC: 7.8 MG/DL (ref 8.3–10.4)
CALCULATED P AXIS, ECG09: 36 DEGREES
CALCULATED R AXIS, ECG10: -12 DEGREES
CALCULATED R AXIS, ECG10: -19 DEGREES
CALCULATED R AXIS, ECG10: -21 DEGREES
CALCULATED R AXIS, ECG10: -9 DEGREES
CALCULATED T AXIS, ECG11: -20 DEGREES
CALCULATED T AXIS, ECG11: -23 DEGREES
CALCULATED T AXIS, ECG11: -32 DEGREES
CALCULATED T AXIS, ECG11: 24 DEGREES
CHLORIDE SERPL-SCNC: 114 MMOL/L (ref 98–107)
CO2 SERPL-SCNC: 26 MMOL/L (ref 21–32)
CREAT SERPL-MCNC: 0.83 MG/DL (ref 0.8–1.5)
DIAGNOSIS, 93000: NORMAL
ERYTHROCYTE [DISTWIDTH] IN BLOOD BY AUTOMATED COUNT: 16.1 % (ref 11.9–14.6)
GLUCOSE SERPL-MCNC: 103 MG/DL (ref 65–100)
HCT VFR BLD AUTO: 25.7 % (ref 41.1–50.3)
HGB BLD-MCNC: 8.2 G/DL (ref 13.6–17.2)
MAGNESIUM SERPL-MCNC: 2.3 MG/DL (ref 1.8–2.4)
MCH RBC QN AUTO: 29.5 PG (ref 26.1–32.9)
MCHC RBC AUTO-ENTMCNC: 31.9 G/DL (ref 31.4–35)
MCV RBC AUTO: 92.4 FL (ref 79.6–97.8)
MM INDURATION POC: 0 MM (ref 0–5)
NRBC # BLD: 0 K/UL (ref 0–0.2)
P-R INTERVAL, ECG05: 194 MS
PLATELET # BLD AUTO: 133 K/UL (ref 150–450)
PMV BLD AUTO: 10.5 FL (ref 9.4–12.3)
POTASSIUM SERPL-SCNC: 3.9 MMOL/L (ref 3.5–5.1)
PPD POC: NEGATIVE NEGATIVE
Q-T INTERVAL, ECG07: 396 MS
Q-T INTERVAL, ECG07: 402 MS
Q-T INTERVAL, ECG07: 408 MS
Q-T INTERVAL, ECG07: 420 MS
QRS DURATION, ECG06: 130 MS
QRS DURATION, ECG06: 132 MS
QRS DURATION, ECG06: 132 MS
QRS DURATION, ECG06: 134 MS
QTC CALCULATION (BEZET), ECG08: 442 MS
QTC CALCULATION (BEZET), ECG08: 460 MS
QTC CALCULATION (BEZET), ECG08: 481 MS
QTC CALCULATION (BEZET), ECG08: 507 MS
RBC # BLD AUTO: 2.78 M/UL (ref 4.23–5.6)
SODIUM SERPL-SCNC: 145 MMOL/L (ref 136–145)
VENTRICULAR RATE, ECG03: 75 BPM
VENTRICULAR RATE, ECG03: 79 BPM
VENTRICULAR RATE, ECG03: 79 BPM
VENTRICULAR RATE, ECG03: 93 BPM
WBC # BLD AUTO: 10 K/UL (ref 4.3–11.1)

## 2021-12-12 PROCEDURE — 97535 SELF CARE MNGMENT TRAINING: CPT

## 2021-12-12 PROCEDURE — 74011250637 HC RX REV CODE- 250/637: Performed by: PHYSICIAN ASSISTANT

## 2021-12-12 PROCEDURE — 65610000006 HC RM INTENSIVE CARE

## 2021-12-12 PROCEDURE — 99232 SBSQ HOSP IP/OBS MODERATE 35: CPT | Performed by: INTERNAL MEDICINE

## 2021-12-12 PROCEDURE — 80048 BASIC METABOLIC PNL TOTAL CA: CPT

## 2021-12-12 PROCEDURE — 71045 X-RAY EXAM CHEST 1 VIEW: CPT

## 2021-12-12 PROCEDURE — 83735 ASSAY OF MAGNESIUM: CPT

## 2021-12-12 PROCEDURE — 2709999900 HC NON-CHARGEABLE SUPPLY

## 2021-12-12 PROCEDURE — 97162 PT EVAL MOD COMPLEX 30 MIN: CPT

## 2021-12-12 PROCEDURE — 97165 OT EVAL LOW COMPLEX 30 MIN: CPT

## 2021-12-12 PROCEDURE — 97530 THERAPEUTIC ACTIVITIES: CPT

## 2021-12-12 PROCEDURE — 74011250637 HC RX REV CODE- 250/637: Performed by: THORACIC SURGERY (CARDIOTHORACIC VASCULAR SURGERY)

## 2021-12-12 PROCEDURE — 85027 COMPLETE CBC AUTOMATED: CPT

## 2021-12-12 PROCEDURE — 36592 COLLECT BLOOD FROM PICC: CPT

## 2021-12-12 PROCEDURE — 74011250637 HC RX REV CODE- 250/637: Performed by: NURSE PRACTITIONER

## 2021-12-12 PROCEDURE — 74011250636 HC RX REV CODE- 250/636: Performed by: INTERNAL MEDICINE

## 2021-12-12 PROCEDURE — 93005 ELECTROCARDIOGRAM TRACING: CPT | Performed by: PHYSICIAN ASSISTANT

## 2021-12-12 RX ORDER — FAMOTIDINE 20 MG/1
20 TABLET, FILM COATED ORAL EVERY 12 HOURS
Status: DISCONTINUED | OUTPATIENT
Start: 2021-12-12 | End: 2021-12-13

## 2021-12-12 RX ORDER — AMOXICILLIN 250 MG
2 CAPSULE ORAL
Status: DISCONTINUED | OUTPATIENT
Start: 2021-12-12 | End: 2021-12-13 | Stop reason: SDUPTHER

## 2021-12-12 RX ORDER — POTASSIUM CHLORIDE 20 MEQ/1
20 TABLET, EXTENDED RELEASE ORAL 2 TIMES DAILY
Status: COMPLETED | OUTPATIENT
Start: 2021-12-12 | End: 2021-12-12

## 2021-12-12 RX ADMIN — ACETAMINOPHEN 650 MG: 325 TABLET ORAL at 10:00

## 2021-12-12 RX ADMIN — POTASSIUM CHLORIDE 20 MEQ: 20 TABLET, EXTENDED RELEASE ORAL at 16:06

## 2021-12-12 RX ADMIN — Medication 10 ML: at 16:04

## 2021-12-12 RX ADMIN — FAMOTIDINE 20 MG: 20 TABLET ORAL at 19:55

## 2021-12-12 RX ADMIN — ACETAMINOPHEN 650 MG: 325 TABLET ORAL at 19:54

## 2021-12-12 RX ADMIN — AMIODARONE HYDROCHLORIDE 200 MG: 200 TABLET ORAL at 09:58

## 2021-12-12 RX ADMIN — METOPROLOL TARTRATE 25 MG: 25 TABLET, FILM COATED ORAL at 19:54

## 2021-12-12 RX ADMIN — METOPROLOL TARTRATE 25 MG: 25 TABLET, FILM COATED ORAL at 09:58

## 2021-12-12 RX ADMIN — ACETAMINOPHEN 650 MG: 325 TABLET ORAL at 16:06

## 2021-12-12 RX ADMIN — Medication 10 ML: at 19:35

## 2021-12-12 RX ADMIN — Medication 10 ML: at 03:19

## 2021-12-12 RX ADMIN — AMIODARONE HYDROCHLORIDE 200 MG: 200 TABLET ORAL at 16:06

## 2021-12-12 RX ADMIN — POTASSIUM CHLORIDE 20 MEQ: 20 TABLET, EXTENDED RELEASE ORAL at 09:58

## 2021-12-12 RX ADMIN — SENNOSIDES AND DOCUSATE SODIUM 2 TABLET: 8.6; 5 TABLET ORAL at 19:55

## 2021-12-12 RX ADMIN — SODIUM CHLORIDE 1000 ML: 900 INJECTION, SOLUTION INTRAVENOUS at 06:16

## 2021-12-12 RX ADMIN — ASPIRIN 81 MG: 81 TABLET ORAL at 09:58

## 2021-12-12 RX ADMIN — ATORVASTATIN CALCIUM 80 MG: 80 TABLET, FILM COATED ORAL at 19:56

## 2021-12-12 RX ADMIN — POLYETHYLENE GLYCOL 3350 17 G: 17 POWDER, FOR SOLUTION ORAL at 09:58

## 2021-12-12 RX ADMIN — FAMOTIDINE 20 MG: 20 TABLET ORAL at 09:58

## 2021-12-12 NOTE — PROGRESS NOTES
[facility] CARDIOLOGY PROGRESS NOTE           12/12/2021 7:36 AM    Admit Date: 12/10/2021      Subjective: The patient is sitting up in a cardiac chair. He has been extubated. He denies dyspnea. He reports that he has not had a bowel movement. ROS:  Constitutional:   Negative unexplained weight loss. Eyes:   Negative for unexplained blindness. ENT:   Negative for unexplained hearing loss. Respiratory:   Negative for unexplained hemoptysis. Cardiovascular:   Negative except as noted in HPI. Gastrointestinal:   Negative for unexplained vomiting. Genitourinary:   Negative for unexplained hematuria. Integumentary:   Negative for unexplained rash. Hematologic/Lymphatic:   Negative for unexplained excessive bleeding. Musculoskeletal:  Negative for unexplained joint pain. Neurological:   Negative for stroke. Behavioral/Psych:   Negative for suicidal ideations. Endocrine:   Negative for uncontrolled diabetic symptoms including polyuria, polydipsia. Objective:      Vitals:    12/12/21 0334 12/12/21 0402 12/12/21 0507 12/12/21 0602   BP:  136/63 (!) 149/63 (!) 112/55   Pulse:  82 92 91   Resp:  14 14 15   Temp:       SpO2:  93% 96% 95%   Weight: 222 lb 3.6 oz (100.8 kg)      Height:           Physical Exam:  General-No Acute Distress  Neck- supple, no JVD  CV- regular rate and rhythm no RG  Lung- rales in the bilateral bases  Abd- soft, nontender, nondistended  Ext-trace lower extremity bilaterally.   Skin- warm and dry    Data Review:   Recent Labs     12/12/21  0400 12/12/21  0317 12/11/21  1118 12/11/21  0350 12/11/21  0350 12/10/21  2326 12/10/21  1934   NA  --  145  --   --  149*   < > 148*   K  --  3.9  --   --  4.0   < > 3.5   MG  --  2.3  --   --  2.3   < > 2.7*   BUN  --  14  --   --  9   < > 10   CREA  --  0.83  --   --  0.92   < > 0.76*   GLU  --  103*  --   --  114*   < > 138*   WBC 10.0  --   --   --  12.4*   < > 14.4*   HGB 8.2*  --  8.2*   < > 6.2*   < > 7.5* HCT 25.7*  --  25.0*   < > 20.0*   < > 22.3*   *  --   --   --  184   < > 164   INR  --   --   --   --   --   --  1.7   CHOL  --   --   --   --  59  --   --    LDLC  --   --   --   --  22.8  --   --    HDL  --   --   --   --  22*  --   --     < > = values in this interval not displayed. Assessment/Plan:     1. Hx of CABG  2.  NSTEMI   3. Hyperlipidemia    He was extubated yesterday and his balloon pump was removed. The patient is hemodynamically stable. He is currently on baby aspirin daily and Lipitor as well as p.o. Lopressor and p.o. amiodarone. Consider P2Y12 inhibition once acceptable from a surgical standpoint since the patient was admitted with ACS.     Dante Martins MD

## 2021-12-12 NOTE — PROGRESS NOTES
80 yo patient POD 2 s/p Emergent CABGx4  AVSS AAOx3 Doing well. No SOB or CP  /70   HR 92-96, NSR  Lungs CTA Heart RR, No murmurs  Abdoment: Soft, NT ND BS(+)  Urine output ok  Neurol: Intact, OS=PD, No facial asimmetry, No tongue deviation, No focal Neuro- deficits  Wounds: Clean  C-tubes: Min output    LABS: Mild Anemia, o/w - Normal  ABG: Ok, Hgb 8.2 Plt 133K  Creat.  0.83  BUN 14    CXray: Ok, mild bibasilar lung infiltrates    A/P : POD 2 s/p Emergent CABGx4  Benign Course  OOB, Ambulate  Incentive Spirometer  B-blockers po  Discussed in Detail with the patient and medical staff  \

## 2021-12-12 NOTE — PROGRESS NOTES
ACUTE OT GOALS:  (Developed with and agreed upon by patient and/or caregiver.)  1. Patient will complete lower body bathing and dressing with MIN A and adaptive equipment as needed. 2.Patient will complete upper body bathing and dressing with MOD I and adaptive equipment as needed. 3. Patient will complete toileting with MIN A.   4. Patient will tolerate 25 minutes of OT treatment with 1-2 rest breaks to increase activity tolerance for ADLs. 5. Patient will complete functional transfers with MIN A and adaptive equipment as needed. 6. Patient will complete functional activity with MOD I and adaptive equipment as needed.     Timeframe: 7 visits     OCCUPATIONAL THERAPY ASSESSMENT: Initial Assessment and Daily Note OT Treatment Day # 1   Sternal Precautions     Monty Kussmaul is a 79 y.o. male   PRIMARY DIAGNOSIS: S/P CABG x 4  NSTEMI (non-ST elevated myocardial infarction) ([facility] [address] [I21.4]  Unstable angina (HCC) [I20.0]  CAD, multiple vessel [I25.10]  Procedure(s) (LRB):  CORONARY ARTERY BYPASS GRAFT (CABG X 4)/ LIMA (N/A)  LEFT GREATER SAPHENOUS VEIN HARVEST ENDOSCOPIC (Left)  2 Days Post-Op  Reason for Referral:    ICD-10: Treatment Diagnosis: Generalized Muscle Weakness (M62.81)  Other lack of cordination (R27.8)  Difficulty in walking, Not elsewhere classified (R26.2)  [place] / Product Type: Medicare /   ASSESSMENT:     REHAB RECOMMENDATIONS:   Recommendation to date pending progress:  Setting:   Short-term Rehab  Equipment:    To Be Determined     PRIOR LEVEL OF FUNCTION:  (Prior to Hospitalization)  INITIAL/CURRENT LEVEL OF FUNCTION:  (Based on today's evaluation)   Bathing:   Modified Independent  Dressing:   Modified Independent  Feeding/Grooming:   Modified Independent  Toileting:   Modified Independent  Functional Mobility:   Modified Independent (squat pivot transfers and wheelchair level) Bathing:   Maximal Assistance  Dressing:   Maximal Assistance  Feeding/Grooming:   Contact Guard Assistance  Toileting:   Maximal Assistance  Functional Mobility:   Maximal Assistance x 2 (partial stand)     ASSESSMENT:  Mr. Luiz Washburn is s/p CABG x4. At baseline, pt is independent at the wheelchair level for ADLs. Performed squat pivot transfers independently. Has assist from family for all IADLs. Today, pt was received sitting in the chair in CVICU. Completed partial stand maxA x2. Performed grooming tasks sitting in the chair--pt sat without support of chair back in order to promote use of abdominal muscles during grooming tasks. Pt reported it was [place]. \" Pt pleasant and very willing to participate in therapy session today. Greer De La Paz currently demonstrates overall deficits in strength, balance, activity tolerance, and ADL performance. Patient would benefit from skilled OT services at this time in order to address functional deficits and OT goals stated above. SUBJECTIVE:   Mr. Luiz Washburn states, Shruti Kylee was hard. \"    SOCIAL HISTORY/LIVING ENVIRONMENT: lives with his wife in a one level home, ramp to enter, walk in shower with chair, does not ambulate, independent for squat pivot transfer and all ADLs, assist for IADLs from family  Home Environment: Private residence  One/Two Story Residence: One story  Living Alone: No  Support Systems: Spouse/Significant Other    OBJECTIVE:     PAIN: VITAL SIGNS: LINES/DRAINS:   Pre Treatment: Pain Screen  Pain Scale 1: Numeric (0 - 10)  Pain Intensity 1: 0  Post Treatment: no change    Chest Tube, IV and ICU monitors  O2 Device: None (Room air)     GROSS EVALUATION:  BUEs Within Functional Limits Abnormal/ Functional Abnormal/ Non-Functional (see comments) Not Tested Comments:   AROM [x] [] [] []    PROM [] [] [] [x]    Strength [] [x] [] []    Balance [] [x] [] [] Poor standing, fair+ sitting    Posture [x] [] [] []    Sensation [x] [] [] []    Coordination [x] [] [] []    Tone [] [] [] [x]    Edema [] [] [] [x]    Activity Tolerance [] [x] [] [] fatigues quickly     [] [] [] []      COGNITION/  PERCEPTION: Intact Impaired   (see comments) Comments:   Orientation [x] [] Mild confusion    Vision [x] []    Hearing [x] []    Judgment/ Insight [x] []    Attention [x] []    Memory [x] []    Command Following [x] []    Emotional Regulation [x] []     [] []      ACTIVITIES OF DAILY LIVING: I Mod I S SBA CGA Min Mod Max Total NT Comments   BASIC ADLs:              Bathing/ Showering [] [] [] [] [] [] [] [] [] [x]    Toileting [] [] [] [] [] [] [] [] [] [x]    Dressing [] [] [] [] [] [] [] [x] [] [] Socks    Feeding [] [] [] [] [] [] [] [] [] [x]    Grooming [] [] [] [] [x] [] [] [] [] [] Brushed teeth and washed face sitting unsupported in the chair    Personal Device Care [] [] [] [] [] [] [] [] [] [x]    Functional Mobility [] [] [] [] [] [] [] [x] [] [] x2 for partial stand    I=Independent, Mod I=Modified Independent, S=Supervision, SBA=Standby Assistance, CGA=Contact Guard Assistance,   Min=Minimal Assistance, Mod=Moderate Assistance, Max=Maximal Assistance, Total=Total Assistance, NT=Not Tested    MOBILITY: I Mod I S SBA CGA Min Mod Max Total  NT x2 Comments:   Supine to sit [] [] [] [] [] [] [] [] [] [x] [] Received in chair    Sit to supine [] [] [] [] [] [] [] [] [] [x] [] Left sitting in the chair    Sit to stand [] [] [] [] [] [] [] [x] [] [] [x] Partial stand    Bed to chair [] [] [] [] [] [] [] [] [] [x] []    I=Independent, Mod I=Modified Independent, S=Supervision, SBA=Standby Assistance, CGA=Contact Guard Assistance,   Min=Minimal Assistance, Mod=Moderate Assistance, Max=Maximal Assistance, Total=Total Assistance, NT=Not Tested    [facility] AM-PAC 6 Clicks   Daily Activity Inpatient Short Form        How much help from another person does the patient currently need. .. Total A Lot A Little None   1. Putting on and taking off regular lower body clothing?    [] 1   [x] 2   [] 3   [] 4   2. Bathing (including washing, rinsing, drying)? [] 1   [x] 2   [] 3   [] 4   3. Toileting, which includes using toilet, bedpan or urinal?   [] 1   [x] 2   [] 3   [] 4   4. Putting on and taking off regular upper body clothing? [] 1   [] 2   [x] 3   [] 4   5. Taking care of personal grooming such as brushing teeth? [] 1   [] 2   [x] 3   [] 4   6. Eating meals? [] 1   [] 2   [] 3   [x] 4   © 2007, Trustees of [address] [place] [address] [address] [address], under license to [employer]. All rights reserved     Score:  Initial: 16 completed on 12/12/21 Most Recent: X (Date: -- )   Interpretation of Tool:  Represents activities that are increasingly more difficult (i.e. Bed mobility, Transfers, Gait). PLAN:   FREQUENCY/DURATION: OT Plan of Care: 3 times/week for duration of hospital stay or until stated goals are met, whichever comes first.    PROBLEM LIST:   (Skilled intervention is medically necessary to address:)  1. Decreased ADL/Functional Activities  2. Decreased Activity Tolerance  3. Decreased Balance  4. Decreased Coordination  5. Decreased Strength  6. Decreased Transfer Abilities   INTERVENTIONS PLANNED:   (Benefits and precautions of occupational therapy have been discussed with the patient.)  1. Self Care Training  2. Therapeutic Activity  3. Therapeutic Exercise/HEP  4. Neuromuscular Re-education  5. Education     TREATMENT:     EVALUATION: Low Complexity : (Untimed Charge)    TREATMENT:   ($$ Self Care/Home Management: 8-22 mins    )  Co-Treatment PT/OT necessary due to patient's decreased overall endurance/tolerance levels, as well as need for high level skilled assistance to complete functional transfers/mobility and functional tasks  Self Care (15 Minutes): Self care including Lower Body Dressing and Grooming to increase independence and decrease level of assistance required.     TREATMENT GRID:  N/A    AFTER TREATMENT POSITION/PRECAUTIONS:  Alarm Activated, Chair, Needs within reach and RN notified    INTERDISCIPLINARY COLLABORATION:  RN/PCT, PT/PTA and OT/LAWRENCE    TOTAL TREATMENT DURATION:  OT Patient Time In/Time Out  Time In: 0829  Time Out: [address], OT

## 2021-12-12 NOTE — PROGRESS NOTES
ACUTE PHYSICAL THERAPY GOALS:  (Developed with and agreed upon by patient and/or caregiver. )  LTG:  (1.)Mr. Joyce Arriaza will move from supine to sit and sit to supine , scoot up and down and roll side to side in bed with MINIMAL ASSISTANCE within 7 treatment day(s). (2.)Mr. Joyce Arriaza will transfer from bed to chair and chair to bed with MODERATE ASSIST using the least restrictive device within 7 treatment day(s). (3.)Mr. Joyce Arriaza will propel manual wheelchair 100 ft with SUPERVISION for increased independence within 7 treatment day(s). (4.)Mr. Joyce Arriaza will participate in therapeutic activity/exercises x 24 minutes for increased strength within 7 treatment days.     ________________________________________________________________________________________________          PHYSICAL THERAPY ASSESSMENT: Initial Assessment and AM PT Treatment Day # 1      Jen Kenyon is a 79 y.o. male   PRIMARY DIAGNOSIS: S/P CABG x 4  NSTEMI (non-ST elevated myocardial infarction) ([facility] [address] [I21.4]  Unstable angina (HCC) [I20.0]  CAD, multiple vessel [I25.10]  Procedure(s) (LRB):  CORONARY ARTERY BYPASS GRAFT (CABG X 4)/ LIMA (N/A)  LEFT GREATER SAPHENOUS VEIN HARVEST ENDOSCOPIC (Left)  2 Days Post-Op  Reason for Referral:    ICD-10: Treatment Diagnosis: Generalized Muscle Weakness (M62.81)  [place] / Product Type: Medicare /     ASSESSMENT:     REHAB RECOMMENDATIONS:   Recommendation to date pending progress:  Setting:   Short-term Rehab  Equipment:    To Be Determined     PRIOR LEVEL OF FUNCTION:  (Prior to Hospitalization) INITIAL/CURRENT LEVEL OF FUNCTION:  (Most Recently Demonstrated)   Bed Mobility:   Modified Independent  Sit to Stand:   Modified Independent  Transfers:   Modified Independent  Gait/Mobility:   Unable to perform Bed Mobility:   Not tested  Sit to Stand:   Maximal Assistance x 2  Transfers:   Not tested  Gait/Mobility:   Not tested     ASSESSMENT:  Mr. Bridger Prince presents to PT with decreased AROM and strength in B LEs. He also has decreased R LE terminal knee extension. Pt reported that he transfers himself to manual w/c at home but doesn't come to complete stand. Pt was able to scoot forward in chair with modA x 2 and additional time. He demonstrated fair sitting balance and came to partial stand with maxA x 2. Pt demonstrated poor standing balance and unable to remain up for long. Mr. Bridger Prince could benefit from skilled PT as he is currently functioning below his baseline. SUBJECTIVE:   Mr. Bridger Prince states, \"My wife is Lolly Lesch. \"    SOCIAL HISTORY/LIVING ENVIRONMENT: Lives with wife and reported that he transfers to manual w/c with squat pivot; history of falls  Home Environment: Private residence  One/Two Story Residence: One story  Living Alone: No  Support Systems: Spouse/Significant Other  OBJECTIVE:     PAIN: VITAL SIGNS: LINES/DRAINS:   Pre Treatment: Pain Screen  Pain Scale 1: Numeric (0 - 10)  Pain Intensity 1: 0  Post Treatment: 0   Chest Tube, Continuous Pulse Oximetry, Prince Catheter and IV  O2 Device: None (Room air)     GROSS EVALUATION:  B LE Within Functional Limits Abnormal/ Functional Abnormal/ Non-Functional (see comments) Not Tested Comments:   AROM [] [x] [] []    PROM [] [x] [] [] R knee extension   Strength [] [] [x] [] Grossly decreased   Balance [] [] [x] [] Poor \"standing\" and fair sitting   Posture [] [] [] []    Sensation [] [] [] []    Coordination [] [] [] []    Tone [] [] [] []    Edema [] [] [] []    Activity Tolerance [] [x] [] [] Fatigued quickly    [] [] [] []      COGNITION/  PERCEPTION: Intact Impaired   (see comments) Comments:   Orientation [] [x] Slight confusion   Vision [] []    Hearing [] []    Command Following [x] []    Safety Awareness [x] []     [] []      MOBILITY: I Mod I S SBA CGA Min Mod Max Total  NT x2 Comments:   Bed Mobility    Rolling [] [] [] [] [] [] [] [] [] [] []    Supine to Sit [] [] [] [] [] [] [] [] [] [] []    Scooting [] [] [] [] [] [] [x] [x] [] [] [x]    Sit to Supine [] [] [] [] [] [] [] [] [] [] []    Transfers    Sit to Stand [] [] [] [] [] [] [] [x] [] [] [x]    Bed to Chair [] [] [] [] [] [] [] [] [] [] []    Stand to Sit [] [] [] [] [] [] [] [x] [] [] [x]    I=Independent, Mod I=Modified Independent, S=Supervision, SBA=Standby Assistance, CGA=Contact Guard Assistance,   Min=Minimal Assistance, Mod=Moderate Assistance, Max=Maximal Assistance, Total=Total Assistance, NT=Not [facility]       How much difficulty does the patient currently have. .. Unable A Lot A Little None   1. Turning over in bed (including adjusting bedclothes, sheets and blankets)? [] 1   [x] 2   [] 3   [] 4   2. Sitting down on and standing up from a chair with arms ( e.g., wheelchair, bedside commode, etc.)   [] 1   [x] 2   [] 3   [] 4   3. Moving from lying on back to sitting on the side of the bed? [] 1   [x] 2   [] 3   [] 4   How much help from another person does the patient currently need. .. Total A Lot A Little None   4. Moving to and from a bed to a chair (including a wheelchair)? [] 1   [x] 2   [] 3   [] 4   5. Need to walk in hospital room? [x] 1   [] 2   [] 3   [] 4   6. Climbing 3-5 steps with a railing? [x] 1   [] 2   [] 3   [] 4   © 2007, Trustees of [facility] [employer], under license to [employer]. All rights reserved     Score:  Initial: 10 Most Recent: X (Date: -- )    Interpretation of Tool:  Represents activities that are increasingly more difficult (i.e. Bed mobility, Transfers, Gait). PLAN:   FREQUENCY/DURATION: PT Plan of Care: 3 times/week for duration of hospital stay or until stated goals are met, whichever comes first.    PROBLEM LIST:   (Skilled intervention is medically necessary to address:)  1. Decreased ADL/Functional Activities  2. Decreased Activity Tolerance  3. Decreased AROM/PROM  4.  Decreased Balance  5. Decreased Cognition  6. Decreased Coordination  7. Decreased Gait Ability  8. Decreased Strength  9. Decreased Transfer Abilities  10. Increased Pain   INTERVENTIONS PLANNED:   (Benefits and precautions of physical therapy have been discussed with the patient.)  1. Self Care Training  2. Therapeutic Activity  3. Therapeutic Exercise/HEP  4. Neuromuscular Re-education  5. Gait Training  6. Manual Therapy  7. Education     TREATMENT:     EVALUATION: Moderate Complexity : (Untimed Charge)    TREATMENT:   ($$ Therapeutic Activity: 8-22 mins    )  Co-Treatment PT/OT necessary due to patient's decreased overall endurance/tolerance levels, as well as need for high level skilled assistance to complete functional transfers/mobility and functional tasks  Therapeutic Activity (10 Minutes): Therapeutic activity included Scooting, Transfer Training, Sitting balance  and Standing balance to improve functional Mobility, Strength and Activity tolerance.     TREATMENT GRID:  N/A    AFTER TREATMENT POSITION/PRECAUTIONS:  Alarm Activated, Chair, Needs within reach and RN notified    INTERDISCIPLINARY COLLABORATION:  RN/PCT, PT/PTA and OT/ALWRENCE    TOTAL TREATMENT DURATION:  PT Patient Time In/Time Out  Time In: 0721  Time Out: [address], PT, DPT

## 2021-12-12 NOTE — PROGRESS NOTES
Progressing per POC. Wife called with patient ID and was updated. Patient with mild confusion pertaining to situation. Reoriented. IABP groin site benign.

## 2021-12-12 NOTE — ANESTHESIA POSTPROCEDURE EVALUATION
Procedure(s):  CORONARY ARTERY BYPASS GRAFT (CABG X 4)/ LIMA  LEFT GREATER SAPHENOUS VEIN HARVEST ENDOSCOPIC. general    Anesthesia Post Evaluation      Multimodal analgesia: multimodal analgesia used between 6 hours prior to anesthesia start to PACU discharge  Patient location during evaluation: PACU  Patient participation: complete - patient participated  Level of consciousness: awake and alert  Pain management: adequate  Airway patency: patent  Anesthetic complications: no  Cardiovascular status: acceptable  Respiratory status: acceptable  Hydration status: acceptable  Comments: Extubated and IABP removed yesterday  Post anesthesia nausea and vomiting:  none  Final Post Anesthesia Temperature Assessment:  Normothermia (36.0-37.5 degrees C)      INITIAL Post-op Vital signs:   Vitals Value Taken Time   /63 12/12/21 1202   Temp 36.3 °C (97.4 °F) 12/12/21 0700   Pulse 84 12/12/21 1230   Resp 24 12/12/21 1230   SpO2 95 % 12/12/21 1230   Vitals shown include unvalidated device data.

## 2021-12-12 NOTE — PROGRESS NOTES
Reviewed notes for concerns      Per notes:      Sitting in chair    extubated        Will continue to assess how to best serve this family

## 2021-12-12 NOTE — PROGRESS NOTES
Pulmonary CV progress Note: 12/12/2021        Jeanna Paul [facility]                                                     Admission Date: 12/10/2021     The patient's chart is reviewed and the patient is discussed with the staff. Background: 79 y.o. y/o male  has a past medical history of Arthritis, HLD (hyperlipidemia) (12/7/2012), Macular degeneration, and Osteoarthritis of left knee. He also has no past medical history of Malignant hyperthermia due to anesthesia or Pseudocholinesterase deficiency. S/P CABG x 4 2 Days Post-Op      The patient is an 79 y.o.  male seen and evaluated at the request of Dr. Charlotte Martin for respiratory management post emergent CABG.     The pt has been having CP at home for about 10 days. He presented to the ER and evaluation was positive for a NSTEMI. Because of ongoing CP he was taken to the cardiac cath lab where MV CAD was noted involving the LM, LAD, RCA, OM1, and OM2. Urgent surgery was recommended and an IABP was placed. An echo revealed an LVEF of 55%  He was taken to the OR and underwent CABG x 4 by Dr. Kapil Schuster. He is currently on low dose levophed and NTG. His IABP is set at 1:2.      The pt is a former smoker but quit nearly 50 years ago. He has not been on inhaled bronchodilators.        Subjective:     Extubated yesterday and now on RA up in chair. Off pressors. IABP out yesterday as well. In wheelchair at home to mobility will be an issue. UOP marginal but Cr Ok. Objective:   Blood pressure (!) 149/63, pulse 92, temperature 97.1 °F (36.2 °C), resp. rate 14, height 5' 10\" (1.778 m), weight 222 lb 3.6 oz (100.8 kg), SpO2 96 %.      Intake/Output Summary (Last 24 hours) at 12/12/2021 0559  Last data filed at 12/12/2021 0518  Gross per 24 hour   Intake 781.43 ml   Output 1700 ml   Net -918.57 ml     Physical Exam:          Constitutional:  Awake, calm  EENMT:  Sclera clear, pupils equal  Respiratory: decreased BS  Cardiovascular:  RRR with no M,G,R;  Gastrointestinal:  soft; minimal bowel sounds present  Musculoskeletal:  warm with no cyanosis, no lower extremity edema. SKIN:  no jaundice or ecchymosis; pale   Neurologic:  moving all extremities  Psychiatric:  calm    CXR:       12/12:        12/11, 10:            LINES:  meza, swan gita, arterial line, chest tubes times 2 in epigastric area without air leak. DRIPS:   Epinephrine Dose (mcg/min): 0 mcg/min  Levophed Dose (mcg/kg/min): 0 mcg/kg/min (BP-130/62  SVR-1029)  Phenylephrine Dose (mcg/min): 0 mcg/min  Precedex Dose (mcg/kg/hr): 0 mcg/kg/hr  Levophed Dose (mcg/kg/min): 0 mcg/kg/min (BP-130/62  SVR-1029)       Recent Labs     12/11/21  0403 12/10/21  1956 12/10/21  1838   PHI 7.37 7.35 7.35   PCO2I 39.8 40.5 39.2   PO2I 140* 110* 275*   HCO3I 23.2 22.4 21.7*      Recent Labs     12/12/21  0400 12/11/21  1118 12/11/21  0350 12/10/21  2326 12/10/21  1934 12/10/21  1934 12/10/21  1138 12/10/21  1138   WBC 10.0  --  12.4*  --   --  14.4*  --  7.1   HGB 8.2* 8.2* 6.2* 6.9*   < > 7.5*   < > 10.6*   HCT 25.7* 25.0* 20.0* 20.8*   < > 22.3*   < > 32.0*   *  --  184  --   --  164  --  310   INR  --   --   --   --   --  1.7  --   --     < > = values in this interval not displayed. Recent Labs     12/12/21  0317 12/11/21  0350 12/10/21  2326 12/10/21  1934 12/10/21  1934 12/10/21  1138 12/10/21  1138    149*  --   --  148*   < > 141   K 3.9 4.0 4.1   < > 3.5   < > 3.8   * 119*  --   --  115*   < > 107   CO2 26 24  --   --  23   < > 25   * 114*  --   --  138*   < > 118*   BUN 14 9  --   --  10   < > 14   CREA 0.83 0.92  --   --  0.76*   < > 0.91   MG 2.3 2.3 2.4   < > 2.7*   < > 2.0   CA 7.8* 7.2*  --   --  7.8*   < > 8.9   ALB  --   --   --   --   --   --  3.8    < > = values in this interval not displayed. No results for input(s): LCAD, LAC in the last 72 hours.   Results from [place] encounter on 12/10/21    ECHO ADULT COMPLETE    Interpretation Summary  · LV: Estimated LVEF is 60 - 65%. Normal cavity size, wall thickness, systolic function (ejection fraction normal) and diastolic function. Abnormal left ventricular septal motion. · Image quality for this study was technically difficult. · Contrast used: DEFINITY. Assessment and Plan :  (Medical Decision Making)   Impression: 79 y.o. y/o male s/p CV surgery for S/P CABG x 4; 2 Days Post-Op    Principal Problem:    S/P CABG x 4 (12/10/2021)    Doing well. Extubated and on RA. UOP soft- force fluids. Active Problems:      Unstable angina (HCC) (12/10/2021)    Now post CABG x 4      HTN (hypertension) (12/10/2021)    BP better      CAD, multiple vessel (12/10/2021)    Echo with preserved LV function. Anemia (12/10/2021)    Better after transfusion. Debility (12/12/2021)     Mobilize as able. In wheelchair at home. More than 50% of the time documented was spent in face-to-face contact with the patient and in the care of the patient on the floor/unit where the patient is located. Will sign off. Call if needed.      Aubrie Yanez MD

## 2021-12-13 ENCOUNTER — APPOINTMENT (OUTPATIENT)
Dept: GENERAL RADIOLOGY | Age: 81
DRG: 234 | End: 2021-12-13
Attending: THORACIC SURGERY (CARDIOTHORACIC VASCULAR SURGERY)
Payer: MEDICARE

## 2021-12-13 LAB
ACT AVERAGE - AAVG: 129 SEC
ANION GAP SERPL CALC-SCNC: 4 MMOL/L (ref 7–16)
ARTERIAL PATENCY WRIST A: ABNORMAL
ATRIAL RATE: 85 BPM
BASE DEFICIT BLD-SCNC: 2.8 MMOL/L
BASELINE ACT - BAACT: 256 SEC
BASELINE ACT - BAACT: 256 SEC
BDY SITE: ABNORMAL
BUN SERPL-MCNC: 16 MG/DL (ref 8–23)
CALCIUM SERPL-MCNC: 7.8 MG/DL (ref 8.3–10.4)
CALCULATED R AXIS, ECG10: -29 DEGREES
CALCULATED T AXIS, ECG11: -29 DEGREES
CHLORIDE SERPL-SCNC: 114 MMOL/L (ref 98–107)
CO2 SERPL-SCNC: 25 MMOL/L (ref 21–32)
CREAT SERPL-MCNC: 0.83 MG/DL (ref 0.8–1.5)
DIAGNOSIS, 93000: NORMAL
ERYTHROCYTE [DISTWIDTH] IN BLOOD BY AUTOMATED COUNT: 15.9 % (ref 11.9–14.6)
GAS FLOW.O2 O2 DELIVERY SYS: ABNORMAL L/MIN
GLUCOSE SERPL-MCNC: 109 MG/DL (ref 65–100)
HCO3 BLD-SCNC: 22.5 MMOL/L (ref 22–26)
HCT VFR BLD AUTO: 24.6 % (ref 41.1–50.3)
HEPARIN BOLUS-PATIENT - HBPAT: NORMAL UNITS
HEPARIN BOLUS-PATIENT - HBPAT: NORMAL UNITS
HEPARIN BOLUS-PUMP - HBPUMP: 0 UNITS
HEPARIN BOLUS-PUMP - HBPUMP: 0 UNITS
HEPARIN BOLUS-TOTAL - HBTOT: NORMAL UNITS
HEPARIN BOLUS-TOTAL - HBTOT: NORMAL UNITS
HEPARIN TEST CONCENTRATE - HEPTC: 2.5 MG/KG
HEPARIN TEST CONCENTRATE - HEPTC: 2.5 MG/KG
HGB BLD-MCNC: 8 G/DL (ref 13.6–17.2)
MAGNESIUM SERPL-MCNC: 2.5 MG/DL (ref 1.8–2.4)
MCH RBC QN AUTO: 30.2 PG (ref 26.1–32.9)
MCHC RBC AUTO-ENTMCNC: 32.5 G/DL (ref 31.4–35)
MCV RBC AUTO: 92.8 FL (ref 79.6–97.8)
NRBC # BLD: 0 K/UL (ref 0–0.2)
O2/TOTAL GAS SETTING VFR VENT: 50 %
P-R INTERVAL, ECG05: 208 MS
PAW @ MEAN EXP FLOW ON VENT: 11 CMH2O
PCO2 BLD: 40.4 MMHG (ref 35–45)
PEEP RESPIRATORY: 8 CMH2O
PH BLD: 7.35 [PH] (ref 7.35–7.45)
PIP ISTAT,IPIP: 17
PLATELET # BLD AUTO: 143 K/UL (ref 150–450)
PMV BLD AUTO: 10.4 FL (ref 9.4–12.3)
PO2 BLD: 123 MMHG (ref 75–100)
POTASSIUM SERPL-SCNC: 4.1 MMOL/L (ref 3.5–5.1)
PRESSURE SUPPORT SETTING VENT: 8 CMH2O
PROJECTED HEPARIN CONC - PHEP: 1.4 MG/KG
PROJECTED HEPARIN CONC - PHEP: 1.4 MG/KG
Q-T INTERVAL, ECG07: 400 MS
QRS DURATION, ECG06: 140 MS
QTC CALCULATION (BEZET), ECG08: 476 MS
RBC # BLD AUTO: 2.65 M/UL (ref 4.23–5.6)
SAO2 % BLD: 98.6 % (ref 95–98)
SERVICE CMNT-IMP: ABNORMAL
SERVICE CMNT-IMP: ABNORMAL
SLOPE: 114
SLOPE: 114
SODIUM SERPL-SCNC: 143 MMOL/L (ref 138–145)
SPECIMEN TYPE: ABNORMAL
TOTAL RESP. RATE, ITRR: 12
VENTILATION MODE VENT: ABNORMAL
VENTRICULAR RATE, ECG03: 85 BPM
WBC # BLD AUTO: 8.7 K/UL (ref 4.3–11.1)

## 2021-12-13 PROCEDURE — 93005 ELECTROCARDIOGRAM TRACING: CPT | Performed by: PHYSICIAN ASSISTANT

## 2021-12-13 PROCEDURE — 36592 COLLECT BLOOD FROM PICC: CPT

## 2021-12-13 PROCEDURE — 65660000004 HC RM CVT STEPDOWN

## 2021-12-13 PROCEDURE — 2709999900 HC NON-CHARGEABLE SUPPLY

## 2021-12-13 PROCEDURE — 77030018667 ADMN ST IV BLD FENW -A

## 2021-12-13 PROCEDURE — 99232 SBSQ HOSP IP/OBS MODERATE 35: CPT | Performed by: INTERNAL MEDICINE

## 2021-12-13 PROCEDURE — 71045 X-RAY EXAM CHEST 1 VIEW: CPT

## 2021-12-13 PROCEDURE — 76937 US GUIDE VASCULAR ACCESS: CPT

## 2021-12-13 PROCEDURE — P9016 RBC LEUKOCYTES REDUCED: HCPCS

## 2021-12-13 PROCEDURE — 80048 BASIC METABOLIC PNL TOTAL CA: CPT

## 2021-12-13 PROCEDURE — 85027 COMPLETE CBC AUTOMATED: CPT

## 2021-12-13 PROCEDURE — 99024 POSTOP FOLLOW-UP VISIT: CPT | Performed by: THORACIC SURGERY (CARDIOTHORACIC VASCULAR SURGERY)

## 2021-12-13 PROCEDURE — 36430 TRANSFUSION BLD/BLD COMPNT: CPT

## 2021-12-13 PROCEDURE — 74011250637 HC RX REV CODE- 250/637: Performed by: PHYSICIAN ASSISTANT

## 2021-12-13 PROCEDURE — 83735 ASSAY OF MAGNESIUM: CPT

## 2021-12-13 PROCEDURE — 97530 THERAPEUTIC ACTIVITIES: CPT

## 2021-12-13 PROCEDURE — 74011250637 HC RX REV CODE- 250/637: Performed by: THORACIC SURGERY (CARDIOTHORACIC VASCULAR SURGERY)

## 2021-12-13 PROCEDURE — 74011250637 HC RX REV CODE- 250/637: Performed by: NURSE PRACTITIONER

## 2021-12-13 RX ORDER — ASPIRIN 81 MG/1
81 TABLET ORAL DAILY
Status: DISCONTINUED | OUTPATIENT
Start: 2021-12-14 | End: 2021-12-15 | Stop reason: HOSPADM

## 2021-12-13 RX ORDER — DOCUSATE SODIUM 100 MG/1
100 CAPSULE, LIQUID FILLED ORAL DAILY
Status: DISCONTINUED | OUTPATIENT
Start: 2021-12-14 | End: 2021-12-13

## 2021-12-13 RX ORDER — METOPROLOL TARTRATE 25 MG/1
25 TABLET, FILM COATED ORAL EVERY 12 HOURS
Status: DISCONTINUED | OUTPATIENT
Start: 2021-12-13 | End: 2021-12-15 | Stop reason: HOSPADM

## 2021-12-13 RX ORDER — POTASSIUM CHLORIDE 20 MEQ/1
40 TABLET, EXTENDED RELEASE ORAL
Status: DISCONTINUED | OUTPATIENT
Start: 2021-12-13 | End: 2021-12-15 | Stop reason: HOSPADM

## 2021-12-13 RX ORDER — POTASSIUM CHLORIDE 20 MEQ/1
20 TABLET, EXTENDED RELEASE ORAL
Status: DISCONTINUED | OUTPATIENT
Start: 2021-12-13 | End: 2021-12-15 | Stop reason: HOSPADM

## 2021-12-13 RX ORDER — SODIUM CHLORIDE 0.9 % (FLUSH) 0.9 %
5-40 SYRINGE (ML) INJECTION EVERY 8 HOURS
Status: DISCONTINUED | OUTPATIENT
Start: 2021-12-13 | End: 2021-12-15 | Stop reason: HOSPADM

## 2021-12-13 RX ORDER — ATORVASTATIN CALCIUM 80 MG/1
80 TABLET, FILM COATED ORAL
Status: DISCONTINUED | OUTPATIENT
Start: 2021-12-13 | End: 2021-12-15 | Stop reason: HOSPADM

## 2021-12-13 RX ORDER — MAG HYDROX/ALUMINUM HYD/SIMETH 200-200-20
30 SUSPENSION, ORAL (FINAL DOSE FORM) ORAL
Status: DISCONTINUED | OUTPATIENT
Start: 2021-12-13 | End: 2021-12-15 | Stop reason: HOSPADM

## 2021-12-13 RX ORDER — SODIUM CHLORIDE 0.9 % (FLUSH) 0.9 %
5-40 SYRINGE (ML) INJECTION AS NEEDED
Status: DISCONTINUED | OUTPATIENT
Start: 2021-12-13 | End: 2021-12-15 | Stop reason: HOSPADM

## 2021-12-13 RX ORDER — AMIODARONE HYDROCHLORIDE 200 MG/1
200 TABLET ORAL EVERY 12 HOURS
Status: DISCONTINUED | OUTPATIENT
Start: 2021-12-13 | End: 2021-12-15 | Stop reason: HOSPADM

## 2021-12-13 RX ORDER — LANOLIN ALCOHOL/MO/W.PET/CERES
400 CREAM (GRAM) TOPICAL
Status: DISCONTINUED | OUTPATIENT
Start: 2021-12-13 | End: 2021-12-15 | Stop reason: HOSPADM

## 2021-12-13 RX ORDER — LANOLIN ALCOHOL/MO/W.PET/CERES
3 CREAM (GRAM) TOPICAL
Status: DISCONTINUED | OUTPATIENT
Start: 2021-12-13 | End: 2021-12-15 | Stop reason: HOSPADM

## 2021-12-13 RX ORDER — POTASSIUM CHLORIDE 750 MG/1
10 TABLET, EXTENDED RELEASE ORAL DAILY
Status: DISCONTINUED | OUTPATIENT
Start: 2021-12-14 | End: 2021-12-15 | Stop reason: HOSPADM

## 2021-12-13 RX ORDER — SODIUM CHLORIDE 9 MG/ML
250 INJECTION, SOLUTION INTRAVENOUS AS NEEDED
Status: DISCONTINUED | OUTPATIENT
Start: 2021-12-13 | End: 2021-12-15 | Stop reason: HOSPADM

## 2021-12-13 RX ORDER — MUPIROCIN 20 MG/G
OINTMENT TOPICAL 2 TIMES DAILY
Status: DISCONTINUED | OUTPATIENT
Start: 2021-12-13 | End: 2021-12-13

## 2021-12-13 RX ORDER — ACETAMINOPHEN 325 MG/1
650 TABLET ORAL
Status: DISCONTINUED | OUTPATIENT
Start: 2021-12-13 | End: 2021-12-15 | Stop reason: HOSPADM

## 2021-12-13 RX ORDER — ONDANSETRON 2 MG/ML
4 INJECTION INTRAMUSCULAR; INTRAVENOUS
Status: DISCONTINUED | OUTPATIENT
Start: 2021-12-13 | End: 2021-12-15 | Stop reason: HOSPADM

## 2021-12-13 RX ORDER — FAMOTIDINE 20 MG/1
20 TABLET, FILM COATED ORAL 2 TIMES DAILY
Status: DISCONTINUED | OUTPATIENT
Start: 2021-12-13 | End: 2021-12-15 | Stop reason: HOSPADM

## 2021-12-13 RX ORDER — NITROGLYCERIN 0.4 MG/1
0.4 TABLET SUBLINGUAL
Status: DISCONTINUED | OUTPATIENT
Start: 2021-12-13 | End: 2021-12-15 | Stop reason: HOSPADM

## 2021-12-13 RX ORDER — FUROSEMIDE 40 MG/1
40 TABLET ORAL DAILY
Status: DISCONTINUED | OUTPATIENT
Start: 2021-12-14 | End: 2021-12-15 | Stop reason: HOSPADM

## 2021-12-13 RX ORDER — AMOXICILLIN 250 MG
2 CAPSULE ORAL EVERY 12 HOURS
Status: DISCONTINUED | OUTPATIENT
Start: 2021-12-13 | End: 2021-12-15 | Stop reason: HOSPADM

## 2021-12-13 RX ADMIN — ATORVASTATIN CALCIUM 80 MG: 80 TABLET, FILM COATED ORAL at 22:44

## 2021-12-13 RX ADMIN — AMIODARONE HYDROCHLORIDE 200 MG: 200 TABLET ORAL at 08:48

## 2021-12-13 RX ADMIN — Medication 10 ML: at 14:10

## 2021-12-13 RX ADMIN — SENNOSIDES AND DOCUSATE SODIUM 2 TABLET: 8.6; 5 TABLET ORAL at 22:44

## 2021-12-13 RX ADMIN — ASPIRIN 81 MG: 81 TABLET ORAL at 08:48

## 2021-12-13 RX ADMIN — AMIODARONE HYDROCHLORIDE 200 MG: 200 TABLET ORAL at 22:44

## 2021-12-13 RX ADMIN — ACETAMINOPHEN 650 MG: 325 TABLET ORAL at 12:51

## 2021-12-13 RX ADMIN — METOPROLOL TARTRATE 25 MG: 25 TABLET, FILM COATED ORAL at 22:44

## 2021-12-13 RX ADMIN — FAMOTIDINE 20 MG: 20 TABLET ORAL at 17:59

## 2021-12-13 RX ADMIN — Medication 10 ML: at 03:09

## 2021-12-13 RX ADMIN — METOPROLOL TARTRATE 25 MG: 25 TABLET, FILM COATED ORAL at 08:47

## 2021-12-13 RX ADMIN — Medication 10 ML: at 18:00

## 2021-12-13 RX ADMIN — POLYETHYLENE GLYCOL 3350 17 G: 17 POWDER, FOR SOLUTION ORAL at 08:47

## 2021-12-13 RX ADMIN — FAMOTIDINE 20 MG: 20 TABLET ORAL at 08:47

## 2021-12-13 RX ADMIN — Medication 10 ML: at 22:00

## 2021-12-13 RX ADMIN — Medication 1 AMPULE: at 22:44

## 2021-12-13 NOTE — PROGRESS NOTES
ACUTE PHYSICAL THERAPY GOALS:  (Developed with and agreed upon by patient and/or caregiver.)  (1.)Mr. Ama Rodas will move from supine to sit and sit to supine , scoot up and down and roll side to side in bed with MINIMAL ASSISTANCE within 7 treatment day(s). (2.)Mr. Ama Rodas will transfer from bed to chair and chair to bed with MODERATE ASSIST using the least restrictive device within 7 treatment day(s). (3.)Mr. Ama Rodas will propel manual wheelchair 100 ft with SUPERVISION for increased independence within 7 treatment day(s). (4.)Mr. Ama Rodas will participate in therapeutic activity/exercises x 24 minutes for increased strength within 7 treatment days. PHYSICAL THERAPY: Daily Note and AM Treatment Day # 2    Natanael Vera is a 79 y.o. male   PRIMARY DIAGNOSIS: S/P CABG x 4  NSTEMI (non-ST elevated myocardial infarction) ([facility] [address] [I21.4]  Unstable angina (HCC) [I20.0]  CAD, multiple vessel [I25.10]  Procedure(s) (LRB):  CORONARY ARTERY BYPASS GRAFT (CABG X 4)/ LIMA (N/A)  LEFT GREATER SAPHENOUS VEIN HARVEST ENDOSCOPIC (Left)  3 Days Post-Op    ASSESSMENT:     REHAB RECOMMENDATIONS: CURRENT LEVEL OF FUNCTION:  (Most Recently Demonstrated)   Recommendation to date pending progress:  Setting:   Short-term Rehab  Equipment:    To Be Determined Bed Mobility:   Not tested  Sit to Stand:   Unable to perform  Transfers:   Not tested  Gait/Mobility:   Unable to perform     ASSESSMENT:  Mr. Ama Rodas was sitting up in chair. Attempted sit to stand x 4 with max a x 2. Unable to come into standing although he did bear wt through his LE's. He is not able to perform SPT safely. Pt performed B LE ex. SUBJECTIVE:   Mr. Ama Rodas states \"I could do all this before my heart attack but I haven't tried since\".     SOCIAL HISTORY/ LIVING ENVIRONMENT:  Lives with wife and reported that he transfers to manual   w/c with squat pivot; history of falls  Home Environment: Private residence  One/Two Story Residence: [facility] story  Living Alone: No  Support Systems: Spouse/Significant Other  OBJECTIVE:     PAIN: VITAL SIGNS: LINES/DRAINS:   Pre Treatment:  0  Post Treatment: 0   Arterial Line, Chest Tube, Prince Catheter and IV  O2 Device: None (Room air)     MOBILITY: I Mod I S SBA CGA Min Mod Max Total  NT x2 Comments:   Bed Mobility    Rolling [] [] [] [] [] [] [] [] [] [x] []    Supine to Sit [] [] [] [] [] [] [] [] [] [x] []    Scooting [] [] [] [] [] [] [] [] [] [x] []    Sit to Supine [] [] [] [] [] [] [] [] [] [x] []    Transfers    Sit to Stand [] [] [] [] [] [] [] [x] [] [] [x]   Attempted   Bed to Chair [] [] [] [] [] [] [] [] [] [] []    Stand to Sit [] [] [] [] [] [] [] [x] [] [] [x]    I=Independent, Mod I=Modified Independent, S=Supervision, SBA=Standby Assistance, CGA=Contact Guard Assistance,   Min=Minimal Assistance, Mod=Moderate Assistance, Max=Maximal Assistance, Total=Total Assistance, NT=Not Tested    BALANCE: Good Fair+ Fair Fair- Poor NT Comments   Sitting Static [] [x] [] [] [] []    Sitting Dynamic [] [x] [] [] [] []              Standing Static [] [] [] [] [x] []    Standing Dynamic [] [] [] [] [x] []      GAIT: I Mod I S SBA CGA Min Mod Max Total  NT x2 Comments:   Level of Assistance [] [] [] [] [] [] [] [] [] [x] []    Distance     DME N/A    Gait Quality     Weightbearing  Status N/A     I=Independent, Mod I=Modified Independent, S=Supervision, SBA=Standby Assistance, CGA=Contact Guard Assistance,   Min=Minimal Assistance, Mod=Moderate Assistance, Max=Maximal Assistance, Total=Total Assistance, NT=Not Tested    PLAN:   FREQUENCY/DURATION: PT Plan of Care: BID for duration of hospital stay or until stated goals are met, whichever comes first.  TREATMENT:     TREATMENT:   ($$ Therapeutic Activity: 23-37 mins    )  Therapeutic Activity (23 Minutes): Therapeutic activity included Scooting, Sitting balance  and sit to stand and LE ex to improve functional Mobility, Strength and Activity tolerance.     TREATMENT GRID:     Date:  12/13/21 Date:   Date:     Activity/Exercise Seated Parameters Parameters Parameters   Heel raises X 20 B     Toe raises X 20 B     LAQ's X 15 B active  X 15 B AA     Hip Flex X 15 B     Hip ABD X 15 B AA                           AFTER TREATMENT POSITION/PRECAUTIONS:  Chair, Needs within reach and RN notified    INTERDISCIPLINARY COLLABORATION:  RN/PCT, PT/PTA and Rehab Attendant     TOTAL TREATMENT DURATION:  PT Patient Time In/Time Out  Time In: 0922  Time Out: [address], PTA

## 2021-12-13 NOTE — PROGRESS NOTES
CM spoke with patient to complete initial assessment. Patient states that he lives with spouse in a one story home with 2STE. Patient states before hospitalization, he was independent with ADL's however, unsure about now. DME's include; w/c, rw. Demographics, insurance and PCP confirmed. No hx of HH or STR. PT/OT recommended STR for patient in which patient is agreeable. SNF list given to patient. CM will return tomorrow to get choices. PPD already placed. Care Management Interventions  PCP Verified by CM:  Yes  Discharge Durable Medical Equipment: No  Physical Therapy Consult: Yes  Occupational Therapy Consult: Yes  Speech Therapy Consult: No  Support Systems: Spouse/Significant Other  Confirm Follow Up Transport: Family  Discharge Location  Discharge Placement: Skilled nursing facility

## 2021-12-13 NOTE — PROGRESS NOTES
Physician Progress Note      PATIENT:               Huseyin Flores  [facility] #:                  [id number]  :                       1940  ADMIT DATE:       12/10/2021 12:28 PM  [address] [place] DATE:  RESPONDING  PROVIDER #:        Hugo An MD        QUERY TEXT:    Type of Anemia: Please provide further specificity, if known. Clinical indicators include: 1 units, 5 units 5 units, hgb, hct, anemia, prbc, 2u, hemoglobin, prbcs, due to anemia, transfusion, rbc, platelet, transfuse  Options provided:  -- Anemia due to acute blood loss  -- Anemia due to chronic blood loss  -- Anemia due to iron deficiency  -- Anemia due to postoperative blood loss  -- Anemia due to chronic disease  -- Other - I will add my own diagnosis  -- Disagree - Not applicable / Not valid  -- Disagree - Clinically Unable to determine / Unknown        PROVIDER RESPONSE TEXT:    The patient has anemia due to acute blood loss.       Electronically signed by:  Hugo An MD 2021 2:13 PM pt will meet estimated nutrient needs; pt will be able to verbalize 3 foods high in sodium to avoid

## 2021-12-13 NOTE — PROGRESS NOTES
[facility] CARDIOLOGY PROGRESS NOTE           12/13/2021 9:38 AM    Admit Date: 12/10/2021         Subjective: Doing well, post CABG on Friday. Denies CP or SOB. ROS:  Cardiovascular:  As noted above    Objective:      Vitals:    12/13/21 0726 12/13/21 0831 12/13/21 0847 12/13/21 0849   BP: (!) 148/65  124/61 124/61   Pulse: 79 89 86 86   Resp: 17 16 (!) 33 14   Temp:  98.3 °F (36.8 °C)  97.2 °F (36.2 °C)   SpO2:       Weight:       Height:           On telemetry:      Physical Exam:  General: Well Developed, Well Nourished, No Acute Distress, Alert & Oriented x 3, Appropriate mood  Neck: supple, no JVD  Heart: S1S2 with RRR without murmurs or gallops  Lungs: Clear throughout auscultation bilaterally without adventitious sounds  Abd: soft, nontender, nondistended, with good bowel sounds  Ext: no edema bilaterally  Skin: warm and dry      Data Review:   Recent Labs     12/13/21  0310 12/12/21  0400 12/12/21  0317 12/11/21  1118 12/11/21  0350 12/11/21  0350 12/10/21  2326 12/10/21  1934     --  145  --   --  149*   < > 148*   K 4.1  --  3.9  --   --  4.0   < > 3.5   MG 2.5*  --  2.3  --   --  2.3   < > 2.7*   BUN 16  --  14  --   --  9   < > 10   CREA 0.83  --  0.83  --   --  0.92   < > 0.76*   *  --  103*  --   --  114*   < > 138*   WBC 8.7 10.0  --   --    < > 12.4*   < > 14.4*   HGB 8.0* 8.2*  --    < >   < > 6.2*   < > 7.5*   HCT 24.6* 25.7*  --    < >   < > 20.0*   < > 22.3*   * 133*  --   --    < > 184   < > 164   INR  --   --   --   --   --   --   --  1.7   CHOL  --   --   --   --   --  59  --   --    LDLC  --   --   --   --   --  22.8  --   --    HDL  --   --   --   --   --  22*  --   --     < > = values in this interval not displayed. No results for input(s): Darby Spare in the last 72 hours.       Assessment/Plan:     Principal Problem:    S/P CABG x 4 (12/10/2021)    Active Problems:    HLD (hyperlipidemia) (12/7/2012)      Unstable angina (HCC) (12/10/2021)      Diarrhea (12/10/2021)      HTN (hypertension) (12/10/2021)      CAD, multiple vessel (12/10/2021)      Anemia (12/10/2021)      Debility (12/12/2021)    A/P  1) CAD - post cabg, on ASA/statin  2) ACS - continue BB  3) HTN - controlled  4) HLD - statin    LVEF was normal prior to cabg.       Juana Pugh MD  12/13/2021 9:38 AM

## 2021-12-13 NOTE — PROGRESS NOTES
POC provided,patient with some confusion. 24 hour chart check done. Sacrum benign with optifoam intact. Son in to see.

## 2021-12-13 NOTE — PROGRESS NOTES
Physician Progress Note      PATIENT:               Wei Grant  CSN #:                  [id number]  :                       1940  ADMIT DATE:       12/10/2021 12:28 PM  [address] [place] DATE:  RESPONDING  PROVIDER #:        Yaa Smith MD          QUERY TEXT:    Patient admitted with NSTEMI. Noted documentation of NSTEMI in op report on 12/10 1 by whom and where) and ACS/CAD by Attn. in PN on   If possible, please document in progress notes and discharge summary if you are evaluating and /or treating any of the following: The medical record reflects the following:  Risk Factors: CAD, HTN, Smoker  Clinical Indicators: Chest pain ongoing,  hyperlipidemia,,  ECG changes concerning for NSTEMI, Cath showed LM, LAD, RCA, OM1 and OM2 disease. Treatment: Aspirin PO, urgent  CABGx4        Thank you. Don Lares RN CDI, CCS  My office phone [phone]  Options provided:  -- NSTEMI confirmed and ACS/CAD ruled out  -- ACS/CAD confirmed and NSTEMI ruled out  -- Other - I will add my own diagnosis  -- Disagree - Not applicable / Not valid  -- Disagree - Clinically unable to determine / Unknown  -- Refer to Clinical Documentation Reviewer    PROVIDER RESPONSE TEXT:    After study, NSTEMI confirmed and ACS/CAD ruled out.     Query created by: Jalyn Montague on 2021 12:04 PM      Electronically signed by:  Yaa Smith MD 2021 2:26 PM

## 2021-12-13 NOTE — PROGRESS NOTES
TRANSFER - OUT REPORT:    Verbal report given to [facility] rn(name) on Jay Jensenccasin  being transferred to [facility](unit) for routine progression of care       Report consisted of patients Situation, Background, Assessment and   Recommendations(SBAR). Information from the following report(s) SBAR, Kardex, Intake/Output, MAR, Recent Results, Med Rec Status and Cardiac Rhythm SR was reviewed with the receiving nurse. Lines:   Double Lumen 12/10/21 Right Internal jugular (Active)   Central Line Being Utilized Yes 12/13/21 0730   Criteria for Appropriate Use Hemodynamically unstable, requiring monitoring lines, vasopressors, or volume resuscitation 12/13/21 0730   Site Assessment Clean, dry, & intact 12/13/21 0730   Infiltration Assessment 0 12/13/21 0730   Affected Extremity/Extremities Color distal to insertion site pink (or appropriate for race) 12/13/21 0730   Date of Last Dressing Change 12/10/21 12/13/21 0730   Dressing Status Clean, dry, & intact 12/13/21 0730   Dressing Type Disk with Chlorhexadine gluconate (CHG); Tape; Transparent 12/13/21 0730   Action Taken Open ports on tubing capped 12/11/21 1501   Proximal Hub Color/Line Status White; Capped; Flushed; Patent 12/13/21 0730   Positive Blood Return (Medial Site) Yes 12/13/21 0730   Distal Hub Color/Line Status Brown; Capped; Flushed; Patent 12/13/21 0730   Positive Blood Return (Lateral Site) Yes 12/13/21 0730   Alcohol Cap Used No 12/11/21 0300       Peripheral IV 12/10/21 Upper; Left Arm (Active)   Site Assessment Clean, dry, & intact 12/13/21 0730   Phlebitis Assessment 0 12/13/21 0730   Infiltration Assessment 0 12/13/21 0730   Dressing Status Clean, dry, & intact 12/13/21 0730   Dressing Type Tape; Transparent 12/13/21 0730   Hub Color/Line Status Flushed; Patent 12/13/21 1502   Alcohol Cap Used No 12/11/21 0300        Opportunity for questions and clarification was provided.       Patient transported with:   Monitor  Registered Nurse   Family updated

## 2021-12-13 NOTE — PROGRESS NOTES
IV Access    Called for assistance with IV access. 20g, 1.75 inch catheter placed with ultrasound guidance x 1 attempt to patient's right forearm. Blood return noted and PIV flushed without difficulty. Pt kelly well. [address] [facility]  Nithya Carl RN, PCCN, VAT

## 2021-12-13 NOTE — PROGRESS NOTES
POD 3 Days Post-Op    Procedure:  Procedure(s):  CORONARY ARTERY BYPASS GRAFT (CABG X 4)/ LIMA  LEFT GREATER SAPHENOUS VEIN HARVEST ENDOSCOPIC      Subjective:     Patient has No significant medical complaints      Objective:     Patient Vitals for the past 8 hrs:   BP Pulse Resp SpO2   21 0600  80 15    21 0503  74 15    21 0407  94 26    21 0202  62 15 97 %   21 0201  (!) 57 14 97 %   21 0200  60 13 97 %   21 0107 (!) 146/66 (!) 141 20 92 %   21 0003 125/61 70 16 95 %     Temp (24hrs), Av.8 °F (36.6 °C), Min:97.3 °F (36.3 °C), Max:98 °F (36.7 °C)        Hemodynamics  PAP Systolic: 38 PAP  CO (l/min): 5.4 l/min CO  CI (l/min/m2): 2.5 l/min/m2 CI    No intake/output data recorded.    1901 -  0700  In: 1480 [P.O.:1380; I.V.:100]  Out: 2925 [Urine:2155]    CT Drainage              total of all CT's    Heart:  regular rate and rhythm, S1, S2 normal, no murmur, click, rub or gallop  Lung:  clear to auscultation bilaterally  Neuro: Grossly non focal  Incisions: Clean, dry, and intact    Labs:  Recent Results (from the past 24 hour(s))   PLEASE READ & DOCUMENT PPD TEST IN 48 HRS    Collection Time: 21  9:39 PM   Result Value Ref Range    PPD Negative Negative    mm Induration 0 0 - 5 mm   METABOLIC PANEL, BASIC    Collection Time: 21  3:10 AM   Result Value Ref Range    Sodium 143 138 - 145 mmol/L    Potassium 4.1 3.5 - 5.1 mmol/L    Chloride 114 (H) 98 - 107 mmol/L    CO2 25 21 - 32 mmol/L    Anion gap 4 (L) 7 - 16 mmol/L    Glucose 109 (H) 65 - 100 mg/dL    BUN 16 8 - 23 MG/DL    Creatinine 0.83 0.8 - 1.5 MG/DL    GFR est AA >60 >60 ml/min/1.73m2    GFR est non-AA >60 >60 ml/min/1.73m2    Calcium 7.8 (L) 8.3 - 10.4 MG/DL   CBC W/O DIFF    Collection Time: 21  3:10 AM   Result Value Ref Range    WBC 8.7 4.3 - 11.1 K/uL    RBC 2.65 (L) 4.23 - 5.6 M/uL    HGB 8.0 (L) 13.6 - 17.2 g/dL    HCT 24.6 (L) 41.1 - 50.3 %    MCV 92.8 79.6 - 97.8 FL    MCH 30.2 26.1 - 32.9 PG    MCHC 32.5 31.4 - 35.0 g/dL    RDW 15.9 (H) 11.9 - 14.6 %    PLATELET 572 (L) 049 - 450 K/uL    MPV 10.4 9.4 - 12.3 FL    ABSOLUTE NRBC 0.00 0.0 - 0.2 K/uL   MAGNESIUM    Collection Time: 12/13/21  3:10 AM   Result Value Ref Range    Magnesium 2.5 (H) 1.8 - 2.4 mg/dL       Assessment:     Principal Problem:    S/P CABG x 4 (12/10/2021)    Active Problems:    HLD (hyperlipidemia) (12/7/2012)      Unstable angina ([facility] [address] (12/10/2021)      Diarrhea (12/10/2021)      HTN (hypertension) (12/10/2021)      CAD, multiple vessel (12/10/2021)      Anemia (12/10/2021)      Debility (12/12/2021)        Plan/Recommendations/Medical Decision Making:     Discontinue:  chest tubes    See orders    Transfuse, PT consult, to floor

## 2021-12-13 NOTE — PROGRESS NOTES
ACUTE PHYSICAL THERAPY GOALS:  (Developed with and agreed upon by patient and/or caregiver.)  (1.)Mr. Junior Walton will move from supine to sit and sit to supine , scoot up and down and roll side to side in bed with MINIMAL ASSISTANCE within 7 treatment day(s). (2.)Mr. Junior Walton will transfer from bed to chair and chair to bed with MODERATE ASSIST using the least restrictive device within 7 treatment day(s). (3.)Mr. Junior Walton will propel manual wheelchair 100 ft with SUPERVISION for increased independence within 7 treatment day(s). (4.)Mr. Junior Walton will participate in therapeutic activity/exercises x 24 minutes for increased strength within 7 treatment days. PHYSICAL THERAPY: Daily Note and PM Treatment Day # 2    Emy Salinas is a 79 y.o. male   PRIMARY DIAGNOSIS: S/P CABG x 4  NSTEMI (non-ST elevated myocardial infarction) ([facility] [address] [I21.4]  Unstable angina (HCC) [I20.0]  CAD, multiple vessel [I25.10]  Procedure(s) (LRB):  CORONARY ARTERY BYPASS GRAFT (CABG X 4)/ LIMA (N/A)  LEFT GREATER SAPHENOUS VEIN HARVEST ENDOSCOPIC (Left)  3 Days Post-Op    ASSESSMENT:     REHAB RECOMMENDATIONS: CURRENT LEVEL OF FUNCTION:  (Most Recently Demonstrated)   Recommendation to date pending progress:  Setting:   Short-term Rehab  Equipment:    To Be Determined Bed Mobility:   Not tested  Sit to Stand:  Unable to perform  Attempted with max x 2  Transfers:   Not tested  Gait/Mobility:   Unable to perform     ASSESSMENT:  Mr. Junior Walton was sitting up in chair. Attempted sit to stand x 3 with max a x 2. Unable to come into standing. He did a little better this PM.  He is not able to perform SPT safely. Pt performed B LE ex. Daughter brought floor pedal exerciser. It was set up for him. He pedaled about 5 minutes. He enjoyed doing this. SUBJECTIVE:   Mr. Junior Walton states \"II usually pedal 15 to 20 minutes once or twice a day\".     SOCIAL HISTORY/ LIVING ENVIRONMENT:  Lives with wife and reported that he transfers to manual w/c with squat pivot; history of falls  Home Environment: Private residence  One/Two Story Residence: One story  Living Alone: No  Support Systems: Spouse/Significant Other  OBJECTIVE:     PAIN: VITAL SIGNS: LINES/DRAINS:   Pre Treatment:  0  Post Treatment: 0   Arterial Line, Prince Catheter and IV  O2 Device: None (Room air)     MOBILITY: I Mod I S SBA CGA Min Mod Max Total  NT x2 Comments:   Bed Mobility    Rolling [] [] [] [] [] [] [] [] [] [x] []    Supine to Sit [] [] [] [] [] [] [] [] [] [x] []    Scooting [] [] [] [] [] [] [] [] [] [x] []    Sit to Supine [] [] [] [] [] [] [] [] [] [x] []    Transfers    Sit to Stand [] [] [] [] [] [] [] [x] [] [] [x]   Attempted   Bed to Chair [] [] [] [] [] [] [] [] [] [] []    Stand to Sit [] [] [] [] [] [] [] [x] [] [] [x]    I=Independent, Mod I=Modified Independent, S=Supervision, SBA=Standby Assistance, CGA=Contact Guard Assistance,   Min=Minimal Assistance, Mod=Moderate Assistance, Max=Maximal Assistance, Total=Total Assistance, NT=Not Tested    BALANCE: Good Fair+ Fair Fair- Poor NT Comments   Sitting Static [] [x] [] [] [] []    Sitting Dynamic [] [x] [] [] [] []              Standing Static [] [] [] [] [x] []    Standing Dynamic [] [] [] [] [x] []      GAIT: I Mod I S SBA CGA Min Mod Max Total  NT x2 Comments:   Level of Assistance [] [] [] [] [] [] [] [] [] [x] []    Distance     DME N/A    Gait Quality     Weightbearing  Status N/A     I=Independent, Mod I=Modified Independent, S=Supervision, SBA=Standby Assistance, CGA=Contact Guard Assistance,   Min=Minimal Assistance, Mod=Moderate Assistance, Max=Maximal Assistance, Total=Total Assistance, NT=Not Tested    PLAN:   FREQUENCY/DURATION: PT Plan of Care: BID for duration of hospital stay or until stated goals are met, whichever comes first.  TREATMENT:     TREATMENT:   ($$ Therapeutic Activity: 23-37 mins    )  Therapeutic Activity (23 Minutes):  Therapeutic activity included Scooting, Sitting balance  and sit to stand and LE ex to improve functional Mobility, Strength and Activity tolerance.     TREATMENT GRID:     Date:  12/13/21  BID Date:   Date:     Activity/Exercise Seated Parameters Parameters Parameters   Heel raises X 20 B     Toe raises X 20 B     LAQ's X 15 B active  X 15 B AA     Hip Flex X 15 B     Hip ABD X 15 B AA                           AFTER TREATMENT POSITION/PRECAUTIONS:  Chair, Needs within reach and RN notified    INTERDISCIPLINARY COLLABORATION:  RN/PCT, PT/PTA and Rehab Attendant     TOTAL TREATMENT DURATION:  PT Patient Time In/Time Out  Time In: 1330  Time Out: [address], PTA

## 2021-12-14 ENCOUNTER — APPOINTMENT (OUTPATIENT)
Dept: GENERAL RADIOLOGY | Age: 81
DRG: 234 | End: 2021-12-14
Attending: INTERNAL MEDICINE
Payer: MEDICARE

## 2021-12-14 LAB
ABO + RH BLD: NORMAL
ANION GAP SERPL CALC-SCNC: 7 MMOL/L (ref 7–16)
BLD PROD TYP BPU: NORMAL
BLOOD GROUP ANTIBODIES SERPL: NORMAL
BPU ID: NORMAL
BUN SERPL-MCNC: 12 MG/DL (ref 8–23)
CALCIUM SERPL-MCNC: 8.3 MG/DL (ref 8.3–10.4)
CHLORIDE SERPL-SCNC: 115 MMOL/L (ref 98–107)
CO2 SERPL-SCNC: 23 MMOL/L (ref 21–32)
CREAT SERPL-MCNC: 0.74 MG/DL (ref 0.8–1.5)
CROSSMATCH RESULT,%XM: NORMAL
ERYTHROCYTE [DISTWIDTH] IN BLOOD BY AUTOMATED COUNT: 16 % (ref 11.9–14.6)
GLUCOSE SERPL-MCNC: 102 MG/DL (ref 65–100)
HCT VFR BLD AUTO: 33.5 % (ref 41.1–50.3)
HGB BLD-MCNC: 10.1 G/DL (ref 13.6–17.2)
MAGNESIUM SERPL-MCNC: 2.4 MG/DL (ref 1.8–2.4)
MCH RBC QN AUTO: 30.3 PG (ref 26.1–32.9)
MCHC RBC AUTO-ENTMCNC: 30.1 G/DL (ref 31.4–35)
MCV RBC AUTO: 100.6 FL (ref 79.6–97.8)
MM INDURATION POC: 0 MM (ref 0–5)
NRBC # BLD: 0 K/UL (ref 0–0.2)
PLATELET # BLD AUTO: 147 K/UL (ref 150–450)
PMV BLD AUTO: 11 FL (ref 9.4–12.3)
POTASSIUM SERPL-SCNC: 4.2 MMOL/L (ref 3.5–5.1)
PPD POC: NEGATIVE NEGATIVE
RBC # BLD AUTO: 3.33 M/UL (ref 4.23–5.6)
SODIUM SERPL-SCNC: 145 MMOL/L (ref 138–145)
SPECIMEN EXP DATE BLD: NORMAL
STATUS OF UNIT,%ST: NORMAL
UNIT DIVISION, %UDIV: 0
WBC # BLD AUTO: 6.9 K/UL (ref 4.3–11.1)

## 2021-12-14 PROCEDURE — 74011250637 HC RX REV CODE- 250/637: Performed by: THORACIC SURGERY (CARDIOTHORACIC VASCULAR SURGERY)

## 2021-12-14 PROCEDURE — 80048 BASIC METABOLIC PNL TOTAL CA: CPT

## 2021-12-14 PROCEDURE — 99232 SBSQ HOSP IP/OBS MODERATE 35: CPT | Performed by: INTERNAL MEDICINE

## 2021-12-14 PROCEDURE — 85027 COMPLETE CBC AUTOMATED: CPT

## 2021-12-14 PROCEDURE — 2709999900 HC NON-CHARGEABLE SUPPLY

## 2021-12-14 PROCEDURE — 36415 COLL VENOUS BLD VENIPUNCTURE: CPT

## 2021-12-14 PROCEDURE — 65660000004 HC RM CVT STEPDOWN

## 2021-12-14 PROCEDURE — 83735 ASSAY OF MAGNESIUM: CPT

## 2021-12-14 PROCEDURE — 77030012890

## 2021-12-14 PROCEDURE — 97530 THERAPEUTIC ACTIVITIES: CPT

## 2021-12-14 PROCEDURE — 71046 X-RAY EXAM CHEST 2 VIEWS: CPT

## 2021-12-14 RX ADMIN — METOPROLOL TARTRATE 25 MG: 25 TABLET, FILM COATED ORAL at 09:36

## 2021-12-14 RX ADMIN — AMIODARONE HYDROCHLORIDE 200 MG: 200 TABLET ORAL at 09:35

## 2021-12-14 RX ADMIN — ATORVASTATIN CALCIUM 80 MG: 80 TABLET, FILM COATED ORAL at 22:11

## 2021-12-14 RX ADMIN — Medication 10 ML: at 06:14

## 2021-12-14 RX ADMIN — SENNOSIDES AND DOCUSATE SODIUM 2 TABLET: 8.6; 5 TABLET ORAL at 22:11

## 2021-12-14 RX ADMIN — Medication 10 ML: at 16:49

## 2021-12-14 RX ADMIN — POTASSIUM CHLORIDE 10 MEQ: 750 TABLET, EXTENDED RELEASE ORAL at 09:35

## 2021-12-14 RX ADMIN — Medication 10 ML: at 22:11

## 2021-12-14 RX ADMIN — Medication 1 AMPULE: at 22:11

## 2021-12-14 RX ADMIN — ACETAMINOPHEN 650 MG: 325 TABLET ORAL at 09:37

## 2021-12-14 RX ADMIN — ACETAMINOPHEN 650 MG: 325 TABLET ORAL at 16:49

## 2021-12-14 RX ADMIN — POLYETHYLENE GLYCOL 3350 17 G: 17 POWDER, FOR SOLUTION ORAL at 09:35

## 2021-12-14 RX ADMIN — FAMOTIDINE 20 MG: 20 TABLET ORAL at 17:45

## 2021-12-14 RX ADMIN — FAMOTIDINE 20 MG: 20 TABLET ORAL at 09:36

## 2021-12-14 RX ADMIN — Medication 1 AMPULE: at 09:35

## 2021-12-14 RX ADMIN — ASPIRIN 81 MG: 81 TABLET ORAL at 09:35

## 2021-12-14 RX ADMIN — AMIODARONE HYDROCHLORIDE 200 MG: 200 TABLET ORAL at 22:11

## 2021-12-14 RX ADMIN — METOPROLOL TARTRATE 25 MG: 25 TABLET, FILM COATED ORAL at 22:11

## 2021-12-14 RX ADMIN — FUROSEMIDE 40 MG: 40 TABLET ORAL at 09:35

## 2021-12-14 RX ADMIN — SENNOSIDES AND DOCUSATE SODIUM 2 TABLET: 8.6; 5 TABLET ORAL at 09:35

## 2021-12-14 NOTE — PROGRESS NOTES
Chart reviewed. PT and OT recommending STR and pt agreeable. After reviewing choice list he would like [address] Referral placed. Spoke to spouse, Sharda Chow, and she is also agreeable to STR and [address] is first choice. Waiting on acceptance. PPD complete. COVID swab ordered. CM to continue to follow and monitor for any further needs. Update 1107: Pt accepted to [address] Bed accepted. COVID swab ordered. PA reports pt can d/c tomorrow or Thursday pending medical needs.

## 2021-12-14 NOTE — PROGRESS NOTES
[facility] CARDIOLOGY PROGRESS NOTE           12/14/2021 7:30 AM    Admit Date: 12/10/2021      Subjective:   Patient notes pain underneath left scapula with movement. No pleuritic component. Sternotomy pain controlled. In sinus rhythm    ROS:  Cardiovascular:  As noted above    Objective:      Vitals:    12/13/21 1653 12/13/21 2016 12/13/21 2333 12/14/21 0333   BP: 128/68 136/64 133/62 129/61   Pulse: 75 87 72 70   Resp: 20 20 20 18   Temp: 99 °F (37.2 °C) 98.6 °F (37 °C) 98.1 °F (36.7 °C) 98.3 °F (36.8 °C)   SpO2: 97% 97% 96% (!) 70%   Weight:    227 lb 3.2 oz (103.1 kg)   Height:           Physical Exam:  General-No Acute Distress  Neck- supple, no JVD  CV- regular rate and rhythm no MRG  Lung- clear bilaterally anteriorly  Abd- soft, nontender, nondistended  Ext- trivial edema bilaterally. Skin- warm and dry      Data Review:   Recent Labs     12/14/21  0341 12/13/21  0310    143   K 4.2 4.1   MG 2.4 2.5*   BUN 12 16   CREA 0.74* 0.83   * 109*   WBC 6.9 8.7   HGB 10.1* 8.0*   HCT 33.5* 24.6*   * 143*      No results found for: TROIQ, BRENNAN, TROPT, TNIPOC    Assessment/Plan:     Principal Problem:    S/P CABG x 4 (12/10/2021)    Doing well post CABG. Continue ASA and amiodarone. Active Problems:    HLD (hyperlipidemia) (12/7/2012)    Continue Lipitor. Unstable angina ([address] (12/10/2021)    S/P CABG. HTN (hypertension) (12/10/2021)    BP controlled. Continue Lopressor. Anemia (12/10/2021)    Stable today with Hgb 10.1      Debility (12/12/2021)    Noted.                     Marilyn Harden MD  12/14/2021 7:30 AM

## 2021-12-14 NOTE — PROGRESS NOTES
Patient has rested quietly through the night. He has complained of no pain. He has been alert and oriented when awake with no episodes of confusion. He has had one episode of urinary incontinence. But has asked for the urinal otherwise. Will not move patient into the chair this am as he will be going down for xray and is non weight bearing at this point.

## 2021-12-14 NOTE — PROGRESS NOTES
ACUTE PHYSICAL THERAPY GOALS:  (Developed with and agreed upon by patient and/or caregiver.)  (1.)Mr. Luz Eli will move from supine to sit and sit to supine , scoot up and down and roll side to side in bed with MINIMAL ASSISTANCE within 7 treatment day(s). (2.)Mr. Luz Eli will transfer from bed to chair and chair to bed with MODERATE ASSIST using the least restrictive device within 7 treatment day(s). (3.)Mr. Luz Eli will propel manual wheelchair 100 ft with SUPERVISION for increased independence within 7 treatment day(s). (4.)Mr. Luz Eli will participate in therapeutic activity/exercises x 24 minutes for increased strength within 7 treatment days. PHYSICAL THERAPY: Daily Note and AM Treatment Day # 3    Anastacia Howe is a 79 y.o. male   PRIMARY DIAGNOSIS: S/P CABG x 4  NSTEMI (non-ST elevated myocardial infarction) ([facility] [address] [I21.4]  Unstable angina (HCC) [I20.0]  CAD, multiple vessel [I25.10]  Procedure(s) (LRB):  CORONARY ARTERY BYPASS GRAFT (CABG X 4)/ LIMA (N/A)  LEFT GREATER SAPHENOUS VEIN HARVEST ENDOSCOPIC (Left)  4 Days Post-Op    ASSESSMENT:     REHAB RECOMMENDATIONS: CURRENT LEVEL OF FUNCTION:  (Most Recently Demonstrated)   Recommendation to date pending progress:  Setting:   Short-term Rehab  Equipment:    To Be Determined Bed Mobility:   Maximal Assistance x 2  Sit to Stand:  Unable to perform   Transfers:   Maximal Assistance x 2 for sliding board transfer  Gait/Mobility:   Unable to perform     ASSESSMENT:  Mr. Luz Eli was supine in bed and willing to work with PT. Pt Max A x2 for bed mobility to sit EOB with fair seated balance demonstrated. Re-education required for sternal precautions, as patient states confusion as to why he cannot use his UEs to help with the transfer. Pt Max A x2 for sliding board transfer to chair. Pt did well abiding by sternal precautions during transfer and left seated upright in chair with alarm activated and needs in reach. RN notified.  Will continue to follow. SUBJECTIVE:   Mr. Jamir Quevedo states \"Why cant I use my arms\".     SOCIAL HISTORY/ LIVING ENVIRONMENT:  Lives with wife and reported that he transfers to manual   w/c with squat pivot; history of falls  Home Environment: Private residence  One/Two Story Residence: One story  Living Alone: No  Support Systems: Spouse/Significant Other  OBJECTIVE:     PAIN: VITAL SIGNS: LINES/DRAINS:   Pre Treatment:  0  Post Treatment: 0   Arterial Line, Prince Catheter and IV  O2 Device: None (Room air)     MOBILITY: I Mod I S SBA CGA Min Mod Max Total  NT x2 Comments:   Bed Mobility    Rolling [] [] [] [] [] [] [] [] [] [x] []    Supine to Sit [] [] [] [] [] [] [] [x] [] [] [x]    Scooting [] [] [] [] [] [] [] [x] [] [] [x]    Sit to Supine [] [] [] [] [] [] [] [] [] [x] []    Transfers    Sit to Stand [] [] [] [] [] [] [] [] [] [x] []      Bed to Chair [] [] [] [] [] [] [] [x] [] [] [x] Sliding board transfer   Stand to Sit [] [] [] [] [] [] [] [] [] [x] []    I=Independent, Mod I=Modified Independent, S=Supervision, SBA=Standby Assistance, CGA=Contact Guard Assistance,   Min=Minimal Assistance, Mod=Moderate Assistance, Max=Maximal Assistance, Total=Total Assistance, NT=Not Tested    BALANCE: Good Fair+ Fair Fair- Poor NT Comments   Sitting Static [] [x] [] [] [] []    Sitting Dynamic [] [x] [] [] [] []              Standing Static [] [] [] [] [] [x]    Standing Dynamic [] [] [] [] [] [x]      GAIT: I Mod I S SBA CGA Min Mod Max Total  NT x2 Comments:   Level of Assistance [] [] [] [] [] [] [] [] [] [x] []    Distance     DME N/A    Gait Quality     Weightbearing  Status N/A     I=Independent, Mod I=Modified Independent, S=Supervision, SBA=Standby Assistance, CGA=Contact Guard Assistance,   Min=Minimal Assistance, Mod=Moderate Assistance, Max=Maximal Assistance, Total=Total Assistance, NT=Not Tested    PLAN:   FREQUENCY/DURATION: PT Plan of Care: BID for duration of hospital stay or until stated goals are met, whichever comes first.  TREATMENT:     TREATMENT:   ($$ Therapeutic Activity: 8-22 mins    )  Therapeutic Activity (14 Minutes): Therapeutic activity included Supine to Sit, Scooting, Lateral Scooting, Transfer Training and Sitting balance  to improve functional Mobility, Strength and Activity tolerance.     TREATMENT GRID:     Date:  12/13/21  BID Date:   Date:     Activity/Exercise Seated Parameters Parameters Parameters   Heel raises X 20 B     Toe raises X 20 B     LAQ's X 15 B active  X 15 B AA     Hip Flex X 15 B     Hip ABD X 15 B AA                           AFTER TREATMENT POSITION/PRECAUTIONS:  Alarm Activated, Chair, Needs within reach and RN notified    INTERDISCIPLINARY COLLABORATION:  RN/PCT, PT/PTA and Rehab Attendant     TOTAL TREATMENT DURATION:  PT Patient Time In/Time Out  Time In: 1136  Time Out: [address]

## 2021-12-14 NOTE — PROGRESS NOTES
Problem: Ventilator Management  Goal: *Adequate oxygenation and ventilation  Outcome: Progressing Towards Goal  Goal: *Patient maintains clear airway/free of aspiration  Outcome: Progressing Towards Goal  Goal: *Absence of infection signs and symptoms  Outcome: Progressing Towards Goal  Goal: *Normal spontaneous ventilation  Outcome: Progressing Towards Goal     Problem: Patient Education: Go to Patient Education Activity  Goal: Patient/Family Education  Outcome: Progressing Towards Goal     Problem: Falls - Risk of  Goal: *Absence of Falls  Description: Document Parag Fall Risk and appropriate interventions in the flowsheet.   Outcome: Progressing Towards Goal  Note: Fall Risk Interventions:  Mobility Interventions: Bed/chair exit alarm    Mentation Interventions: Bed/chair exit alarm    Medication Interventions: Bed/chair exit alarm    Elimination Interventions: Bed/chair exit alarm, Call light in reach    History of Falls Interventions: Bed/chair exit alarm         Problem: Patient Education: Go to Patient Education Activity  Goal: Patient/Family Education  Outcome: Progressing Towards Goal     Problem: Patient Education: Go to Patient Education Activity  Goal: Patient/Family Education  Outcome: Progressing Towards Goal     Problem: CABG: Pre-Op Day  Goal: Off Pathway (Use only if patient is Off Pathway)  Outcome: Progressing Towards Goal  Goal: Activity/Safety  Outcome: Progressing Towards Goal  Goal: Consults, if ordered  Outcome: Progressing Towards Goal  Goal: Diagnostic Test/Procedures  Outcome: Progressing Towards Goal  Goal: Nutrition/Diet  Outcome: Progressing Towards Goal  Goal: Medications  Outcome: Progressing Towards Goal  Goal: Treatments/Interventions/Procedures  Outcome: Progressing Towards Goal  Goal: Discharge Planning  Outcome: Progressing Towards Goal  Goal: Psychosocial  Outcome: Progressing Towards Goal  Goal: *Hemodynamically stable  Outcome: Progressing Towards Goal  Goal: *Consent obtained, permits and diagnostics complete  Outcome: Progressing Towards Goal     Problem: CABG: Day of Surgery (Initiate SCIP measures for post-op care)  Goal: Off Pathway (Use only if patient is Off Pathway)  Outcome: Progressing Towards Goal  Goal: Activity/Safety  Outcome: Progressing Towards Goal  Goal: Consults, if ordered  Outcome: Progressing Towards Goal  Goal: Diagnostic Test/Procedures  Outcome: Progressing Towards Goal  Goal: Nutrition/Diet  Outcome: Progressing Towards Goal  Goal: Medications  Outcome: Progressing Towards Goal  Goal: Respiratory  Outcome: Progressing Towards Goal  Goal: Treatments/Interventions/Procedures  Outcome: Progressing Towards Goal  Goal: Psychosocial  Outcome: Progressing Towards Goal  Goal: *Hemodynamically stable (eg: Cardiac output/index; pulmonary arterial pressures; mixed venous oxygen saturation within set parameters)  Outcome: Progressing Towards Goal  Goal: *Lungs clear or at baseline  Outcome: Progressing Towards Goal  Goal: *Stable cardiac rhythm  Outcome: Progressing Towards Goal  Goal: *Afebrile  Outcome: Progressing Towards Goal  Goal: *Follows simple commands post anesthesia  Outcome: Progressing Towards Goal  Goal: *Orients easily following extubation  Outcome: Progressing Towards Goal  Goal: *Optimal pain control at patient's stated goal  Outcome: Progressing Towards Goal     Problem: CABG: Post-Op Day 1  Goal: Off Pathway (Use only if patient is Off Pathway)  Outcome: Progressing Towards Goal  Goal: Activity/Safety  Outcome: Progressing Towards Goal  Goal: Consults, if ordered  Outcome: Progressing Towards Goal  Goal: Diagnostic Test/Procedures  Outcome: Progressing Towards Goal  Goal: Nutrition/Diet  Outcome: Progressing Towards Goal  Goal: Medications  Outcome: Progressing Towards Goal  Goal: Respiratory  Outcome: Progressing Towards Goal  Goal: Treatments/Interventions/Procedures  Outcome: Progressing Towards Goal  Goal: Psychosocial  Outcome: Progressing Towards Goal  Goal: *Hemodynamically stable without vasoactive medications  Outcome: Progressing Towards Goal  Goal: *Lungs clear or at baseline  Outcome: Progressing Towards Goal  Goal: *Mechanical ventilation discontinued  Outcome: Progressing Towards Goal  Goal: *Optimal pain control at patient's stated goal  Outcome: Progressing Towards Goal  Goal: *Demonstrates progressive activity  Outcome: Progressing Towards Goal  Goal: *Tolerating diet  Outcome: Progressing Towards Goal  Goal: *Level of consciousness returns to baseline  Outcome: Progressing Towards Goal     Problem: CABG: Post-Op Day 2/Transfer Day  Goal: Off Pathway (Use only if patient is Off Pathway)  Outcome: Progressing Towards Goal  Goal: Activity/Safety  Outcome: Progressing Towards Goal  Goal: Consults, if ordered  Outcome: Progressing Towards Goal  Goal: Diagnostic Test/Procedures  Outcome: Progressing Towards Goal  Goal: Nutrition/Diet  Outcome: Progressing Towards Goal  Goal: Medications  Outcome: Progressing Towards Goal  Goal: Discharge Planning  Outcome: Progressing Towards Goal  Goal: Respiratory  Outcome: Progressing Towards Goal  Goal: Treatments/Interventions/Procedures  Outcome: Progressing Towards Goal  Goal: Psychosocial  Outcome: Progressing Towards Goal  Goal: *Hemodynamically stable without vasoactive medications  Outcome: Progressing Towards Goal  Goal: *Lungs clear or at baseline  Outcome: Progressing Towards Goal  Goal: *Optimal pain control at patient's stated goal  Outcome: Progressing Towards Goal  Goal: *Demonstrates progressive activity  Outcome: Progressing Towards Goal  Goal: *Tolerating diet  Outcome: Progressing Towards Goal     Problem: CABG: Post-Op Day 3  Goal: Off Pathway (Use only if patient is Off Pathway)  Outcome: Progressing Towards Goal  Goal: Activity/Safety  Outcome: Progressing Towards Goal  Goal: Consults, if ordered  Outcome: Progressing Towards Goal  Goal: Diagnostic Test/Procedures  Outcome: Progressing Towards Goal  Goal: Nutrition/Diet  Outcome: Progressing Towards Goal  Goal: Discharge Planning  Outcome: Progressing Towards Goal  Goal: Medications  Outcome: Progressing Towards Goal  Goal: Respiratory  Outcome: Progressing Towards Goal  Goal: Treatments/Interventions/Procedures  Outcome: Progressing Towards Goal  Goal: Psychosocial  Outcome: Progressing Towards Goal  Goal: *Hemodynamically stable  Outcome: Progressing Towards Goal  Goal: *Lungs clear or at baseline  Outcome: Progressing Towards Goal  Goal: *Optimal pain control at patient's stated goal  Outcome: Progressing Towards Goal  Goal: *Incisions without signs and symptoms of wound complication  Outcome: Progressing Towards Goal  Goal: *Demonstrates progressive activity  Outcome: Progressing Towards Goal  Goal: *Tolerating diet  Outcome: Progressing Towards Goal     Problem: CABG: Post-Op Day 4  Goal: Off Pathway (Use only if patient is Off Pathway)  Outcome: Progressing Towards Goal  Goal: Activity/Safety  Outcome: Progressing Towards Goal  Goal: Diagnostic Test/Procedures  Outcome: Progressing Towards Goal  Goal: Nutrition/Diet  Outcome: Progressing Towards Goal  Goal: Medications  Outcome: Progressing Towards Goal  Goal: Discharge Planning  Outcome: Progressing Towards Goal  Goal: Respiratory  Outcome: Progressing Towards Goal  Goal: Treatments/Interventions/Procedures  Outcome: Progressing Towards Goal  Goal: Psychosocial  Outcome: Progressing Towards Goal  Goal: *Hemodynamically stable  Outcome: Progressing Towards Goal  Goal: *Lungs clear or at baseline  Outcome: Progressing Towards Goal  Goal: *Optimal pain control at patient's stated goal  Outcome: Progressing Towards Goal  Goal: *Incisions without signs and symptoms of wound complication  Outcome: Progressing Towards Goal  Goal: *Demonstrates progressive activity  Outcome: Progressing Towards Goal  Goal: *Tolerating diet  Outcome: Progressing Towards Goal     Problem: CABG: Post-Op Day 5  Goal: Off Pathway (Use only if patient is Off Pathway)  Outcome: Progressing Towards Goal  Goal: Activity/Safety  Outcome: Progressing Towards Goal  Goal: Diagnostic Test/Procedures  Outcome: Progressing Towards Goal  Goal: Nutrition/Diet  Outcome: Progressing Towards Goal  Goal: Discharge Planning  Outcome: Progressing Towards Goal  Goal: Medications  Outcome: Progressing Towards Goal  Goal: Respiratory  Outcome: Progressing Towards Goal  Goal: Treatments/Interventions/Procedures  Outcome: Progressing Towards Goal  Goal: Psychosocial  Outcome: Progressing Towards Goal     Problem: CABG: Discharge Outcomes  Goal: *Hemodynamically stable  Outcome: Progressing Towards Goal  Goal: *Stable cardiac rhythm  Outcome: Progressing Towards Goal  Goal: *Lungs clear or at baseline  Outcome: Progressing Towards Goal  Goal: *Demonstrates ability to perform prescribed activity without shortness of breath or discomfort  Outcome: Progressing Towards Goal  Goal: *Verbalizes home exercise program, activity guidelines, cardiac precautions  Outcome: Progressing Towards Goal  Goal: *Optimal pain control at patient's stated goal  Outcome: Progressing Towards Goal  Goal: *Verbalizes understanding and describes prescribed diet  Outcome: Progressing Towards Goal  Goal: *Verbalizes name, dosage, time, side effects, and number of days to continue medications  Outcome: Progressing Towards Goal  Goal: *Weight  is stable  Outcome: Progressing Towards Goal  Goal: *No signs and symptoms of infection or wound complications  Outcome: Progressing Towards Goal  Goal: *Anxiety reduced or absent  Outcome: Progressing Towards Goal  Goal: *Verbalizes and demonstrates incision care  Outcome: Progressing Towards Goal  Goal: *Understands and describes signs and symptoms to report to providers(Stroke Metric)  Outcome: Progressing Towards Goal  Goal: *Describes follow-up/return visits to physicians  Outcome: Progressing Towards Goal  Goal: *Describes available resources and support systems  Outcome: Progressing Towards Goal  Goal: *Expresses feelings about diagnosis and surgery  Outcome: Progressing Towards Goal  Goal: *Influenza immunization  Outcome: Progressing Towards Goal  Goal: *Pneumococcal immunization  Outcome: Progressing Towards Goal

## 2021-12-14 NOTE — PROGRESS NOTES
ACUTE PHYSICAL THERAPY GOALS:  (Developed with and agreed upon by patient and/or caregiver.)  (1.)Mr. Sunday Munoz will move from supine to sit and sit to supine , scoot up and down and roll side to side in bed with MINIMAL ASSISTANCE within 7 treatment day(s). (2.)Mr. Sunday Munoz will transfer from bed to chair and chair to bed with MODERATE ASSIST using the least restrictive device within 7 treatment day(s). (3.)Mr. Sunday Munoz will propel manual wheelchair 100 ft with SUPERVISION for increased independence within 7 treatment day(s). (4.)Mr. Sunday Munoz will participate in therapeutic activity/exercises x 24 minutes for increased strength within 7 treatment days. PHYSICAL THERAPY: Daily Note and PM Treatment Day # 3    Aashish Jaramillo is a 79 y.o. male   PRIMARY DIAGNOSIS: S/P CABG x 4  NSTEMI (non-ST elevated myocardial infarction) ([facility] [address] [I21.4]  Unstable angina (HCC) [I20.0]  CAD, multiple vessel [I25.10]  Procedure(s) (LRB):  CORONARY ARTERY BYPASS GRAFT (CABG X 4)/ LIMA (N/A)  LEFT GREATER SAPHENOUS VEIN HARVEST ENDOSCOPIC (Left)  4 Days Post-Op    ASSESSMENT:     REHAB RECOMMENDATIONS: CURRENT LEVEL OF FUNCTION:  (Most Recently Demonstrated)   Recommendation to date pending progress:  Setting:   Short-term Rehab  Equipment:    To Be Determined Bed Mobility:   Maximal Assistance x 2  Sit to Stand:  Unable to perform   Transfers:   Maximal Assistance x 2 BSC to chair and Max Ax3 chair to bed. Gait/Mobility:   Unable to perform     ASSESSMENT:  Mr. Sunday Munoz was supine in bed and willing to work with PT. Pt Max A x2 for bed mobility to sit EOB with fair seated balance demonstrated. Re-education required for sternal precautions, as patient states confusion as to why he cannot use his UEs to help with the transfer. Pt Max A x2 for sliding board transfer to chair. Pt did well abiding by sternal precautions during transfer and left seated upright in chair with alarm activated and needs in reach. RN notified.  Will continue to follow. PM note: Pt on BSC with RN and CPT present assisting in hygiene. Pt Max Ax2 for stand pivot transfer to chair and Max Ax3 for stand pivot chair to bed. Pt able to put some weight through L LE to assist in transfers. Max Ax2 for bed mobility. Pt left supien in bed with needs in reach and RN and CPT in room. Will continue to follow. SUBJECTIVE:   Mr. Colleen Cherry states \"I think that went well\".     SOCIAL HISTORY/ LIVING ENVIRONMENT:  Lives with wife and reported that he transfers to manual   w/c with squat pivot; history of falls  Home Environment: Private residence  One/Two Story Residence: One story  Living Alone: No  Support Systems: Spouse/Significant Other  OBJECTIVE:     PAIN: VITAL SIGNS: LINES/DRAINS:   Pre Treatment:  0  Post Treatment: 0   Arterial Line, Prince Catheter and IV  O2 Device: None (Room air)     MOBILITY: I Mod I S SBA CGA Min Mod Max Total  NT x2 Comments:   Bed Mobility    Rolling [] [] [] [] [] [] [] [x] [] [] [x]    Supine to Sit [] [] [] [] [] [] [] [] [] [x] []    Scooting [] [] [] [] [] [] [] [x] [] [] [x]    Sit to Supine [] [] [] [] [] [] [] [x] [] [] [x]    Transfers    Sit to Stand [] [] [] [] [] [] [] [] [] [x] []      Bed to Chair [] [] [] [] [] [] [] [x] [] [] [x] Max Ax2 BSC>chair and Max Ax3 chair>bed   Stand to Sit [] [] [] [] [] [] [] [] [] [x] []    I=Independent, Mod I=Modified Independent, S=Supervision, SBA=Standby Assistance, CGA=Contact Guard Assistance,   Min=Minimal Assistance, Mod=Moderate Assistance, Max=Maximal Assistance, Total=Total Assistance, NT=Not Tested    BALANCE: Good Fair+ Fair Fair- Poor NT Comments   Sitting Static [] [x] [] [] [] []    Sitting Dynamic [] [x] [] [] [] []              Standing Static [] [] [] [] [] [x]    Standing Dynamic [] [] [] [] [] [x]      GAIT: I Mod I S SBA CGA Min Mod Max Total  NT x2 Comments:   Level of Assistance [] [] [] [] [] [] [] [] [] [x] []    Distance     DME N/A    Gait Quality Weightbearing  Status N/A     I=Independent, Mod I=Modified Independent, S=Supervision, SBA=Standby Assistance, CGA=Contact Guard Assistance,   Min=Minimal Assistance, Mod=Moderate Assistance, Max=Maximal Assistance, Total=Total Assistance, NT=Not Tested    PLAN:   FREQUENCY/DURATION: PT Plan of Care: BID for duration of hospital stay or until stated goals are met, whichever comes first.  TREATMENT:     TREATMENT:   ($$ Therapeutic Activity: 8-22 mins    )  Therapeutic Activity (13 Minutes): Therapeutic activity included Rolling, Sit to Supine, Scooting, Lateral Scooting, Transfer Training and Sitting balance  to improve functional Mobility, Strength and Activity tolerance.     TREATMENT GRID:     Date:  12/13/21  BID Date:   Date:     Activity/Exercise Seated Parameters Parameters Parameters   Heel raises X 20 B     Toe raises X 20 B     LAQ's X 15 B active  X 15 B AA     Hip Flex X 15 B     Hip ABD X 15 B AA                           AFTER TREATMENT POSITION/PRECAUTIONS:  Bed, Needs within reach, RN notified and and RN and CPT at bedside    INTERDISCIPLINARY COLLABORATION:  RN/PCT, PT/PTA and Rehab Attendant     TOTAL TREATMENT DURATION:  PT Patient Time In/Time Out  Time In: 1418  Time Out: [address]

## 2021-12-14 NOTE — PROGRESS NOTES
Today's Date: 12/14/2021  Date of Admission: 12/10/2021    Chart Reviewed. Subjective:     Pt feels ok. He was able to sleep last night. Appetite ok. Medications Reviewed. Objective:     Vitals:    12/13/21 2016 12/13/21 2333 12/14/21 0333 12/14/21 0731   BP: 136/64 133/62 129/61 (!) 148/65   Pulse: 87 72 70 72   Resp: 20 20 18 18   Temp: 98.6 °F (37 °C) 98.1 °F (36.7 °C) 98.3 °F (36.8 °C) 97.5 °F (36.4 °C)   SpO2: 97% 96% (!) 70% 96%   Weight:   227 lb 3.2 oz (103.1 kg)    Height:           Intake and Output  Current Shift: 12/14 0701 - 12/14 1900  In: -   Out: 350 [Urine:350]   Last 3 Shifts: 12/12 1901 - 12/14 0700  In: 670 [P.O.:360]  Out: 3180 [Urine:2895]    Physical Exam:  General: Well Developed, Well Nourished, No Acute Distress, Alert & Oriented x 3, Appropriate mood  Neck: supple, no JVD  Heart: S1S2 with RRR without murmurs or gallops  Lungs: mostly clear throughout auscultation bilaterally  Abd: soft, nontender, nondistended, with good bowel sounds  Ext: no edema bilaterally  Sternal incision: clean, dry, and intact  Skin: warm and dry    LABS  Data Review:   Recent Labs     12/14/21  0341 12/13/21  0310    143   K 4.2 4.1   MG 2.4 2.5*   BUN 12 16   CREA 0.74* 0.83   * 109*   WBC 6.9 8.7   HGB 10.1* 8.0*   HCT 33.5* 24.6*   * 143*       Estimated Creatinine Clearance: 92.5 mL/min (A) (based on SCr of 0.74 mg/dL (L)).       Assessment/Plan:     Principal Problem:    S/P CABG x 4 (12/10/2021)  On ASA, BB, statin, stable on room air, pacing wires removed, continue PT    Active Problems:    HLD (hyperlipidemia) (12/7/2012)  Continue statin       Unstable angina ([facility] [address] (12/10/2021)  S/p emergent CABG       Diarrhea (12/10/2021)        HTN (hypertension) (12/10/2021)  BP stable       CAD, multiple vessel (12/10/2021)  S/p CABG      Anemia (12/10/2021)  Hgb/Hct stable after transfusion      Debility (12/12/2021)  Pt wheelchair bound at baseline, will likely need rehab, will discuss further after PT sessions today           Rochelle Steel PA-C

## 2021-12-14 NOTE — PROGRESS NOTES
Verbal bedside report received from off going RN, Justino Rocha. Patient's situation, background, assessment and recommendations provided. Opportunity for questions provided. Assumed care of patient. Assessment to follow.

## 2021-12-14 NOTE — PROGRESS NOTES
Pacing wires pulled per and by Francesco Melo. Patient instructed to one hour bedrest with every 15 minute blood pressure checks for one hour. Pt voices understanding.

## 2021-12-15 VITALS
HEART RATE: 70 BPM | TEMPERATURE: 97.8 F | SYSTOLIC BLOOD PRESSURE: 110 MMHG | BODY MASS INDEX: 32.13 KG/M2 | WEIGHT: 224.4 LBS | DIASTOLIC BLOOD PRESSURE: 53 MMHG | HEIGHT: 70 IN | RESPIRATION RATE: 16 BRPM | OXYGEN SATURATION: 96 %

## 2021-12-15 LAB
MAGNESIUM SERPL-MCNC: 2.3 MG/DL (ref 1.8–2.4)
POTASSIUM SERPL-SCNC: 4 MMOL/L (ref 3.5–5.1)
SARS-COV-2, COV2: NORMAL
SARS-COV-2, COV2: NOT DETECTED
SPECIMEN SOURCE, FCOV2M: NORMAL

## 2021-12-15 PROCEDURE — 36415 COLL VENOUS BLD VENIPUNCTURE: CPT

## 2021-12-15 PROCEDURE — U0005 INFEC AGEN DETEC AMPLI PROBE: HCPCS

## 2021-12-15 PROCEDURE — 97110 THERAPEUTIC EXERCISES: CPT

## 2021-12-15 PROCEDURE — 2709999900 HC NON-CHARGEABLE SUPPLY

## 2021-12-15 PROCEDURE — 83735 ASSAY OF MAGNESIUM: CPT

## 2021-12-15 PROCEDURE — 74011250637 HC RX REV CODE- 250/637: Performed by: THORACIC SURGERY (CARDIOTHORACIC VASCULAR SURGERY)

## 2021-12-15 PROCEDURE — 84132 ASSAY OF SERUM POTASSIUM: CPT

## 2021-12-15 PROCEDURE — 99024 POSTOP FOLLOW-UP VISIT: CPT | Performed by: PHYSICIAN ASSISTANT

## 2021-12-15 RX ORDER — LISINOPRIL 5 MG/1
5 TABLET ORAL DAILY
Qty: 30 TABLET | Refills: 3 | Status: SHIPPED
Start: 2021-12-15 | End: 2022-01-03 | Stop reason: SDUPTHER

## 2021-12-15 RX ORDER — ATORVASTATIN CALCIUM 40 MG/1
40 TABLET, FILM COATED ORAL
Qty: 90 TABLET | Refills: 3 | Status: SHIPPED
Start: 2021-12-15 | End: 2022-01-03 | Stop reason: SDUPTHER

## 2021-12-15 RX ORDER — NITROGLYCERIN 0.4 MG/1
0.4 TABLET SUBLINGUAL
Qty: 25 EACH | Refills: 3 | Status: SHIPPED
Start: 2021-12-15 | End: 2022-01-03 | Stop reason: SDUPTHER

## 2021-12-15 RX ORDER — METOPROLOL TARTRATE 25 MG/1
25 TABLET, FILM COATED ORAL EVERY 12 HOURS
Qty: 60 TABLET | Refills: 3 | Status: SHIPPED
Start: 2021-12-15 | End: 2022-01-03 | Stop reason: SDUPTHER

## 2021-12-15 RX ADMIN — POTASSIUM CHLORIDE 10 MEQ: 750 TABLET, EXTENDED RELEASE ORAL at 08:52

## 2021-12-15 RX ADMIN — AMIODARONE HYDROCHLORIDE 200 MG: 200 TABLET ORAL at 08:52

## 2021-12-15 RX ADMIN — POTASSIUM CHLORIDE 20 MEQ: 20 TABLET, EXTENDED RELEASE ORAL at 17:12

## 2021-12-15 RX ADMIN — Medication 1 AMPULE: at 08:52

## 2021-12-15 RX ADMIN — FUROSEMIDE 40 MG: 40 TABLET ORAL at 08:52

## 2021-12-15 RX ADMIN — POTASSIUM CHLORIDE 20 MEQ: 20 TABLET, EXTENDED RELEASE ORAL at 06:34

## 2021-12-15 RX ADMIN — FAMOTIDINE 20 MG: 20 TABLET ORAL at 17:12

## 2021-12-15 RX ADMIN — ACETAMINOPHEN 650 MG: 325 TABLET ORAL at 17:55

## 2021-12-15 RX ADMIN — METOPROLOL TARTRATE 25 MG: 25 TABLET, FILM COATED ORAL at 08:52

## 2021-12-15 RX ADMIN — Medication 10 ML: at 06:33

## 2021-12-15 RX ADMIN — FAMOTIDINE 20 MG: 20 TABLET ORAL at 08:52

## 2021-12-15 RX ADMIN — ASPIRIN 81 MG: 81 TABLET ORAL at 08:52

## 2021-12-15 NOTE — DISCHARGE SUMMARY
Discharge Summary     Patient ID:  Ramon Kerr  [id number]  88 y.o.  1940    Admit date: 12/10/2021    Discharge date:  12/15/2021    Admitting Physician: Alley Coon MD     Discharge Physician: TRUDY Baca/Dr. Walter Henderson    Admission Diagnoses: NSTEMI (non-ST elevated myocardial infarction) [facility]) [I21.4]  Unstable angina ([facility] [address] [I20.0]  CAD, multiple vessel [I25.10]    Discharge Diagnoses:   Patient Active Problem List    Diagnosis Date Noted    Debility 12/12/2021    Unstable angina ([facility] [address] 12/10/2021    Diarrhea 12/10/2021    HTN (hypertension) 12/10/2021    CAD, multiple vessel 12/10/2021    S/P CABG x 4 12/10/2021    Anemia 12/10/2021    Right patella fracture 10/26/2020    Arthritis of knee, right 06/25/2020    Total knee replacement status, right 06/25/2020    Total knee replacement status, left 12/13/2019    Unilateral primary osteoarthritis, left knee 12/13/2019    Arthritis of knee, left 12/12/2019    Primary osteoarthritis involving multiple joints 04/04/2017    HLD (hyperlipidemia) 12/07/2012       Procedures this admission:  Diagnostic left heart catheterization  EchoCardiogram  Coronary Artery Bypass Graft Surgery   Consults: Cardiac Surgery, Pulmonary/Intensive Care     Hospital Course: The patient is a 79 y.o. male who was admitted for NSTEMI (non-ST elevated myocardial infarction). Pt presented to the ER at [facility] with recurrent chest pain. He has noted intermittent chest pain for a couple weeks that continued to worsen. The night before presentation, he woke up with chest pain and felt like he couldn't breathe. Troponin was elevated on arrival to the ER consistent with NSTEMI. He was transferred to [facility] for urgent cardiac catheterization. LHC showed severe multivessel disease including critical LM disease. He stated he has been essentially wheelchair bound for the last year. He underwent knee surgery then suffered a fall.  Since then, he has been unable to walk. IABP was placed in the cath lab. He was taken to the OR for emergent CABG surgery. Limited echo pre-op showed a normal LV EF. He underwent CABG X 4 with LIMA to the LAD, reverse SVG to the OM1, reverse SVG to the OM2, and reverse SVG to the PDA on 12/10/21. He was transfused for post op anemia. He required pressor support. He was extubated on 12/11 and IABP was removed. He was quickly weaned off of O2. He was weaned off of pressor support. He continued to improve and was transferred to [facility] on POD 3. His appetite improved. He remained in sinus rhythm. STR was recommended and bed was arranged. The morning of 12/15/21, patient was feeling well and ambulating without any symptoms. Patient was determined stable and ready for discharge. Patient was instructed on the importance of medication compliance and outpatient follow up. For maximized medical therapy for CAD, patient will continue ASA, BB, ACE-I, and statin as well. The patient will have close transitional care follow up with [facility] Dr. Anne Wilson on January 3rd at 12:45pm [facility] The patient will follow up with Dr. Jennifer Quinn on January 12th at 2:10pm and has been referred to cardiac rehab. DISPOSITION: The patient is being discharged to rehab facility in stable condition on a low saturated fat, low cholesterol and low salt diet. The patient is instructed to advance activities as tolerated to the limit of fatigue or shortness of breath. The patient is instructed to avoid all heavy lifting or activities that strain the chest or upper arm muscles. Strenuous activity should be avoided. The patient is instructed to watch for signs of infection which include: increasing area of redness, fever or purulent drainage at the incision site. The patient is instructed to call the office or return to the ER for immediate evaluation for any shortness of breath or chest pain not relieved by NTG.     Discharge Exam:   Visit Vitals  BP (!) 156/72   Pulse 77   Temp 97.8 °F (36.6 °C)   Resp 16   Ht 5' 10\" (1.778 m)   Wt 224 lb 6.4 oz (101.8 kg)   SpO2 96%   BMI 32.20 kg/m²         Physical Exam:  General: Well Developed, Well Nourished, No Acute Distress, Alert & Oriented  Neck: supple, no JVD  Heart: S1S2 with RRR without murmurs or gallops  Lungs: Clear throughout auscultation bilaterally without adventitious sounds  Abd: soft, nontender, nondistended, with good bowel sounds  Ext: warm, no edema  Sternal incision: clean, dry, and intact  Skin: warm and dry      Recent Results (from the past 24 hour(s))   MAGNESIUM    Collection Time: 12/15/21  3:34 AM   Result Value Ref Range    Magnesium 2.3 1.8 - 2.4 mg/dL   POTASSIUM    Collection Time: 12/15/21  3:34 AM   Result Value Ref Range    Potassium 4.0 3.5 - 5.1 mmol/L         Patient Instructions:   Current Discharge Medication List      START taking these medications    Details   atorvastatin (LIPITOR) 40 mg tablet Take 1 Tablet by mouth nightly. Qty: 90 Tablet, Refills: 3      metoprolol tartrate (LOPRESSOR) 25 mg tablet Take 1 Tablet by mouth every twelve (12) hours. Qty: 60 Tablet, Refills: 3      nitroglycerin (NITROSTAT) 0.4 mg SL tablet 1 Tablet by SubLINGual route every five (5) minutes as needed for Chest Pain for up to 3 doses. Up to 3 doses. Qty: 25 Each, Refills: 3      lisinopriL (PRINIVIL, ZESTRIL) 5 mg tablet Take 1 Tablet by mouth daily. Qty: 30 Tablet, Refills: 3         CONTINUE these medications which have NOT CHANGED    Details   glucosam/gluc palomares/ar-ted-t-gluc (GLUCOSAMINE COMPLEX PO) Take 1,500 mg by mouth daily. vit A/vit C/vit E/zinc/copper (PRESERVISION AREDS PO) Take 1 Tab by mouth two (2) times a day. meloxicam (MOBIC) 7.5 mg tablet Take 7.5 mg by mouth daily. docosahexaenoic acid/epa (FISH OIL PO) Take 1,200 mg by mouth daily. aspirin delayed-release 81 mg tablet Take 81 mg by mouth daily.       polyethylene glycol (MIRALAX) 17 gram packet Take 17 g by mouth daily. Mix with 8oz water      amoxicillin (AMOXIL) 500 mg capsule Take 4 tablets by mouth 1 hour prior to dental procedure  Qty: 4 Capsule, Refills: 1    Associated Diagnoses: Presence of total knee joint prosthesis, right      acetaminophen (TYLENOL PO) Take 1,000 mg by mouth every six (6) hours as needed for Pain.          STOP taking these medications       chondroitin sulfate A sodium (CHONDROITIN SULFATE PO) Comments:   Reason for Stopping:         cephALEXin (KEFLEX) 500 mg capsule Comments:   Reason for Stopping:                 Signed:  Jessi Kerr PA-C  12/15/2021  9:00 AM

## 2021-12-15 NOTE — PROGRESS NOTES
Verbal bedside report given to oncoming RNDaylin. Patient's situation, background, assessment and recommendations provided. Opportunity for questions provided. Oncoming RN assumed care of patient.

## 2021-12-15 NOTE — PROGRESS NOTES
Discharge instructions reviewed with Patient. Prescriptions sent to rehab and med info sheets provided for all new medications. Opportunity for questions provided. patient voiced understanding of all discharge instructions.      Sutures removed and iv

## 2021-12-15 NOTE — PROGRESS NOTES
Problem: Ventilator Management  Goal: *Adequate oxygenation and ventilation  Outcome: Resolved/Met  Goal: *Patient maintains clear airway/free of aspiration  Outcome: Resolved/Met  Goal: *Absence of infection signs and symptoms  Outcome: Resolved/Met  Goal: *Normal spontaneous ventilation  Outcome: Resolved/Met     Problem: Patient Education: Go to Patient Education Activity  Goal: Patient/Family Education  Outcome: Resolved/Met     Problem: Falls - Risk of  Goal: *Absence of Falls  Description: Document Parag Fall Risk and appropriate interventions in the flowsheet.   Outcome: Resolved/Met  Note: Fall Risk Interventions:  Mobility Interventions: Bed/chair exit alarm    Mentation Interventions: Bed/chair exit alarm    Medication Interventions: Bed/chair exit alarm    Elimination Interventions: Call light in reach,Urinal in reach,Bed/chair exit alarm    History of Falls Interventions: Bed/chair exit alarm

## 2021-12-15 NOTE — ROUTINE PROCESS
Cardiac Rehab: Spoke with patient regarding referral to cardiac rehab. Patient meets admission criteria based on CABG x4 (12/10/21). Written information about Cardiac Rehab given and reviewed with patient. Discussed lifestyle modifications to promote cardiac wellness. Patient indicated that he may want to participate in the cardiac rehab program after discharge home form short term rehab. We will follow up with him after discharge home as requested. His Cardiologist is Dr. Kelvin Nelson.       Thank you,  MARIFER DunnN, RN  Cardiopulmonary Rehabilitation Nurse Liaison  [facility]

## 2021-12-15 NOTE — DISCHARGE INSTRUCTIONS
The patient is being discharged to rehab facility in stable condition on a low saturated fat, low cholesterol and low salt diet. The patient is instructed to advance activities as tolerated to the limit of fatigue or shortness of breath. The patient is instructed to avoid all heavy lifting or activities that strain the chest or upper arm muscles. Strenuous activity should be avoided. The patient is instructed to watch for signs of infection which include: increasing area of redness, fever or purulent drainage at the incision site. The patient is instructed to call the office or return to the ER for immediate evaluation for any shortness of breath or chest pain not relieved by NTG. Coronary Artery Bypass Graft: What to Expect at Home  Your Recovery     Coronary artery bypass graft (CABG) is surgery to treat coronary artery disease. The surgery helps blood make a detour, or bypass, around one or more narrowed or blocked coronary arteries. Coronary arteries are the blood vessels that bring blood to the heart muscle. Your doctor did the surgery through a cut, called an incision, in your chest.  You will feel tired and sore for the first few weeks after surgery. You may have some brief, sharp pains on either side of your chest. Your chest, shoulders, and upper back may ache. These symptoms usually get better after 4 to 6 weeks. The incision in your chest and the area where the healthy blood vessel was taken may be sore or swollen. You will probably be able to do many of your usual activities after 4 to 6 weeks. But for at least 6 weeks, you'll avoid lifting heavy objects and doing activities that strain your chest or upper arm muscles. At first you may notice that you get tired easily and need to rest often. It may take 1 to 2 months to get your energy back. Some people find that they are more emotional after this surgery. You may cry easily or show emotion in ways that are unusual for you.  This is common and may last for up to a year. Some people get depressed after the surgery. Talk with your doctor if you have sadness that continues or you are concerned about how you are feeling. Treatment and other support can help you feel better. Even though the surgery may improve your symptoms, you will still follow a heart-healthy lifestyle to help lower your risk of a heart attack or stroke. It will be important to eat a heart-healthy diet, get regular exercise, stay at a healthy weight, manage other health problems, take your medicines, and not smoke. You may start a cardiac rehabilitation (rehab) program in the hospital. You will continue with this rehab program after you go home to help you recover and prevent problems with your heart. Talk to your doctor about whether rehab is right for you. This care sheet gives you a general idea about how long it will take for you to recover. But each person recovers at a different pace. Follow the steps below to get better as quickly as possible. How can you care for yourself at home? Activity    · Rest when you feel tired. Getting enough sleep will help you recover. Try to sleep on your back while you heal. If your breastbone (sternum) was cut, healing usually takes about 4 to 6 weeks.     · Try to walk each day. Start by walking a little more than you did the day before. Bit by bit, increase the amount you walk. Walking boosts blood flow and helps prevent pneumonia and constipation.     · Avoid strenuous activities, such as bicycle riding, jogging, weight lifting, or heavy aerobic exercise, until your doctor says it is okay.     · For 3 months, avoid activities that strain your chest or upper arm muscles. This includes pushing a  or vacuum, mopping floors, or swinging a golf club or tennis racquet.     · For at least 6 weeks, avoid lifting anything that would make you strain.  This may include a child, heavy grocery bags and milk containers, a heavy briefcase or backpack, or cat litter or dog food bags.     · For at least 6 weeks, avoid pushing yourself up out of a bed or chair using your arms. Do not use your arms to pull yourself into or out of a vehicle.     · Hold a pillow firmly over your chest incision when you cough or take deep breaths. This will support your chest and reduce your pain.     · Do breathing exercises at home as instructed by your doctor. This will help prevent pneumonia.     · Ask your doctor when you can drive again.     · You may need to take 4 to 12 weeks off from work. It depends on the type of work you do and how you feel.     · Ask your doctor when it is okay for you to have sex. Diet    · Eat a heart-healthy diet. If you have not been eating this way, talk to your doctor. You also may want to talk to a dietitian. A dietitian can help you learn about healthy foods.     · Drink plenty of fluids (unless your doctor tells you not to).     · You may notice that your bowel movements are not regular right after your surgery. This is common. Try to avoid constipation and straining with bowel movements. You may want to take a fiber supplement every day. If you have not had a bowel movement after a couple of days, ask your doctor about taking a mild laxative. Medicines    · Your doctor will tell you if and when you can restart your medicines. You will also be given instructions about taking any new medicines.     · If you take aspirin or some other blood thinner, ask your doctor if and when to start taking it again. Make sure that you understand exactly what your doctor wants you to do.     · Be safe with medicines. Take your medicines exactly as prescribed. Call your doctor if you think you are having a problem with your medicine.     · Take pain medicines exactly as directed. ? If the doctor gave you a prescription medicine for pain, take it as prescribed.   ? If you are not taking a prescription pain medicine, ask your doctor if you can take an over-the-counter medicine. ? Do not take aspirin, ibuprofen (Advil, Motrin), naproxen (Aleve), or other nonsteroidal anti-inflammatory drugs (NSAIDs) unless your doctor says it is okay.     · If you think your pain medicine is making you sick to your stomach:  ? Take your medicine after meals (unless your doctor has told you not to). ? Ask your doctor for a different pain medicine.     · If your doctor prescribed antibiotics, take them as directed. Do not stop taking them just because you feel better. You need to take the full course of antibiotics.     · Your doctor may give you a blood thinner to prevent blood clots. If you take a blood thinner, be sure you get instructions about how to take your medicine safely. Blood thinners can cause serious bleeding problems. Incision care    · If you have strips of tape on the incisions the doctor made, leave the tape on for a week or until it falls off.     · Wash the area daily with warm, soapy water, and pat it dry. Don't use hydrogen peroxide or alcohol, which can slow healing. You may cover the area with a gauze bandage if it weeps or rubs against clothing. Change the bandage every day.     · You can take showers with your back to the showerhead. Allow the warm and soapy water to run across your shoulders and down over the incision. Pat the incision dry with a clean towel.     · Do not take a bath for the first 3 weeks, or until your doctor tells you it is okay.     · Do not swim or use a hot tub for at least 1 month, or until your doctor says it is okay.     · Do not use any creams, lotions, powders, ointments, or oils unless your doctor tells you it is okay.     · If a vein was removed from your leg, you can help prevent swelling by:  ? Wearing compression stockings if your doctor recommends them. ? Elevating your legs above the level of your heart whenever you lie down. Other instructions    · Keep track of your weight.  Weigh yourself every day at the same time of day, on the same scale, in the same amount of clothing. A sudden increase in weight can be a sign of a problem with your heart. Tell your doctor if you suddenly gain weight, such as 3 pounds or more in 2 to 3 days.     · Do not smoke. Smoking can make it harder for you to recover. And it will raise the chances of your arteries narrowing again. If you need help quitting, talk to your doctor about stop-smoking programs and medicines. These can increase your chances of quitting for good. Follow-up care is a key part of your treatment and safety. Be sure to make and go to all appointments, and call your doctor if you are having problems. It's also a good idea to know your test results and keep a list of the medicines you take. When should you call for help? Call 911 anytime you think you may need emergency care. For example, call if:    · You passed out (lost consciousness).     · You have severe trouble breathing.     · You have sudden chest pain and shortness of breath, or you cough up blood.     · You have severe pain in your chest.     · You have symptoms of a heart attack. These may include:  ? Chest pain or pressure, or a strange feeling in the chest.  ? Sweating. ? Shortness of breath. ? Nausea or vomiting. ? Pain, pressure, or a strange feeling in the back, neck, jaw, or upper belly or in one or both shoulders or arms. ? Lightheadedness or sudden weakness. ? A fast or irregular heartbeat. After you call 911, the  may tell you to chew 1 adult-strength or 2 to 4 low-dose aspirin. Wait for an ambulance. Do not try to drive yourself.     · You have angina symptoms (such as chest pain or pressure) that do not go away with rest or are not getting better within 5 minutes after you take a dose of nitroglycerin.     · You have symptoms of a stroke.  These may include:  ? Sudden numbness, tingling, weakness, or loss of movement in your face, arm, or leg, especially on only one side of your body.  ? Sudden vision changes. ? Sudden trouble speaking. ? Sudden confusion or trouble understanding simple statements. ? Sudden problems with walking or balance. ? A sudden, severe headache that is different from past headaches. Call your doctor now or seek immediate medical care if:    · You have pain that does not get better after you take pain medicine.     · You have loose stitches, or your incision comes open.     · You are bleeding a lot from the incision.     · You have signs of infection, such as:  ? Increased pain, swelling, warmth, or redness. ? Red streaks leading from the incision. ? Pus draining from the incision. ? A fever.     · Your heartbeat feels very fast, skips beats, or flutters.     · You have signs of a blood clot in a leg. If you had a vein removed from your leg, you may have tenderness and swelling while your leg heals. But signs of a blood clot may be in a different part of your leg and may include:  ? Pain in your calf, back of the knee, thigh, or groin. ? Redness and swelling in your leg or groin.     · You have symptoms of heart failure, such as:  ? Swelling in your legs, ankles, or feet. ? Sudden weight gain, such as more than 2 to 3 pounds in a day or 5 pounds in a week. (Your doctor may suggest a different range of weight gain.)     · You are sick to your stomach or cannot keep fluids down. Watch closely for changes in your health, and be sure to contact your doctor if:    · You do not get better as expected. Where can you learn more? Go to http://www.gray.com/  Enter F759 in the search box to learn more about \"Coronary Artery Bypass Graft: What to Expect at Home. \"  Current as of: April 29, 2021               Content Version: 13.0  © 0075-4611 [employer]. Care instructions adapted under license by [employer] (which disclaims liability or warranty for this information).  If you have questions about a medical condition or this instruction, always ask your healthcare professional. [address] any warranty or liability for your use of this information. Coronary Artery Disease: Care Instructions  Your Care Instructions     The heart is a muscle, and like any muscle, it needs blood to work well. Coronary artery disease occurs when the arteries that bring oxygen-rich blood to your heart have a buildup of plaque--deposits of fats and other substances. Plaque can reduce blood flow to the heart muscle. This can cause angina symptoms such as chest pain or pressure. A heart attack can happen if blood flow is completely blocked. You can do a lot to improve your health and prevent a heart attack. Eating healthy food, not smoking, getting regular exercise, and taking your medicine are the main things you can do every day to stay healthy. Follow-up care is a key part of your treatment and safety. Be sure to make and go to all appointments, and call your doctor if you are having problems. It's also a good idea to know your test results and keep a list of the medicines you take. How can you care for yourself at home? Medicines    · Be safe with medicines. Take your medicines exactly as prescribed. Call your doctor if you think you are having a problem with your medicine. You will get more details on the specific medicines your doctor prescribes.     · You will take medicines that lower your risk of a heart attack and lower your risk of dying early from heart disease. These medicines include:  ? Angiotensin-converting enzyme (ACE) inhibitors or angiotensin II receptor blockers (ARBs). They lower blood pressure. ? Aspirin and other blood thinners. They prevent blood clots that could cause a heart attack. ? Beta-blockers. They lower the heart's workload. ? Statins and other cholesterol medicines.  They lower cholesterol.     · If your doctor has given you nitroglycerin for angina symptoms (such as chest pain or pressure) keep it with you at all times. If you have symptoms, sit down and rest, and take the first dose of nitroglycerin as directed. If your symptoms get worse or are not getting better within 5 minutes, call 911 right away. Stay on the phone. The emergency  will give you further instructions.     · Do not take any over-the-counter medicines, vitamins, or herbal products without talking to your doctor first.   Lifestyle  Ask your doctor if a cardiac rehab program is right for you. Cardiac rehab can help you make lifestyle changes. In cardiac rehab, a team of health professionals provides education and support to help you make new, healthy habits.    · Do not smoke. Avoid secondhand smoke too. Smoking can increase your risk of a heart attack or stroke. If you need help quitting, talk to your doctor about stop-smoking programs and medicines. These can increase your chances of quitting for good.     · Eat heart-healthy foods. These include vegetables, fruits, nuts, beans, lean meat, fish, and whole grains. Limit saturated fat, sodium, and alcohol. Limit drinks and foods with added sugar.     · If your doctor recommends it, get more exercise. Ask your doctor what level of exercise is safe for you. Walking is a good choice. Bit by bit, increase the amount you walk every day. Try for at least 30 minutes on most days of the week. You also may want to swim, bike, or do other activities.     · Stay at a healthy weight. Lose weight if you need to.     · Manage other health problems. These include diabetes, high blood pressure, and high cholesterol. If you think you may have a problem with alcohol or drug use, talk to your doctor.     · If you have angina symptoms, pay attention to your symptoms. This can help you see what causes them and what is typical for you.     · Avoid colds and flu. Get a pneumococcal vaccine shot. If you have had one before, ask your doctor whether you need another dose.  Get a flu vaccine every year. If you must be around people with colds or flu, wash your hands often.     · If you think you have symptoms of depression, talk to your doctor. Symptoms include feeling sad or hopeless most of the time, or losing interest in activities that used to make you happy. When should you call for help? Call 911 anytime you think you may need emergency care. For example, call if:    · You have symptoms of a heart attack. These may include:  ? Chest pain or pressure, or a strange feeling in the chest.  ? Sweating. ? Shortness of breath. ? Nausea or vomiting. ? Pain, pressure, or a strange feeling in the back, neck, jaw, or upper belly or in one or both shoulders or arms. ? Lightheadedness or sudden weakness. ? A fast or irregular heartbeat. After you call 911, the  may tell you to chew 1 adult-strength or 2 to 4 low-dose aspirin. Wait for an ambulance. Do not try to drive yourself.     · You have angina symptoms (such as chest pain or pressure) that do not go away with rest or are not getting better within 5 minutes after you take a dose of nitroglycerin.     · You passed out (lost consciousness). Call your doctor now or seek immediate medical care if:    · You are having angina symptoms, such as chest pain or pressure, more often than usual, or they are different or worse than usual.     · You have new or increased shortness of breath.     · You are dizzy or lightheaded, or you feel like you may faint. Watch closely for changes in your health, and be sure to contact your doctor if you have any problems. Where can you learn more? Go to http://www.gray.com/  Enter Y486 in the search box to learn more about \"Coronary Artery Disease: Care Instructions. \"  Current as of: April 29, 2021               Content Version: 13.0  © 6230-9357 "[employer].    Care instructions adapted under license by [employer] (which disclaims liability or warranty for this information). If you have questions about a medical condition or this instruction, always ask your healthcare professional. [address] any warranty or liability for your use of this information. Heart-Healthy Diet: Care Instructions  Your Care Instructions     A heart-healthy diet has lots of vegetables, fruits, nuts, beans, and whole grains, and is low in salt. It limits foods that are high in saturated fat, such as meats, cheeses, and fried foods. It may be hard to change your diet, but even small changes can lower your risk of heart attack and heart disease. Follow-up care is a key part of your treatment and safety. Be sure to make and go to all appointments, and call your doctor if you are having problems. It's also a good idea to know your test results and keep a list of the medicines you take. How can you care for yourself at home? Watch your portions  · Use food labels to learn what the recommended servings are for the foods you eat. · Eat only the number of calories you need to stay at a healthy weight. If you need to lose weight, eat fewer calories than your body burns (through exercise and other physical activity). Eat more fruits and vegetables  · Eat a variety of fruit and vegetables every day. Dark green, deep orange, red, or yellow fruits and vegetables are especially good for you. Examples include spinach, carrots, peaches, and berries. · Keep carrots, celery, and other veggies handy for snacks. Buy fruit that is in season and store it where you can see it so that you will be tempted to eat it. · Cook dishes that have a lot of veggies in them, such as stir-fries and soups. Limit saturated fat  · Read food labels, and try to avoid saturated fats. They increase your risk of heart disease. · Use olive or canola oil when you cook. · Bake, broil, grill, or steam foods instead of frying them.   · Choose lean meats instead of high-fat meats such as hot dogs and sausages. Cut off all visible fat when you prepare meat. · Eat fish, skinless poultry, and meat alternatives such as soy products instead of high-fat meats. Soy products, such as tofu, may be especially good for your heart. · Choose low-fat or fat-free milk and dairy products. Eat foods high in fiber  · Eat a variety of grain products every day. Include whole-grain foods that have lots of fiber and nutrients. Examples of whole-grain foods include oats, whole wheat bread, and brown rice. · Buy whole-grain breads and cereals, instead of white bread or pastries. Limit salt and sodium  · Limit how much salt and sodium you eat to help lower your blood pressure. · Taste food before you salt it. Add only a little salt when you think you need it. With time, your taste buds will adjust to less salt. · Eat fewer snack items, fast foods, and other high-salt, processed foods. Check food labels for the amount of sodium in packaged foods. · Choose low-sodium versions of canned goods (such as soups, vegetables, and beans). Limit sugar  · Limit drinks and foods with added sugar. These include candy, desserts, and soda pop. Limit alcohol  · Limit alcohol to no more than 2 drinks a day for men and 1 drink a day for women. Too much alcohol can cause health problems. When should you call for help? Watch closely for changes in your health, and be sure to contact your doctor if:    · You would like help planning heart-healthy meals. Where can you learn more? Go to http://www.[employer].com/  Enter V137 in the search box to learn more about \"Heart-Healthy Diet: Care Instructions. \"  Current as of: December 17, 2020               Content Version: 13.0  © 3077-4347 [employer]. Care instructions adapted under license by [employer] (which disclaims liability or warranty for this information).  If you have questions about a medical condition or this instruction, always ask your healthcare professional. [place] [address] any warranty or liability for your use of this information.

## 2021-12-15 NOTE — PROGRESS NOTES
TRANSFER - IN REPORT:    TRANSFER - OUT REPORT:    Verbal report given to minal(name) on Natanael Vera  being transferred to [facility](unit) for routine progression of care       Report consisted of patients Situation, Background, Assessment and   Recommendations(SBAR). Information from the following report(s) SBAR, Kardex and MAR was reviewed with the receiving nurse. Lines:       Opportunity for questions and clarification was provided.

## 2021-12-15 NOTE — PROGRESS NOTES
ACUTE PHYSICAL THERAPY GOALS:  (Developed with and agreed upon by patient and/or caregiver.)  (1.)Mr. Addi Duke will move from supine to sit and sit to supine , scoot up and down and roll side to side in bed with MINIMAL ASSISTANCE within 7 treatment day(s). (2.)Mr. Addi Duke will transfer from bed to chair and chair to bed with MODERATE ASSIST using the least restrictive device within 7 treatment day(s). (3.)Mr. Addi Duke will propel manual wheelchair 100 ft with SUPERVISION for increased independence within 7 treatment day(s). (4.)Mr. Addi Duke will participate in therapeutic activity/exercises x 24 minutes for increased strength within 7 treatment days. PHYSICAL THERAPY: Daily Note and AM Treatment Day # 4    Chas Steen is a 79 y.o. male   PRIMARY DIAGNOSIS: S/P CABG x 4  NSTEMI (non-ST elevated myocardial infarction) ([facility] [address] [I21.4]  Unstable angina (HCC) [I20.0]  CAD, multiple vessel [I25.10]  Procedure(s) (LRB):  CORONARY ARTERY BYPASS GRAFT (CABG X 4)/ LIMA (N/A)  LEFT GREATER SAPHENOUS VEIN HARVEST ENDOSCOPIC (Left)  5 Days Post-Op    ASSESSMENT:     REHAB RECOMMENDATIONS: CURRENT LEVEL OF FUNCTION:  (Most Recently Demonstrated)   Recommendation to date pending progress:  Setting:   Short-term Rehab  Equipment:    To Be Determined Bed Mobility:   Not tested  Sit to Stand:  Maximal Assistance x 2   Transfers:   Maximal Assistance x 2  Gait/Mobility:   Unable to perform     ASSESSMENT:  Mr. Addi Duke was sitting up in chair on contact. He was able to perform below LE exercises with assist to complete as needed. He also performed SPT to [facility] with max assist x 2. He has been using sliding board transfer to get to the chair apparently. He has flexed knees with SPT to [facility] [place]. He is scheduled to go to rehab later today. SUBJECTIVE:   Mr. Addi Duke is very pleasant and agreeable to therapy.     SOCIAL HISTORY/ LIVING ENVIRONMENT:  Lives with wife and reported that he transfers to manual w/c with squat pivot; history of falls  Home Environment: Private residence  One/Two Story Residence: One story  Living Alone: No  Support Systems: Spouse/Significant Other  OBJECTIVE:     PAIN: VITAL SIGNS: LINES/DRAINS:   Pre Treatment:  0  Post Treatment: 0   Arterial Line, Prince Catheter and IV  O2 Device: None (Room air)     MOBILITY: I Mod I S SBA CGA Min Mod Max Total  NT x2 Comments:   Bed Mobility    Rolling [] [] [] [] [] [] [] [x] [] [] [x]    Supine to Sit [] [] [] [] [] [] [] [] [] [x] []    Scooting [] [] [] [] [] [] [] [x] [] [] [x]    Sit to Supine [] [] [] [] [] [] [] [x] [] [] [x]    Transfers    Sit to Stand [] [] [] [] [] [] [] [] [] [x] []      Bed to Chair [] [] [] [] [] [] [] [x] [] [] [x] Max Ax2 chair>bed   Stand to Sit [] [] [] [] [] [] [] [] [] [x] []    I=Independent, Mod I=Modified Independent, S=Supervision, SBA=Standby Assistance, CGA=Contact Guard Assistance,   Min=Minimal Assistance, Mod=Moderate Assistance, Max=Maximal Assistance, Total=Total Assistance, NT=Not Tested    BALANCE: Good Fair+ Fair Fair- Poor NT Comments   Sitting Static [] [x] [] [] [] []    Sitting Dynamic [] [x] [] [] [] []              Standing Static [] [] [] [] [] [x]    Standing Dynamic [] [] [] [] [] [x]      GAIT: I Mod I S SBA CGA Min Mod Max Total  NT x2 Comments:   Level of Assistance [] [] [] [] [] [] [] [] [] [x] []    Distance     DME N/A    Gait Quality     Weightbearing  Status N/A     I=Independent, Mod I=Modified Independent, S=Supervision, SBA=Standby Assistance, CGA=Contact Guard Assistance,   Min=Minimal Assistance, Mod=Moderate Assistance, Max=Maximal Assistance, Total=Total Assistance, NT=Not Tested    PLAN:   FREQUENCY/DURATION: PT Plan of Care: BID for duration of hospital stay or until stated goals are met, whichever comes first.  TREATMENT:     TREATMENT:   ($$ Therapeutic Exercises: 8-22 mins    )  Therapeutic Exercise (18 Minutes):  Therapeutic exercises noted below to improve functional activity tolerance, AROM, strength and mobility.      TREATMENT GRID:     Date:  12/13/21  BID Date:  12/15/21 Date:     Activity/Exercise Seated Parameters Parameters Parameters   Heel raises X 20 B X 15 B    Toe raises X 20 B X 15 B    LAQ's X 15 B active  X 15 B AA X 15 B active  X 15 B AA    Hip Flex X 15 B X 15 B    Hip ABD X 15 B AA X 15 B AA    QS (in recliner)  X 15 B                    AFTER TREATMENT POSITION/PRECAUTIONS:  Needs within reach, RN notified and BSC    INTERDISCIPLINARY COLLABORATION:  RN/PCT and PT/PTA    TOTAL TREATMENT DURATION:  PT Patient Time In/Time Out  Time In: 0930  Time Out: 200 [facility]

## 2022-01-26 NOTE — CONSULTS
[address] [facility]  [facility]. Walt Hendrix MD     [facility], KIMBERLY Moonine Maco       [id number]  12/10/2021        1940        CHIEF COMPLAINT:  LM CAD    HISTORY OF PRESENT ILLNESS:  The patient is a 79 y.o. male who is seen for evaluation of critical LM CAD, he underwent LHC, found critical LM CAD, having CP. Past Medical History:   Diagnosis Date    Arthritis     HLD (hyperlipidemia) 2012    Macular degeneration     Osteoarthritis of left knee        Past Surgical History:   Procedure Laterality Date    HX COLONOSCOPY      HX COLONOSCOPY  13    diverticulosis, internal hemorrhoids. no further screening recommended       No family history on file. Social History     Socioeconomic History    Marital status:      Spouse name: Not on file    Number of children: Not on file    Years of education: Not on file    Highest education level: Not on file   Occupational History    Not on file   Tobacco Use    Smoking status: Former Smoker     Types: Cigarettes     Quit date: 1976     Years since quittin.1    Smokeless tobacco: Never Used   Substance and Sexual Activity    Alcohol use: Yes     Alcohol/week: 0.0 standard drinks     Comment: 1weekly    Drug use: Never    Sexual activity: Not on file   Other Topics Concern    Not on file   Social History Narrative    Not on file     Social Determinants of Health     Financial Resource Strain:     Difficulty of Paying Living Expenses: Not on file   Food Insecurity:     Worried About Running Out of Food in the Last Year: Not on file    Raquel of Food in the Last Year: Not on file   Transportation Needs:     Lack of Transportation (Medical): Not on file    Lack of Transportation (Non-Medical):  Not on file   Physical Activity:     Days of Exercise per Week: Not on file    Minutes of Exercise per Session: Not on file   Stress:     Feeling of Stress : Not on file   Social Connections:     Frequency of Communication with Friends and Family: Not on file    Frequency of Social Gatherings with Friends and Family: Not on file    Attends Spiritism Services: Not on file    Active Member of Clubs or Organizations: Not on file    Attends Club or Organization Meetings: Not on file    Marital Status: Not on file   Intimate Partner Violence:     Fear of Current or Ex-Partner: Not on file    Emotionally Abused: Not on file    Physically Abused: Not on file    Sexually Abused: Not on file   Housing Stability:     Unable to Pay for Housing in the Last Year: Not on file    Number of [place] in the Last Year: Not on file    Unstable Housing in the Last Year: Not on file       No Known Allergies    No current facility-administered medications on file prior to encounter. Current Outpatient Medications on File Prior to Encounter   Medication Sig Dispense Refill    glucosam/gluc palomares/ow-qny-c-gluc (GLUCOSAMINE COMPLEX PO) Take 1,500 mg by mouth daily.  vit A/vit C/vit E/zinc/copper (PRESERVISION AREDS PO) Take 1 Tab by mouth two (2) times a day.  meloxicam (MOBIC) 7.5 mg tablet Take 7.5 mg by mouth daily.  docosahexaenoic acid/epa (FISH OIL PO) Take 1,200 mg by mouth daily.  aspirin delayed-release 81 mg tablet Take 81 mg by mouth daily.  polyethylene glycol (MIRALAX) 17 gram packet Take 17 g by mouth daily. Mix with 8oz water      amoxicillin (AMOXIL) 500 mg capsule Take 4 tablets by mouth 1 hour prior to dental procedure 4 Capsule 1    acetaminophen (TYLENOL PO) Take 1,000 mg by mouth every six (6) hours as needed for Pain. REVIEW OF SYSTEMS:  GENERAL:  No weight loss. No fever. EYES:  No diplopia. No vision loss. ENTM:  No hearing loss. No trouble swallowing. No hoarseness. CARDIAC:  See present illness. PULMONARY:  Denies asthma or chronic pulmonary disease. GI:  No change in bowel habits. No bleeding.   : No dysuria. No history of renal stones or renal insufficiency. MS:  Denies osteoarthritis. NEURO:  No stroke or seizure disorder  HEME/LYMPHATIC:  No history of bleeding tendencies. PSYCHIATRIC:  No history of depression. PHYSICAL EXAMINATION:  GENERAL APPEARANCE:  Well developed. Well nourished. Alert, cooperative and oriented times three. HEENT:  Normocephalic. Extraocular muscles are intact. No scleral icterus. NECK/LYMPHATIC:  Supple with no carotic bruits. No jugular venous distention. LUNGS: Lungs are clear to auscultation. HEART:  Heart is regular rate and rhythm without a murmur. ABDOMEN:  Soft and non-tender. SKIN:  No rashes, cyanosis, jaundice, ecchymoses or evidence of skin breakdown present. EXTREMITIES:  Full range of motion. No deformity. No peripheral edema. VASCULAR:  Pulses are full and equal at the radial arteries and the popliteal arteries bilaterally. There is no venous stasis disease. NEURO:  Grossly intact. IMPRESSION:  Encounter Diagnoses   Name Primary?  NSTEMI (non-ST elevated myocardial infarction) ([facility] [address]     CAD, multiple vessel     Mixed hyperlipidemia     Primary hypertension     Anemia, unspecified type     Encounter for weaning from ventilator ([facility] [address]     S/P CABG x 4     Hx of CABG     Debility     Hyperlipidemia, unspecified hyperlipidemia type        PLAN:Critical LM CAD having CP, to OR for emergent CABG          No name on file.

## 2022-03-18 PROBLEM — M17.11 ARTHRITIS OF KNEE, RIGHT: Status: ACTIVE | Noted: 2020-06-25

## 2022-03-18 PROBLEM — Z96.652 TOTAL KNEE REPLACEMENT STATUS, LEFT: Status: ACTIVE | Noted: 2019-12-13

## 2022-03-18 PROBLEM — I25.10 CAD, MULTIPLE VESSEL: Status: ACTIVE | Noted: 2021-12-10

## 2022-03-18 PROBLEM — M17.12 ARTHRITIS OF KNEE, LEFT: Status: ACTIVE | Noted: 2019-12-12

## 2022-03-18 PROBLEM — Z96.651 TOTAL KNEE REPLACEMENT STATUS, RIGHT: Status: ACTIVE | Noted: 2020-06-25

## 2022-03-19 PROBLEM — M15.9 PRIMARY OSTEOARTHRITIS INVOLVING MULTIPLE JOINTS: Status: ACTIVE | Noted: 2017-04-04

## 2022-03-19 PROBLEM — I10 HTN (HYPERTENSION): Status: ACTIVE | Noted: 2021-12-10

## 2022-03-19 PROBLEM — R19.7 DIARRHEA: Status: ACTIVE | Noted: 2021-12-10

## 2022-03-19 PROBLEM — M17.12 UNILATERAL PRIMARY OSTEOARTHRITIS, LEFT KNEE: Status: ACTIVE | Noted: 2019-12-13

## 2022-03-19 PROBLEM — M15.0 PRIMARY OSTEOARTHRITIS INVOLVING MULTIPLE JOINTS: Status: ACTIVE | Noted: 2017-04-04

## 2022-03-19 PROBLEM — S82.001A RIGHT PATELLA FRACTURE: Status: ACTIVE | Noted: 2020-10-26

## 2022-03-19 PROBLEM — R53.81 DEBILITY: Status: ACTIVE | Noted: 2021-12-12

## 2022-03-19 PROBLEM — Z95.1 S/P CABG X 4: Status: ACTIVE | Noted: 2021-12-10

## 2022-03-20 PROBLEM — I20.0 UNSTABLE ANGINA (HCC): Status: ACTIVE | Noted: 2021-12-10

## 2022-03-20 PROBLEM — D64.9 ANEMIA: Status: ACTIVE | Noted: 2021-12-10

## 2022-05-08 ENCOUNTER — TELEPHONE (OUTPATIENT)
Dept: CARDIOLOGY | Age: 82
End: 2022-05-08

## 2022-05-09 NOTE — TELEPHONE ENCOUNTER
1 -- Spoke with Pts daughter via phone. States that while she was not present, her brothers were at the house with her mother and father. Brother told her that Pt was sitting a chair and was holding a glass of water. The water fell and initially they thought Pt was bending down to  the cup. However, they realized that Pt was actually falling forward. They each grabbed him by one arm and Pt immediately \"woke up\" and asked them what they were doing and what just happened. This was apparently about 3 hrs ago. Pt has been completely normal since then. VS now /69, HR 66. Edgardo Never wants to know if this is an emergent situation and if Pt needs to go to the ED. States Pt doesn't want to go to ED because he will have to sit and wait for hours to be seen. Discussed that there can be multiple reasons for what sounds like a syncopal event. However, without further evaluation and work-up -- NP unable to dx Pt over the phone. Could be a hydration issue or could be a serious arrhythmia. No way to know without eval and ED is only option at this time although understand this is not Pts preferred option. Instructed that she can offer Pt some water and electrolytes now but if he has a recurrent events or feels poorly that he should call 911 and/or go to ED for eval. Edgardo Never v/u.        [employer], NP-C

## 2022-06-17 ENCOUNTER — TELEMEDICINE (OUTPATIENT)
Dept: INTERNAL MEDICINE CLINIC | Facility: CLINIC | Age: 82
End: 2022-06-17
Payer: MEDICARE

## 2022-06-17 DIAGNOSIS — E55.9 VITAMIN D INSUFFICIENCY: ICD-10-CM

## 2022-06-17 DIAGNOSIS — Z23 NEED FOR SHINGLES VACCINE: ICD-10-CM

## 2022-06-17 DIAGNOSIS — Z76.89 ENCOUNTER TO ESTABLISH CARE WITH NEW DOCTOR: Primary | ICD-10-CM

## 2022-06-17 DIAGNOSIS — M15.9 PRIMARY OSTEOARTHRITIS INVOLVING MULTIPLE JOINTS: ICD-10-CM

## 2022-06-17 DIAGNOSIS — R53.81 DEBILITY: ICD-10-CM

## 2022-06-17 DIAGNOSIS — E78.2 MIXED HYPERLIPIDEMIA: ICD-10-CM

## 2022-06-17 DIAGNOSIS — I25.10 CAD, MULTIPLE VESSEL: ICD-10-CM

## 2022-06-17 DIAGNOSIS — I10 PRIMARY HYPERTENSION: ICD-10-CM

## 2022-06-17 DIAGNOSIS — R35.1 NOCTURIA: ICD-10-CM

## 2022-06-17 PROBLEM — I20.0 UNSTABLE ANGINA (HCC): Status: RESOLVED | Noted: 2021-12-10 | Resolved: 2022-06-17

## 2022-06-17 PROBLEM — M25.562 CHRONIC PAIN OF BOTH KNEES: Status: ACTIVE | Noted: 2020-09-15

## 2022-06-17 PROBLEM — H35.30 MACULAR DEGENERATION: Status: ACTIVE | Noted: 2022-06-17

## 2022-06-17 PROBLEM — R19.7 DIARRHEA: Status: RESOLVED | Noted: 2021-12-10 | Resolved: 2022-06-17

## 2022-06-17 PROBLEM — G89.29 CHRONIC PAIN OF BOTH KNEES: Status: ACTIVE | Noted: 2020-09-15

## 2022-06-17 PROBLEM — M25.561 CHRONIC PAIN OF BOTH KNEES: Status: ACTIVE | Noted: 2020-09-15

## 2022-06-17 PROCEDURE — 99214 OFFICE O/P EST MOD 30 MIN: CPT | Performed by: INTERNAL MEDICINE

## 2022-06-17 PROCEDURE — G8427 DOCREV CUR MEDS BY ELIG CLIN: HCPCS | Performed by: INTERNAL MEDICINE

## 2022-06-17 PROCEDURE — 1123F ACP DISCUSS/DSCN MKR DOCD: CPT | Performed by: INTERNAL MEDICINE

## 2022-06-17 PROCEDURE — G8417 CALC BMI ABV UP PARAM F/U: HCPCS | Performed by: INTERNAL MEDICINE

## 2022-06-17 PROCEDURE — 1036F TOBACCO NON-USER: CPT | Performed by: INTERNAL MEDICINE

## 2022-06-17 RX ORDER — ZOSTER VACCINE RECOMBINANT, ADJUVANTED 50 MCG/0.5
0.5 KIT INTRAMUSCULAR SEE ADMIN INSTRUCTIONS
Qty: 0.5 ML | Refills: 0 | Status: SHIPPED | OUTPATIENT
Start: 2022-06-17 | End: 2022-10-05

## 2022-06-17 RX ORDER — CHLORAL HYDRATE 500 MG
CAPSULE ORAL
COMMUNITY

## 2022-06-17 RX ORDER — MULTIVITAMIN/IRON/FOLIC ACID 18MG-0.4MG
TABLET ORAL
COMMUNITY

## 2022-06-17 ASSESSMENT — ENCOUNTER SYMPTOMS: RESPIRATORY NEGATIVE: 1

## 2022-06-17 NOTE — PROGRESS NOTES
[facility]      2022    Patient Name: Kendall Dickerson  :  1940    Subjective:    Chief Complaint:  Chief Complaint   Patient presents with    Establish Care         HPI I was at home while conducting this encounter. Consent:  He and/or his healthcare decision maker is aware that this patient-initiated Telehealth encounter is a billable service, with coverage as determined by his insurance carrier. He is aware that he may receive a bill and has provided verbal consent to proceed: Yes    This virtual visit was conducted via [employer]. Pursuant to the emergency declaration under the [facility]5 waiver authority and the [employer] Act, this Virtual  Visit was conducted to reduce the patient's risk of exposure to COVID-19 and provide continuity of care for an established patient. Services were provided through a video synchronous discussion virtually to substitute for in-person clinic visit. Due to this being a TeleHealth evaluation, many elements of the physical examination are unable to be assessed. Total Time: minutes: 21-30 minutes. Patient evaluated to [facility], was previously seeing Dr. Anastasiia Rose at [facility]; He had bilateral knee replacement, has macular degeneration and is WC bound; He has pain in his shin, shoulders, takes Tylenol at bedtime, which helps; He has CAD, s/p CABG x4; he has f/u w/ Dr. Librado Garcia; he denies CP, [place];    He follows a healthy diet, avoids fried foods and sweets; he does leg exercises, 30-60 minutes daily;   HTN:  Patient compliant with taking blood pressure medications: Yes  Discussed importance of following low sodium DASH diet, increasing physical activity, taking medications as ordered, decreasing alcohol intake, keeping f/u visits to recheck blood pressure, monitoring blood pressure at home and keeping a log, with goal blood pressure <140/90. Home bp readings are: 130/68        Past Medical History:   Diagnosis Date    Coronary artery disease involving native coronary artery of native heart without angina pectoris     Dr. Gene Rios History of coronary artery bypass graft 12/2021    x4    HLD (hyperlipidemia) 2012    Macular degeneration     Dr. Hans Lazcano Osteoarthritis knee     Primary hypertension        Past Surgical History:   Procedure Laterality Date    COLONOSCOPY  13    diverticulosis, internal hemorrhoids. no further screening recommended    COLONOSCOPY      CORONARY ARTERY BYPASS GRAFT  2021    TOTAL KNEE ARTHROPLASTY Bilateral 2020       History reviewed. No pertinent family history.     Social History     Tobacco Use    Smoking status: Former Smoker     Packs/day: 1.00     Years: 20.00     Pack years: 20.00     Types: Cigarettes     Quit date: 1976     Years since quittin.4    Smokeless tobacco: Never Used   Vaping Use    Vaping Use: Never used   Substance Use Topics    Alcohol use: Not Currently    Drug use: Never                 Current Outpatient Medications:     Glucosamine-Chondroitin 8193-6630 MG/30ML LIQD, Take by mouth, Disp: , Rfl:     Multiple Vitamin (MULTIVITAMIN ADULT PO), Take by mouth, Disp: , Rfl:     Omega-3 1000 MG CAPS, Take by mouth, Disp: , Rfl:     zoster recombinant adjuvanted vaccine (SHINGRIX) 50 MCG/0.5ML SUSR injection, Inject 0.5 mLs into the muscle See Admin Instructions 1 dose now and repeat in 2-6 months, Disp: 0.5 mL, Rfl: 0    aspirin 81 MG EC tablet, Take 81 mg by mouth daily, Disp: , Rfl:     atorvastatin (LIPITOR) 40 MG tablet, Take 40 mg by mouth, Disp: , Rfl:     lisinopril (PRINIVIL;ZESTRIL) 5 MG tablet, Take 5 mg by mouth daily, Disp: , Rfl:     metoprolol tartrate (LOPRESSOR) 25 MG tablet, Take 25 mg by mouth every 12 hours, Disp: , Rfl:     nitroGLYCERIN (NITROSTAT) 0.4 MG SL tablet, Place 0.4 mg under the tongue, Disp: , Rfl:     No Known Allergies    Review of Systems   Constitutional: Negative. HENT: Negative. Respiratory: Negative. Cardiovascular: Negative. Objective: There were no vitals taken for this visit. Examination:  Physical Exam  Neurological:      Mental Status: He is alert. Psychiatric:         Mood and Affect: Mood normal.         Thought Content: Thought content normal.         Judgment: Judgment normal.           Assessment/Plan:    Sarah Geller was seen today for establish care. Diagnoses and all orders for this visit:    Encounter to establish care with new doctor  Patient's medical history was reviewed in detail today. Recommended healthy diet, regular exercise. Primary hypertension  Stable, continue medication, diet. CAD, multiple vessel  Asymptomatic; recommended low fat, low cholesterol diet, regular exercise, taking ASA and other medications as prescribed; he has regular f/u w/ Cardiology;     Primary osteoarthritis involving multiple joints  Stable. Debility  He is WC bound, but does stretching exercises daily; Mixed hyperlipidemia  -     CBC with Auto Differential; Future  -     Comprehensive Metabolic Panel; Future  -     Lipid Panel; Future  -     T4, Free; Future  -     TSH; Future  -     Urinalysis; Future    Need for shingles vaccine  -     zoster recombinant adjuvanted vaccine [facility]) 50 MCG/0.5ML SUSR injection; Inject 0.5 mLs into the muscle See Admin Instructions 1 dose now and repeat in 2-6 months    Nocturia  -     PSA, Total and Free; Future    Vitamin D insufficiency  -     Vitamin D 25 Hydroxy; Future  -     Urinalysis; Future          Follow-up and Dispositions    · Return in about 6 weeks (around 7/29/2022), or if symptoms worsen or fail to improve, for medicare annual w/ labs. Medication Reconciliation:  Current Medications Verified: Current medications/ immunizations were reviewed, including purpose, with patient.   Family History, Social History, Current and Past Medical History was reviewed with patient and updated at today's office visit. Medication Reconciliation list was given to patient/ family. Patient was advised to discard old medication lists and provide all providers with current list at each visit and carry list with them in case of emergency.     Completed By:   Armida Fisher MD    [facility]  [address] [place] [address] [address] [facility] [address]  [place], [address] [place] [address] Rd  [phone]  [phone] fax  11:28 AM

## 2022-09-30 ENCOUNTER — NURSE ONLY (OUTPATIENT)
Dept: INTERNAL MEDICINE CLINIC | Facility: CLINIC | Age: 82
End: 2022-09-30

## 2022-09-30 DIAGNOSIS — R35.1 NOCTURIA: ICD-10-CM

## 2022-09-30 DIAGNOSIS — E78.2 MIXED HYPERLIPIDEMIA: ICD-10-CM

## 2022-09-30 DIAGNOSIS — E55.9 VITAMIN D INSUFFICIENCY: ICD-10-CM

## 2022-09-30 LAB
25(OH)D3 SERPL-MCNC: 34.4 NG/ML (ref 30–100)
ALBUMIN SERPL-MCNC: 3.6 G/DL (ref 3.2–4.6)
ALBUMIN/GLOB SERPL: 1.3 {RATIO} (ref 1.2–3.5)
ALP SERPL-CCNC: 84 U/L (ref 50–136)
ALT SERPL-CCNC: 91 U/L (ref 12–65)
ANION GAP SERPL CALC-SCNC: 2 MMOL/L (ref 4–13)
APPEARANCE UR: CLEAR
AST SERPL-CCNC: 36 U/L (ref 15–37)
BASOPHILS # BLD: 0.1 K/UL (ref 0–0.2)
BASOPHILS NFR BLD: 1 % (ref 0–2)
BILIRUB SERPL-MCNC: 0.9 MG/DL (ref 0.2–1.1)
BILIRUB UR QL: NEGATIVE
BUN SERPL-MCNC: 15 MG/DL (ref 8–23)
CALCIUM SERPL-MCNC: 9.7 MG/DL (ref 8.3–10.4)
CHLORIDE SERPL-SCNC: 110 MMOL/L (ref 101–110)
CHOLEST SERPL-MCNC: 76 MG/DL
CO2 SERPL-SCNC: 30 MMOL/L (ref 21–32)
COLOR UR: NORMAL
CREAT SERPL-MCNC: 0.9 MG/DL (ref 0.8–1.5)
DIFFERENTIAL METHOD BLD: NORMAL
EOSINOPHIL # BLD: 0.2 K/UL (ref 0–0.8)
EOSINOPHIL NFR BLD: 3 % (ref 0.5–7.8)
ERYTHROCYTE [DISTWIDTH] IN BLOOD BY AUTOMATED COUNT: 14.4 % (ref 11.9–14.6)
GLOBULIN SER CALC-MCNC: 2.8 G/DL (ref 2.3–3.5)
GLUCOSE SERPL-MCNC: 97 MG/DL (ref 65–100)
GLUCOSE UR STRIP.AUTO-MCNC: NEGATIVE MG/DL
HCT VFR BLD AUTO: 45.1 % (ref 41.1–50.3)
HDLC SERPL-MCNC: 36 MG/DL (ref 40–60)
HDLC SERPL: 2.1 {RATIO}
HGB BLD-MCNC: 14.5 G/DL (ref 13.6–17.2)
HGB UR QL STRIP: NEGATIVE
IMM GRANULOCYTES # BLD AUTO: 0 K/UL (ref 0–0.5)
IMM GRANULOCYTES NFR BLD AUTO: 0 % (ref 0–5)
KETONES UR QL STRIP.AUTO: NEGATIVE MG/DL
LDLC SERPL CALC-MCNC: 24.2 MG/DL
LEUKOCYTE ESTERASE UR QL STRIP.AUTO: NEGATIVE
LYMPHOCYTES # BLD: 1.4 K/UL (ref 0.5–4.6)
LYMPHOCYTES NFR BLD: 20 % (ref 13–44)
MCH RBC QN AUTO: 31.2 PG (ref 26.1–32.9)
MCHC RBC AUTO-ENTMCNC: 32.2 G/DL (ref 31.4–35)
MCV RBC AUTO: 97 FL (ref 79.6–97.8)
MONOCYTES # BLD: 0.6 K/UL (ref 0.1–1.3)
MONOCYTES NFR BLD: 8 % (ref 4–12)
NEUTS SEG # BLD: 4.7 K/UL (ref 1.7–8.2)
NEUTS SEG NFR BLD: 68 % (ref 43–78)
NITRITE UR QL STRIP.AUTO: NEGATIVE
NRBC # BLD: 0 K/UL (ref 0–0.2)
PH UR STRIP: 5.5 [PH] (ref 5–9)
PLATELET # BLD AUTO: 178 K/UL (ref 150–450)
PMV BLD AUTO: 11.5 FL (ref 9.4–12.3)
POTASSIUM SERPL-SCNC: 4.6 MMOL/L (ref 3.5–5.1)
PROT SERPL-MCNC: 6.4 G/DL (ref 6.3–8.2)
PROT UR STRIP-MCNC: NEGATIVE MG/DL
RBC # BLD AUTO: 4.65 M/UL (ref 4.23–5.6)
SODIUM SERPL-SCNC: 142 MMOL/L (ref 136–145)
SP GR UR REFRACTOMETRY: 1.02 (ref 1–1.02)
T4 FREE SERPL-MCNC: 1 NG/DL (ref 0.78–1.46)
TRIGL SERPL-MCNC: 79 MG/DL (ref 35–150)
TSH, 3RD GENERATION: 1.58 UIU/ML (ref 0.36–3.74)
UROBILINOGEN UR QL STRIP.AUTO: 1 EU/DL (ref 0.2–1)
VLDLC SERPL CALC-MCNC: 15.8 MG/DL (ref 6–23)
WBC # BLD AUTO: 6.9 K/UL (ref 4.3–11.1)

## 2022-10-03 LAB
PSA FREE MFR SERPL: 28.6 %
PSA FREE SERPL-MCNC: 0.2 NG/ML
PSA SERPL-MCNC: 0.7 NG/ML

## 2022-10-05 ENCOUNTER — OFFICE VISIT (OUTPATIENT)
Dept: INTERNAL MEDICINE CLINIC | Facility: CLINIC | Age: 82
End: 2022-10-05
Payer: MEDICARE

## 2022-10-05 VITALS
HEIGHT: 70 IN | HEART RATE: 69 BPM | DIASTOLIC BLOOD PRESSURE: 57 MMHG | BODY MASS INDEX: 29.56 KG/M2 | OXYGEN SATURATION: 98 % | TEMPERATURE: 97.9 F | RESPIRATION RATE: 16 BRPM | SYSTOLIC BLOOD PRESSURE: 113 MMHG

## 2022-10-05 DIAGNOSIS — Z00.00 MEDICARE ANNUAL WELLNESS VISIT, SUBSEQUENT: Primary | ICD-10-CM

## 2022-10-05 DIAGNOSIS — I25.10 CAD, MULTIPLE VESSEL: ICD-10-CM

## 2022-10-05 DIAGNOSIS — Z23 NEED FOR INFLUENZA VACCINATION: ICD-10-CM

## 2022-10-05 DIAGNOSIS — Z23 NEED FOR TDAP VACCINATION: ICD-10-CM

## 2022-10-05 DIAGNOSIS — M15.9 PRIMARY OSTEOARTHRITIS INVOLVING MULTIPLE JOINTS: ICD-10-CM

## 2022-10-05 DIAGNOSIS — I10 PRIMARY HYPERTENSION: ICD-10-CM

## 2022-10-05 DIAGNOSIS — E78.2 MIXED HYPERLIPIDEMIA: ICD-10-CM

## 2022-10-05 PROCEDURE — 90715 TDAP VACCINE 7 YRS/> IM: CPT | Performed by: INTERNAL MEDICINE

## 2022-10-05 PROCEDURE — G8427 DOCREV CUR MEDS BY ELIG CLIN: HCPCS | Performed by: INTERNAL MEDICINE

## 2022-10-05 PROCEDURE — 1123F ACP DISCUSS/DSCN MKR DOCD: CPT | Performed by: INTERNAL MEDICINE

## 2022-10-05 PROCEDURE — G0439 PPPS, SUBSEQ VISIT: HCPCS | Performed by: INTERNAL MEDICINE

## 2022-10-05 PROCEDURE — 1036F TOBACCO NON-USER: CPT | Performed by: INTERNAL MEDICINE

## 2022-10-05 PROCEDURE — G0008 ADMIN INFLUENZA VIRUS VAC: HCPCS | Performed by: INTERNAL MEDICINE

## 2022-10-05 PROCEDURE — 90694 VACC AIIV4 NO PRSRV 0.5ML IM: CPT | Performed by: INTERNAL MEDICINE

## 2022-10-05 PROCEDURE — 99213 OFFICE O/P EST LOW 20 MIN: CPT | Performed by: INTERNAL MEDICINE

## 2022-10-05 PROCEDURE — 90471 IMMUNIZATION ADMIN: CPT | Performed by: INTERNAL MEDICINE

## 2022-10-05 PROCEDURE — G8417 CALC BMI ABV UP PARAM F/U: HCPCS | Performed by: INTERNAL MEDICINE

## 2022-10-05 PROCEDURE — G8484 FLU IMMUNIZE NO ADMIN: HCPCS | Performed by: INTERNAL MEDICINE

## 2022-10-05 RX ORDER — VIT A/VIT C/VIT E/ZINC/COPPER 2148-113
1 TABLET ORAL 2 TIMES DAILY
COMMUNITY

## 2022-10-05 ASSESSMENT — PATIENT HEALTH QUESTIONNAIRE - PHQ9
SUM OF ALL RESPONSES TO PHQ9 QUESTIONS 1 & 2: 0
SUM OF ALL RESPONSES TO PHQ QUESTIONS 1-9: 0
SUM OF ALL RESPONSES TO PHQ QUESTIONS 1-9: 0
1. LITTLE INTEREST OR PLEASURE IN DOING THINGS: 0
SUM OF ALL RESPONSES TO PHQ QUESTIONS 1-9: 0
2. FEELING DOWN, DEPRESSED OR HOPELESS: 0
SUM OF ALL RESPONSES TO PHQ QUESTIONS 1-9: 0

## 2022-10-05 ASSESSMENT — ENCOUNTER SYMPTOMS
COUGH: 0
BLOOD IN STOOL: 0
ABDOMINAL PAIN: 0
RHINORRHEA: 0
SHORTNESS OF BREATH: 0

## 2022-10-05 NOTE — PATIENT INSTRUCTIONS
Personalized Preventive Plan for Avery Howard - 10/5/2022  Medicare offers a range of preventive health benefits. Some of the tests and screenings are paid in full while other may be subject to a deductible, co-insurance, and/or copay. Some of these benefits include a comprehensive review of your medical history including lifestyle, illnesses that may run in your family, and various assessments and screenings as appropriate. After reviewing your medical record and screening and assessments performed today your provider may have ordered immunizations, labs, imaging, and/or referrals for you. A list of these orders (if applicable) as well as your Preventive Care list are included within your After Visit Summary for your review. Other Preventive Recommendations:    A preventive eye exam performed by an eye specialist is recommended every 1-2 years to screen for glaucoma; cataracts, macular degeneration, and other eye disorders. A preventive dental visit is recommended every 6 months. Try to get at least 150 minutes of exercise per week or 10,000 steps per day on a pedometer . Order or download the FREE \"Exercise & Physical Activity: Your Everyday Guide\" from [employer]. Call 9-362.924.2106 or search [employer] online. You need 2899-2979 mg of calcium and 5752-1101 IU of vitamin D per day. It is possible to meet your calcium requirement with diet alone, but a vitamin D supplement is usually necessary to meet this goal.  When exposed to the sun, use a sunscreen that protects against both UVA and UVB radiation with an SPF of 30 or greater. Reapply every 2 to 3 hours or after sweating, drying off with a towel, or swimming. Always wear a seat belt when traveling in a car. Always wear a helmet when riding a bicycle or motorcycle.

## 2022-10-05 NOTE — PROGRESS NOTES
[facility]      10/5/2022    Patient Name: John Pedraza  :  1940    Subjective:    Chief Complaint:  Chief Complaint   Patient presents with    Medicare AWV     Pt is here for Medicare Wellness and to review labs. HPI Here for Medicare Wellness visit; patient had fasting labs done recently and results were reviewed in detail today; He was seen as a new patient in July;   Colonoscopy: , declined further  Eye exam: 2022  Dental exam: 2022  Pneumovax:   Prevnar 13: 2016  Shingrix: declined  Tdap: , today  Fluvax: today  Jarvis Looney Covid 19: , 3/10, 21  Jarvis Looney booster: 2022  HTN:  Patient compliant with taking blood pressure medications: Yes  Discussed importance of following low sodium DASH diet, increasing physical activity, taking medications as ordered, decreasing alcohol intake, keeping f/u visits to recheck blood pressure, monitoring blood pressure at home and keeping a log, with goal blood pressure <140/90. Home bp readings are: good      Past Medical History:   Diagnosis Date    Coronary artery disease involving native coronary artery of native heart without angina pectoris     Dr. 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 Marc Redman    Osteoarthritis knee     Primary hypertension        Past Surgical History:   Procedure Laterality Date    COLONOSCOPY  13    diverticulosis, internal hemorrhoids. no further screening recommended    COLONOSCOPY      CORONARY ARTERY BYPASS GRAFT  2021    TOTAL KNEE ARTHROPLASTY Bilateral 2020       History reviewed. No pertinent family history.     Social History     Tobacco Use    Smoking status: Former     Packs/day: 1.00     Years: 20.00     Pack years: 20.00     Types: Cigarettes     Quit date: 1976     Years since quittin.7    Smokeless tobacco: Never   Vaping Use    Vaping Use: Never used   Substance Use Topics Alcohol use: Not Currently    Drug use: Never                 Current Outpatient Medications:     Multiple Vitamins-Minerals (PRESERVISION AREDS) TABS, Take 1 tablet by mouth 2 times daily, Disp: , Rfl:     Glucosamine-Chondroitin 7561-2804 MG/30ML LIQD, Take by mouth, Disp: , Rfl:     Multiple Vitamin (MULTIVITAMIN ADULT PO), Take by mouth, Disp: , Rfl:     Omega-3 1000 MG CAPS, Take by mouth, Disp: , Rfl:     aspirin 81 MG EC tablet, Take 81 mg by mouth daily, Disp: , Rfl:     atorvastatin (LIPITOR) 40 MG tablet, Take 40 mg by mouth, Disp: , Rfl:     lisinopril (PRINIVIL;ZESTRIL) 5 MG tablet, Take 5 mg by mouth daily, Disp: , Rfl:     metoprolol tartrate (LOPRESSOR) 25 MG tablet, Take 25 mg by mouth every 12 hours, Disp: , Rfl:     nitroGLYCERIN (NITROSTAT) 0.4 MG SL tablet, Place 0.4 mg under the tongue, Disp: , Rfl:     No Known Allergies    Review of Systems   Constitutional:  Negative for chills, fatigue and fever. HENT:  Negative for congestion, postnasal drip and rhinorrhea. Eyes:  Negative for visual disturbance. Respiratory:  Negative for cough and shortness of breath. Cardiovascular:  Negative for chest pain and palpitations. Gastrointestinal:  Negative for abdominal pain and blood in stool. Genitourinary:  Negative for dysuria and frequency. Musculoskeletal:  Negative for arthralgias and myalgias. Skin: Negative. Neurological:  Negative for numbness and headaches. Psychiatric/Behavioral:  Negative for dysphoric mood and sleep disturbance. The patient is not nervous/anxious. All other systems reviewed and are negative. Objective:  BP (!) 113/57   Pulse 69   Temp 97.9 °F (36.6 °C) (Temporal)   Resp 16   Ht 5' 10\" (1.778 m)   SpO2 98%   BMI 29.56 kg/m²     Examination:  Physical Exam  Vitals reviewed. Constitutional:       Appearance: Normal appearance. HENT:      Head: Normocephalic and atraumatic.       Nose: Nose normal.      Mouth/Throat:      Mouth: Mucous membranes are moist.      Pharynx: Oropharynx is clear. Eyes:      Extraocular Movements: Extraocular movements intact. Conjunctiva/sclera: Conjunctivae normal.      Pupils: Pupils are equal, round, and reactive to light. Cardiovascular:      Rate and Rhythm: Normal rate and regular rhythm. Pulses: Normal pulses. Heart sounds: Normal heart sounds. Pulmonary:      Effort: Pulmonary effort is normal.      Breath sounds: Normal breath sounds. Abdominal:      General: Abdomen is flat. Bowel sounds are normal.      Palpations: Abdomen is soft. Musculoskeletal:         General: Normal range of motion. Cervical back: Normal range of motion and neck supple. Skin:     General: Skin is warm and dry. Neurological:      General: No focal deficit present. Mental Status: He is alert and oriented to person, place, and time. Mental status is at baseline. Psychiatric:         Mood and Affect: Mood normal.         Behavior: Behavior normal.         Assessment/Plan:    Rashmi Smart was seen today for medicare awv. Diagnoses and all orders for this visit:    Medicare annual wellness visit, subsequent  Annual Exam: Goals for good health were addressed at today's visit:  -Reach and stay at a healthy weight. This can lower your risk of obesity, diabetes, heart disease and high blood pressure. -Get at least 30 minutes of physical activity daily. Walking, running, swimming, cycling or playing sports are good options.  -Do not smoke or allow others to smoke around you. -Use birth control if you do not want to have children at this time and barrier contraception to prevent or decrease risk of exposure to sexually transmitted diseases. -Use sunscreen daily, SPF of 15 or higher are best.  -See a dentist at least once yearly for checkups.   -Have vision checked every 1-2 years.  -Wear a seat belt in the car.  -Avoid drinking in excess of 2 alcoholic drinks per day for men and 1 drink a day for women, or avoid drinking altogether.   -Watch closely for changes in our health and contact your doctor with any concerning problems or symptoms  -Age appropriate screening tests were updated and discussed at today's visit. -Immunization history was reviewed and updated at today's visit. Need for influenza vaccination  -     Influenza, FLUAD, (age 72 y+), IM, Preservative Free, 0.5 mL    Need for Tdap vaccination  -     Tdap, BOOSTRIX, (age 8 yrs+), IM    Primary hypertension  Recommended low sodium diet; Goal: take meds as prescribed, monitor blood pressure at home and call with readings. CAD, multiple vessel  Stable, continue medication, diet. Primary osteoarthritis involving multiple joints  Stable. Mixed hyperlipidemia  Stable, continue medication, diet. -     Comprehensive Metabolic Panel; Future  -     CBC with Auto Differential; Future  -     Lipid Panel; Future  -     Urinalysis; Future        Follow-up and Dispositions    Return in 6 months (on 4/5/2023), or if symptoms worsen or fail to improve, for Medicare Annual Wellness Visit in 1 year. Medication Reconciliation:  Current Medications Verified: Current medications/ immunizations were reviewed, including purpose, with patient. Family History, Social History, Current and Past Medical History was reviewed with patient and updated at today's office visit. Medication Reconciliation list was given to patient/ family. Patient was advised to discard old medication lists and provide all providers with current list at each visit and carry list with them in case of emergency.     Completed By:   Giselle Oates MD    [facility]  [address] [place] [address] 2050, [address] [facility] [address]  [address]  [phone] fax  3:45 PM  Medicare Annual Wellness Visit    Diana Martinez is here for Medicare AWV (Pt is here for Medicare Wellness and to review labs. )    Assessment & Plan   Medicare annual wellness visit, subsequent  Need for influenza vaccination  -     Influenza, FLUAD, (age 72 y+), IM, Preservative Free, 0.5 mL  Need for Tdap vaccination  -     Tdap, BOOSTRIX, (age 8 yrs+), IM  Primary hypertension  CAD, multiple vessel  Primary osteoarthritis involving multiple joints  Mixed hyperlipidemia  -     Comprehensive Metabolic Panel; Future  -     CBC with Auto Differential; Future  -     Lipid Panel; Future  -     Urinalysis; Future    Recommendations for Preventive Services Due: see orders and patient instructions/AVS.  Recommended screening schedule for the next 5-10 years is provided to the patient in written form: see Patient Instructions/AVS.     Return in 6 months (on 4/5/2023), or if symptoms worsen or fail to improve, for Medicare Annual Wellness Visit in 1 year. Subjective       Patient's complete Health Risk Assessment and screening values have been reviewed and are found in Flowsheets. The following problems were reviewed today and where indicated follow up appointments were made and/or referrals ordered.     Positive Risk Factor Screenings with Interventions:             General Health and ACP:  General  In general, how would you say your health is?: Very Good  In the past 7 days, have you experienced any of the following: New or Increased Pain, New or Increased Fatigue, Loneliness, Social Isolation, Stress or Anger?: No  Do you get the social and emotional support that you need?: Yes  Do you have a Living Will?: Yes    Advance Directives       Power of  Living Will ACP-Advance Directive ACP-Power of     Not on File Not on File Not on File Not on File          General Health Risk Interventions:  No Living Will: Advance Care Planning addressed with patient today    Health Habits/Nutrition:  Physical Activity: Inactive    Days of Exercise per Week: 0 days    Minutes of Exercise per Session: 0 min     Have you lost any weight without trying in the past 3 months?: No     Have you seen the dentist within the past year?: Yes  Health Habits/Nutrition Interventions:  Inadequate physical activity:  educational materials provided to promote increased physical activity  He rides stationary bicycle 45 minutes daily and uses rubber band for stretching his arms and legs;       ADLs:  In the past 7 days, did you need help from others to perform any of the following everyday activities: Eating, dressing, grooming, bathing, toileting, or walking/balance?: No  In the past 7 days, did you need help from others to take care of any of the following: Laundry, housekeeping, banking/finances, shopping, telephone use, food preparation, transportation, or taking medications?: (!) Yes  Select all that apply: [employer], Housekeeping, Banking/Finances, Shopping, Telephone Use, Food Preparation  ADL Interventions:  Patient declines any further evaluation/treatment for this issue  Daughter helps with ADLs          Objective   Vitals:    10/05/22 1403   BP: (!) 113/57   Pulse: 69   Resp: 16   Temp: 97.9 °F (36.6 °C)   TempSrc: Temporal   SpO2: 98%   Height: 5' 10\" (1.778 m)      Body mass index is 29.56 kg/m². No Known Allergies  Prior to Visit Medications    Medication Sig Taking?  Authorizing Provider   Multiple Vitamins-Minerals (PRESERVISION AREDS) TABS Take 1 tablet by mouth 2 times daily Yes Historical Provider, MD   Glucosamine-Chondroitin 3251-0735 MG/30ML LIQD Take by mouth Yes Historical Provider, MD   Multiple Vitamin (MULTIVITAMIN ADULT PO) Take by mouth Yes Historical Provider, MD   [place]-3 1000 MG CAPS Take by mouth Yes Historical Provider, MD   aspirin 81 MG EC tablet Take 81 mg by mouth daily Yes Ar Automatic Reconciliation   atorvastatin (LIPITOR) 40 MG tablet Take 40 mg by mouth Yes Ar Automatic Reconciliation   lisinopril (PRINIVIL;ZESTRIL) 5 MG tablet Take 5 mg by mouth daily Yes Ar Automatic Reconciliation   metoprolol tartrate (LOPRESSOR) 25 MG tablet Take 25 mg by mouth every 12 hours Yes Ar Automatic Reconciliation   nitroGLYCERIN (NITROSTAT) 0.4 MG SL tablet Place 0.4 mg under the tongue Yes Ar Automatic Reconciliation       CareTeam (Including outside providers/suppliers regularly involved in providing care):   Patient Care Team:  Filemon Tuttle MD as PCP - General (Internal Medicine)  Filemon Tuttle MD as PCP - [facility] Empaneled Provider     Reviewed and updated this visit:  Tobacco  Allergies  Meds  Problems  Med Hx  Surg Hx  Soc Hx  Fam Hx

## 2022-10-14 ENCOUNTER — OFFICE VISIT (OUTPATIENT)
Dept: CARDIOLOGY CLINIC | Age: 82
End: 2022-10-14
Payer: MEDICARE

## 2022-10-14 VITALS
DIASTOLIC BLOOD PRESSURE: 80 MMHG | SYSTOLIC BLOOD PRESSURE: 120 MMHG | HEART RATE: 52 BPM | BODY MASS INDEX: 29.56 KG/M2 | HEIGHT: 70 IN

## 2022-10-14 DIAGNOSIS — I10 ESSENTIAL HYPERTENSION: ICD-10-CM

## 2022-10-14 DIAGNOSIS — I25.10 CAD, MULTIPLE VESSEL: Primary | ICD-10-CM

## 2022-10-14 DIAGNOSIS — Z95.1 S/P CABG X 4: ICD-10-CM

## 2022-10-14 DIAGNOSIS — E78.2 MIXED HYPERLIPIDEMIA: ICD-10-CM

## 2022-10-14 PROCEDURE — G8417 CALC BMI ABV UP PARAM F/U: HCPCS | Performed by: INTERNAL MEDICINE

## 2022-10-14 PROCEDURE — 1123F ACP DISCUSS/DSCN MKR DOCD: CPT | Performed by: INTERNAL MEDICINE

## 2022-10-14 PROCEDURE — 99214 OFFICE O/P EST MOD 30 MIN: CPT | Performed by: INTERNAL MEDICINE

## 2022-10-14 PROCEDURE — G8484 FLU IMMUNIZE NO ADMIN: HCPCS | Performed by: INTERNAL MEDICINE

## 2022-10-14 PROCEDURE — 1036F TOBACCO NON-USER: CPT | Performed by: INTERNAL MEDICINE

## 2022-10-14 PROCEDURE — G8427 DOCREV CUR MEDS BY ELIG CLIN: HCPCS | Performed by: INTERNAL MEDICINE

## 2022-10-14 RX ORDER — LISINOPRIL 5 MG/1
5 TABLET ORAL DAILY
Qty: 90 TABLET | Refills: 3 | Status: SHIPPED | OUTPATIENT
Start: 2022-10-14

## 2022-10-14 RX ORDER — ATORVASTATIN CALCIUM 40 MG/1
40 TABLET, FILM COATED ORAL DAILY
Qty: 90 TABLET | Refills: 3 | Status: SHIPPED | OUTPATIENT
Start: 2022-10-14

## 2022-10-14 ASSESSMENT — ENCOUNTER SYMPTOMS: SHORTNESS OF BREATH: 0

## 2022-10-14 NOTE — PATIENT INSTRUCTIONS
Patient Education        Learning About Coronary Artery Disease (CAD)  What is coronary artery disease? Coronary artery disease is a condition that occurs when plaque builds up in the arteries that bring oxygen-rich blood to your heart. Plaque is a fatty substance made of cholesterol, calcium, and other substances in the blood. This process is called hardening of the arteries, or atherosclerosis. What happens when you have coronary artery disease? Plaque may narrow the coronary arteries. Narrowed arteries cause poor blood flow. This can lead to angina symptoms such as chest pain or discomfort. If blood flow is completely blocked, you could have a heart attack. You can slow and reduce the risk of future problems by making changes in your lifestyle. These include quitting smoking and eating heart-healthy foods. Treatment, along with changes in your lifestyle, can help you live a longer and healthier life. How can you prevent coronary artery disease? Do not smoke. It may be the best thing you can do to prevent coronary artery disease. If you need help quitting, talk to your doctor about stop-smoking programs and medicines. These can increase your chances of quitting for good. Be active. Try to do moderate activity at least 2½ hours a week. Or try to do vigorous activity at least 1¼ hours a week. You may want to walk or try other activities, such as running, swimming, cycling, or playing tennis or team sports. Eat heart-healthy foods. Eat more fruits and vegetables and less food that contains saturated and trans fats. Limit alcohol, sodium, and sweets. Stay at a healthy weight. Lose weight if you need to. Manage other health problems such as diabetes, high blood pressure, and high cholesterol. How is coronary artery disease treated? Your doctor will suggest that you make lifestyle changes. For example, your doctor may ask you to eat healthy foods, quit smoking, lose extra weight, and be more active.   You will take medicines that help prevent a heart attack. Your doctor may suggest a procedure to open narrowed or blocked arteries. This is called angioplasty. Or your doctor may suggest using healthy blood vessels to create detours around narrowed or blocked arteries. This is called bypass surgery. Follow-up care is a key part of your treatment and safety. Be sure to make and go to all appointments, and call your doctor if you are having problems. It's also a good idea to know your test results and keep a list of the medicines you take. Where can you learn more? Go to https://[address].Gigit. org and sign in to your [employer] account. Enter [phone] in the [facility] box to learn more about \"Learning About Coronary Artery Disease (CAD). \"     If you do not have an account, please click on the \"Sign Up Now\" link. Current as of: January 10, 2022               Content Version: 13.4  © 2006-2022 Healthwise, [employer]. Care instructions adapted under license by [facility] ([facility]). If you have questions about a medical condition or this instruction, always ask your healthcare professional. [place] [address] any warranty or liability for your use of this information.

## 2022-10-14 NOTE — PROGRESS NOTES
[facility] CARDIOLOGY  [address] [facility], [address] [facility], [address] [place] [address]  PHONE: [phone]      10/14/22    NAME:  Bruna Jones  : 1940  MRN: [id number]         SUBJECTIVE:   Bruna Jones is a 79 y.o. male seen for a follow up visit regarding the following:     Chief Complaint   Patient presents with    Hypertension            HPI:  Follow up  Hypertension   . follow up CABG/NSTEMI nearly a year ago now,   Wheelchair bound. Has done well, denies chest discomfort or dyspnea. PAST CARDIAC HISTORY:   Dec 2021- LIMA-LAD, SVG-OM1 SVG-OM2 SVG-PDA, preserved EF   Echo EF 60-65% normal DF, no valve disease          Key CAD CHF Meds            atorvastatin (LIPITOR) 40 MG tablet (Taking)    Sig - Route: Take 1 tablet by mouth daily - Oral    lisinopril (PRINIVIL;ZESTRIL) 5 MG tablet (Taking)    Sig - Route: Take 1 tablet by mouth daily - Oral    metoprolol tartrate (LOPRESSOR) 25 MG tablet (Taking)    Sig - Route: Take 1 tablet by mouth in the morning and 1 tablet in the evening. - Oral              Past Medical History, Past Surgical History, Family history, Social History, and Medications were all reviewed with the patient today and updated as necessary. Prior to Admission medications    Medication Sig Start Date End Date Taking? Authorizing Provider   atorvastatin (LIPITOR) 40 MG tablet Take 1 tablet by mouth daily 10/14/22  Yes Darcy Parra MD   lisinopril (PRINIVIL;ZESTRIL) 5 MG tablet Take 1 tablet by mouth daily 10/14/22  Yes Darcy Parra MD   metoprolol tartrate (LOPRESSOR) 25 MG tablet Take 1 tablet by mouth in the morning and 1 tablet in the evening.  10/14/22  Yes Darcy Parra MD   Multiple Vitamins-Minerals (PRESERVISION AREDS) TABS Take 1 tablet by mouth 2 times daily   Yes Historical Provider, MD   Glucosamine-Chondroitin 8339-0664 MG/30ML LIQD Take by mouth   Yes Historical Provider, MD   Multiple Vitamin (MULTIVITAMIN ADULT PO) Take by mouth   Yes Historical Provider, MD   [place]-3 1000 MG CAPS Take by mouth   Yes Historical Provider, MD   aspirin 81 MG EC tablet Take 81 mg by mouth daily   Yes Ar Automatic Reconciliation   nitroGLYCERIN (NITROSTAT) 0.4 MG SL tablet Place 0.4 mg under the tongue 1/3/22  Yes Ar Automatic Reconciliation     No Known Allergies  Past Medical History:   Diagnosis Date    Coronary artery disease involving native coronary artery of native heart without angina pectoris     Dr. 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 Del Jimenez    Osteoarthritis knee     Primary hypertension      Past Surgical History:   Procedure Laterality Date    COLONOSCOPY  13    diverticulosis, internal hemorrhoids. no further screening recommended    COLONOSCOPY      CORONARY ARTERY BYPASS GRAFT  2021    TOTAL KNEE ARTHROPLASTY Bilateral 2020     History reviewed. No pertinent family history. Social History     Tobacco Use    Smoking status: Former     Packs/day: 1.00     Years: 20.00     Pack years: 20.00     Types: Cigarettes     Quit date: 1976     Years since quittin.8    Smokeless tobacco: Never   Substance Use Topics    Alcohol use: Not Currently       ROS:    Review of Systems   Cardiovascular:  Negative for chest pain. Respiratory:  Negative for shortness of breath. Gastrointestinal:  Positive for dysphagia (rare, if he eats too fast). PHYSICAL EXAM:   /80   Pulse 52   Ht 5' 10\" (1.778 m)   BMI 29.56 kg/m²      Wt Readings from Last 3 Encounters:   22 206 lb (93.4 kg)     BP Readings from Last 3 Encounters:   10/14/22 120/80   10/05/22 (!) 113/57   22 (!) 112/58     Pulse Readings from Last 3 Encounters:   10/14/22 52   10/05/22 69   22 58           Physical Exam  Constitutional:       Appearance: Normal appearance. HENT:      Head: Normocephalic and atraumatic.    Eyes: Conjunctiva/sclera: Conjunctivae normal.   Neck:      Vascular: No carotid bruit. Cardiovascular:      Rate and Rhythm: Normal rate and regular rhythm. Heart sounds: No murmur heard. No friction rub. No gallop. Pulmonary:      Effort: No respiratory distress. Breath sounds: No wheezing or rales. Musculoskeletal:         General: Swelling (1+ pitting ankles) present. Cervical back: Neck supple. Skin:     General: Skin is warm and dry. Neurological:      General: No focal deficit present. Mental Status: He is alert. Psychiatric:         Mood and Affect: Mood normal.         Behavior: Behavior normal.       Medical problems and test results were reviewed with the patient today. DATA REVIEW    LIPID PANEL     Lab Results   Component Value Date    CHOL 76 09/30/2022    CHOL 59 12/11/2021     Lab Results   Component Value Date    TRIG 79 09/30/2022    TRIG 71 12/11/2021     Lab Results   Component Value Date    HDL 36 (L) 09/30/2022    HDL 22 (L) 12/11/2021     Lab Results   Component Value Date    LDLCALC 24.2 09/30/2022    LDLCALC 22.8 12/11/2021     Lab Results   Component Value Date    LABVLDL 15.8 09/30/2022    LABVLDL 14.2 12/11/2021     Lab Results   Component Value Date    CHOLHDLRATIO 2.1 09/30/2022    CHOLHDLRATIO 2.7 12/11/2021       BMP  Lab Results   Component Value Date/Time     09/30/2022 09:35 AM    K 4.6 09/30/2022 09:35 AM     09/30/2022 09:35 AM    CO2 30 09/30/2022 09:35 AM    BUN 15 09/30/2022 09:35 AM    CREATININE 0.90 09/30/2022 09:35 AM    GLUCOSE 97 09/30/2022 09:35 AM    CALCIUM 9.7 09/30/2022 09:35 AM          EKG        CXR/IMAGING        DEVICE INTERROGATION        OUTSIDE RECORDS REVIEW    Records from outside providers have been reviewed and summarized as noted in the HPI, past history and data review sections of this note, and reviewed with patient. .       TRENT and Capo Andrews was seen today for hypertension.     Diagnoses and all orders for this visit:    CAD, multiple vessel    S/P CABG x 4    Mixed hyperlipidemia    Essential hypertension    Other orders  -     atorvastatin (LIPITOR) 40 MG tablet; Take 1 tablet by mouth daily  -     lisinopril (PRINIVIL;ZESTRIL) 5 MG tablet; Take 1 tablet by mouth daily  -     metoprolol tartrate (LOPRESSOR) 25 MG tablet; Take 1 tablet by mouth in the morning and 1 tablet in the evening. IMPRESSION:    No angina, continue meds and lifestyle modification      BP at target, continue meds  elegant home BP diary also confirms      Lipids at goal, continue meds    Continue upper body exercises as able              Return in about 1 year (around 10/14/2023). Thank you for allowing me to participate in this patient's care. Please call or contact me if there are any questions or concerns regarding the above.       Alley Rodas MD  10/14/22  12:47 PM

## 2023-04-03 DIAGNOSIS — E78.2 MIXED HYPERLIPIDEMIA: ICD-10-CM

## 2023-04-03 LAB
ALBUMIN SERPL-MCNC: 3.5 G/DL (ref 3.2–4.6)
ALBUMIN/GLOB SERPL: 1.1 (ref 0.4–1.6)
ALP SERPL-CCNC: 112 U/L (ref 50–136)
ALT SERPL-CCNC: 43 U/L (ref 12–65)
ANION GAP SERPL CALC-SCNC: 3 MMOL/L (ref 2–11)
AST SERPL-CCNC: 28 U/L (ref 15–37)
BASOPHILS # BLD: 0.1 K/UL (ref 0–0.2)
BASOPHILS NFR BLD: 1 % (ref 0–2)
BILIRUB SERPL-MCNC: 0.8 MG/DL (ref 0.2–1.1)
BUN SERPL-MCNC: 16 MG/DL (ref 8–23)
CALCIUM SERPL-MCNC: 9.2 MG/DL (ref 8.3–10.4)
CHLORIDE SERPL-SCNC: 111 MMOL/L (ref 101–110)
CHOLEST SERPL-MCNC: 87 MG/DL
CO2 SERPL-SCNC: 27 MMOL/L (ref 21–32)
CREAT SERPL-MCNC: 0.8 MG/DL (ref 0.8–1.5)
DIFFERENTIAL METHOD BLD: NORMAL
EOSINOPHIL # BLD: 0.2 K/UL (ref 0–0.8)
EOSINOPHIL NFR BLD: 3 % (ref 0.5–7.8)
ERYTHROCYTE [DISTWIDTH] IN BLOOD BY AUTOMATED COUNT: 13.9 % (ref 11.9–14.6)
GLOBULIN SER CALC-MCNC: 3.1 G/DL (ref 2.8–4.5)
GLUCOSE SERPL-MCNC: 96 MG/DL (ref 65–100)
HCT VFR BLD AUTO: 43 % (ref 41.1–50.3)
HDLC SERPL-MCNC: 42 MG/DL (ref 40–60)
HDLC SERPL: 2.1
HGB BLD-MCNC: 14 G/DL (ref 13.6–17.2)
IMM GRANULOCYTES # BLD AUTO: 0 K/UL (ref 0–0.5)
IMM GRANULOCYTES NFR BLD AUTO: 0 % (ref 0–5)
LDLC SERPL CALC-MCNC: 30.8 MG/DL
LYMPHOCYTES # BLD: 1.5 K/UL (ref 0.5–4.6)
LYMPHOCYTES NFR BLD: 20 % (ref 13–44)
MCH RBC QN AUTO: 30.4 PG (ref 26.1–32.9)
MCHC RBC AUTO-ENTMCNC: 32.6 G/DL (ref 31.4–35)
MCV RBC AUTO: 93.5 FL (ref 82–102)
MONOCYTES # BLD: 0.6 K/UL (ref 0.1–1.3)
MONOCYTES NFR BLD: 8 % (ref 4–12)
NEUTS SEG # BLD: 5.2 K/UL (ref 1.7–8.2)
NEUTS SEG NFR BLD: 68 % (ref 43–78)
NRBC # BLD: 0 K/UL (ref 0–0.2)
PLATELET # BLD AUTO: 204 K/UL (ref 150–450)
PMV BLD AUTO: 11.4 FL (ref 9.4–12.3)
POTASSIUM SERPL-SCNC: 4.2 MMOL/L (ref 3.5–5.1)
PROT SERPL-MCNC: 6.6 G/DL (ref 6.3–8.2)
RBC # BLD AUTO: 4.6 M/UL (ref 4.23–5.6)
SODIUM SERPL-SCNC: 141 MMOL/L (ref 133–143)
TRIGL SERPL-MCNC: 71 MG/DL (ref 35–150)
VLDLC SERPL CALC-MCNC: 14.2 MG/DL (ref 6–23)
WBC # BLD AUTO: 7.6 K/UL (ref 4.3–11.1)

## 2023-04-29 NOTE — PROGRESS NOTES
Patient has been accepted @ [facility] - room [address]B. [facility] will pick patient up for transport @ 4:30pm.     Patient and family updated. Update 1629: [facility] called and delayed transport - new pickup time is 5:30pm     Care Management Interventions  PCP Verified by CM:  Yes  Discharge Durable Medical Equipment: No  Physical Therapy Consult: Yes  Occupational Therapy Consult: Yes  Speech Therapy Consult: No  Support Systems: Spouse/Significant Other  Confirm Follow Up Transport: Family  Discharge Location  Discharge Placement: Skilled nursing facility No

## 2023-07-26 NOTE — TELEPHONE ENCOUNTER
MEDICATION REFILL REQUEST      Name of Medication:  Metoprolol   Dose:  25 mg  Frequency:  twice a day   Quantity:    Days' supply:  80      Pharmacy Name/Location:  please call into [facility] till mail order comes in.  Please call in today because he is out

## 2023-08-30 ENCOUNTER — TELEMEDICINE (OUTPATIENT)
Dept: INTERNAL MEDICINE CLINIC | Facility: CLINIC | Age: 83
End: 2023-08-30
Payer: MEDICARE

## 2023-08-30 DIAGNOSIS — U07.1 POSITIVE SELF-ADMINISTERED ANTIGEN TEST FOR COVID-19: Primary | ICD-10-CM

## 2023-08-30 PROCEDURE — G8427 DOCREV CUR MEDS BY ELIG CLIN: HCPCS | Performed by: INTERNAL MEDICINE

## 2023-08-30 PROCEDURE — 1123F ACP DISCUSS/DSCN MKR DOCD: CPT | Performed by: INTERNAL MEDICINE

## 2023-08-30 PROCEDURE — 1036F TOBACCO NON-USER: CPT | Performed by: INTERNAL MEDICINE

## 2023-08-30 PROCEDURE — G8417 CALC BMI ABV UP PARAM F/U: HCPCS | Performed by: INTERNAL MEDICINE

## 2023-08-30 PROCEDURE — 99213 OFFICE O/P EST LOW 20 MIN: CPT | Performed by: INTERNAL MEDICINE

## 2023-08-30 ASSESSMENT — ENCOUNTER SYMPTOMS
DIARRHEA: 1
COUGH: 1
RHINORRHEA: 1
SHORTNESS OF BREATH: 0

## 2023-08-30 NOTE — PROGRESS NOTES
[facility]      2023    Patient Name: Salomón Briceno  :  1940    Subjective:    Chief Complaint:  Chief Complaint   Patient presents with    Cough         HPI I was in the office while conducting this encounter. Consent:  He and/or his healthcare decision maker is aware that this patient-initiated Telehealth encounter is a billable service, with coverage as determined by his insurance carrier. He is aware that he may receive a bill and has provided verbal consent to proceed: Yes    This virtual visit was conducted via [address] [place] [address]. Pursuant to the emergency declaration under the [place] [address] waiver authority and the [employer] and [employer] Act, this Virtual  Visit was conducted to reduce the patient's risk of exposure to COVID-19 and provide continuity of care for an established patient. Services were provided through a video synchronous discussion virtually to substitute for in-person clinic visit. Due to this being a TeleHealth evaluation, many elements of the physical examination are unable to be assessed. Total Time: minutes: 5-10 minutes. C/o fever, feeling very fatigued, cough x2-3 days; cough is mostly dry; he denies SOB, has had some diarrhea; he's had decreased appetite; he had 2 positive Covid tests at home; he has taken some Tylenol for the fever; no known sick contacts; Past Medical History:   Diagnosis Date    Coronary artery disease involving native coronary artery of native heart without angina pectoris     Dr. 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 Zoë Tsang    Osteoarthritis knee     Primary hypertension        Past Surgical History:   Procedure Laterality Date    COLONOSCOPY  13    diverticulosis, internal hemorrhoids.   no further screening recommended    COLONOSCOPY      CORONARY

## 2023-10-09 ENCOUNTER — TELEPHONE (OUTPATIENT)
Age: 83
End: 2023-10-09

## 2023-10-09 RX ORDER — ATORVASTATIN CALCIUM 40 MG/1
40 TABLET, FILM COATED ORAL DAILY
Qty: 90 TABLET | Refills: 0 | Status: SHIPPED | OUTPATIENT
Start: 2023-10-09

## 2023-10-09 RX ORDER — LISINOPRIL 5 MG/1
5 TABLET ORAL DAILY
Qty: 90 TABLET | Refills: 0 | Status: SHIPPED | OUTPATIENT
Start: 2023-10-09

## 2023-10-09 NOTE — TELEPHONE ENCOUNTER
Patient is due for an appointment with Dr. Jase Metzger, last seen on 10/14/2023 and was to return in about one year. Note sent to scheduling to coordinate a follow-up appointment.

## 2023-10-11 ENCOUNTER — TELEPHONE (OUTPATIENT)
Dept: INTERNAL MEDICINE CLINIC | Facility: CLINIC | Age: 83
End: 2023-10-11

## 2023-10-11 NOTE — TELEPHONE ENCOUNTER
----- Message from Kendall Gudino sent at 10/11/2023  1:43 PM EDT -----  Subject: Message to Provider    QUESTIONS  Information for Provider? Information for Provider? Called back to speak   to Paramjit Grant in regards of a miss call she received to reschedule   appointment . Please reach out to pt   ---------------------------------------------------------------------------  --------------  Ellis Marine Mayito  [phone]; OK to leave message on voicemail  ---------------------------------------------------------------------------  --------------  SCRIPT ANSWERS  undefined

## 2023-10-24 NOTE — PROGRESS NOTES
[facility] CARDIOLOGY  [address] [facility], [address]  PHONE: [phone]      10/25/23    NAME:  Ilan Schmidt  : 1940  MRN: [id number]         SUBJECTIVE:   Ilan Schmidt is a 79 y.o. male seen for a follow up visit regarding the following:     Chief Complaint   Patient presents with    Coronary Artery Disease    Hyperlipidemia    Hypertension            HPI:  Follow up  Coronary Artery Disease, Hyperlipidemia, and Hypertension   . follow up prior CABG. Here with his son. This sweet man has no complaints. Denies cp, dyspnea, palpitations, light headedness or near syncope. Some facial bruising from a tooth extraction. PAST CARDIAC HISTORY:   Dec 2021- LIMA-LAD, SVG-OM1 SVG-OM2 SVG-PDA, preserved EF   Echo EF 60-65% normal DF, no valve disease            Key CAD CHF Meds            atorvastatin (LIPITOR) 40 MG tablet (Taking)    Sig - Route: Take 1 tablet by mouth daily - Oral    lisinopril (PRINIVIL;ZESTRIL) 5 MG tablet (Taking)    Sig - Route: Take 1 tablet by mouth daily - Oral          Key Antihyperglycemic Medications       Patient is on no antihyperglycemic meds. Past Medical History, Past Surgical History, Family history, Social History, and Medications were all reviewed with the patient today and updated as necessary. Prior to Admission medications    Medication Sig Start Date End Date Taking?  Authorizing Provider   atorvastatin (LIPITOR) 40 MG tablet Take 1 tablet by mouth daily 10/25/23  Yes Monica Wasserman MD   lisinopril (PRINIVIL;ZESTRIL) 5 MG tablet Take 1 tablet by mouth daily 10/25/23  Yes Monica Wasserman MD   Multiple Vitamins-Minerals (PRESERVISION AREDS) TABS Take 1 tablet by mouth 2 times daily   Yes Candie Hameed MD   Glucosamine-Chondroitin 8351-5542 MG/30ML LIQD Take by mouth   Yes Candie Hameed MD   Multiple Vitamin (MULTIVITAMIN ADULT PO) Take by mouth   Yes Candie Hameed MD

## 2023-10-25 ENCOUNTER — OFFICE VISIT (OUTPATIENT)
Age: 83
End: 2023-10-25
Payer: MEDICARE

## 2023-10-25 VITALS
WEIGHT: 215 LBS | SYSTOLIC BLOOD PRESSURE: 110 MMHG | HEART RATE: 57 BPM | DIASTOLIC BLOOD PRESSURE: 76 MMHG | BODY MASS INDEX: 30.78 KG/M2 | HEIGHT: 70 IN

## 2023-10-25 DIAGNOSIS — I44.0 FIRST DEGREE AV BLOCK: ICD-10-CM

## 2023-10-25 DIAGNOSIS — I25.118 CORONARY ARTERY DISEASE OF NATIVE ARTERY OF NATIVE HEART WITH STABLE ANGINA PECTORIS (HCC): ICD-10-CM

## 2023-10-25 DIAGNOSIS — I10 PRIMARY HYPERTENSION: ICD-10-CM

## 2023-10-25 DIAGNOSIS — Z95.1 S/P CABG X 4: Primary | ICD-10-CM

## 2023-10-25 PROCEDURE — 3078F DIAST BP <80 MM HG: CPT | Performed by: INTERNAL MEDICINE

## 2023-10-25 PROCEDURE — 3074F SYST BP LT 130 MM HG: CPT | Performed by: INTERNAL MEDICINE

## 2023-10-25 PROCEDURE — G8417 CALC BMI ABV UP PARAM F/U: HCPCS | Performed by: INTERNAL MEDICINE

## 2023-10-25 PROCEDURE — G8484 FLU IMMUNIZE NO ADMIN: HCPCS | Performed by: INTERNAL MEDICINE

## 2023-10-25 PROCEDURE — 1123F ACP DISCUSS/DSCN MKR DOCD: CPT | Performed by: INTERNAL MEDICINE

## 2023-10-25 PROCEDURE — G8427 DOCREV CUR MEDS BY ELIG CLIN: HCPCS | Performed by: INTERNAL MEDICINE

## 2023-10-25 PROCEDURE — 1036F TOBACCO NON-USER: CPT | Performed by: INTERNAL MEDICINE

## 2023-10-25 PROCEDURE — 99214 OFFICE O/P EST MOD 30 MIN: CPT | Performed by: INTERNAL MEDICINE

## 2023-10-25 PROCEDURE — 93000 ELECTROCARDIOGRAM COMPLETE: CPT | Performed by: INTERNAL MEDICINE

## 2023-10-25 RX ORDER — LISINOPRIL 5 MG/1
5 TABLET ORAL DAILY
Qty: 90 TABLET | Refills: 3 | Status: SHIPPED | OUTPATIENT
Start: 2023-10-25

## 2023-10-25 RX ORDER — ATORVASTATIN CALCIUM 40 MG/1
40 TABLET, FILM COATED ORAL DAILY
Qty: 90 TABLET | Refills: 3 | Status: SHIPPED | OUTPATIENT
Start: 2023-10-25

## 2023-10-25 ASSESSMENT — ENCOUNTER SYMPTOMS: SHORTNESS OF BREATH: 0

## 2023-10-27 ENCOUNTER — HOSPITAL ENCOUNTER (OUTPATIENT)
Dept: LAB | Age: 83
Discharge: HOME | End: 2023-10-30

## 2023-10-27 DIAGNOSIS — R35.1 NOCTURIA: ICD-10-CM

## 2023-10-27 DIAGNOSIS — E55.9 VITAMIN D INSUFFICIENCY: ICD-10-CM

## 2023-10-27 DIAGNOSIS — E78.2 MIXED HYPERLIPIDEMIA: ICD-10-CM

## 2023-10-27 DIAGNOSIS — I10 PRIMARY HYPERTENSION: Primary | ICD-10-CM

## 2023-10-27 DIAGNOSIS — I10 PRIMARY HYPERTENSION: ICD-10-CM

## 2023-10-27 LAB
25(OH)D3 SERPL-MCNC: 36 NG/ML (ref 30–100)
ALBUMIN SERPL-MCNC: 3.9 G/DL (ref 3.2–4.6)
ALBUMIN/GLOB SERPL: 1.3 (ref 0.4–1.6)
ALP SERPL-CCNC: 100 U/L (ref 50–136)
ALT SERPL-CCNC: 43 U/L (ref 12–65)
ANION GAP SERPL CALC-SCNC: 8 MMOL/L (ref 2–11)
AST SERPL-CCNC: 19 U/L (ref 15–37)
BASOPHILS # BLD: 0.1 K/UL (ref 0–0.2)
BASOPHILS NFR BLD: 1 % (ref 0–2)
BILIRUB SERPL-MCNC: 1.1 MG/DL (ref 0.2–1.1)
BUN SERPL-MCNC: 14 MG/DL (ref 8–23)
CALCIUM SERPL-MCNC: 9.7 MG/DL (ref 8.3–10.4)
CHLORIDE SERPL-SCNC: 110 MMOL/L (ref 101–110)
CHOLEST SERPL-MCNC: 98 MG/DL
CO2 SERPL-SCNC: 26 MMOL/L (ref 21–32)
CREAT SERPL-MCNC: 0.9 MG/DL (ref 0.8–1.5)
DIFFERENTIAL METHOD BLD: ABNORMAL
EOSINOPHIL # BLD: 0.1 K/UL (ref 0–0.8)
EOSINOPHIL NFR BLD: 2 % (ref 0.5–7.8)
ERYTHROCYTE [DISTWIDTH] IN BLOOD BY AUTOMATED COUNT: 15.5 % (ref 11.9–14.6)
GLOBULIN SER CALC-MCNC: 2.9 G/DL (ref 2.8–4.5)
GLUCOSE SERPL-MCNC: 99 MG/DL (ref 65–100)
HCT VFR BLD AUTO: 42.9 % (ref 41.1–50.3)
HDLC SERPL-MCNC: 44 MG/DL (ref 40–60)
HDLC SERPL: 2.2
HGB BLD-MCNC: 13.9 G/DL (ref 13.6–17.2)
IMM GRANULOCYTES # BLD AUTO: 0 K/UL (ref 0–0.5)
IMM GRANULOCYTES NFR BLD AUTO: 0 % (ref 0–5)
LDLC SERPL CALC-MCNC: 33.4 MG/DL
LYMPHOCYTES # BLD: 1.2 K/UL (ref 0.5–4.6)
LYMPHOCYTES NFR BLD: 20 % (ref 13–44)
MCH RBC QN AUTO: 30.5 PG (ref 26.1–32.9)
MCHC RBC AUTO-ENTMCNC: 32.4 G/DL (ref 31.4–35)
MCV RBC AUTO: 94.3 FL (ref 82–102)
MONOCYTES # BLD: 0.4 K/UL (ref 0.1–1.3)
MONOCYTES NFR BLD: 7 % (ref 4–12)
NEUTS SEG # BLD: 4.3 K/UL (ref 1.7–8.2)
NEUTS SEG NFR BLD: 70 % (ref 43–78)
NRBC # BLD: 0 K/UL (ref 0–0.2)
PLATELET # BLD AUTO: 192 K/UL (ref 150–450)
PMV BLD AUTO: 11.6 FL (ref 9.4–12.3)
POTASSIUM SERPL-SCNC: 4.1 MMOL/L (ref 3.5–5.1)
PROT SERPL-MCNC: 6.8 G/DL (ref 6.3–8.2)
RBC # BLD AUTO: 4.55 M/UL (ref 4.23–5.6)
SODIUM SERPL-SCNC: 144 MMOL/L (ref 133–143)
T4 FREE SERPL-MCNC: 1.1 NG/DL (ref 0.78–1.46)
TRIGL SERPL-MCNC: 103 MG/DL (ref 35–150)
TSH, 3RD GENERATION: 2.11 UIU/ML (ref 0.36–3.74)
VLDLC SERPL CALC-MCNC: 20.6 MG/DL (ref 6–23)
WBC # BLD AUTO: 6.1 K/UL (ref 4.3–11.1)

## 2023-10-31 LAB
PSA FREE MFR SERPL: 12.5 %
PSA FREE SERPL-MCNC: 0.1 NG/ML
PSA SERPL-MCNC: 0.8 NG/ML

## 2023-11-06 ENCOUNTER — OFFICE VISIT (OUTPATIENT)
Dept: INTERNAL MEDICINE CLINIC | Facility: CLINIC | Age: 83
End: 2023-11-06
Payer: MEDICARE

## 2023-11-06 VITALS
TEMPERATURE: 97.9 F | HEART RATE: 61 BPM | HEIGHT: 70 IN | DIASTOLIC BLOOD PRESSURE: 78 MMHG | OXYGEN SATURATION: 100 % | BODY MASS INDEX: 30.85 KG/M2 | SYSTOLIC BLOOD PRESSURE: 122 MMHG | RESPIRATION RATE: 16 BRPM

## 2023-11-06 DIAGNOSIS — Z23 NEED FOR SHINGLES VACCINE: ICD-10-CM

## 2023-11-06 DIAGNOSIS — Z23 NEED FOR INFLUENZA VACCINATION: ICD-10-CM

## 2023-11-06 DIAGNOSIS — I10 PRIMARY HYPERTENSION: ICD-10-CM

## 2023-11-06 DIAGNOSIS — Z00.00 MEDICARE ANNUAL WELLNESS VISIT, SUBSEQUENT: Primary | ICD-10-CM

## 2023-11-06 DIAGNOSIS — E78.2 MIXED HYPERLIPIDEMIA: ICD-10-CM

## 2023-11-06 DIAGNOSIS — L30.8 OTHER ECZEMA: ICD-10-CM

## 2023-11-06 DIAGNOSIS — I25.10 CAD, MULTIPLE VESSEL: ICD-10-CM

## 2023-11-06 DIAGNOSIS — M15.9 PRIMARY OSTEOARTHRITIS INVOLVING MULTIPLE JOINTS: ICD-10-CM

## 2023-11-06 DIAGNOSIS — Z95.1 S/P CABG X 4: ICD-10-CM

## 2023-11-06 PROCEDURE — 1036F TOBACCO NON-USER: CPT | Performed by: INTERNAL MEDICINE

## 2023-11-06 PROCEDURE — 3074F SYST BP LT 130 MM HG: CPT | Performed by: INTERNAL MEDICINE

## 2023-11-06 PROCEDURE — G8417 CALC BMI ABV UP PARAM F/U: HCPCS | Performed by: INTERNAL MEDICINE

## 2023-11-06 PROCEDURE — 3078F DIAST BP <80 MM HG: CPT | Performed by: INTERNAL MEDICINE

## 2023-11-06 PROCEDURE — G8427 DOCREV CUR MEDS BY ELIG CLIN: HCPCS | Performed by: INTERNAL MEDICINE

## 2023-11-06 PROCEDURE — G0008 ADMIN INFLUENZA VIRUS VAC: HCPCS | Performed by: INTERNAL MEDICINE

## 2023-11-06 PROCEDURE — 99214 OFFICE O/P EST MOD 30 MIN: CPT | Performed by: INTERNAL MEDICINE

## 2023-11-06 PROCEDURE — 1123F ACP DISCUSS/DSCN MKR DOCD: CPT | Performed by: INTERNAL MEDICINE

## 2023-11-06 PROCEDURE — 90694 VACC AIIV4 NO PRSRV 0.5ML IM: CPT | Performed by: INTERNAL MEDICINE

## 2023-11-06 PROCEDURE — G0439 PPPS, SUBSEQ VISIT: HCPCS | Performed by: INTERNAL MEDICINE

## 2023-11-06 PROCEDURE — G8484 FLU IMMUNIZE NO ADMIN: HCPCS | Performed by: INTERNAL MEDICINE

## 2023-11-06 RX ORDER — TRIAMCINOLONE ACETONIDE 1 MG/G
CREAM TOPICAL
Qty: 80 G | Refills: 2 | Status: SHIPPED | OUTPATIENT
Start: 2023-11-06

## 2023-11-06 RX ORDER — ZOSTER VACCINE RECOMBINANT, ADJUVANTED 50 MCG/0.5
0.5 KIT INTRAMUSCULAR SEE ADMIN INSTRUCTIONS
Qty: 0.5 ML | Refills: 0 | Status: SHIPPED | OUTPATIENT
Start: 2023-11-06 | End: 2024-05-04

## 2023-11-06 ASSESSMENT — PATIENT HEALTH QUESTIONNAIRE - PHQ9
SUM OF ALL RESPONSES TO PHQ9 QUESTIONS 1 & 2: 0
1. LITTLE INTEREST OR PLEASURE IN DOING THINGS: 0
SUM OF ALL RESPONSES TO PHQ QUESTIONS 1-9: 0
2. FEELING DOWN, DEPRESSED OR HOPELESS: 0

## 2023-11-06 ASSESSMENT — ENCOUNTER SYMPTOMS
SHORTNESS OF BREATH: 0
ABDOMINAL PAIN: 0
RHINORRHEA: 0
BLOOD IN STOOL: 0
COUGH: 0

## 2023-11-06 ASSESSMENT — LIFESTYLE VARIABLES
HOW OFTEN DO YOU HAVE A DRINK CONTAINING ALCOHOL: NEVER
HOW MANY STANDARD DRINKS CONTAINING ALCOHOL DO YOU HAVE ON A TYPICAL DAY: PATIENT DOES NOT DRINK

## 2024-04-03 NOTE — TELEPHONE ENCOUNTER
MEDICATION REFILL REQUEST      Name of Medication:  Atorvastatin   Dose:  40 mg  Frequency:  daily   Quantity:    Days' supply:  90      Pharmacy Name/Location:  [facility]    MEDICATION REFILL REQUEST      Name of Medication:  Lisinopril   Dose:  5 mg  Frequency:  daily   Quantity:    Days' supply:  90      Pharmacy Name/Location:  [facility]

## 2024-04-03 NOTE — TELEPHONE ENCOUNTER
Requested Prescriptions     Pending Prescriptions Disp Refills    atorvastatin (LIPITOR) 40 MG tablet 90 tablet 3     Sig: Take 1 tablet by mouth daily    lisinopril (PRINIVIL;ZESTRIL) 5 MG tablet 90 tablet 3     Sig: Take 1 tablet by mouth daily     Rx verified last ov 10/25/23.

## 2024-04-04 RX ORDER — LISINOPRIL 5 MG/1
5 TABLET ORAL DAILY
Qty: 90 TABLET | Refills: 3 | Status: SHIPPED | OUTPATIENT
Start: 2024-04-04

## 2024-04-04 RX ORDER — ATORVASTATIN CALCIUM 40 MG/1
40 TABLET, FILM COATED ORAL DAILY
Qty: 90 TABLET | Refills: 3 | Status: SHIPPED | OUTPATIENT
Start: 2024-04-04

## 2024-04-26 NOTE — PROGRESS NOTES
[facility] CARDIOLOGY  [address] [place] [address], [address]  [place] [address]  PHONE: [phone]      24    NAME:  Taran Mays  : 1940  MRN: [id number]         SUBJECTIVE:   Taran Mays is a 83 y.o. male seen for a follow up visit regarding the following:     Chief Complaint   Patient presents with    6 Month Follow-Up    Coronary Artery Disease            HPI:  Follow up  6 Month Follow-Up and Coronary Artery Disease   .    follow up prior CABG. BB stopped last visit with profound first degree AV block. This sweet man is doing well, heart rate improved off BB.  Lives with his wife but his children are in nearly every day and are very attentive.  Denies angina, dyspnea.  Chronic dependent edema stable.              PAST CARDIAC HISTORY:   Dec 2021- LIMA-LAD, SVG-OM1 SVG-OM2 SVG-PDA, preserved EF   Echo EF 60-65% normal DF, no valve disease                Cardiac Medications       ACE Inhibitors       lisinopril (PRINIVIL;ZESTRIL) 5 MG tablet Take 1 tablet by mouth daily       Nitrates       nitroGLYCERIN (NITROSTAT) 0.4 MG SL tablet Place 1 tablet under the tongue     Patient not taking: Reported on 2024       HMG CoA Reductase Inhibitors       atorvastatin (LIPITOR) 40 MG tablet Take 1 tablet by mouth daily       Salicylates       aspirin 81 MG EC tablet Take 1 tablet by mouth daily                  Past Medical History, Past Surgical History, Family history, Social History, and Medications were all reviewed with the patient today and updated as necessary.     Prior to Admission medications    Medication Sig Start Date End Date Taking? Authorizing Provider   atorvastatin (LIPITOR) 40 MG tablet Take 1 tablet by mouth daily 24  Yes Jackie Wasserman MD   lisinopril (PRINIVIL;ZESTRIL) 5 MG tablet Take 1 tablet by mouth daily 24  Yes Jackie Wasserman MD   triamcinolone (KENALOG) 0.1 % cream Apply topically 2 times daily. 23  Yes Belkis Almanzar MD   Multiple

## 2024-04-29 ENCOUNTER — HOSPITAL ENCOUNTER (OUTPATIENT)
Dept: LAB | Age: 84
Discharge: HOME | End: 2024-05-02

## 2024-04-29 ENCOUNTER — OFFICE VISIT (OUTPATIENT)
Age: 84
End: 2024-04-29
Payer: MEDICARE

## 2024-04-29 VITALS
HEART RATE: 64 BPM | BODY MASS INDEX: 30.85 KG/M2 | SYSTOLIC BLOOD PRESSURE: 134 MMHG | HEIGHT: 70 IN | DIASTOLIC BLOOD PRESSURE: 80 MMHG

## 2024-04-29 DIAGNOSIS — I25.118 CORONARY ARTERY DISEASE OF NATIVE ARTERY OF NATIVE HEART WITH STABLE ANGINA PECTORIS (HCC): Primary | ICD-10-CM

## 2024-04-29 DIAGNOSIS — E78.2 MIXED HYPERLIPIDEMIA: ICD-10-CM

## 2024-04-29 DIAGNOSIS — I10 PRIMARY HYPERTENSION: ICD-10-CM

## 2024-04-29 DIAGNOSIS — Z95.1 S/P CABG X 4: ICD-10-CM

## 2024-04-29 DIAGNOSIS — I49.5 SINUS NODE DYSFUNCTION (HCC): ICD-10-CM

## 2024-04-29 LAB
ALBUMIN SERPL-MCNC: 4 G/DL (ref 3.2–4.6)
ALBUMIN/GLOB SERPL: 1.5 (ref 1–1.9)
ALP SERPL-CCNC: 98 U/L (ref 40–129)
ALT SERPL-CCNC: 34 U/L (ref 12–65)
ANION GAP SERPL CALC-SCNC: 11 MMOL/L (ref 9–18)
APPEARANCE UR: CLEAR
AST SERPL-CCNC: 31 U/L (ref 15–37)
BASOPHILS # BLD: 0.1 K/UL (ref 0–0.2)
BASOPHILS NFR BLD: 1 % (ref 0–2)
BILIRUB SERPL-MCNC: 1.1 MG/DL (ref 0–1.2)
BILIRUB UR QL: NEGATIVE
BUN SERPL-MCNC: 22 MG/DL (ref 8–23)
CALCIUM SERPL-MCNC: 9.7 MG/DL (ref 8.8–10.2)
CHLORIDE SERPL-SCNC: 106 MMOL/L (ref 98–107)
CHOLEST SERPL-MCNC: 86 MG/DL (ref 0–200)
CO2 SERPL-SCNC: 25 MMOL/L (ref 20–28)
COLOR UR: ABNORMAL
CREAT SERPL-MCNC: 0.92 MG/DL (ref 0.8–1.3)
DIFFERENTIAL METHOD BLD: NORMAL
EOSINOPHIL # BLD: 0.1 K/UL (ref 0–0.8)
EOSINOPHIL NFR BLD: 2 % (ref 0.5–7.8)
ERYTHROCYTE [DISTWIDTH] IN BLOOD BY AUTOMATED COUNT: 14.4 % (ref 11.9–14.6)
GLOBULIN SER CALC-MCNC: 2.7 G/DL (ref 2.3–3.5)
GLUCOSE SERPL-MCNC: 94 MG/DL (ref 70–99)
GLUCOSE UR STRIP.AUTO-MCNC: NEGATIVE MG/DL
HCT VFR BLD AUTO: 43.9 % (ref 41.1–50.3)
HDLC SERPL-MCNC: 37 MG/DL (ref 40–60)
HDLC SERPL: 2.3 (ref 0–5)
HGB BLD-MCNC: 14.3 G/DL (ref 13.6–17.2)
HGB UR QL STRIP: NEGATIVE
IMM GRANULOCYTES # BLD AUTO: 0 K/UL (ref 0–0.5)
IMM GRANULOCYTES NFR BLD AUTO: 0 % (ref 0–5)
KETONES UR QL STRIP.AUTO: 15 MG/DL
LDLC SERPL CALC-MCNC: 33 MG/DL (ref 0–100)
LEUKOCYTE ESTERASE UR QL STRIP.AUTO: NEGATIVE
LYMPHOCYTES # BLD: 1.3 K/UL (ref 0.5–4.6)
LYMPHOCYTES NFR BLD: 21 % (ref 13–44)
MCH RBC QN AUTO: 30.6 PG (ref 26.1–32.9)
MCHC RBC AUTO-ENTMCNC: 32.6 G/DL (ref 31.4–35)
MCV RBC AUTO: 94 FL (ref 82–102)
MONOCYTES # BLD: 0.5 K/UL (ref 0.1–1.3)
MONOCYTES NFR BLD: 9 % (ref 4–12)
NEUTS SEG # BLD: 4.1 K/UL (ref 1.7–8.2)
NEUTS SEG NFR BLD: 67 % (ref 43–78)
NITRITE UR QL STRIP.AUTO: NEGATIVE
NRBC # BLD: 0 K/UL (ref 0–0.2)
PH UR STRIP: 6.5 (ref 5–9)
PLATELET # BLD AUTO: 170 K/UL (ref 150–450)
PMV BLD AUTO: 12.2 FL (ref 9.4–12.3)
POTASSIUM SERPL-SCNC: 4.2 MMOL/L (ref 3.5–5.1)
PROT SERPL-MCNC: 6.7 G/DL (ref 6.3–8.2)
PROT UR STRIP-MCNC: NEGATIVE MG/DL
RBC # BLD AUTO: 4.67 M/UL (ref 4.23–5.6)
SODIUM SERPL-SCNC: 142 MMOL/L (ref 136–145)
SP GR UR REFRACTOMETRY: 1.03 (ref 1–1.02)
TRIGL SERPL-MCNC: 75 MG/DL (ref 0–150)
UROBILINOGEN UR QL STRIP.AUTO: 1 EU/DL (ref 0.2–1)
VLDLC SERPL CALC-MCNC: 15 MG/DL (ref 6–23)
WBC # BLD AUTO: 6.2 K/UL (ref 4.3–11.1)

## 2024-04-29 PROCEDURE — 3079F DIAST BP 80-89 MM HG: CPT | Performed by: INTERNAL MEDICINE

## 2024-04-29 PROCEDURE — 1036F TOBACCO NON-USER: CPT | Performed by: INTERNAL MEDICINE

## 2024-04-29 PROCEDURE — G8417 CALC BMI ABV UP PARAM F/U: HCPCS | Performed by: INTERNAL MEDICINE

## 2024-04-29 PROCEDURE — 1123F ACP DISCUSS/DSCN MKR DOCD: CPT | Performed by: INTERNAL MEDICINE

## 2024-04-29 PROCEDURE — 3075F SYST BP GE 130 - 139MM HG: CPT | Performed by: INTERNAL MEDICINE

## 2024-04-29 PROCEDURE — 99214 OFFICE O/P EST MOD 30 MIN: CPT | Performed by: INTERNAL MEDICINE

## 2024-04-29 PROCEDURE — G8427 DOCREV CUR MEDS BY ELIG CLIN: HCPCS | Performed by: INTERNAL MEDICINE

## 2024-06-13 ENCOUNTER — OFFICE VISIT (OUTPATIENT)
Dept: INTERNAL MEDICINE CLINIC | Facility: CLINIC | Age: 84
End: 2024-06-13

## 2024-06-13 VITALS
SYSTOLIC BLOOD PRESSURE: 108 MMHG | OXYGEN SATURATION: 98 % | BODY MASS INDEX: 30.85 KG/M2 | RESPIRATION RATE: 16 BRPM | HEIGHT: 70 IN | HEART RATE: 76 BPM | DIASTOLIC BLOOD PRESSURE: 60 MMHG | TEMPERATURE: 97.9 F

## 2024-06-13 DIAGNOSIS — E78.2 MIXED HYPERLIPIDEMIA: ICD-10-CM

## 2024-06-13 DIAGNOSIS — I10 PRIMARY HYPERTENSION: Primary | ICD-10-CM

## 2024-06-13 DIAGNOSIS — E55.9 VITAMIN D INSUFFICIENCY: ICD-10-CM

## 2024-06-13 DIAGNOSIS — R35.1 NOCTURIA: ICD-10-CM

## 2024-06-13 DIAGNOSIS — I25.10 CAD, MULTIPLE VESSEL: ICD-10-CM

## 2024-06-13 DIAGNOSIS — M15.9 PRIMARY OSTEOARTHRITIS INVOLVING MULTIPLE JOINTS: ICD-10-CM

## 2024-06-13 DIAGNOSIS — I49.5 SINUS NODE DYSFUNCTION (HCC): ICD-10-CM

## 2024-06-13 SDOH — ECONOMIC STABILITY: FOOD INSECURITY: WITHIN THE PAST 12 MONTHS, THE FOOD YOU BOUGHT JUST DIDN'T LAST AND YOU DIDN'T HAVE MONEY TO GET MORE.: NEVER TRUE

## 2024-06-13 SDOH — ECONOMIC STABILITY: INCOME INSECURITY: HOW HARD IS IT FOR YOU TO PAY FOR THE VERY BASICS LIKE FOOD, HOUSING, MEDICAL CARE, AND HEATING?: NOT HARD AT ALL

## 2024-06-13 SDOH — ECONOMIC STABILITY: HOUSING INSECURITY
IN THE LAST 12 MONTHS, WAS THERE A TIME WHEN YOU DID NOT HAVE A STEADY PLACE TO SLEEP OR SLEPT IN A SHELTER (INCLUDING NOW)?: NO

## 2024-06-13 SDOH — ECONOMIC STABILITY: FOOD INSECURITY: WITHIN THE PAST 12 MONTHS, YOU WORRIED THAT YOUR FOOD WOULD RUN OUT BEFORE YOU GOT MONEY TO BUY MORE.: NEVER TRUE

## 2024-06-13 ASSESSMENT — ENCOUNTER SYMPTOMS
BLOOD IN STOOL: 0
RHINORRHEA: 0
COUGH: 0
ABDOMINAL PAIN: 0
SHORTNESS OF BREATH: 0

## 2024-06-13 ASSESSMENT — PATIENT HEALTH QUESTIONNAIRE - PHQ9
1. LITTLE INTEREST OR PLEASURE IN DOING THINGS: NOT AT ALL
2. FEELING DOWN, DEPRESSED OR HOPELESS: NOT AT ALL
SUM OF ALL RESPONSES TO PHQ9 QUESTIONS 1 & 2: 0
SUM OF ALL RESPONSES TO PHQ QUESTIONS 1-9: 0

## 2024-06-13 NOTE — PROGRESS NOTES
Detail Level: Detailed
[facility]      2024    Patient Name: Taran Mays  :  1940    Subjective:    Chief Complaint:  Chief Complaint   Patient presents with    Follow-up     Pt is here for 6 month follow up to review labs.         HPI Here for f/u visit; patient had fasting labs done recently and results were reviewed in detail today;   He has CAD, s/p CABG, saw Dr. Wasserman in April for f/u; BB d/cd at prior visit due to first degree AV block; he is to continue meds, diet, f/u in 1 year; records reviewed;   He generally follows a healthy diet; he does bicycle pedals from his WC at least twice a day; he feels like his leg muscles are getting stronger;   HTN:  Patient compliant with taking blood pressure medications: Yes  Discussed importance of following low sodium DASH diet, increasing physical activity, taking medications as ordered, decreasing alcohol intake, keeping f/u visits to recheck blood pressure, monitoring blood pressure at home and keeping a log, with goal blood pressure <140/90.  Home bp readings are: good      Past Medical History:   Diagnosis Date    Coronary artery disease involving native coronary artery of native heart without angina pectoris     Dr. Jarrett    History of coronary artery bypass graft 12/2021    x4    HLD (hyperlipidemia) 2012    Macular degeneration     Dr. Nunes    Osteoarthritis knee     Primary hypertension        Past Surgical History:   Procedure Laterality Date    COLONOSCOPY  13    diverticulosis, internal hemorrhoids.  no further screening recommended    COLONOSCOPY      CORONARY ARTERY BYPASS GRAFT  2021    TOTAL KNEE ARTHROPLASTY Bilateral 2020       History reviewed. No pertinent family history.    Social History     Tobacco Use    Smoking status: Former     Current packs/day: 0.00     Average packs/day: 1 pack/day for 20.0 years (20.0 ttl pk-yrs)     Types: Cigarettes     Start date: 1956     Quit date: 1976     Years since quitting: 
Detail Level: Zone

## 2025-01-17 ENCOUNTER — OFFICE VISIT (OUTPATIENT)
Dept: ORTHOPEDIC SURGERY | Age: 85
End: 2025-01-17

## 2025-01-17 DIAGNOSIS — Z96.652 TOTAL KNEE REPLACEMENT STATUS, LEFT: Primary | ICD-10-CM

## 2025-01-17 DIAGNOSIS — Z96.651 TOTAL KNEE REPLACEMENT STATUS, RIGHT: ICD-10-CM

## 2025-01-17 DIAGNOSIS — Z09 FOLLOW-UP EXAMINATION: ICD-10-CM

## 2025-01-17 NOTE — PROGRESS NOTES
Name: Taran Mays  YOB: 1940  Gender: male  MRN: [id number]      Chief complaint:  BILATERAL KNEE PAIN    History of Present Illness:     Taran Mays is a 84 y.o. male  He returns today for recheck on his bilateral TKAs that were performed by Dr. Sanders in 2019 and 2020.  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  With attempted Repair for the right knee did occur in 2020, however this also failed.  He has been using primarily a wheelchair to get around his house and can transfer from the wheelchair to his chair or bed reasonably well.  He presents today accompanied by his son because the patient's wife was in the hospital and the son did note that his energy and stamina had certainly been compromised recently.  They are seeking help to try to manage these issues at home.  The patient denies any other recent fall or injury.  Denies any fever, chills or malaise.    Past Medical History:    Allergies:  No Known Allergies    Medications:  Current Outpatient Medications   Medication Sig    atorvastatin (LIPITOR) 40 MG tablet Take 1 tablet by mouth daily    lisinopril (PRINIVIL;ZESTRIL) 5 MG tablet Take 1 tablet by mouth daily    triamcinolone (KENALOG) 0.1 % cream Apply topically 2 times daily.    Multiple Vitamins-Minerals (PRESERVISION AREDS) TABS Take 1 tablet by mouth 2 times daily    Glucosamine-Chondroitin 8489-6958 MG/30ML LIQD Take by mouth    Multiple Vitamin (MULTIVITAMIN ADULT PO) Take by mouth    Omega-3 1000 MG CAPS Take by mouth    aspirin 81 MG EC tablet Take 1 tablet by mouth daily    nitroGLYCERIN (NITROSTAT) 0.4 MG SL tablet Place 1 tablet under the tongue (Patient not taking: Reported on 4/29/2024)     No current facility-administered medications for this visit.       Past Medical history:  Past Medical History:   Diagnosis Date    Coronary artery disease involving native coronary artery of native heart without

## 2025-03-14 ENCOUNTER — OFFICE VISIT (OUTPATIENT)
Dept: INTERNAL MEDICINE CLINIC | Facility: CLINIC | Age: 85
End: 2025-03-14

## 2025-03-14 VITALS
BODY MASS INDEX: 30.85 KG/M2 | HEIGHT: 70 IN | HEART RATE: 61 BPM | SYSTOLIC BLOOD PRESSURE: 132 MMHG | DIASTOLIC BLOOD PRESSURE: 82 MMHG | TEMPERATURE: 97.3 F | OXYGEN SATURATION: 100 %

## 2025-03-14 DIAGNOSIS — E78.2 MIXED HYPERLIPIDEMIA: ICD-10-CM

## 2025-03-14 DIAGNOSIS — Z00.00 MEDICARE ANNUAL WELLNESS VISIT, SUBSEQUENT: Primary | ICD-10-CM

## 2025-03-14 DIAGNOSIS — M25.561 CHRONIC PAIN OF BOTH KNEES: ICD-10-CM

## 2025-03-14 DIAGNOSIS — M25.562 CHRONIC PAIN OF BOTH KNEES: ICD-10-CM

## 2025-03-14 DIAGNOSIS — R53.81 DEBILITY: ICD-10-CM

## 2025-03-14 DIAGNOSIS — R35.1 NOCTURIA: ICD-10-CM

## 2025-03-14 DIAGNOSIS — E55.9 VITAMIN D INSUFFICIENCY: ICD-10-CM

## 2025-03-14 DIAGNOSIS — L98.9 SKIN LESION: ICD-10-CM

## 2025-03-14 DIAGNOSIS — I49.5 SINUS NODE DYSFUNCTION (HCC): ICD-10-CM

## 2025-03-14 DIAGNOSIS — I10 PRIMARY HYPERTENSION: ICD-10-CM

## 2025-03-14 DIAGNOSIS — G89.29 CHRONIC PAIN OF BOTH KNEES: ICD-10-CM

## 2025-03-14 DIAGNOSIS — Z95.1 S/P CABG X 4: ICD-10-CM

## 2025-03-14 DIAGNOSIS — M15.0 PRIMARY OSTEOARTHRITIS INVOLVING MULTIPLE JOINTS: ICD-10-CM

## 2025-03-14 LAB
25(OH)D3 SERPL-MCNC: 40.2 NG/ML (ref 30–100)
ALBUMIN SERPL-MCNC: 3.6 G/DL (ref 3.2–4.6)
ALBUMIN/GLOB SERPL: 1.3 (ref 1–1.9)
ALP SERPL-CCNC: 97 U/L (ref 40–129)
ALT SERPL-CCNC: 36 U/L (ref 8–55)
ANION GAP SERPL CALC-SCNC: 12 MMOL/L (ref 7–16)
AST SERPL-CCNC: 32 U/L (ref 15–37)
BASOPHILS # BLD: 0.06 K/UL (ref 0–0.2)
BASOPHILS NFR BLD: 1 % (ref 0–2)
BILIRUB SERPL-MCNC: 0.6 MG/DL (ref 0–1.2)
BUN SERPL-MCNC: 22 MG/DL (ref 8–23)
CALCIUM SERPL-MCNC: 9.7 MG/DL (ref 8.8–10.2)
CHLORIDE SERPL-SCNC: 105 MMOL/L (ref 98–107)
CHOLEST SERPL-MCNC: 93 MG/DL (ref 0–200)
CO2 SERPL-SCNC: 25 MMOL/L (ref 20–29)
CREAT SERPL-MCNC: 0.98 MG/DL (ref 0.8–1.3)
DIFFERENTIAL METHOD BLD: ABNORMAL
EOSINOPHIL # BLD: 0.09 K/UL (ref 0–0.8)
EOSINOPHIL NFR BLD: 1.6 % (ref 0.5–7.8)
ERYTHROCYTE [DISTWIDTH] IN BLOOD BY AUTOMATED COUNT: 14.4 % (ref 11.9–14.6)
GLOBULIN SER CALC-MCNC: 2.8 G/DL (ref 2.3–3.5)
GLUCOSE SERPL-MCNC: 91 MG/DL (ref 70–99)
HCT VFR BLD AUTO: 41.7 % (ref 41.1–50.3)
HDLC SERPL-MCNC: 46 MG/DL (ref 40–60)
HDLC SERPL: 2 (ref 0–5)
HGB BLD-MCNC: 13.4 G/DL (ref 13.6–17.2)
IMM GRANULOCYTES # BLD AUTO: 0.02 K/UL (ref 0–0.5)
IMM GRANULOCYTES NFR BLD AUTO: 0.3 % (ref 0–5)
LDLC SERPL CALC-MCNC: 35 MG/DL (ref 0–100)
LYMPHOCYTES # BLD: 1.03 K/UL (ref 0.5–4.6)
LYMPHOCYTES NFR BLD: 18 % (ref 13–44)
MCH RBC QN AUTO: 30.3 PG (ref 26.1–32.9)
MCHC RBC AUTO-ENTMCNC: 32.1 G/DL (ref 31.4–35)
MCV RBC AUTO: 94.3 FL (ref 82–102)
MONOCYTES # BLD: 0.47 K/UL (ref 0.1–1.3)
MONOCYTES NFR BLD: 8.2 % (ref 4–12)
NEUTS SEG # BLD: 4.06 K/UL (ref 1.7–8.2)
NEUTS SEG NFR BLD: 70.9 % (ref 43–78)
NRBC # BLD: 0 K/UL (ref 0–0.2)
PLATELET # BLD AUTO: 171 K/UL (ref 150–450)
PMV BLD AUTO: 12.3 FL (ref 9.4–12.3)
POTASSIUM SERPL-SCNC: 4.5 MMOL/L (ref 3.5–5.1)
PROT SERPL-MCNC: 6.4 G/DL (ref 6.3–8.2)
PSA FREE MFR SERPL: 31.3 %
PSA FREE SERPL-MCNC: 0.2 NG/ML
PSA SERPL-MCNC: 0.6 NG/ML (ref 0–4)
RBC # BLD AUTO: 4.42 M/UL (ref 4.23–5.6)
SODIUM SERPL-SCNC: 141 MMOL/L (ref 136–145)
T4 FREE SERPL-MCNC: 1.1 NG/DL (ref 0.9–1.7)
TRIGL SERPL-MCNC: 59 MG/DL (ref 0–150)
TSH, 3RD GENERATION: 1.47 UIU/ML (ref 0.27–4.2)
VLDLC SERPL CALC-MCNC: 12 MG/DL (ref 6–23)
WBC # BLD AUTO: 5.7 K/UL (ref 4.3–11.1)

## 2025-03-14 RX ORDER — LISINOPRIL 5 MG/1
5 TABLET ORAL DAILY
Qty: 90 TABLET | Refills: 4 | Status: SHIPPED | OUTPATIENT
Start: 2025-03-14

## 2025-03-14 RX ORDER — ATORVASTATIN CALCIUM 40 MG/1
40 TABLET, FILM COATED ORAL DAILY
Qty: 90 TABLET | Refills: 4 | Status: SHIPPED | OUTPATIENT
Start: 2025-03-14

## 2025-03-14 SDOH — ECONOMIC STABILITY: FOOD INSECURITY: WITHIN THE PAST 12 MONTHS, THE FOOD YOU BOUGHT JUST DIDN'T LAST AND YOU DIDN'T HAVE MONEY TO GET MORE.: NEVER TRUE

## 2025-03-14 SDOH — ECONOMIC STABILITY: FOOD INSECURITY: WITHIN THE PAST 12 MONTHS, YOU WORRIED THAT YOUR FOOD WOULD RUN OUT BEFORE YOU GOT MONEY TO BUY MORE.: NEVER TRUE

## 2025-03-14 ASSESSMENT — LIFESTYLE VARIABLES
HOW MANY STANDARD DRINKS CONTAINING ALCOHOL DO YOU HAVE ON A TYPICAL DAY: PATIENT DOES NOT DRINK
HOW OFTEN DO YOU HAVE A DRINK CONTAINING ALCOHOL: NEVER

## 2025-03-14 ASSESSMENT — PATIENT HEALTH QUESTIONNAIRE - PHQ9
SUM OF ALL RESPONSES TO PHQ QUESTIONS 1-9: 1
SUM OF ALL RESPONSES TO PHQ QUESTIONS 1-9: 1
3. TROUBLE FALLING OR STAYING ASLEEP: SEVERAL DAYS
6. FEELING BAD ABOUT YOURSELF - OR THAT YOU ARE A FAILURE OR HAVE LET YOURSELF OR YOUR FAMILY DOWN: NOT AT ALL
1. LITTLE INTEREST OR PLEASURE IN DOING THINGS: NOT AT ALL
9. THOUGHTS THAT YOU WOULD BE BETTER OFF DEAD, OR OF HURTING YOURSELF: NOT AT ALL
5. POOR APPETITE OR OVEREATING: NOT AT ALL
2. FEELING DOWN, DEPRESSED OR HOPELESS: NOT AT ALL
10. IF YOU CHECKED OFF ANY PROBLEMS, HOW DIFFICULT HAVE THESE PROBLEMS MADE IT FOR YOU TO DO YOUR WORK, TAKE CARE OF THINGS AT HOME, OR GET ALONG WITH OTHER PEOPLE: NOT DIFFICULT AT ALL
7. TROUBLE CONCENTRATING ON THINGS, SUCH AS READING THE NEWSPAPER OR WATCHING TELEVISION: NOT AT ALL
SUM OF ALL RESPONSES TO PHQ QUESTIONS 1-9: 1
4. FEELING TIRED OR HAVING LITTLE ENERGY: NOT AT ALL
SUM OF ALL RESPONSES TO PHQ QUESTIONS 1-9: 1
8. MOVING OR SPEAKING SO SLOWLY THAT OTHER PEOPLE COULD HAVE NOTICED. OR THE OPPOSITE, BEING SO FIGETY OR RESTLESS THAT YOU HAVE BEEN MOVING AROUND A LOT MORE THAN USUAL: NOT AT ALL

## 2025-03-14 ASSESSMENT — ENCOUNTER SYMPTOMS
BLOOD IN STOOL: 0
SHORTNESS OF BREATH: 0
RHINORRHEA: 0
ABDOMINAL PAIN: 0
COUGH: 0

## 2025-03-14 NOTE — PROGRESS NOTES
[facility]      3/14/2025    Patient Name: Taran Mays  :  1940    Subjective:    Chief Complaint:  Chief Complaint   Patient presents with    Medicare AWV     Pt is here for his yearly.          HPI Here for Medicare Wellness; he did not have fasting labs prior to today's visit; he would like to do labs today;   He has hx of bilateral knee pain, s/p bilateral TKAs; he saw CELINA Viramontes for f/u in January; xrays did not show any significant loosening of the implants; he does have bilateral extensor mechanism disruptions; he uses a WC; he declined PT for now; he was referred to physical medicine and rehabilitation physician, is to f/u prn; records reviewed;   He has CAD, denies CP, SOB; he is mostly in his WC, family assists with ADLs;   He does have a skin lesion on his R ear that is crusting, does not know how long it has been there; he also has several lesions on his face and head; he is agreeable with seeing a Dermatology;   Colonoscopy: , declined further  Eye exam:   Dental exam:   Pneumovax 23: 2006  Prevnar 13: 2016  Shingrix: 2010  Tdap:   Fluvax: declined  Pfizer Covid 19 booster: 2022  HTN:  Patient compliant with taking blood pressure medications: Yes  Discussed importance of following low sodium DASH diet, increasing physical activity, taking medications as ordered, decreasing alcohol intake, keeping f/u visits to recheck blood pressure, monitoring blood pressure at home and keeping a log, with goal blood pressure <140/90.  Home bp readings are: does not monitor      Past Medical History:   Diagnosis Date    Coronary artery disease involving native coronary artery of native heart without angina pectoris     Dr. Jarrett    History of coronary artery bypass graft 12/2021    x4    HLD (hyperlipidemia) 2012    Macular degeneration     Dr. Nunes    Osteoarthritis knee     Primary hypertension        Past Surgical History:   Procedure Laterality Date

## 2025-03-19 ENCOUNTER — RESULTS FOLLOW-UP (OUTPATIENT)
Dept: INTERNAL MEDICINE CLINIC | Facility: CLINIC | Age: 85
End: 2025-03-19

## 2025-04-08 ENCOUNTER — APPOINTMENT (OUTPATIENT)
Dept: URBAN - METROPOLITAN AREA CLINIC 25 | Facility: CLINIC | Age: 85
Setting detail: DERMATOLOGY
End: 2025-04-08

## 2025-04-08 DIAGNOSIS — L57.0 ACTINIC KERATOSIS: ICD-10-CM

## 2025-04-08 DIAGNOSIS — L81.4 OTHER MELANIN HYPERPIGMENTATION: ICD-10-CM

## 2025-04-08 DIAGNOSIS — D485 NEOPLASM OF UNCERTAIN BEHAVIOR OF SKIN: ICD-10-CM

## 2025-04-08 PROBLEM — D48.5 NEOPLASM OF UNCERTAIN BEHAVIOR OF SKIN: Status: ACTIVE | Noted: 2025-04-08

## 2025-04-08 PROCEDURE — 17000 DESTRUCT PREMALG LESION: CPT | Mod: 59

## 2025-04-08 PROCEDURE — ? BIOPSY BY SHAVE METHOD

## 2025-04-08 PROCEDURE — ? LIQUID NITROGEN

## 2025-04-08 PROCEDURE — ? COUNSELING

## 2025-04-08 PROCEDURE — 11102 TANGNTL BX SKIN SINGLE LES: CPT

## 2025-04-08 PROCEDURE — 17003 DESTRUCT PREMALG LES 2-14: CPT

## 2025-04-08 PROCEDURE — 11103 TANGNTL BX SKIN EA SEP/ADDL: CPT

## 2025-04-08 ASSESSMENT — LOCATION SIMPLE DESCRIPTION DERM
LOCATION SIMPLE: LEFT SCALP
LOCATION SIMPLE: RIGHT EAR
LOCATION SIMPLE: SCALP
LOCATION SIMPLE: LEFT FOREHEAD
LOCATION SIMPLE: LEFT CHEEK
LOCATION SIMPLE: RIGHT FOREHEAD
LOCATION SIMPLE: RIGHT OCCIPITAL SCALP

## 2025-04-08 ASSESSMENT — LOCATION DETAILED DESCRIPTION DERM
LOCATION DETAILED: LEFT SUPERIOR LATERAL MALAR CHEEK
LOCATION DETAILED: LEFT CENTRAL MALAR CHEEK
LOCATION DETAILED: LEFT LATERAL FOREHEAD
LOCATION DETAILED: RIGHT INFERIOR CRUS OF ANTIHELIX
LOCATION DETAILED: RIGHT SUPERIOR FOREHEAD
LOCATION DETAILED: LEFT SUPERIOR MEDIAL FOREHEAD
LOCATION DETAILED: RIGHT CENTRAL PARIETAL SCALP
LOCATION DETAILED: LEFT SUPERIOR PARIETAL SCALP
LOCATION DETAILED: RIGHT SUPERIOR OCCIPITAL SCALP
LOCATION DETAILED: LEFT MEDIAL FRONTAL SCALP

## 2025-04-08 ASSESSMENT — LOCATION ZONE DERM
LOCATION ZONE: FACE
LOCATION ZONE: SCALP
LOCATION ZONE: EAR

## 2025-04-08 NOTE — PROCEDURE: BIOPSY BY SHAVE METHOD
Detail Level: Detailed
Depth Of Biopsy: dermis
Was A Bandage Applied: Yes
Size Of Lesion In Cm: 0
Additional Anticipated Plans: If malignant consider Mohs surgery
Biopsy Type: H and E
Biopsy Method: Dermablade
Anesthesia Type: 1% lidocaine with epinephrine
Anesthesia Volume In Cc: 0.5
Hemostasis: Drysol
Wound Care: Petrolatum
Dressing: bandage
Destruction After The Procedure: No
Type Of Destruction Used: Curettage
Curettage Text: The wound bed was treated with curettage after the biopsy was performed.
Cryotherapy Text: The wound bed was treated with cryotherapy after the biopsy was performed.
Electrodesiccation Text: The wound bed was treated with electrodesiccation after the biopsy was performed.
Electrodesiccation And Curettage Text: The wound bed was treated with electrodesiccation and curettage after the biopsy was performed.
Silver Nitrate Text: The wound bed was treated with silver nitrate after the biopsy was performed.
Lab: [id number]
Lab Facility: [id number]
Medical Necessity Information: It is in your best interest to select a reason for this procedure from the list below. All of these items fulfill various CMS LCD requirements except the new and changing color options.
Consent: Written consent was obtained and risks were reviewed including but not limited to scarring, infection, bleeding, scabbing, incomplete removal, nerve damage and allergy to anesthesia.
Post-Care Instructions: I reviewed with the patient in detail post-care instructions. Patient is to keep the biopsy site dry overnight, and then apply bacitracin twice daily until healed. Patient may apply hydrogen peroxide soaks to remove any crusting.
Notification Instructions: Patient will be notified of biopsy results. However, patient instructed to call the office if not contacted within 2 weeks.
Billing Type: Third-Party Bill
Information: Selecting Yes will display possible errors in your note based on the variables you have selected. This validation is only offered as a suggestion for you. PLEASE NOTE THAT THE VALIDATION TEXT WILL BE REMOVED WHEN YOU FINALIZE YOUR NOTE. IF YOU WANT TO FAX A PRELIMINARY NOTE YOU WILL NEED TO TOGGLE THIS TO 'NO' IF YOU DO NOT WANT IT IN YOUR FAXED NOTE.

## 2025-04-28 ENCOUNTER — RX ONLY (RX ONLY)
Age: 85
End: 2025-04-28

## 2025-04-28 RX ORDER — CEPHALEXIN 500 MG/1
CAPSULE ORAL
Qty: 4 | Refills: 0 | Status: ERX | COMMUNITY
Start: 2025-04-28

## 2025-05-01 ENCOUNTER — APPOINTMENT (OUTPATIENT)
Dept: URBAN - METROPOLITAN AREA CLINIC 23 | Facility: CLINIC | Age: 85
Setting detail: DERMATOLOGY
End: 2025-05-01

## 2025-05-01 PROBLEM — D04.4 CARCINOMA IN SITU OF SKIN OF SCALP AND NECK: Status: ACTIVE | Noted: 2025-05-01

## 2025-05-01 PROCEDURE — ? MOHS SURGERY

## 2025-05-01 PROCEDURE — 17312 MOHS ADDL STAGE: CPT

## 2025-05-01 PROCEDURE — 17311 MOHS 1 STAGE H/N/HF/G: CPT

## 2025-05-22 ENCOUNTER — APPOINTMENT (OUTPATIENT)
Dept: URBAN - METROPOLITAN AREA CLINIC 23 | Facility: CLINIC | Age: 85
Setting detail: DERMATOLOGY
End: 2025-05-22

## 2025-05-22 DIAGNOSIS — Z48.01 ENCOUNTER FOR CHANGE OR REMOVAL OF SURGICAL WOUND DRESSING: ICD-10-CM

## 2025-05-22 PROCEDURE — ? POST-OP WOUND CHECK

## 2025-05-22 PROCEDURE — 99024 POSTOP FOLLOW-UP VISIT: CPT

## 2025-05-22 ASSESSMENT — LOCATION DETAILED DESCRIPTION DERM: LOCATION DETAILED: LEFT SUPERIOR PARIETAL SCALP

## 2025-05-22 ASSESSMENT — LOCATION SIMPLE DESCRIPTION DERM: LOCATION SIMPLE: SCALP

## 2025-05-22 ASSESSMENT — LOCATION ZONE DERM: LOCATION ZONE: SCALP

## 2025-05-22 NOTE — PROCEDURE: POST-OP WOUND CHECK
Detail Level: Detailed
Add 79206 Cpt? (Important Note: In 2017 The Use Of 29543 Is Being Tracked By Cms To Determine Future Global Period Reimbursement For Global Periods): yes

## 2025-06-11 NOTE — PROGRESS NOTES
Glucosamine-Chondroitin 3364-9249 MG/30ML LIQD Take by mouth   Yes Provider, MD Candie   Multiple Vitamin (MULTIVITAMIN ADULT PO) Take by mouth   Yes ProviderCandie MD   Omega-3 1000 MG CAPS Take by mouth   Yes ProviderCandie MD   aspirin 81 MG EC tablet Take 1 tablet by mouth daily   Yes Automatic Reconciliation, Ar   nitroGLYCERIN (NITROSTAT) 0.4 MG SL tablet Place 1 tablet under the tongue 1/3/22  Yes Automatic Reconciliation, Ar     No Known Allergies  Past Medical History:   Diagnosis Date    Coronary artery disease involving native coronary artery of native heart without angina pectoris     Dr. Jarrett    History of coronary artery bypass graft 12/2021    x4    HLD (hyperlipidemia) 2012    Macular degeneration     Dr. Nunes    Osteoarthritis knee     Primary hypertension      Past Surgical History:   Procedure Laterality Date    COLONOSCOPY  13    diverticulosis, internal hemorrhoids.  no further screening recommended    COLONOSCOPY      CORONARY ARTERY BYPASS GRAFT  2021    TOTAL KNEE ARTHROPLASTY Bilateral 2020     History reviewed. No pertinent family history.  Social History     Tobacco Use    Smoking status: Former     Current packs/day: 0.00     Average packs/day: 1 pack/day for 20.0 years (20.0 ttl pk-yrs)     Types: Cigarettes     Start date: 1956     Quit date: 1976     Years since quittin.4    Smokeless tobacco: Never   Substance Use Topics    Alcohol use: Not Currently       ROS:    Review of Systems   Cardiovascular:  Positive for leg swelling. Negative for chest pain.   Musculoskeletal:  Positive for arthritis and joint pain.          PHYSICAL EXAM:   /60   Pulse 65   Ht 1.778 m (5' 10\")   BMI 30.85 kg/m²      Wt Readings from Last 3 Encounters:   10/25/23 97.5 kg (215 lb)   22 93.4 kg (206 lb)     BP Readings from Last 3 Encounters:   25 110/60   25 132/82   24 108/60     Pulse Readings from Last 3 Encounters:

## 2025-06-13 ENCOUNTER — OFFICE VISIT (OUTPATIENT)
Age: 85
End: 2025-06-13
Payer: MEDICARE

## 2025-06-13 VITALS
BODY MASS INDEX: 30.85 KG/M2 | SYSTOLIC BLOOD PRESSURE: 110 MMHG | DIASTOLIC BLOOD PRESSURE: 60 MMHG | HEART RATE: 65 BPM | HEIGHT: 70 IN

## 2025-06-13 DIAGNOSIS — I10 PRIMARY HYPERTENSION: Primary | ICD-10-CM

## 2025-06-13 DIAGNOSIS — Z95.1 S/P CABG X 4: ICD-10-CM

## 2025-06-13 DIAGNOSIS — I49.5 SINUS NODE DYSFUNCTION (HCC): ICD-10-CM

## 2025-06-13 PROCEDURE — 3074F SYST BP LT 130 MM HG: CPT | Performed by: INTERNAL MEDICINE

## 2025-06-13 PROCEDURE — 1126F AMNT PAIN NOTED NONE PRSNT: CPT | Performed by: INTERNAL MEDICINE

## 2025-06-13 PROCEDURE — 1159F MED LIST DOCD IN RCRD: CPT | Performed by: INTERNAL MEDICINE

## 2025-06-13 PROCEDURE — 1036F TOBACCO NON-USER: CPT | Performed by: INTERNAL MEDICINE

## 2025-06-13 PROCEDURE — G8417 CALC BMI ABV UP PARAM F/U: HCPCS | Performed by: INTERNAL MEDICINE

## 2025-06-13 PROCEDURE — 1160F RVW MEDS BY RX/DR IN RCRD: CPT | Performed by: INTERNAL MEDICINE

## 2025-06-13 PROCEDURE — 1123F ACP DISCUSS/DSCN MKR DOCD: CPT | Performed by: INTERNAL MEDICINE

## 2025-06-13 PROCEDURE — 99214 OFFICE O/P EST MOD 30 MIN: CPT | Performed by: INTERNAL MEDICINE

## 2025-06-13 PROCEDURE — 3078F DIAST BP <80 MM HG: CPT | Performed by: INTERNAL MEDICINE

## 2025-06-13 PROCEDURE — 93000 ELECTROCARDIOGRAM COMPLETE: CPT | Performed by: INTERNAL MEDICINE

## 2025-06-13 PROCEDURE — G8427 DOCREV CUR MEDS BY ELIG CLIN: HCPCS | Performed by: INTERNAL MEDICINE

## (undated) DEVICE — 3M™ STERI-STRIP™ REINFORCED ADHESIVE SKIN CLOSURES, R1547, 1/2 IN X 4 IN (12 MM X 100 MM), 6 STRIPS/ENVELOPE: Brand: 3M™ STERI-STRIP™

## (undated) DEVICE — TRAY PREP DRY W/ PREM GLV 2 APPL 6 SPNG 2 UNDPD 1 OVERWRAP

## (undated) DEVICE — SOLUTION IRRIG 3000ML 0.9% SOD CHL FLX CONT 0797208] ICU MEDICAL INC]

## (undated) DEVICE — TOTAL KNEE DR RIDGEWAY: Brand: MEDLINE INDUSTRIES, INC.

## (undated) DEVICE — BLADE SCALP SURG BARD-PARK 11 --

## (undated) DEVICE — CATHETER THOR 24FR L22IN PVC 5 EYELET STR ATRAUM

## (undated) DEVICE — GOWN,REINF,POLY,ECL,PP SLV,XL: Brand: MEDLINE

## (undated) DEVICE — SUTURE VCRL SZ 2-0 L27IN ABSRB UD L36MM CP-1 1/2 CIR REV J266H

## (undated) DEVICE — GUIDEWIRE VASC L260CM DIA0.035IN RAD 3MM J TIP L7CM PTFE

## (undated) DEVICE — SUTURE VCRL SZ 1 L27IN ABSRB UD L36MM CP-1 1/2 CIR REV CUT J268H

## (undated) DEVICE — BIPOLAR SEALER 23-112-1 AQM 6.0: Brand: AQUAMANTYS ®

## (undated) DEVICE — SPONGE LAP 18X18IN STRL -- 5/PK

## (undated) DEVICE — BUTTON SWITCH PENCIL BLADE ELECTRODE, HOLSTER: Brand: EDGE

## (undated) DEVICE — Device: Brand: JELCO

## (undated) DEVICE — CLOSURE SKIN FLX NONINVASIVE PRELOC TECHNOLOGY FOR 24IN

## (undated) DEVICE — STERILE PRESSURE PROTECTOR PAD® FOR DE MAYO UNIVERSAL DISTRACTOR® (10/CASE): Brand: DE MAYO UNIVERSAL DISTRACTOR®

## (undated) DEVICE — STERILE HOOK LOCK LATEX FREE ELASTIC BANDAGE 6INX5YD: Brand: HOOK LOCK™

## (undated) DEVICE — (D)PREP SKN CHLRAPRP APPL 26ML -- CONVERT TO ITEM 371833

## (undated) DEVICE — PLEDGET VASC W3/16XL3/8IN THK1/16IN PTFE SFT

## (undated) DEVICE — CATH GUID COR EB30 5FR 100CM -- LAUNCHER

## (undated) DEVICE — HANDPIECE SET WITH COAXIAL HIGH FLOW TIP AND SUCTION TUBE: Brand: INTERPULSE

## (undated) DEVICE — GUIDE NDL ST W/ 14 X 147CM TELESCOPICALLY FLD CIV FLX CVR

## (undated) DEVICE — SUTURE STRATAFIX SPRL SZ 1 L14IN ABSRB VLT L48CM CTX 1/2 SXPD2B405

## (undated) DEVICE — REM POLYHESIVE ADULT PATIENT RETURN ELECTRODE: Brand: VALLEYLAB

## (undated) DEVICE — T4 HOOD

## (undated) DEVICE — STERILE HOOK LOCK LATEX FREE ELASTIC BANDAGE 4INX5YD: Brand: HOOK LOCK™

## (undated) DEVICE — SYR 50ML LR LCK 1ML GRAD NSAF --

## (undated) DEVICE — BNDG SELF ADH 4INX5YD STRL LF -- COFLEX LF2

## (undated) DEVICE — X-RAY SPONGES,12 PLY: Brand: DERMACEA

## (undated) DEVICE — APPLIER CLP LIG SM TI PREM SURGCLP SUPER INTLOK 20 DISP

## (undated) DEVICE — SUT PROL 4-0 30IN SH1 DA BLU --

## (undated) DEVICE — SUTURE VCRL SZ 4-0 L27IN ABSRB UD L19MM PS-2 3/8 CIR PRIM J426H

## (undated) DEVICE — DUAL CUT SAGITTAL BLADE

## (undated) DEVICE — PAD,NON-ADHERENT,3X8,STERILE,LF,1/PK: Brand: MEDLINE

## (undated) DEVICE — SYS VSL HARV HEMOPRO2 VASOVIEW -- HARV SYS MINIMUM ORDER 5/EA

## (undated) DEVICE — Device

## (undated) DEVICE — MEDI-TRACE CADENCE ADULT, DEFIBRILLATION ELECTRODE -RTS  (10 PR/PK) - ZOLL: Brand: MEDI-TRACE CADENCE

## (undated) DEVICE — SUTURE PROL SZ 6-0 L30IN NONABSORBABLE BLU L13MM CC-1 3/8 M8707

## (undated) DEVICE — AORTIC PUNCHES ARE USED TO CREATE A UNIFORM OPENING IN BLOOD VESSELS DURING CARDIOVASCULAR SURGERY. THE VESSEL GRAFT IS INSERTED INTO THE CREATED OPENING AND SUTURED TO THE VESSEL WALL. AORTIC LANCETS ARE USED TO MAKE A SMALL UNIFORM CUT IN A BLOOD VESSEL TO FACILITATE INSERTION OF AN AORTIC PUNCH.  PUNCHES COME IN VARIOUS LENGTHS, DIAMETERS AND TIP CONFIGURATIONS.: Brand: CLEANCUT ROTATING AORTIC PUNCH

## (undated) DEVICE — GOWN,REINFORCED,POLY,AURORA,XXLARGE,STR: Brand: MEDLINE

## (undated) DEVICE — CATHETER DIAG AD 6FR L100CM 0.038IN COR POLYUR JUDKINS R 5

## (undated) DEVICE — 6 FOOT DISPOSABLE EXTENSION CABLE WITH SAFE CONNECT / SCREW-DOWN

## (undated) DEVICE — Device: Brand: POWER-FLO®

## (undated) DEVICE — CATHETER VASC 6 FR DIAG PGTL W/ SIDE H COR 110 CM LEN W/OUT

## (undated) DEVICE — CLOSURE SKIN FACILITATES COMPATIBILITY W/ CERTAIN IS DSG

## (undated) DEVICE — CONNECTOR IV 3/8X3/8X3/8 Y

## (undated) DEVICE — 3M™ IOBAN™ 2 ANTIMICROBIAL INCISE DRAPE 6648EZ: Brand: IOBAN™ 2

## (undated) DEVICE — GUIDE CATHETER: Brand: RUNWAY™

## (undated) DEVICE — CATHETER,URETHRAL,REDRUBBER,STRL,14FR: Brand: MEDLINE INDUSTRIES, INC.

## (undated) DEVICE — DRAPE SHT 3 QTR PROXIMA 53X77 --

## (undated) DEVICE — Z DISCONTINUED USE 2744636  DRESSING AQUACEL 14 IN ALG W3.5XL14IN POLYUR FLM CVR W/ HYDRCOLL

## (undated) DEVICE — 48" PROBE COVER W/GEL, ULTRASOUND, STERILE: Brand: SITE-RITE

## (undated) DEVICE — DRAPE,U/SHT,SPLIT,FILM,60X84,STERILE: Brand: MEDLINE

## (undated) DEVICE — SUT VCRL + 2-0 27IN CP1 VIO --

## (undated) DEVICE — ZIMMER® STERILE DISPOSABLE TOURNIQUET CUFF WITH PROTECTIVE SLEEVE, DUAL PORT, SINGLE BLADDER, 34 IN. (86 CM)

## (undated) DEVICE — AMD ANTIMICROBIAL GAUZE SPONGES,12 PLY USP TYPE VII, 0.2% POLYHEXAMETHYLENE BIGUANIDE HCI (PHMB): Brand: CURITY

## (undated) DEVICE — SUTURE MERS 5MM TAPE L12IN NONABSORBABLE WHT L48MM CTX 1/2 RS22

## (undated) DEVICE — SOLUTION IRRIG 1000ML 09% SOD CHL USP PIC PLAS CONTAINER

## (undated) DEVICE — SOLUTION IV 1000ML 0.9% SOD CHL

## (undated) DEVICE — CANNULA INJ L2.5IN BLNT TIP 3MM CLR BODY W/ 1 W VLV DLP

## (undated) DEVICE — SYR LR LCK 1ML GRAD NSAF 30ML --

## (undated) DEVICE — HOOD: Brand: FLYTE

## (undated) DEVICE — SOL IRR SOD CL 0.9% 3000ML --

## (undated) DEVICE — GLOVE SURG SZ 7 L11.2IN THK9.8MIL STRW LTX POLYMER BEAD CUF

## (undated) DEVICE — SOLUTION IV 250ML 0.9% SOD CHL CLR INJ FLX BG CONT PRT CLSR

## (undated) DEVICE — BNDG COHESIVE 4INX5YD COBAN --

## (undated) DEVICE — GLIDESHEATH SLENDER STAINLESS STEEL KIT: Brand: GLIDESHEATH SLENDER

## (undated) DEVICE — SUTURE PERMAHAND SZ 2-0 L12X18IN NONABSORBABLE BLK SILK A185H

## (undated) DEVICE — SUT SLK 2-0 18IN FS BLK --

## (undated) DEVICE — DRAPE,TOP,102X53,STERILE: Brand: MEDLINE

## (undated) DEVICE — 4.0MM PRECISION ROUND

## (undated) DEVICE — APPLIER CLP L9.38IN M LIG TI DISP STR RNG HNDL LIGACLP

## (undated) DEVICE — PREP SKN CHLRAPRP APL 26ML STR --

## (undated) DEVICE — DRAPE SLUSH DISC W44XL66IN ST FOR RND BSIN HUSH SLUSH SYS

## (undated) DEVICE — SS SUTURE, 3 PER SLEEVE: Brand: MYO/WIRE II

## (undated) DEVICE — BLADE SAW W12.5XL70MM THK0.8MM CUT THK1.12MM S STL RECIP

## (undated) DEVICE — 3M™ STERI-DRAPE™ INSTRUMENT POUCH 1018: Brand: STERI-DRAPE™

## (undated) DEVICE — SUT PROL 3-0 36IN SH DA BLU --

## (undated) DEVICE — CABG DR WILLIAMS: Brand: MEDLINE INDUSTRIES, INC.

## (undated) DEVICE — BANDAGE COMPR W6INXL12FT SMOOTH FOR LIMB EXSANG ESMARCH

## (undated) DEVICE — PREP TY SKN POV IOD SCRB PAINT --

## (undated) DEVICE — CATHETER THOR 32FR L23IN PVC 6 EYELET STR ATRAUM

## (undated) DEVICE — STAPLER SKN FIX HD COUNT STRL -- SUB STAPLER35WA  6/CA $58.16

## (undated) DEVICE — SOLUTION IV 500ML 0.9% SOD CHL FLX CONT

## (undated) DEVICE — SUTURE PDS II SZ 1 L36IN ABSRB VLT L48MM CTX 1/2 CIR Z371T

## (undated) DEVICE — MCLASS OSCILLATING SAW BLADE 19 X 1.27 (0.050") X 90 MM: Brand: MCLASS

## (undated) DEVICE — SUTURE ETHBND EXCEL SZ 5 L30IN NONABSORBABLE GRN L40MM V-37 MB66G

## (undated) DEVICE — TOTAL 1-LAYER TRAY, LATEX FOLEY, 16FR 10: Brand: MEDLINE

## (undated) DEVICE — 1/4 FORCE SURGICAL SPRING CLIP: Brand: STEALTH® SPRING CLIP

## (undated) DEVICE — BLADE SURG NO15 S STL STR DISP GLASSVAN

## (undated) DEVICE — SURGIFOAM SPNG SZ 100

## (undated) DEVICE — BANDAGE,GAUZE,BULKEE II,4.5"X4.1YD,STRL: Brand: MEDLINE

## (undated) DEVICE — RADIFOCUS OPTITORQUE ANGIOGRAPHIC CATHETER: Brand: OPTITORQUE

## (undated) DEVICE — 3000CC GUARDIAN II: Brand: GUARDIAN

## (undated) DEVICE — CATHETER GUID 5FR DIA0.058IN SHFT NYL STD JL 3 CRV ENH VIS

## (undated) DEVICE — CURETTE BNE CEM 10IN DISP --

## (undated) DEVICE — CATHETER ETER TY TEMP SENS F MBO W URIN M FOLLOWS CDC GUIDELINES TO

## (undated) DEVICE — YANKAUER,BULB TIP,W/O VENT,RIGID,STERILE: Brand: MEDLINE

## (undated) DEVICE — GUIDEWIRE VASC J 3 CM 0.014 INX190 CM BAL MIDWT 1001780J

## (undated) DEVICE — SUT VCRL + 1 27IN CP VIO --

## (undated) DEVICE — SUTURE ETHBND EXCEL 2-0 L30IN NONABSORBABLE GRN L26MM SH MX563

## (undated) DEVICE — SUTURE NONABSORBABLE L24IN SZ 7-0 M0-5 BV175-8 EP 24 BLU M8745

## (undated) DEVICE — SUTURE PROL DBL ARMED 8 0 BV130 5 24IN N ABSRB MFIL BLU M8739

## (undated) DEVICE — CATHETER DIAG SM AD 6FR L100CM 0.038IN COR POLYUR JUDKINS L

## (undated) DEVICE — CATHETER GUID EXTRA BACKUP 3.5 0.070IN 6FR 100CM VISTA BRITE TIP

## (undated) DEVICE — 2.5MM DRILL BIT/QC/GOLD/110MM

## (undated) DEVICE — BLADE SAW W12.5XL73.5MM THK0.8MM CUT THK1MM RECIP FOR L BNE

## (undated) DEVICE — BLADE SURG NO20 S STL STR DISP GLASSVAN

## (undated) DEVICE — PINNACLE TIF INTRODUCER SHEATH: Brand: PINNACLE

## (undated) DEVICE — SUTURE ETHBND EXCEL SZ 0 L18IN NONABSORBABLE GRN L36MM CT-1 CX21D

## (undated) DEVICE — CATH GUID COR EB35 5FR 100CM -- LAUNCHER

## (undated) DEVICE — CANNULA PERF L1.8MM TIP L1MM S STL SHFT BLB SHP TIP FEM

## (undated) DEVICE — NEEDLE HYPO 18GA L1.5IN PNK S STL HUB POLYPR SHLD REG BVL

## (undated) DEVICE — DUAL LUMEN STOMACH TUBE: Brand: SALEM SUMP

## (undated) DEVICE — BLADE SAW PAT RMR PILT H 51MM --

## (undated) DEVICE — BLADE SCALP SURG BARD-PARK 10 --

## (undated) DEVICE — Z DUP USE 2481924 BALLOON INT AORT 7.5FR 40CC LIN

## (undated) DEVICE — BLADE SAW W10XL54MM FOR PRI REPEAT STRNOTMY

## (undated) DEVICE — BASIC SINGLE BASIN-LF: Brand: MEDLINE INDUSTRIES, INC.

## (undated) DEVICE — CLAMP INSERT: Brand: STEALTH® CLAMP INSERT

## (undated) DEVICE — BLADE OPHTH MINI BEAV SHRP --

## (undated) DEVICE — 2000CC GUARDIAN II: Brand: GUARDIAN

## (undated) DEVICE — SUTURE VCRL SZ 3-0 L36IN ABSRB UD L36MM CT-1 1/2 CIR J944H

## (undated) DEVICE — SUTURE SILK PERMAHAND PRECUT 6 X 30 IN SZ 1 BLK BRAID A307H

## (undated) DEVICE — COPILOT BLEEDBACK CONTROL VALVE: Brand: COPILOT

## (undated) DEVICE — PACK PROCEDURE SURG TOT KNEE

## (undated) DEVICE — SUTURE S STL SZ 6 L18IN NONABSORBABLE SIL L48MM CCS 1/2 CIR M654G

## (undated) DEVICE — SOL INJ NACL 0.9% 500ML --